# Patient Record
Sex: FEMALE | Race: WHITE | Employment: OTHER | URBAN - METROPOLITAN AREA
[De-identification: names, ages, dates, MRNs, and addresses within clinical notes are randomized per-mention and may not be internally consistent; named-entity substitution may affect disease eponyms.]

---

## 2017-12-18 ENCOUNTER — OFFICE VISIT (OUTPATIENT)
Dept: CARDIOLOGY CLINIC | Age: 82
End: 2017-12-18

## 2017-12-18 ENCOUNTER — CLINICAL SUPPORT (OUTPATIENT)
Dept: CARDIOLOGY CLINIC | Age: 82
End: 2017-12-18

## 2017-12-18 VITALS
BODY MASS INDEX: 21.73 KG/M2 | OXYGEN SATURATION: 94 % | WEIGHT: 107.8 LBS | DIASTOLIC BLOOD PRESSURE: 62 MMHG | HEIGHT: 59 IN | SYSTOLIC BLOOD PRESSURE: 160 MMHG | RESPIRATION RATE: 18 BRPM | HEART RATE: 79 BPM

## 2017-12-18 DIAGNOSIS — I25.10 CORONARY ARTERY DISEASE INVOLVING NATIVE CORONARY ARTERY OF NATIVE HEART WITHOUT ANGINA PECTORIS: Primary | ICD-10-CM

## 2017-12-18 DIAGNOSIS — R06.02 SHORTNESS OF BREATH: ICD-10-CM

## 2017-12-18 DIAGNOSIS — R09.89 CAROTID BRUIT, UNSPECIFIED LATERALITY: Primary | ICD-10-CM

## 2017-12-18 DIAGNOSIS — R00.2 HEART PALPITATIONS: Primary | ICD-10-CM

## 2017-12-18 DIAGNOSIS — H53.9 VISUAL DISTURBANCE: ICD-10-CM
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
## 2017-12-18 NOTE — PROGRESS NOTES
1. Have you been to the ER, urgent care clinic since your last visit? Hospitalized since your last visit? No.    2. Have you seen or consulted any other health care providers outside of the [address] [place] [address] since your last visit? Include any pap smears or colon screening. Yes her PCP.       Chief Complaint   Patient presents with    Annual Exam     soboe- PT STOPPED TAKING HER METOPROLOL DUE TO DOUBLE VISION

## 2017-12-20 NOTE — PROGRESS NOTES
Spoke to patient using 2 identifiers. Per Ewa Grubbs NP, pt was made aware that we are waiting for holter report to make the decision as to when to follow up. Pt verbalized understanding.

## 2017-12-20 NOTE — PROGRESS NOTES
Spoke to patient using 2 identifiers.   Per Christo Adhikari, NP, pt was made aware that carotid duplex is normal.

## 2017-12-20 NOTE — PROCEDURES
[facility]  *** FINAL REPORT ***    Name: Luisa Coker  MRN: [id number]      Outpatient  : 1928  HIS Order #: [id number]  [address] [facility] Visit #: [id number]  Date: 18 Dec 2017    TYPE OF TEST: Cerebrovascular Duplex    REASON FOR TEST    Right Carotid:-             Proximal               Mid                 Distal  cm/s  Systolic  Diastolic  Systolic  Diastolic  Systolic  Diastolic  CCA:     95.2       6.3       47.8       9.1       46.8      10.3  Bulb:  ECA:    163.0      21.3  ICA:    121.5      37.6        0.0                121.2      25.6  ICA/CCA:  2.6       3.7    ICA Stenosis: <50%    Right Vertebral:-  Finding: Antegrade  Sys:       25.7  Luisa:        9.1    Right Subclavian: Normal    Left Carotid:-            Proximal                Mid                 Distal  cm/s  Systolic  Diastolic  Systolic  Diastolic  Systolic  Diastolic  CCA:     02.2      11.1       87.8      17.3       76.8      13.2  Bulb:  ECA:    107.5      15.9  ICA:     64.9      20.1                            99.9      26.9  ICA/CCA:  0.8       1.5    ICA Stenosis: <50%    Left Vertebral:-  Finding: Antegrade  Sys:       37.4  Luisa:       11.3    Left Subclavian: Normal    INTERPRETATION/FINDINGS  1. Bilateral <50% stenosis of the internal carotid arteries. 2. No significant stenosis in the external carotid arteries  bilaterally. 3. Antegrade flow in both vertebral arteries. 4. Normal flow in both subclavian arteries. Plaque Morphology:  1. Homogeneous plaque in the bulb and right ICA. 2. Homogeneous plaque in the bulb and left ICA. ADDITIONAL COMMENTS    I have personally reviewed the data relevant to the interpretation of  this  study. TECHNOLOGIST: BUTCH Ellsworth  Signed:    PHYSICIAN: Myra Juarez.  Alanna Friend MD  Signed: 2017 08:40 AM

## 2017-12-22 ENCOUNTER — TELEPHONE (OUTPATIENT)
Dept: OTHER | Age: 82
End: 2017-12-22

## 2017-12-22 NOTE — TELEPHONE ENCOUNTER
Please call pt, she has some questions regarding monitor results. She will be leaving home in the next hour. She has requested that we call her daughter in law who she will be with Hilary Rollins) she is listed on [employer].  Her cell number is [phone]      Thanks

## 2017-12-22 NOTE — TELEPHONE ENCOUNTER
Spoke with Beau Teague on [address] [place] [address] Verified patient with two identifiers.  Informed her of abnormal heart monitor and need to discuss further with Dr Emily Castro.

## 2017-12-26 ENCOUNTER — OFFICE VISIT (OUTPATIENT)
Dept: CARDIOLOGY CLINIC | Age: 82
End: 2017-12-26

## 2017-12-26 VITALS
WEIGHT: 104.5 LBS | BODY MASS INDEX: 21.07 KG/M2 | RESPIRATION RATE: 18 BRPM | SYSTOLIC BLOOD PRESSURE: 154 MMHG | HEIGHT: 59 IN | DIASTOLIC BLOOD PRESSURE: 70 MMHG | OXYGEN SATURATION: 93 % | HEART RATE: 48 BPM

## 2017-12-26 DIAGNOSIS — R42 DIZZINESS: ICD-10-CM

## 2017-12-26 DIAGNOSIS — I49.5 SSS (SICK SINUS SYNDROME) (HCC): ICD-10-CM

## 2017-12-26 DIAGNOSIS — R00.2 HEART PALPITATIONS: Primary | ICD-10-CM

## 2017-12-26 RX ORDER — LATANOPROST 50 UG/ML
1 SOLUTION/ DROPS OPHTHALMIC
Refills: 0 | COMMUNITY
Start: 2017-11-24

## 2017-12-26 NOTE — MR AVS SNAPSHOT
Visit Information Date & Time Provider Department Dept. Phone Encounter #  
 12/26/2017 10:15 AM Sharron Bob Lisandro, [address] [facility] [phone] [id number] Your Appointments 1/25/2018  3:15 PM  
PACEMAKER with PACEMAKER, [facility] [address]) Appt Note: f/u new dcpm,jaa  
 [phone] [facility] [address].  
[phone] [address] Upcoming Health Maintenance Date Due DTaP/Tdap/Td series (1 - Tdap) 1/29/1949 ZOSTER VACCINE AGE 60> 11/29/1987 GLAUCOMA SCREENING Q2Y 1/29/1993 OSTEOPOROSIS SCREENING (DEXA) 1/29/1993 MEDICARE YEARLY EXAM 1/29/1993 Pneumococcal 65+ Low/Medium Risk (2 of 2 - PCV13) 11/11/2015 Influenza Age 5 to Adult 8/1/2017 Allergies as of 12/26/2017  Review Complete On: 12/26/2017 By: Angie Villatoro LPN No Known Allergies Current Immunizations  Never Reviewed Name Date Influenza Vaccine 10/1/2014 Pneumococcal Polysaccharide (PPSV-23) 11/11/2014  8:53 AM  
  
 Not reviewed this visit You Were Diagnosed With   
  
 Codes Comments Heart palpitations    -  Primary ICD-10-CM: R00.2 ICD-9-CM: 785.1 SSS (sick sinus syndrome) (HCC)     ICD-10-CM: I49.5 ICD-9-CM: 427.81 Dizziness     ICD-10-CM: J33 ICD-9-CM: 780.4 Vitals BP Pulse Resp Height(growth percentile) Weight(growth percentile) SpO2  
 154/70 (BP 1 Location: Left arm, BP Patient Position: Sitting) (!) 48 18 4' 11\" (1.499 m) 104 lb 8 oz (47.4 kg) 93% BMI OB Status Smoking Status 21.11 kg/m2 Postmenopausal Former Smoker Vitals History BMI and BSA Data Body Mass Index Body Surface Area  
 21.11 kg/m 2 1.4 m 2 Your Updated Medication List  
  
   
This list is accurate as of: 12/26/17 11:11 AM.  Always use your most recent med list.  
  
  
  
  
 aspirin 325 mg tablet Commonly known as:  ASPIRIN  
 Take 325 mg by mouth daily. CALCIUM 600 WITH VITAMIN D3 600 mg(1,500mg) -400 unit Cap Generic drug:  Calcium-Cholecalciferol (D3) Take 1 Cap by mouth two (2) times a day. FLOVENT  mcg/actuation inhaler Generic drug:  fluticasone Take 2 puffs by inhalation every twelve (12) hours. FOSAMAX 70 mg tablet Generic drug:  alendronate Take 70 mg by mouth every Sunday. HYDROcodone-acetaminophen 5-325 mg per tablet Commonly known as:  Aleshia Ehsan Take 1 tablet by mouth every four (4) hours as needed for Pain.  
  
 latanoprost 0.005 % ophthalmic solution Commonly known as:  Verta Piety Administer 1 Drop to both eyes nightly. LEVOTHROID 75 mcg tablet Generic drug:  levothyroxine Take 75 mcg by mouth Daily (before breakfast). metoprolol succinate 25 mg XL tablet Commonly known as:  TOPROL-XL Take 0.5 Tabs by mouth every evening.  
  
 multivitamin tablet Commonly known as:  ONE A DAY Take 1 Tab by mouth daily. QVAR 80 mcg/actuation CEED Tech Generic drug:  beclomethasone 1 Puff as needed. simvastatin 40 mg tablet Commonly known as:  ZOCOR Take 40 mg by mouth nightly. We Performed the Following AMB POC EKG ROUTINE W/ 12 LEADS, INTER & REP [49340 CPT(R)] CBC WITH AUTOMATED DIFF [75172 CPT(R)] METABOLIC PANEL, COMPREHENSIVE [75953 CPT(R)] PROTHROMBIN TIME + INR [14851 CPT(R)] To-Do List   
 12/29/2017 Imaging:  XR CHEST PA LAT Introducing [facility]! Rosalia Souza introduces [employer] patient portal. Now you can access parts of your medical record, email your doctor's office, and request medication refills online. 1. In your internet browser, go to [email or web address] 2. Click on the First Time User? Click Here link in the Sign In box. You will see the New Member Sign Up page. 3. Enter your [employer] Access Code exactly as it appears below.  You will not need to use this code after youve completed the sign-up process. If you do not sign up before the expiration date, you must request a new code. · [employer] Access Code: [email or web address] Expires: 3/18/2018 12:51 PM 
 
4. Enter the last four digits of your Social Security Number (xxxx) and Date of Birth (mm/dd/yyyy) as indicated and click Submit. You will be taken to the next sign-up page. 5. Create a Mobile[employer]iter ID. This will be your [employer] login ID and cannot be changed, so think of one that is secure and easy to remember. 6. Create a [employer] password. You can change your password at any time. 7. Enter your Password Reset Question and Answer. This can be used at a later time if you forget your password. 8. Enter your e-mail address. You will receive e-mail notification when new information is available in [address] 9. Click Sign Up. You can now view and download portions of your medical record. 10. Click the Download Summary menu link to download a portable copy of your medical information. If you have questions, please visit the Frequently Asked Questions section of the [employer] website. Remember, Mobile[employer]iter is NOT to be used for urgent needs. For medical emergencies, dial 911. Now available from your iPhone and Android! Please provide this summary of care documentation to your next provider. Your primary care clinician is listed as Marie Christensen. If you have any questions after today's visit, please call [phone].

## 2017-12-26 NOTE — PROGRESS NOTES
1. Have you been to the ER, urgent care clinic since your last visit? Hospitalized since your last visit? No    2. Have you seen or consulted any other health care providers outside of the [address] [place] [address] since your last visit? Include any pap smears or colon screening. No    Chief Complaint   Patient presents with    Palpitations     See  results. Ref by Dr. Bre Hess. C/O rare SOB.

## 2017-12-26 NOTE — PROGRESS NOTES
Subjective:      Michael Roberson is a 80 y.o. female is here for EP consult. She has sob and dizziness. Her monitor was c/w sick sinus syndrome. Patient Active Problem List    Diagnosis Date Noted    Heart palpitations 12/18/2017    Shortness of breath 09/23/2016    Pneumonia 11/13/2014    Left humeral fracture 11/13/2014    Acute respiratory failure with hypoxia ([address] 11/13/2014    Sepsis ([facility] [address] 11/10/2014    CAD (coronary artery disease) 01/01/1996      Karel Dominguez MD  Past Medical History:   Diagnosis Date    CAD (coronary artery disease) 1996    MI     Cancer ([facility] [address]     breast, lung    Hypertension     Ill-defined condition     elevated cholesterol      Past Surgical History:   Procedure Laterality Date    BREAST SURGERY PROCEDURE UNLISTED      right mastectomy     No Known Allergies   No family history on file. negative for cardiac disease  Social History     Social History    Marital status:      Spouse name: N/A    Number of children: N/A    Years of education: N/A     Social History Main Topics    Smoking status: Former Smoker     Packs/day: 0.25     Years: 10.00     Quit date: 12/26/1989    Smokeless tobacco: Never Used    Alcohol use No    Drug use: No    Sexual activity: Not Asked     Other Topics Concern    None     Social History Narrative     Current Outpatient Prescriptions   Medication Sig    QVAR 80 mcg/actuation aero 1 Puff as needed.  latanoprost (XALATAN) 0.005 % ophthalmic solution Administer 1 Drop to both eyes nightly.  multivitamin (ONE A DAY) tablet Take 1 Tab by mouth daily.  simvastatin (ZOCOR) 40 mg tablet Take 40 mg by mouth nightly.  Calcium-Cholecalciferol, D3, (CALCIUM 600 WITH VITAMIN D3) 600 mg(1,500mg) -400 unit cap Take 1 Cap by mouth two (2) times a day.  aspirin (ASPIRIN) 325 mg tablet Take 325 mg by mouth daily.  levothyroxine (LEVOTHROID) 75 mcg tablet Take 75 mcg by mouth Daily (before breakfast).     metoprolol succinate (TOPROL-XL) 25 mg XL tablet Take 0.5 Tabs by mouth every evening. (Patient taking differently: Take 12.5 mg by mouth every evening. Indications: not taken in 2 months)    alendronate (FOSAMAX) 70 mg tablet Take 70 mg by mouth every Sunday.  hydrocodone-acetaminophen (NORCO) 5-325 mg per tablet Take 1 tablet by mouth every four (4) hours as needed for Pain.  fluticasone (FLOVENT HFA) 110 mcg/actuation inhaler Take 2 puffs by inhalation every twelve (12) hours. No current facility-administered medications for this visit. Vitals:    12/26/17 1016   BP: 154/70   Pulse: (!) 48   Resp: 18   SpO2: 93%   Weight: 104 lb 8 oz (47.4 kg)   Height: 4' 11\" (1.499 m)       I have reviewed the nurses notes, vitals, problem list, allergy list, medical history, family, social history and medications. Review of Symptoms:    General: Pt denies excessive weight gain or loss. Pt is able to conduct ADL's  HEENT: Denies blurred vision, headaches, epistaxis and difficulty swallowing. Respiratory: + shortness of breath, HAND, denies wheezing or stridor. Cardiovascular: Denies precordial pain, palpitations, edema or PND  Gastrointestinal: Denies poor appetite, indigestion, abdominal pain or blood in stool  Urinary: Denies dysuria, pyuria  Musculoskeletal: Denies pain or swelling from muscles or joints  Neurologic: Denies tremor, paresthesias, or sensory motor disturbance  Skin: Denies rash, itching or texture change. Psych: Denies depression      Physical Exam:      General: Well developed, in no acute distress. HEENT: Eyes - PERRL, no jvd  Heart:  Normal S1/S2 negative S3 or S4. Regular, no murmur, gallop or rub.   Respiratory: Clear bilaterally x 4, no wheezing or rales  Abdomen:   Soft, non-tender, bowel sounds are active.   Extremities:  No edema, normal cap refill, no cyanosis. Musculoskeletal: No clubbing  Neuro: A&Ox3, speech clear, gait stable. Skin: Skin color is normal. No rashes or lesions.  Non diaphoretic  Vascular: 2+ pulses symmetric in all extremities    Cardiographics    Ekg: nsr    Monitor - junctional rhythm c/w sss    Results for orders placed or performed during the hospital encounter of 09/01/16   EKG, 12 LEAD, INITIAL   Result Value Ref Range    Ventricular Rate 80 BPM    Atrial Rate 80 BPM    P-R Interval 112 ms    QRS Duration 94 ms    Q-T Interval 402 ms    QTC Calculation (Bezet) 463 ms    Calculated P Axis 69 degrees    Calculated R Axis -96 degrees    Calculated T Axis 16 degrees    Diagnosis       Sinus rhythm with occasional premature ventricular complexes  Left atrial enlargement  Right superior axis deviation  When compared with ECG of 10-NOV-2014 13:19,  premature ventricular complexes are now present  Confirmed by Alexandrea Dao ([id number]) on 9/2/2016 1:41:31 PM           Lab Results   Component Value Date/Time    WBC 8.8 09/01/2016 06:00 PM    HGB 15.0 09/01/2016 06:00 PM    HCT 44.8 09/01/2016 06:00 PM    PLATELET 322 92/53/1278 06:00 PM    MCV 89.8 09/01/2016 06:00 PM      Lab Results   Component Value Date/Time    Sodium 141 09/01/2016 06:00 PM    Potassium 4.0 09/01/2016 06:00 PM    Chloride 107 09/01/2016 06:00 PM    CO2 27 09/01/2016 06:00 PM    Anion gap 7 09/01/2016 06:00 PM    Glucose 87 09/01/2016 06:00 PM    BUN 23 09/01/2016 06:00 PM    Creatinine 1.04 09/01/2016 06:00 PM    BUN/Creatinine ratio 22 09/01/2016 06:00 PM    GFR est AA >60 09/01/2016 06:00 PM    GFR est non-AA 50 09/01/2016 06:00 PM    Calcium 8.7 09/01/2016 06:00 PM    Bilirubin, total 0.3 09/01/2016 06:00 PM    AST (SGOT) 19 09/01/2016 06:00 PM    Alk. phosphatase 66 09/01/2016 06:00 PM    Protein, total 6.9 09/01/2016 06:00 PM    Albumin 3.6 09/01/2016 06:00 PM    Globulin 3.3 09/01/2016 06:00 PM    A-G Ratio 1.1 09/01/2016 06:00 PM    ALT (SGPT) 24 09/01/2016 06:00 PM         Assessment:     Assessment:        ICD-10-CM ICD-9-CM    1.  Heart palpitations R00.2 785.1 AMB POC EKG ROUTINE W/ 12 LEADS, INTER & REP      QVAR 80 mcg/actuation aero      latanoprost (XALATAN) 0.005 % ophthalmic solution      CBC WITH AUTOMATED DIFF      PROTHROMBIN TIME + INR      METABOLIC PANEL, COMPREHENSIVE      XR CHEST PA LAT   2. SSS (sick sinus syndrome) ([facility]) I49.5 427.81 AMB POC EKG ROUTINE W/ 12 LEADS, INTER & REP      QVAR 80 mcg/actuation aero      latanoprost (XALATAN) 0.005 % ophthalmic solution      CBC WITH AUTOMATED DIFF      PROTHROMBIN TIME + INR      METABOLIC PANEL, COMPREHENSIVE      XR CHEST PA LAT   3. Dizziness R42 780.4 AMB POC EKG ROUTINE W/ 12 LEADS, INTER & REP      QVAR 80 mcg/actuation aero      latanoprost (XALATAN) 0.005 % ophthalmic solution      CBC WITH AUTOMATED DIFF      PROTHROMBIN TIME + INR      METABOLIC PANEL, COMPREHENSIVE      XR CHEST PA LAT     Orders Placed This Encounter    XR CHEST PA LAT     Standing Status:   Future     Standing Expiration Date:   2019     Order Specific Question:   Reason for Exam     Answer:   pre op     Order Specific Question:   Is Patient Allergic to Contrast Dye? Answer:   Unknown    CBC WITH AUTOMATED DIFF    PROTHROMBIN TIME + INR    METABOLIC PANEL, COMPREHENSIVE    AMB POC EKG ROUTINE W/ 12 LEADS, INTER & REP     Order Specific Question:   Reason for Exam:     Answer:   routine    QVAR 80 mcg/actuation aero     Si Puff as needed. Refill:  0    latanoprost (XALATAN) 0.005 % ophthalmic solution     Sig: Administer 1 Drop to both eyes nightly. Refill:  0        Plan:   Ms Sherie Diaz is a pleasant lady with sick sinus syndrome/sob and dizziness. She is a candidate for a pacemaker. I discussed the risks/benefits/alternatives of the procedure with the patient. Risks include (but are not limited to) bleeding, heart block, infection, cva/mi/tamponade/death. The patient understands and agrees to proceed. Thank you for this interesting consultation. Continue medical management for htn.     Thank you for allowing me to participate in [address] Janice Coronado 's care.     Jeremiah Lawson MD, Nadeen Mcdonnell

## 2018-01-02 ENCOUNTER — HOSPITAL ENCOUNTER (OUTPATIENT)
Dept: LAB | Age: 83
Discharge: HOME | End: 2018-01-02

## 2018-01-02 ENCOUNTER — HOSPITAL ENCOUNTER (OUTPATIENT)
Dept: GENERAL RADIOLOGY | Age: 83
Discharge: HOME | End: 2018-01-02
Payer: MEDICARE

## 2018-01-02 DIAGNOSIS — R00.2 HEART PALPITATIONS: ICD-10-CM

## 2018-01-02 DIAGNOSIS — I49.5 SSS (SICK SINUS SYNDROME) (HCC): ICD-10-CM

## 2018-01-02 DIAGNOSIS — R42 DIZZINESS: ICD-10-CM

## 2018-01-02 PROCEDURE — 71046 X-RAY EXAM CHEST 2 VIEWS: CPT

## 2018-01-03 LAB
ALBUMIN SERPL-MCNC: 4.1 G/DL (ref 3.5–4.7)
ALBUMIN/GLOB SERPL: 1.6 {RATIO} (ref 1.2–2.2)
ALP SERPL-CCNC: 60 IU/L (ref 39–117)
ALT SERPL-CCNC: 16 IU/L (ref 0–32)
AST SERPL-CCNC: 21 IU/L (ref 0–40)
BASOPHILS # BLD AUTO: 0.1 X10E3/UL (ref 0–0.2)
BASOPHILS NFR BLD AUTO: 2 %
BILIRUB SERPL-MCNC: 0.5 MG/DL (ref 0–1.2)
BUN SERPL-MCNC: 15 MG/DL (ref 8–27)
BUN/CREAT SERPL: 14 (ref 12–28)
CALCIUM SERPL-MCNC: 9.4 MG/DL (ref 8.7–10.3)
CHLORIDE SERPL-SCNC: 104 MMOL/L (ref 96–106)
CO2 SERPL-SCNC: 29 MMOL/L (ref 18–29)
CREAT SERPL-MCNC: 1.04 MG/DL (ref 0.57–1)
EOSINOPHIL # BLD AUTO: 0.2 X10E3/UL (ref 0–0.4)
EOSINOPHIL NFR BLD AUTO: 2 %
ERYTHROCYTE [DISTWIDTH] IN BLOOD BY AUTOMATED COUNT: 14.5 % (ref 12.3–15.4)
GLOBULIN SER CALC-MCNC: 2.6 G/DL (ref 1.5–4.5)
GLUCOSE SERPL-MCNC: 84 MG/DL (ref 65–99)
HCT VFR BLD AUTO: 45.9 % (ref 34–46.6)
HGB BLD-MCNC: 15.4 G/DL (ref 11.1–15.9)
IMM GRANULOCYTES # BLD: 0 X10E3/UL (ref 0–0.1)
IMM GRANULOCYTES NFR BLD: 0 %
INR PPP: 1.1 (ref 0.8–1.2)
INTERPRETATION: NORMAL
LYMPHOCYTES # BLD AUTO: 1.5 X10E3/UL (ref 0.7–3.1)
LYMPHOCYTES NFR BLD AUTO: 18 %
MCH RBC QN AUTO: 31.2 PG (ref 26.6–33)
MCHC RBC AUTO-ENTMCNC: 33.6 G/DL (ref 31.5–35.7)
MCV RBC AUTO: 93 FL (ref 79–97)
MONOCYTES # BLD AUTO: 0.7 X10E3/UL (ref 0.1–0.9)
MONOCYTES NFR BLD AUTO: 8 %
NEUTROPHILS # BLD AUTO: 6.1 X10E3/UL (ref 1.4–7)
NEUTROPHILS NFR BLD AUTO: 70 %
PLATELET # BLD AUTO: 254 X10E3/UL (ref 150–379)
POTASSIUM SERPL-SCNC: 4.2 MMOL/L (ref 3.5–5.2)
PROT SERPL-MCNC: 6.7 G/DL (ref 6–8.5)
PROTHROMBIN TIME: 11.2 SEC (ref 9.1–12)
RBC # BLD AUTO: 4.93 X10E6/UL (ref 3.77–5.28)
SODIUM SERPL-SCNC: 146 MMOL/L (ref 134–144)
WBC # BLD AUTO: 8.6 X10E3/UL (ref 3.4–10.8)

## 2018-01-09 ENCOUNTER — HOSPITAL ENCOUNTER (OUTPATIENT)
Dept: NON INVASIVE DIAGNOSTICS | Age: 83
Discharge: HOME | End: 2018-01-09
Attending: INTERNAL MEDICINE | Admitting: INTERNAL MEDICINE
Payer: MEDICARE

## 2018-01-09 ENCOUNTER — APPOINTMENT (OUTPATIENT)
Dept: GENERAL RADIOLOGY | Age: 83
End: 2018-01-09
Attending: INTERNAL MEDICINE
Payer: MEDICARE

## 2018-01-09 VITALS
RESPIRATION RATE: 19 BRPM | OXYGEN SATURATION: 93 % | SYSTOLIC BLOOD PRESSURE: 170 MMHG | BODY MASS INDEX: 19.63 KG/M2 | HEART RATE: 72 BPM | TEMPERATURE: 97.9 F | WEIGHT: 100 LBS | HEIGHT: 60 IN | DIASTOLIC BLOOD PRESSURE: 87 MMHG

## 2018-01-09 PROBLEM — I49.5 SSS (SICK SINUS SYNDROME) (HCC): Status: ACTIVE | Noted: 2018-01-09

## 2018-01-09 PROCEDURE — C1898 LEAD, PMKR, OTHER THAN TRANS: HCPCS

## 2018-01-09 PROCEDURE — 33208 INSRT HEART PM ATRIAL & VENT: CPT

## 2018-01-09 PROCEDURE — 74011250636 HC RX REV CODE- 250/636

## 2018-01-09 PROCEDURE — 71045 X-RAY EXAM CHEST 1 VIEW: CPT

## 2018-01-09 PROCEDURE — 77030018836 HC SOL IRR NACL ICUM -A

## 2018-01-09 PROCEDURE — A4565 SLINGS: HCPCS

## 2018-01-09 PROCEDURE — 77030011640 HC PAD GRND REM COVD -A

## 2018-01-09 PROCEDURE — 77030031139 HC SUT VCRL2 J&J -A

## 2018-01-09 PROCEDURE — C1894 INTRO/SHEATH, NON-LASER: HCPCS

## 2018-01-09 PROCEDURE — 74011250636 HC RX REV CODE- 250/636: Performed by: INTERNAL MEDICINE

## 2018-01-09 PROCEDURE — C1892 INTRO/SHEATH,FIXED,PEEL-AWAY: HCPCS

## 2018-01-09 PROCEDURE — 74011000250 HC RX REV CODE- 250

## 2018-01-09 PROCEDURE — 74011000258 HC RX REV CODE- 258: Performed by: INTERNAL MEDICINE

## 2018-01-09 PROCEDURE — 77030002996 HC SUT SLK J&J -A

## 2018-01-09 PROCEDURE — C1785 PMKR, DUAL, RATE-RESP: HCPCS

## 2018-01-09 PROCEDURE — 77030018729 HC ELECTRD DEFIB PAD CARD -B

## 2018-01-09 RX ORDER — NALOXONE HYDROCHLORIDE 0.4 MG/ML
0.4 INJECTION, SOLUTION INTRAMUSCULAR; INTRAVENOUS; SUBCUTANEOUS AS NEEDED
Status: DISCONTINUED | OUTPATIENT
Start: 2018-01-09 | End: 2018-01-09 | Stop reason: HOSPADM

## 2018-01-09 RX ORDER — FENTANYL CITRATE 50 UG/ML
INJECTION, SOLUTION INTRAMUSCULAR; INTRAVENOUS
Status: COMPLETED
Start: 2018-01-09 | End: 2018-01-09

## 2018-01-09 RX ORDER — HEPARIN SODIUM 200 [USP'U]/100ML
INJECTION, SOLUTION INTRAVENOUS
Status: COMPLETED
Start: 2018-01-09 | End: 2018-01-09

## 2018-01-09 RX ORDER — HEPARIN SODIUM 200 [USP'U]/100ML
500 INJECTION, SOLUTION INTRAVENOUS ONCE
Status: COMPLETED | OUTPATIENT
Start: 2018-01-09 | End: 2018-01-09

## 2018-01-09 RX ORDER — BACITRACIN 50000 [IU]/1
INJECTION, POWDER, FOR SOLUTION INTRAMUSCULAR
Status: COMPLETED
Start: 2018-01-09 | End: 2018-01-09

## 2018-01-09 RX ORDER — SODIUM CHLORIDE 0.9 % (FLUSH) 0.9 %
5-10 SYRINGE (ML) INJECTION AS NEEDED
Status: DISCONTINUED | OUTPATIENT
Start: 2018-01-09 | End: 2018-01-09 | Stop reason: HOSPADM

## 2018-01-09 RX ORDER — SODIUM CHLORIDE 0.9 % (FLUSH) 0.9 %
5-10 SYRINGE (ML) INJECTION EVERY 8 HOURS
Status: DISCONTINUED | OUTPATIENT
Start: 2018-01-09 | End: 2018-01-09 | Stop reason: HOSPADM

## 2018-01-09 RX ORDER — MIDAZOLAM HYDROCHLORIDE 1 MG/ML
INJECTION, SOLUTION INTRAMUSCULAR; INTRAVENOUS
Status: COMPLETED
Start: 2018-01-09 | End: 2018-01-09

## 2018-01-09 RX ORDER — FENTANYL CITRATE 50 UG/ML
12.5-5 INJECTION, SOLUTION INTRAMUSCULAR; INTRAVENOUS
Status: DISCONTINUED | OUTPATIENT
Start: 2018-01-09 | End: 2018-01-09 | Stop reason: HOSPADM

## 2018-01-09 RX ORDER — BACITRACIN 50000 [IU]/1
50000 INJECTION, POWDER, FOR SOLUTION INTRAMUSCULAR ONCE
Status: COMPLETED | OUTPATIENT
Start: 2018-01-09 | End: 2018-01-09

## 2018-01-09 RX ORDER — LIDOCAINE HYDROCHLORIDE 10 MG/ML
INJECTION INFILTRATION; PERINEURAL
Status: COMPLETED
Start: 2018-01-09 | End: 2018-01-09

## 2018-01-09 RX ORDER — LIDOCAINE HYDROCHLORIDE 10 MG/ML
1-40 INJECTION INFILTRATION; PERINEURAL
Status: DISCONTINUED | OUTPATIENT
Start: 2018-01-09 | End: 2018-01-09 | Stop reason: HOSPADM

## 2018-01-09 RX ORDER — MIDAZOLAM HYDROCHLORIDE 1 MG/ML
1-5 INJECTION, SOLUTION INTRAMUSCULAR; INTRAVENOUS
Status: DISCONTINUED | OUTPATIENT
Start: 2018-01-09 | End: 2018-01-09 | Stop reason: HOSPADM

## 2018-01-09 RX ORDER — CEFAZOLIN SODIUM 1 G/3ML
INJECTION, POWDER, FOR SOLUTION INTRAMUSCULAR; INTRAVENOUS
Status: DISCONTINUED
Start: 2018-01-09 | End: 2018-01-09 | Stop reason: HOSPADM

## 2018-01-09 RX ADMIN — FENTANYL CITRATE 25 MCG: 50 INJECTION, SOLUTION INTRAMUSCULAR; INTRAVENOUS at 08:10

## 2018-01-09 RX ADMIN — BACITRACIN 50000 UNITS: 5000 INJECTION, POWDER, FOR SOLUTION INTRAMUSCULAR at 08:51

## 2018-01-09 RX ADMIN — CEFAZOLIN SODIUM 1 G: 1 INJECTION, POWDER, FOR SOLUTION INTRAMUSCULAR; INTRAVENOUS at 08:28

## 2018-01-09 RX ADMIN — BACITRACIN 50000 UNITS: 50000 INJECTION, POWDER, FOR SOLUTION INTRAMUSCULAR at 08:51

## 2018-01-09 RX ADMIN — Medication 1000 UNITS: at 08:33

## 2018-01-09 RX ADMIN — FENTANYL CITRATE 25 MCG: 50 INJECTION, SOLUTION INTRAMUSCULAR; INTRAVENOUS at 08:25

## 2018-01-09 RX ADMIN — MIDAZOLAM HYDROCHLORIDE 1 MG: 1 INJECTION INTRAMUSCULAR; INTRAVENOUS at 08:05

## 2018-01-09 RX ADMIN — MIDAZOLAM HYDROCHLORIDE 1 MG: 1 INJECTION, SOLUTION INTRAMUSCULAR; INTRAVENOUS at 08:05

## 2018-01-09 RX ADMIN — MIDAZOLAM HYDROCHLORIDE 1 MG: 1 INJECTION, SOLUTION INTRAMUSCULAR; INTRAVENOUS at 08:30

## 2018-01-09 RX ADMIN — LIDOCAINE HYDROCHLORIDE 30 ML: 10 INJECTION, SOLUTION INFILTRATION; PERINEURAL at 08:37

## 2018-01-09 RX ADMIN — HEPARIN SODIUM 1000 UNITS: 200 INJECTION, SOLUTION INTRAVENOUS at 08:33

## 2018-01-09 RX ADMIN — LIDOCAINE HYDROCHLORIDE 30 ML: 10 INJECTION INFILTRATION; PERINEURAL at 08:37

## 2018-01-09 NOTE — PROCEDURES
[address] [address]  [phone]    Indications and Pre-Procedure Diagnosis:  Vijay Child is a 80 y.o. female with sick sinus syndrome is referred for dual chamber pacemaker. Post Procedure Diagnosis:    Sick sinus syndrome    Pacemaker Implant Procedure and Findings:  Informed consent was obtained and the patient was premedicated with cefazolin. The procedure was performed under local anesthesia. Continuous pulse oximetry and cuff pressure were monitored. During the procedure, the patient received Versed and Fentanyl for sedation per nursing personnel. The left deltopectoral area was prepped and draped in the usual sterile fashion and was liberally infiltrated with 1% lidocaine. An incision was made over the left subpectoral area and a generator pocket was manually dissected. Access was achieved in the left axillary vein under fluoroscopic guidance and using the seldinger technique. Through the left axillary vein, pacing leads were positioned in appropriate regions in the right heart chambers where satisfactory pacing and sensing parameters were measured. Stability of the leads was assessed with deep breathing and there was no diaphragmatic pacing at 10V output. The leads were anchored using the sleeves and a pulse generator pocket fashioned using blunt dissection. The leads were then connected to the pulse generator. The pulse generator pocket was then liberally infiltrated with bacitracin solution, and the device implanted with a single silk fixation suture in the header to prevent migration. The wound was closed in layers using continuous 2-0 Vicryl and 4-0 Vicryl ending with a sub-cuticular closure. A bio-occlusive dressing were applied to the skin. Fluoroscopy and total procedure times were 1 and 30 minutes respectively. Estimated blood loss <10 ml. Sharp count: correct. Specimen(s) collected: none. The following procedure related complication occurred: none.  The following problems were encountered: none. Findings: successful pacemaker placement. Device Data Measurements:  Lead Sensing (mV) Threshold (V)Pulse Width (ms) Impedance (Ohms)  RA 3.7  3.7  0.5   903  RV 11.6  1.5  0.5   907      Final Programmed Parameters  Bradycardia pacing rate  60 bpm  Pacing Mode    DDDR  Pacing Output    3.5 V@ 0.5 ms    Supplies Summary available in the chart  [employer]    Thank you for allowing me to participate in this patients care.     Annalise Thomas MD, Clark Ness

## 2018-01-09 NOTE — PROGRESS NOTES
Ambulate with pt in thomas; gait steady. Dressing to left anterior chest wall dry and intact. Pt denies c/o. DC instructions reviewed with pt and family. All verbalize understanding. SL dc'd without difficulty. Pt dc'd to home with family via car.

## 2018-01-09 NOTE — IP AVS SNAPSHOT
[address] [facility] 
[phone] Patient: Stephany Reynolds MRN: [id number] HTD:0/15/[birth year]75 About your hospitalization You were admitted on:  January 9, 2018 You last received care in the:  [facility] CARDIAC CATH LAB You were discharged on:  January 9, 2018 Why you were hospitalized Your primary diagnosis was:  Not on File Your diagnoses also included:  Sss (Sick Sinus Syndrome) ([facility]) Follow-up Information Follow up With Details Comments Contact Info Mray Guzman MD   [address] [facility] [address] 
[phone] Your Scheduled Appointments Thursday January 25, 2018  3:15 PM EST  
PACEMAKER with PACEMAKER, [facility] [address]) [address] [facility]  
[phone] Discharge Orders None A check kathia indicates which time of day the medication should be taken. My Medications CONTINUE taking these medications Instructions Each Dose to Equal  
 Morning Noon Evening Bedtime  
 aspirin 325 mg tablet Commonly known as:  ASPIRIN Your last dose was: Your next dose is: Take 325 mg by mouth daily. 325 mg CALCIUM 600 WITH VITAMIN D3 600 mg(1,500mg) -400 unit Cap Generic drug:  Calcium-Cholecalciferol (D3) Your last dose was: Your next dose is: Take 1 Cap by mouth two (2) times a day. 1 Cap FLOVENT  mcg/actuation inhaler Generic drug:  fluticasone Your last dose was: Your next dose is: Take 2 puffs by inhalation every twelve (12) hours. 2 Puff FOSAMAX 70 mg tablet Generic drug:  alendronate Your last dose was: Your next dose is: Take 70 mg by mouth every Sunday.   
 70 mg  
    
   
   
   
  
 HYDROcodone-acetaminophen 5-325 mg per tablet Commonly known as:  [address] Jiang Your last dose was: Your next dose is: Take 1 tablet by mouth every four (4) hours as needed for Pain. 1 Tab  
    
   
   
   
  
 latanoprost 0.005 % ophthalmic solution Commonly known as:  Jaylin Sane Your last dose was: Your next dose is:    
   
   
 Administer 1 Drop to both eyes nightly. 1 Drop LEVOTHROID 75 mcg tablet Generic drug:  levothyroxine Your last dose was: Your next dose is: Take 75 mcg by mouth Daily (before breakfast). 75 mcg  
    
   
   
   
  
 metoprolol succinate 25 mg XL tablet Commonly known as:  TOPROL-XL Your last dose was: Your next dose is: Take 0.5 Tabs by mouth every evening. 12.5 mg  
    
   
   
   
  
 multivitamin tablet Commonly known as:  ONE A DAY Your last dose was: Your next dose is: Take 1 Tab by mouth daily. 1 Tab QVAR 80 mcg/actuation [employer] Generic drug:  beclomethasone Your last dose was: Your next dose is:    
   
   
 1 Puff as needed. 1 Puff  
    
   
   
   
  
 simvastatin 40 mg tablet Commonly known as:  ZOCOR Your last dose was: Your next dose is: Take 40 mg by mouth nightly. 40 mg Discharge Instructions None Introducing [address]! Micheal Carrasquillo introduces [employer] patient portal. Now you can access parts of your medical record, email your doctor's office, and request medication refills online. 1. In your internet browser, go to [email or web address] 2. Click on the First Time User? Click Here link in the Sign In box. You will see the New Member Sign Up page. 3. Enter your [employer] Access Code exactly as it appears below.  You will not need to use this code after youve completed the sign-up process. If you do not sign up before the expiration date, you must request a new code. · [employer] Access Code: [email or web address] Expires: 3/18/2018 12:51 PM 
 
4. Enter the last four digits of your Social Security Number (xxxx) and Date of Birth (mm/dd/yyyy) as indicated and click Submit. You will be taken to the next sign-up page. 5. Create a [employer] ID. This will be your [employer] login ID and cannot be changed, so think of one that is secure and easy to remember. 6. Create a [employer] password. You can change your password at any time. 7. Enter your Password Reset Question and Answer. This can be used at a later time if you forget your password. 8. Enter your e-mail address. You will receive e-mail notification when new information is available in [address] 9. Click Sign Up. You can now view and download portions of your medical record. 10. Click the Download Summary menu link to download a portable copy of your medical information. If you have questions, please visit the Frequently Asked Questions section of the [employer] website. Remember, [employer] is NOT to be used for urgent needs. For medical emergencies, dial 911. Now available from your iPhone and Android! Providers Seen During Your Hospitalization Provider Specialty Primary office phone Mari Herman MD Cardiology [phone] Your Primary Care Physician (PCP) Primary Care Physician Office Phone Office Fax 50 [facility] 61 [phone] You are allergic to the following No active allergies Recent Documentation Height Weight Breastfeeding? BMI OB Status Smoking Status 1.524 m 45.4 kg No 19.53 kg/m2 Postmenopausal Former Smoker Emergency Contacts Name Discharge Info Relation Home Work Mobile [address] CAREGIVER [3] Child [2] [phone] Patient Belongings The following personal items are in your possession at time of discharge: 
  Dental Appliances: Lowers, Uppers  Visual Aid: Glasses      Home Medications: None   Jewelry: None  Clothing: At bedside    Other Valuables: None  Personal Items Sent to Safe: none Please provide this summary of care documentation to your next provider. Signatures-by signing, you are acknowledging that this After Visit Summary has been reviewed with you and you have received a copy. Patient Signature:  ____________________________________________________________ Date:  ____________________________________________________________  
  
Cyndi Audelia Provider Signature:  ____________________________________________________________ Date:  ____________________________________________________________

## 2018-01-09 NOTE — PROGRESS NOTES
Cardiac Cath Lab Recovery Arrival Note:      Lupe Ruiz arrived to Cardiac Cath Lab, Recovery Area. Staff introduced to patient. Patient identifiers verified with NAME and DATE OF BIRTH. Procedure verified with patient. Consent forms reviewed and signed by patient or authorized representative and verified. Allergies verified. Patient and family oriented to department. Patient and family informed of procedure and plan of care. Questions answered with review. Patient prepped for procedure, per orders from physician, prior to arrival.    Patient on cardiac monitor, non-invasive blood pressure, SPO2 monitor. On room air. Patient is A&Ox 3. Patient reports no c/o. Patient in stretcher, in low position, with side rails up, call bell within reach, patient instructed to call if assistance as needed. Patient prep in: [address], [place] [address]. Patient family has pager # no c/o  Family in: [address] [facility] waiting area.    Prep by: Caroline Minor RN and [address] [facility], NR

## 2018-01-09 NOTE — INTERVAL H&P NOTE
H&P Update:  Emmie Dalton was seen and examined. History and physical has been reviewed. The patient has been examined.  There have been no significant clinical changes since the completion of the originally dated History and Physical.    Signed By: Jorje Ware MD     January 9, 2018 9:01 AM

## 2018-01-09 NOTE — H&P (VIEW-ONLY)
Subjective:      Manuel Us is a 80 y.o. female is here for EP consult. She has sob and dizziness. Her monitor was c/w sick sinus syndrome. Patient Active Problem List    Diagnosis Date Noted    Heart palpitations 12/18/2017    Shortness of breath 09/23/2016    Pneumonia 11/13/2014    Left humeral fracture 11/13/2014    Acute respiratory failure with hypoxia ([address] 11/13/2014    Sepsis ([address] 11/10/2014    CAD (coronary artery disease) 01/01/1996      Margarita Cardoza MD  Past Medical History:   Diagnosis Date    CAD (coronary artery disease) 1996    MI     Cancer ([facility]     breast, lung    Hypertension     Ill-defined condition     elevated cholesterol      Past Surgical History:   Procedure Laterality Date    BREAST SURGERY PROCEDURE UNLISTED      right mastectomy     No Known Allergies   No family history on file. negative for cardiac disease  Social History     Social History    Marital status:      Spouse name: N/A    Number of children: N/A    Years of education: N/A     Social History Main Topics    Smoking status: Former Smoker     Packs/day: 0.25     Years: 10.00     Quit date: 12/26/1989    Smokeless tobacco: Never Used    Alcohol use No    Drug use: No    Sexual activity: Not Asked     Other Topics Concern    None     Social History Narrative     Current Outpatient Prescriptions   Medication Sig    QVAR 80 mcg/actuation aero 1 Puff as needed.  latanoprost (XALATAN) 0.005 % ophthalmic solution Administer 1 Drop to both eyes nightly.  multivitamin (ONE A DAY) tablet Take 1 Tab by mouth daily.  simvastatin (ZOCOR) 40 mg tablet Take 40 mg by mouth nightly.  Calcium-Cholecalciferol, D3, (CALCIUM 600 WITH VITAMIN D3) 600 mg(1,500mg) -400 unit cap Take 1 Cap by mouth two (2) times a day.  aspirin (ASPIRIN) 325 mg tablet Take 325 mg by mouth daily.  levothyroxine (LEVOTHROID) 75 mcg tablet Take 75 mcg by mouth Daily (before breakfast).     metoprolol succinate (TOPROL-XL) 25 mg XL tablet Take 0.5 Tabs by mouth every evening. (Patient taking differently: Take 12.5 mg by mouth every evening. Indications: not taken in 2 months)    alendronate (FOSAMAX) 70 mg tablet Take 70 mg by mouth every Sunday.  hydrocodone-acetaminophen (NORCO) 5-325 mg per tablet Take 1 tablet by mouth every four (4) hours as needed for Pain.  fluticasone (FLOVENT HFA) 110 mcg/actuation inhaler Take 2 puffs by inhalation every twelve (12) hours. No current facility-administered medications for this visit. Vitals:    12/26/17 1016   BP: 154/70   Pulse: (!) 48   Resp: 18   SpO2: 93%   Weight: 104 lb 8 oz (47.4 kg)   Height: 4' 11\" (1.499 m)       I have reviewed the nurses notes, vitals, problem list, allergy list, medical history, family, social history and medications. Review of Symptoms:    General: Pt denies excessive weight gain or loss. Pt is able to conduct ADL's  HEENT: Denies blurred vision, headaches, epistaxis and difficulty swallowing. Respiratory: + shortness of breath, HAND, denies wheezing or stridor. Cardiovascular: Denies precordial pain, palpitations, edema or PND  Gastrointestinal: Denies poor appetite, indigestion, abdominal pain or blood in stool  Urinary: Denies dysuria, pyuria  Musculoskeletal: Denies pain or swelling from muscles or joints  Neurologic: Denies tremor, paresthesias, or sensory motor disturbance  Skin: Denies rash, itching or texture change. Psych: Denies depression      Physical Exam:      General: Well developed, in no acute distress. HEENT: Eyes - PERRL, no jvd  Heart:  Normal S1/S2 negative S3 or S4. Regular, no murmur, gallop or rub.   Respiratory: Clear bilaterally x 4, no wheezing or rales  Abdomen:   Soft, non-tender, bowel sounds are active.   Extremities:  No edema, normal cap refill, no cyanosis. Musculoskeletal: No clubbing  Neuro: A&Ox3, speech clear, gait stable. Skin: Skin color is normal. No rashes or lesions.  Non diaphoretic  Vascular: 2+ pulses symmetric in all extremities    Cardiographics    Ekg: nsr    Monitor - junctional rhythm c/w sss    Results for orders placed or performed during the hospital encounter of 09/01/16   EKG, 12 LEAD, INITIAL   Result Value Ref Range    Ventricular Rate 80 BPM    Atrial Rate 80 BPM    P-R Interval 112 ms    QRS Duration 94 ms    Q-T Interval 402 ms    QTC Calculation (Bezet) 463 ms    Calculated P Axis 69 degrees    Calculated R Axis -96 degrees    Calculated T Axis 16 degrees    Diagnosis       Sinus rhythm with occasional premature ventricular complexes  Left atrial enlargement  Right superior axis deviation  When compared with ECG of 10-NOV-2014 13:19,  premature ventricular complexes are now present  Confirmed by Annabelle Blount ([id number]) on 9/2/2016 1:41:31 PM           Lab Results   Component Value Date/Time    WBC 8.8 09/01/2016 06:00 PM    HGB 15.0 09/01/2016 06:00 PM    HCT 44.8 09/01/2016 06:00 PM    PLATELET 292 93/85/4648 06:00 PM    MCV 89.8 09/01/2016 06:00 PM      Lab Results   Component Value Date/Time    Sodium 141 09/01/2016 06:00 PM    Potassium 4.0 09/01/2016 06:00 PM    Chloride 107 09/01/2016 06:00 PM    CO2 27 09/01/2016 06:00 PM    Anion gap 7 09/01/2016 06:00 PM    Glucose 87 09/01/2016 06:00 PM    BUN 23 09/01/2016 06:00 PM    Creatinine 1.04 09/01/2016 06:00 PM    BUN/Creatinine ratio 22 09/01/2016 06:00 PM    GFR est AA >60 09/01/2016 06:00 PM    GFR est non-AA 50 09/01/2016 06:00 PM    Calcium 8.7 09/01/2016 06:00 PM    Bilirubin, total 0.3 09/01/2016 06:00 PM    AST (SGOT) 19 09/01/2016 06:00 PM    Alk. phosphatase 66 09/01/2016 06:00 PM    Protein, total 6.9 09/01/2016 06:00 PM    Albumin 3.6 09/01/2016 06:00 PM    Globulin 3.3 09/01/2016 06:00 PM    A-G Ratio 1.1 09/01/2016 06:00 PM    ALT (SGPT) 24 09/01/2016 06:00 PM         Assessment:     Assessment:        ICD-10-CM ICD-9-CM    1.  Heart palpitations R00.2 785.1 AMB POC EKG ROUTINE W/ 12 LEADS, INTER & REP      QVAR 80 mcg/actuation aero      latanoprost (XALATAN) 0.005 % ophthalmic solution      CBC WITH AUTOMATED DIFF      PROTHROMBIN TIME + INR      METABOLIC PANEL, COMPREHENSIVE      XR CHEST PA LAT   2. SSS (sick sinus syndrome) ([facility]) I49.5 427.81 AMB POC EKG ROUTINE W/ 12 LEADS, INTER & REP      QVAR 80 mcg/actuation aero      latanoprost (XALATAN) 0.005 % ophthalmic solution      CBC WITH AUTOMATED DIFF      PROTHROMBIN TIME + INR      METABOLIC PANEL, COMPREHENSIVE      XR CHEST PA LAT   3. Dizziness R42 780.4 AMB POC EKG ROUTINE W/ 12 LEADS, INTER & REP      QVAR 80 mcg/actuation aero      latanoprost (XALATAN) 0.005 % ophthalmic solution      CBC WITH AUTOMATED DIFF      PROTHROMBIN TIME + INR      METABOLIC PANEL, COMPREHENSIVE      XR CHEST PA LAT     Orders Placed This Encounter    XR CHEST PA LAT     Standing Status:   Future     Standing Expiration Date:   2019     Order Specific Question:   Reason for Exam     Answer:   pre op     Order Specific Question:   Is Patient Allergic to Contrast Dye? Answer:   Unknown    CBC WITH AUTOMATED DIFF    PROTHROMBIN TIME + INR    METABOLIC PANEL, COMPREHENSIVE    AMB POC EKG ROUTINE W/ 12 LEADS, INTER & REP     Order Specific Question:   Reason for Exam:     Answer:   routine    QVAR 80 mcg/actuation aero     Si Puff as needed. Refill:  0    latanoprost (XALATAN) 0.005 % ophthalmic solution     Sig: Administer 1 Drop to both eyes nightly. Refill:  0        Plan:   Ms Pretty Travis is a pleasant lady with sick sinus syndrome/sob and dizziness. She is a candidate for a pacemaker. I discussed the risks/benefits/alternatives of the procedure with the patient. Risks include (but are not limited to) bleeding, heart block, infection, cva/mi/tamponade/death. The patient understands and agrees to proceed. Thank you for this interesting consultation. Continue medical management for htn.     Thank you for allowing me to participate in [address] [employer] Julio Bonilla 's rea.     Beena Diaz MD, Jose Bell

## 2018-01-09 NOTE — PROGRESS NOTES
TRANSFER - IN REPORT:    Verbal report received from KELLY Wells on Guillermina We-07-A 1498  being received from EP for routine progression of care. Report consisted of patients Situation, Background, Assessment and Recommendations(SBAR). Information from the following report(s) Procedure Summary and MAR was reviewed with the receiving clinician. Opportunity for questions and clarification was provided. Assessment completed upon patients arrival to [address] [place] [address] and care assumed. Cardiac Cath Lab Recovery Arrival Note:    Guillermina Leos-07-A 1498 arrived to [facility] recovery area. Patient procedure= PPI. Patient on cardiac monitor, non-invasive blood pressure, SPO2 monitor. On O2 @ 2 lpm via nc. Patient status doing well without problems. Patient is A&Ox 3. Patient reports no c/o. PROCEDURE SITE CHECK:    Procedure site:without any bleeding and no hematoma, No pain/discomfort reported at procedure site. No change in patient status. Continue to monitor patient and status.

## 2018-01-25 ENCOUNTER — CLINICAL SUPPORT (OUTPATIENT)
Dept: CARDIOLOGY CLINIC | Age: 83
End: 2018-01-25

## 2018-01-25 DIAGNOSIS — I49.5 SSS (SICK SINUS SYNDROME) (HCC): Primary | ICD-10-CM

## 2018-01-25 DIAGNOSIS — Z95.0 PACEMAKER: Primary | ICD-10-CM

## 2018-01-25 DIAGNOSIS — I49.5 SSS (SICK SINUS SYNDROME) (HCC): ICD-10-CM

## 2018-01-25 DIAGNOSIS — Z51.89 VISIT FOR WOUND CHECK: ICD-10-CM

## 2018-01-25 DIAGNOSIS — R00.1 BRADYCARDIA: ICD-10-CM

## 2018-01-25 DIAGNOSIS — Z95.0 PACEMAKER: ICD-10-CM

## 2018-01-29 NOTE — PROGRESS NOTES
Livia [place]  1/29/1928  Shaggy Vela MD          Subjective:    Patient is here for 2 week site check and device interrogation after pacemaker implantation. The patient denies chest pain or shortness of breath. Patient Active Problem List    Diagnosis Date Noted    SSS (sick sinus syndrome) ([facility] 01/09/2018    Heart palpitations 12/18/2017    Shortness of breath 09/23/2016    Pneumonia 11/13/2014    Left humeral fracture 11/13/2014    Acute respiratory failure with hypoxia ([facility] 11/13/2014    Sepsis ([facility] 11/10/2014    CAD (coronary artery disease) 01/01/1996      Past Medical History:   Diagnosis Date    CAD (coronary artery disease) 1996    MI     Cancer ([facility]     breast, lung    Hypertension     Ill-defined condition     elevated cholesterol      Past Surgical History:   Procedure Laterality Date    BREAST SURGERY PROCEDURE UNLISTED      right mastectomy     No Known Allergies   No family history on file. Current Outpatient Prescriptions   Medication Sig    QVAR 80 mcg/actuation aero 1 Puff as needed.  latanoprost (XALATAN) 0.005 % ophthalmic solution Administer 1 Drop to both eyes nightly.  multivitamin (ONE A DAY) tablet Take 1 Tab by mouth daily.  metoprolol succinate (TOPROL-XL) 25 mg XL tablet Take 0.5 Tabs by mouth every evening. (Patient taking differently: Take 12.5 mg by mouth every evening. Indications: not taken in 2 months)    simvastatin (ZOCOR) 40 mg tablet Take 40 mg by mouth nightly.  alendronate (FOSAMAX) 70 mg tablet Take 70 mg by mouth every Sunday.  Calcium-Cholecalciferol, D3, (CALCIUM 600 WITH VITAMIN D3) 600 mg(1,500mg) -400 unit cap Take 1 Cap by mouth two (2) times a day.  hydrocodone-acetaminophen (NORCO) 5-325 mg per tablet Take 1 tablet by mouth every four (4) hours as needed for Pain.  aspirin (ASPIRIN) 325 mg tablet Take 325 mg by mouth daily.     fluticasone (FLOVENT HFA) 110 mcg/actuation inhaler Take 2 puffs by inhalation every twelve (12) hours.  levothyroxine (LEVOTHROID) 75 mcg tablet Take 75 mcg by mouth Daily (before breakfast). No current facility-administered medications for this visit. Review of Systems:    General: Pt denies excessive weight gain or loss. Pt is able to conduct ADL's  Respiratory: Denies shortness of breath, HAND, wheezing or stridor. Cardiovascular: Denies precordial pain, palpitations, edema or PND        Physical ExamPhysical Exam:      General: Well developed, in no acute distress. .  Chest: left subclavian pacer site approximated well, steri-strips intact  Neuro: A&Ox3, speech clear, gait stable. Assessment:   Assessment:     ICD-10-CM ICD-9-CM    1. SSS (sick sinus syndrome) ([facility]) I49.5 427.81    2. Pacemaker Z95.0 V45.01    3. Visit for wound check Z51.89 V58.89         Plan:     Patient feels well following pacemaker implantation. Left subclavian pacemaker site approximated well, no discharge. Steri-strips are intact. No erythema or heat. Device interrogation shows normal functioning (53.2%AP, 0.2% RVP, no episodes). Follow up as planned.      Micha Fontana NP

## 2018-02-06 ENCOUNTER — APPOINTMENT (OUTPATIENT)
Dept: GENERAL RADIOLOGY | Age: 83
DRG: 536 | End: 2018-02-06
Attending: EMERGENCY MEDICINE
Payer: MEDICARE

## 2018-02-06 ENCOUNTER — HOSPITAL ENCOUNTER (INPATIENT)
Age: 83
LOS: 3 days | Discharge: NURSING, REHAB OR LONG-TERM CARE | DRG: 536 | End: 2018-02-09
Attending: EMERGENCY MEDICINE | Admitting: INTERNAL MEDICINE
Payer: MEDICARE

## 2018-02-06 DIAGNOSIS — S32.501A CLOSED NONDISPLACED FRACTURE OF RIGHT PUBIS, INITIAL ENCOUNTER (HCC): Primary | ICD-10-CM

## 2018-02-06 PROBLEM — S32.509A PUBIC BONE FRACTURE (HCC): Status: ACTIVE | Noted: 2018-02-06

## 2018-02-06 LAB
ALBUMIN SERPL-MCNC: 3.9 G/DL (ref 3.5–5)
ALBUMIN/GLOB SERPL: 1.2 {RATIO} (ref 1.1–2.2)
ALP SERPL-CCNC: 66 U/L (ref 45–117)
ALT SERPL-CCNC: 27 U/L (ref 12–78)
ANION GAP SERPL CALC-SCNC: 6 MMOL/L (ref 5–15)
APTT PPP: 30.9 SEC (ref 22.1–32.5)
AST SERPL-CCNC: 27 U/L (ref 15–37)
BASOPHILS # BLD: 0.1 K/UL (ref 0–0.1)
BASOPHILS NFR BLD: 1 % (ref 0–1)
BILIRUB SERPL-MCNC: 0.6 MG/DL (ref 0.2–1)
BUN SERPL-MCNC: 17 MG/DL (ref 6–20)
BUN/CREAT SERPL: 15 (ref 12–20)
CALCIUM SERPL-MCNC: 8.6 MG/DL (ref 8.5–10.1)
CHLORIDE SERPL-SCNC: 105 MMOL/L (ref 97–108)
CK SERPL-CCNC: 60 U/L (ref 26–192)
CO2 SERPL-SCNC: 30 MMOL/L (ref 21–32)
CREAT SERPL-MCNC: 1.11 MG/DL (ref 0.55–1.02)
DIFFERENTIAL METHOD BLD: ABNORMAL
EOSINOPHIL # BLD: 0.2 K/UL (ref 0–0.4)
EOSINOPHIL NFR BLD: 2 % (ref 0–7)
ERYTHROCYTE [DISTWIDTH] IN BLOOD BY AUTOMATED COUNT: 13.5 % (ref 11.5–14.5)
GLOBULIN SER CALC-MCNC: 3.3 G/DL (ref 2–4)
GLUCOSE SERPL-MCNC: 109 MG/DL (ref 65–100)
HCT VFR BLD AUTO: 46.1 % (ref 35–47)
HGB BLD-MCNC: 16 G/DL (ref 11.5–16)
IMM GRANULOCYTES # BLD: 0.2 K/UL (ref 0–0.04)
IMM GRANULOCYTES NFR BLD AUTO: 2 % (ref 0–0.5)
INR PPP: 1.1 (ref 0.9–1.1)
LYMPHOCYTES # BLD: 1.6 K/UL (ref 0.8–3.5)
LYMPHOCYTES NFR BLD: 15 % (ref 12–49)
MCH RBC QN AUTO: 32.1 PG (ref 26–34)
MCHC RBC AUTO-ENTMCNC: 34.7 G/DL (ref 30–36.5)
MCV RBC AUTO: 92.4 FL (ref 80–99)
MONOCYTES # BLD: 0.7 K/UL (ref 0–1)
MONOCYTES NFR BLD: 7 % (ref 5–13)
NEUTS SEG # BLD: 8 K/UL (ref 1.8–8)
NEUTS SEG NFR BLD: 74 % (ref 32–75)
NRBC # BLD: 0 K/UL (ref 0–0.01)
NRBC BLD-RTO: 0 PER 100 WBC
PLATELET # BLD AUTO: 195 K/UL (ref 150–400)
PMV BLD AUTO: 11 FL (ref 8.9–12.9)
POTASSIUM SERPL-SCNC: 3.8 MMOL/L (ref 3.5–5.1)
PROT SERPL-MCNC: 7.2 G/DL (ref 6.4–8.2)
PROTHROMBIN TIME: 11.4 SEC (ref 9–11.1)
RBC # BLD AUTO: 4.99 M/UL (ref 3.8–5.2)
SODIUM SERPL-SCNC: 141 MMOL/L (ref 136–145)
THERAPEUTIC RANGE,PTTT: NORMAL SECS (ref 58–77)
TROPONIN I SERPL-MCNC: <0.04 NG/ML
WBC # BLD AUTO: 10.7 K/UL (ref 3.6–11)

## 2018-02-06 PROCEDURE — 84484 ASSAY OF TROPONIN QUANT: CPT | Performed by: EMERGENCY MEDICINE

## 2018-02-06 PROCEDURE — 96374 THER/PROPH/DIAG INJ IV PUSH: CPT

## 2018-02-06 PROCEDURE — 80053 COMPREHEN METABOLIC PANEL: CPT | Performed by: EMERGENCY MEDICINE

## 2018-02-06 PROCEDURE — 65270000029 HC RM PRIVATE

## 2018-02-06 PROCEDURE — 93005 ELECTROCARDIOGRAM TRACING: CPT

## 2018-02-06 PROCEDURE — 74011250637 HC RX REV CODE- 250/637: Performed by: EMERGENCY MEDICINE

## 2018-02-06 PROCEDURE — 71101 X-RAY EXAM UNILAT RIBS/CHEST: CPT

## 2018-02-06 PROCEDURE — 99285 EMERGENCY DEPT VISIT HI MDM: CPT

## 2018-02-06 PROCEDURE — 82550 ASSAY OF CK (CPK): CPT | Performed by: EMERGENCY MEDICINE

## 2018-02-06 PROCEDURE — G8979 MOBILITY GOAL STATUS: HCPCS

## 2018-02-06 PROCEDURE — 85025 COMPLETE CBC W/AUTO DIFF WBC: CPT | Performed by: EMERGENCY MEDICINE

## 2018-02-06 PROCEDURE — 97161 PT EVAL LOW COMPLEX 20 MIN: CPT

## 2018-02-06 PROCEDURE — 97530 THERAPEUTIC ACTIVITIES: CPT

## 2018-02-06 PROCEDURE — 85610 PROTHROMBIN TIME: CPT | Performed by: EMERGENCY MEDICINE

## 2018-02-06 PROCEDURE — 73502 X-RAY EXAM HIP UNI 2-3 VIEWS: CPT

## 2018-02-06 PROCEDURE — 74011250637 HC RX REV CODE- 250/637: Performed by: INTERNAL MEDICINE

## 2018-02-06 PROCEDURE — 94761 N-INVAS EAR/PLS OXIMETRY MLT: CPT

## 2018-02-06 PROCEDURE — 77030038269 HC DRN EXT URIN PURWCK BARD -A

## 2018-02-06 PROCEDURE — G8978 MOBILITY CURRENT STATUS: HCPCS

## 2018-02-06 PROCEDURE — 74011250636 HC RX REV CODE- 250/636: Performed by: INTERNAL MEDICINE

## 2018-02-06 PROCEDURE — 85730 THROMBOPLASTIN TIME PARTIAL: CPT | Performed by: EMERGENCY MEDICINE

## 2018-02-06 PROCEDURE — 36415 COLL VENOUS BLD VENIPUNCTURE: CPT | Performed by: EMERGENCY MEDICINE

## 2018-02-06 PROCEDURE — 74011250636 HC RX REV CODE- 250/636: Performed by: EMERGENCY MEDICINE

## 2018-02-06 RX ORDER — FERROUS SULFATE, DRIED 160(50) MG
1 TABLET, EXTENDED RELEASE ORAL DAILY
Status: DISCONTINUED | OUTPATIENT
Start: 2018-02-07 | End: 2018-02-09 | Stop reason: HOSPADM

## 2018-02-06 RX ORDER — LATANOPROST 50 UG/ML
1 SOLUTION/ DROPS OPHTHALMIC
Status: DISCONTINUED | OUTPATIENT
Start: 2018-02-06 | End: 2018-02-09 | Stop reason: HOSPADM

## 2018-02-06 RX ORDER — OXYCODONE AND ACETAMINOPHEN 5; 325 MG/1; MG/1
1 TABLET ORAL
Status: DISCONTINUED | OUTPATIENT
Start: 2018-02-06 | End: 2018-02-09 | Stop reason: HOSPADM

## 2018-02-06 RX ORDER — FACIAL-BODY WIPES
10 EACH TOPICAL DAILY PRN
Status: DISCONTINUED | OUTPATIENT
Start: 2018-02-06 | End: 2018-02-09 | Stop reason: HOSPADM

## 2018-02-06 RX ORDER — ATORVASTATIN CALCIUM 40 MG/1
40 TABLET, FILM COATED ORAL
Status: DISCONTINUED | OUTPATIENT
Start: 2018-02-06 | End: 2018-02-09 | Stop reason: HOSPADM

## 2018-02-06 RX ORDER — HYDRALAZINE HYDROCHLORIDE 20 MG/ML
10 INJECTION INTRAMUSCULAR; INTRAVENOUS
Status: DISCONTINUED | OUTPATIENT
Start: 2018-02-06 | End: 2018-02-09 | Stop reason: HOSPADM

## 2018-02-06 RX ORDER — POLYETHYLENE GLYCOL 3350 17 G/17G
17 POWDER, FOR SOLUTION ORAL DAILY
Status: DISCONTINUED | OUTPATIENT
Start: 2018-02-07 | End: 2018-02-09 | Stop reason: HOSPADM

## 2018-02-06 RX ORDER — ENOXAPARIN SODIUM 100 MG/ML
40 INJECTION SUBCUTANEOUS EVERY 24 HOURS
Status: DISCONTINUED | OUTPATIENT
Start: 2018-02-06 | End: 2018-02-06 | Stop reason: SDUPTHER

## 2018-02-06 RX ORDER — SODIUM CHLORIDE 0.9 % (FLUSH) 0.9 %
5-10 SYRINGE (ML) INJECTION EVERY 8 HOURS
Status: DISCONTINUED | OUTPATIENT
Start: 2018-02-06 | End: 2018-02-09 | Stop reason: HOSPADM

## 2018-02-06 RX ORDER — ACETAMINOPHEN 500 MG
500 TABLET ORAL
COMMUNITY
End: 2018-02-09

## 2018-02-06 RX ORDER — LANOLIN ALCOHOL/MO/W.PET/CERES
1 CREAM (GRAM) TOPICAL DAILY
COMMUNITY

## 2018-02-06 RX ORDER — SODIUM CHLORIDE 0.9 % (FLUSH) 0.9 %
5 SYRINGE (ML) INJECTION EVERY 8 HOURS
Status: DISCONTINUED | OUTPATIENT
Start: 2018-02-06 | End: 2018-02-08

## 2018-02-06 RX ORDER — ENOXAPARIN SODIUM 100 MG/ML
25 INJECTION SUBCUTANEOUS EVERY 24 HOURS
Status: DISCONTINUED | OUTPATIENT
Start: 2018-02-06 | End: 2018-02-09 | Stop reason: HOSPADM

## 2018-02-06 RX ORDER — FENTANYL CITRATE 50 UG/ML
50 INJECTION, SOLUTION INTRAMUSCULAR; INTRAVENOUS
Status: COMPLETED | OUTPATIENT
Start: 2018-02-06 | End: 2018-02-06

## 2018-02-06 RX ORDER — LEVOTHYROXINE SODIUM 75 UG/1
75 TABLET ORAL
Status: DISCONTINUED | OUTPATIENT
Start: 2018-02-07 | End: 2018-02-09 | Stop reason: HOSPADM

## 2018-02-06 RX ORDER — SODIUM CHLORIDE 0.9 % (FLUSH) 0.9 %
5-10 SYRINGE (ML) INJECTION AS NEEDED
Status: DISCONTINUED | OUTPATIENT
Start: 2018-02-06 | End: 2018-02-09 | Stop reason: HOSPADM

## 2018-02-06 RX ORDER — SODIUM CHLORIDE 9 MG/ML
50 INJECTION, SOLUTION INTRAVENOUS CONTINUOUS
Status: DISPENSED | OUTPATIENT
Start: 2018-02-06 | End: 2018-02-07

## 2018-02-06 RX ORDER — ASPIRIN 325 MG
325 TABLET ORAL
Status: DISCONTINUED | OUTPATIENT
Start: 2018-02-06 | End: 2018-02-09 | Stop reason: HOSPADM

## 2018-02-06 RX ORDER — OXYCODONE AND ACETAMINOPHEN 5; 325 MG/1; MG/1
1 TABLET ORAL
Status: COMPLETED | OUTPATIENT
Start: 2018-02-06 | End: 2018-02-06

## 2018-02-06 RX ORDER — ONDANSETRON 4 MG/1
4 TABLET, ORALLY DISINTEGRATING ORAL
Status: DISCONTINUED | OUTPATIENT
Start: 2018-02-06 | End: 2018-02-09 | Stop reason: HOSPADM

## 2018-02-06 RX ORDER — ACETAMINOPHEN 325 MG/1
650 TABLET ORAL
Status: DISCONTINUED | OUTPATIENT
Start: 2018-02-06 | End: 2018-02-09 | Stop reason: HOSPADM

## 2018-02-06 RX ADMIN — ATORVASTATIN CALCIUM 40 MG: 40 TABLET, FILM COATED ORAL at 21:34

## 2018-02-06 RX ADMIN — SODIUM CHLORIDE 75 ML/HR: 900 INJECTION, SOLUTION INTRAVENOUS at 18:30

## 2018-02-06 RX ADMIN — FENTANYL CITRATE 50 MCG: 50 INJECTION, SOLUTION INTRAMUSCULAR; INTRAVENOUS at 16:54

## 2018-02-06 RX ADMIN — HYDRALAZINE HYDROCHLORIDE 10 MG: 20 INJECTION INTRAMUSCULAR; INTRAVENOUS at 21:38

## 2018-02-06 RX ADMIN — Medication 5 ML: at 16:54

## 2018-02-06 RX ADMIN — OXYCODONE HYDROCHLORIDE AND ACETAMINOPHEN 1 TABLET: 5; 325 TABLET ORAL at 12:51

## 2018-02-06 RX ADMIN — ASPIRIN 325 MG ORAL TABLET 325 MG: 325 PILL ORAL at 21:34

## 2018-02-06 RX ADMIN — ENOXAPARIN SODIUM 25 MG: 60 INJECTION SUBCUTANEOUS at 19:06

## 2018-02-06 NOTE — ED NOTES
Received per EMS. Patient states she was walking to her son-in-law's car and lost her footing. Landed on her right hip on the sidewalk and was unable to get back up.

## 2018-02-06 NOTE — PROGRESS NOTES
Pharmacy - Enoxaparin (Lovenox®) Monitoring/Dosing      Indication: DVT ppx     Current Dose: Enoxaparin 40 subcutaneously every 24 hours    Creatinine Clearance (mL/min): 24    Current Weight: 45.4    Labs:  Recent Labs      02/06/18   1312   CREA  1.11*   HGB  16.0   PLT  195   INR  1.1     Wt Readings from Last 1 Encounters:   02/06/18 45.4 kg (100 lb 1.4 oz)     Ht Readings from Last 1 Encounters:   02/06/18 152.4 cm (60\")       Impression/Plan:   Enoxaparin dose adjusted to 0.5 mg/kg for wr <60 kg. Total dose is 25 mg daily. Thanks,  Kavita Lyons PHARMD      [email or web address]

## 2018-02-06 NOTE — IP AVS SNAPSHOT
[address] [facility] 
[phone] Patient: Maximino Guzman MRN: [id number] About your hospitalization You were admitted on:  February 6, 2018 You last received care in the:  [facility] You were discharged on:  February 9, 2018 Why you were hospitalized Your primary diagnosis was:  Not on File Your diagnoses also included:  Pubic Bone Fracture (Hcc), Uti (Urinary Tract Infection) Follow-up Information Follow up With Details Comments Contact Info [id number] [address] [facility]   [address] [place] [address] 
[phone] Nora Menon MD On 3/15/2018 For follow up appointment at 10:45AM  [address] [facility] [address] [facility] [address] 
[phone] Discharge Orders None A check kathia indicates which time of day the medication should be taken. My Medications START taking these medications Instructions Each Dose to Equal  
 Morning Noon Evening Bedtime  
 cefUROXime 500 mg tablet Commonly known as:  CEFTIN Your last dose was: Your next dose is: Take 1 Tab by mouth every twelve (12) hours for 5 days. 500 mg  
    
   
   
   
  
 oxyCODONE-acetaminophen 2.5-325 mg per tablet Commonly known as:  PERCOCET Your last dose was: Your next dose is: Take 1 Tab by mouth every six (6) hours as needed for Pain. Max Daily Amount: 4 Tabs. 1 Tab  
    
   
   
   
  
 polyethylene glycol 17 gram packet Commonly known as:  Rigoberto Samak Your last dose was: Your next dose is: Take 1 Packet by mouth daily. 17 g CHANGE how you take these medications Instructions Each Dose to Equal  
 Morning Noon Evening Bedtime  
 acetaminophen 325 mg tablet Commonly known as:  TYLENOL What changed:   
- medication strength - how much to take - when to take this 
- reasons to take this Your last dose was: Your next dose is: Take 2 Tabs by mouth every four (4) hours as needed. 650 mg CONTINUE taking these medications Instructions Each Dose to Equal  
 Morning Noon Evening Bedtime  
 aspirin 325 mg tablet Commonly known as:  ASPIRIN Your last dose was: Your next dose is: Take 325 mg by mouth nightly. 325 mg  
    
   
   
   
  
 calcium citrate-vitamin d3 315-200 mg-unit Tab Commonly known as:  CITRACAL+D Your last dose was: Your next dose is: Take 1 Tab by mouth daily. 1 Tab  
    
   
   
   
  
 latanoprost 0.005 % ophthalmic solution Commonly known as:  Roman Koehler Your last dose was: Your next dose is:    
   
   
 Administer 1 Drop to both eyes nightly. 1 Drop LEVOTHROID 75 mcg tablet Generic drug:  levothyroxine Your last dose was: Your next dose is: Take 75 mcg by mouth Daily (before breakfast). 75 mcg  
    
   
   
   
  
 multivitamin tablet Commonly known as:  ONE A DAY Your last dose was: Your next dose is: Take 1 Tab by mouth daily. 1 Tab QVAR 80 mcg/actuation Togethera Generic drug:  beclomethasone Your last dose was: Your next dose is: Take 2 Puffs by inhalation as needed. 2 Puff  
    
   
   
   
  
 simvastatin 40 mg tablet Commonly known as:  ZOCOR Your last dose was: Your next dose is: Take 40 mg by mouth nightly. 40 mg Where to Get Your Medications Information on where to get these meds will be given to you by the nurse or doctor. ! Ask your nurse or doctor about these medications  
  acetaminophen 325 mg tablet  
 cefUROXime 500 mg tablet oxyCODONE-acetaminophen 2.5-325 mg per tablet  
 polyethylene glycol 17 gram packet Discharge Instructions HOSPITALIST DISCHARGE INSTRUCTIONS 
 
NAME: Stephany Reynolds :  1928 MRN:  [id number] Date/Time:  2018 8:02 AM 
 
ADMIT DATE: 2018 DISCHARGE DATE: 2018 Attending Physician: Jorge A Pope MD 
 
DISCHARGE DIAGNOSIS: 
Pubic Fracture, UTI, Hypothyroidism, H/O Lung Cancer, SSS s/p PPM, Glaucoma, Moderate Malnutrition Medications: Per above medication reconciliation. Pain Management: per above medications Recommended diet: Regular Diet Recommended activity: Activity as tolerated Wound care: None Indwelling devices:  None Supplemental Oxygen: 2 LNC,  wean as tolerated Required Lab work: Per SNF routine Glucose management:  None Code status: DNR Outside physician follow up: Follow-up Information Follow up With Details Comments Contact Info [address] [address] 
[phone] Mary Guzman MD   [address] [address] 
[phone] F/U PCP 
F/U Orthopedics Skilled nursing facility/ SNF MD responsible for above on discharge. Information obtained by : 
I understand that if any problems occur once I am at home I am to contact my physician. I understand and acknowledge receipt of the instructions indicated above. Physician's or R.N.'s Signature                                                                  Date/Time Patient or Repres MyChart Announcement We are excited to announce that we are making your provider's discharge notes available to you in [employer]. You will see these notes when they are completed and signed by the physician that discharged you from your recent hospital stay. If you have any questions or concerns about any information you see in [employer], please call the Health Information Department where you were seen or reach out to your Primary Care Provider for more information about your plan of care. Introducing [facility]! [employer] introduces [employer] patient portal. Now you can access parts of your medical record, email your doctor's office, and request medication refills online. 1. In your internet browser, go to [email or web address] 2. Click on the First Time User? Click Here link in the Sign In box. You will see the New Member Sign Up page. 3. Enter your [employer] Access Code exactly as it appears below. You will not need to use this code after youve completed the sign-up process. If you do not sign up before the expiration date, you must request a new code. · [employer] Access Code: [email or web address] Expires: 3/18/2018 12:51 PM 
 
4. Enter the last four digits of your Social Security Number (xxxx) and Date of Birth (mm/dd/yyyy) as indicated and click Submit. You will be taken to the next sign-up page. 5. Create a [employer] ID. This will be your [employer] login ID and cannot be changed, so think of one that is secure and easy to remember. 6. Create a [employer] password. You can change your password at any time. 7. Enter your Password Reset Question and Answer. This can be used at a later time if you forget your password. 8. Enter your e-mail address. You will receive e-mail notification when new information is available in [address] 9. Click Sign Up. You can now view and download portions of your medical record.  
10. Click the Download Summary menu link to download a portable copy of your medical information. If you have questions, please visit the Frequently Asked Questions section of the [employer] website. Remember, Tap.Me is NOT to be used for urgent needs. For medical emergencies, dial 911. Now available from your iPhone and Android! Providers Seen During Your Hospitalization Provider Specialty Primary office phone Jaye Jackson DO Emergency Medicine [phone] Cirilo Ernst MD Hospitalist [phone] Your Primary Care Physician (PCP) Primary Care Physician Office Phone Office Fax [address] [address] [phone] You are allergic to the following No active allergies Recent Documentation Height Weight Breastfeeding? BMI OB Status Smoking Status 1.524 m 45.4 kg No 19.55 kg/m2 Postmenopausal Former Smoker Emergency Contacts Name Discharge Info Relation Home Work Mobile Sharonda Arenas 33 CAREGIVER [3] Child [2] [phone] Patient Belongings The following personal items are in your possession at time of discharge: 
  Dental Appliances: Lowers, Uppers, With patient  Visual Aid: Glasses, With patient      Home Medications: None   Jewelry: Ring, Watch, With patient  Clothing: Footwear, Jacket/Coat, Undergarments, Shirt, Pants, Sweater Please provide this summary of care documentation to your next provider. Signatures-by signing, you are acknowledging that this After Visit Summary has been reviewed with you and you have received a copy. Patient Signature:  ____________________________________________________________ Date:  ____________________________________________________________  
  
Paula Duvall Provider Signature:  ____________________________________________________________ Date:  ____________________________________________________________

## 2018-02-06 NOTE — ED NOTES
Patient given fentanyl slow IVP and immediately after completion she stopped breathing briefly. Sats dropped to 82%. Patient placed on oxygen and Dr. Dolly Kong notified.

## 2018-02-06 NOTE — PROGRESS NOTES
Pharmacy Clarification of Prior to Admission Medication Regimen     The patient was interviewed regarding clarification of the prior to admission medication regimen. Patient's family present in room and obtained permission from patient to discuss drug regimen with visitor(s) present. Patient was questioned regarding use of any other inhalers, topical products, over the counter medications, herbal medications, vitamin products or ophthalmic/nasal/otic medication use. Information Obtained From: Patient, Patient's Family, Outpatient Pharmacy, and Rx Query    Pertinent Pharmacy Findings:   QVAR 80 mcg/actuation aero: Per Hesham Zapata at patient's outpatient pharmacy, [phone], patient instructed to take 1 puff BID, but patient takes 2 puffs PRN.  multivitamin (ONE A DAY) tablet: Patient stated that recently, she has not been taking this agent regularly due to 'stuff going on with my pacemaker.'      PTA medication list was corrected to the following:     Prior to Admission Medications   Prescriptions Last Dose Informant Patient Reported? Taking? QVAR 80 mcg/actuation aero 2/5/2018 at Unknown time Self Yes Yes   Sig: Take 2 Puffs by inhalation as needed. acetaminophen (TYLENOL EXTRA STRENGTH) 500 mg tablet 1/6/2018 at Unknown time Self Yes Yes   Sig: Take 500 mg by mouth every six (6) hours as needed for Pain. aspirin (ASPIRIN) 325 mg tablet 2/5/2018 at Unknown time Self Yes Yes   Sig: Take 325 mg by mouth nightly. calcium citrate-vitamin d3 (CITRACAL+D) 315-200 mg-unit tab 2/5/2018 at Unknown time Child Yes Yes   Sig: Take 1 Tab by mouth daily. latanoprost (XALATAN) 0.005 % ophthalmic solution 2/5/2018 at Unknown time Self Yes Yes   Sig: Administer 1 Drop to both eyes nightly. levothyroxine (LEVOTHROID) 75 mcg tablet 2/6/2018 at Unknown time Self Yes Yes   Sig: Take 75 mcg by mouth Daily (before breakfast).    multivitamin (ONE A DAY) tablet 1/23/2018 at Unknown time Self Yes Yes   Sig: Take 1 Tab by mouth daily. simvastatin (ZOCOR) 40 mg tablet 2/5/2018 at Unknown time Self Yes Yes   Sig: Take 40 mg by mouth nightly.       Facility-Administered Medications: None          Thank you,  Gloria Batres CPhT  Medication History Pharmacy Technician

## 2018-02-06 NOTE — PROGRESS NOTES
Problem: Mobility Impaired (Adult and Pediatric)  Goal: *Acute Goals and Plan of Care (Insert Text)  Physical Therapy Goals  Initiated 2/6/2018  1. Patient will move from supine to sit and sit to supine , scoot up and down and roll side to side in bed with minimal assistance/contact guard assist within 7 day(s). 2.  Patient will transfer from bed to chair and chair to bed with minimal assistance/contact guard assist using the least restrictive device within 7 day(s). 3.  Patient will perform sit to stand with minimal assistance/contact guard assist within 7 day(s). 4.  Patient will ambulate with minimal assistance/contact guard assist for 50 feet with the least restrictive device within 7 day(s). physical Therapy Emergency Department EVALUATION with CMS G codes  recommended admission  Patient: Kiana Gee (6 y.o. female)  Date: 2/6/2018  Primary Diagnosis: There are no admission diagnoses documented for this encounter. Precautions: WBAT RLE NOTE: pt is recent PPM placement 1/9/18    ASSESSMENT :  Based on the objective data described below, the patient presents with significant RLE/groin/hip pain s/p fall limiting her ROM and activity tolerance resulting in decreased functional mobility and inability to tolerate weight on RLE and inability to amb. Asked by Dr. Laurel Herring to see pt for assess of mobility. Pt lives at home in [facility] with no steps and has been fully independent with amb and ADLs w/o device. She owns a RW. She showers standing up in walk in shower. Her family is supportive. As noted above, pt is s/p permanent pacemaker placement on 1/9/18. Pt and family state that she is still restricted for AROM and lifting with the L arm but she was to be able to raise arm above shoulder on 2/9/18. Currently, pt reports 5/10 pain at rest in R groin/hip and 10/10 with activity and movement. She also c/o aching pain in R anterior upper ribcage area.  She shows functional strength LLE and R ankle but is unable to tolerate testing on R knee/hip. Pt requires mod A and use of elevation of HOB to near 90 degrees to being movement to sitting. She requires mod A to complete movement to EOB. Her balance in sitting is fair to good. She is able to come to stand with weight mainly on LLE using a slide from EOB to stand off of higher ED stretcher. Pt maintains standing with light support on walker with min A. She is unable to weight RLE for amb or take sufficient weight on UEs for assist due to recent PPM and pain in R ribcage. Pt returned to bed with call bell in place and family present. At this time, pt would be unable to return to her prior living situation safely. Her mobility is further complicated by her recent pacemaker placement and R ribcage pain. She would benefit from PT in the acute setting and will likely need SNF rehab prior to return home to allow for safe transition. Pt is very motivated and very independent-minded and shows good potential to improve with rehab. Rounded all with Dr. Dolly Kong. Admission for this patient is recommended with continued acute therapy. PLAN :  Frequency/Duration: Pending admission, the patient will be followed by physical therapy 5 times a week to address goals.       If admitted, Recommendations and Planned Interventions:  [x]           Bed Mobility Training             []    Neuromuscular Re-Education  [x]           Transfer Training                   []    Orthotic/Prosthetic Training  [x]           Gait Training                         []    Modalities  [x]           Therapeutic Exercises           []    Edema Management/Control  [x]           Therapeutic Activities            [x]    Patient and Family Training/Education  []           Other (comment):    Discharge Recommendations:     []   Home with family  [x]   Skilled nursing facility for rehab  []   Admission to hospital with rehab likely needed  []   Inpatient rehab referral  []   Outpatient physical therapy referral  []   Other:    Further Equipment Recommendations for Discharge:   []   Rolling walker with 5\" wheels  []   Crutches   []   Cane   []   Wheelchair   [x]   Other: TBD    COMMUNICATION/EDUCATION:   Communication/Collaboration:  [x]   Fall prevention education was provided and the patient/caregiver indicated understanding. [x]   Patient/family have participated as able and agree with findings and recommendations. []   Patient is unable to participate in plan of care at this time. Findings and recommendations were discussed with: MD/DO physician and Registered Nurse       SUBJECTIVE:   Patient stated It hurts in my ribs too.     OBJECTIVE DATA SUMMARY:     Past Medical History:   Diagnosis Date    CAD (coronary artery disease) 1996    MI     Cancer ([facility] [address]     breast, lung    Hypertension     Ill-defined condition     elevated cholesterol     Past Surgical History:   Procedure Laterality Date    BREAST SURGERY PROCEDURE UNLISTED      right mastectomy     Prior Level of Function/Home Situation: Pt lives at home in [facility] with no steps and has been fully independent with amb and ADLs w/o device. She owns a RW. She showers standing up in walk in shower. Her family is supportive. As noted above, pt is s/p permanent pacemaker placement on 1/9/18. Pt and family state that she is still restricted for AROM and lifting with the L arm but she was to be able to raise arm above shoulder on 2/9/18.      Home Situation  Home Environment: Private residence  # Steps to Enter: 0  One/Two Story Residence: One story  Living Alone: Yes  Support Systems: Child(tor), Family member(s)  Patient Expects to be Discharged to[de-identified] Private residence  Current DME Used/Available at Home: Walker, rolling  Critical Behavior:  Neurologic State: Alert  Orientation Level: Oriented to person, Oriented to place, Oriented to situation, Oriented to time  Cognition: Follows commands       Strength:    Strength: Generally decreased, functional (limits to LUE due to recent PPM placement)                      Tone & Sensation:                  Sensation: Intact                 Range Of Motion:  AROM:  (RLE with pain on movement limited at hip/knee)                           Functional Mobility:  Bed Mobility:     Supine to Sit: Moderate assistance  Sit to Supine: Maximum assistance     Transfers:  Sit to Stand: Moderate assistance (slides from higher ED stretcher)  Stand to Sit: Minimum assistance                       Balance:   Sitting: Impaired  Sitting - Static: Fair (occasional)  Sitting - Dynamic:  (Not fully tested due to pain)  Standing:  (stands with RW with min A but no weight on RLE)  Ambulation/Gait Training:              Gait Description (WDL):  (unable to take weight on RLE to amb)                                          Special Tests:  Barthel Index:    Bathin  Bladder: 10  Bowels: 10  Groomin  Dressin  Feedin  Mobility: 0  Stairs: 0  Toilet Use: 0  Transfer (Bed to Chair and Back): 0  Total: 25       Barthel and G-code impairment scale:  Percentage of impairment CH  0% CI  1-19% CJ  20-39% CK  40-59% CL  60-79% CM  80-99% CN  100%   Barthel Score 0-100 100 99-80 79-60 59-40 20-39 1-19   0   Barthel Score 0-20 20 17-19 13-16 9-12 5-8 1-4 0      The Barthel ADL Index: Guidelines  1. The index should be used as a record of what a patient does, not as a record of what a patient could do. 2. The main aim is to establish degree of independence from any help, physical or verbal, however minor and for whatever reason. 3. The need for supervision renders the patient not independent. 4. A patient's performance should be established using the best available evidence. Asking the patient, friends/relatives and nurses are the usual sources, but direct observation and common sense are also important. However direct testing is not needed.   5. Usually the patient's performance over the preceding 24-48 hours is important, but occasionally longer periods will be relevant. 6. Middle categories imply that the patient supplies over 50 per cent of the effort. 7. Use of aids to be independent is allowed. Lopez Gr., Barthel, D.W. (1217). Functional evaluation: the Barthel Index. [address] [place] [employer] (14)2. DENA Kendall, Carmenza Corbin., Viancamichelle Haley., Elena Estes, [address] [place] [address] (1999). Measuring the change indisability after inpatient rehabilitation; comparison of the responsiveness of the Barthel Index and Functional [place] Measure. Journal of Neurology, Neurosurgery, and Psychiatry, 66(4), 448-819. David Smith, N.[place].A, LILIAN Chandra, & Everardo Harrison MObduliaA. (2004.) Assessment of post-stroke quality of life in cost-effectiveness studies: The usefulness of the Barthel Index and the EuroQoL-5D. Quality of Life Research, 13, 427-03        In compliance with CMSs Claims Based Outcome Reporting, the following G-code set was chosen for this patient based on their primary functional limitation being treated: The outcome measure chosen to determine the severity of the functional limitation was the barthel with a score of 25/100 which was correlated with the impairment scale. ? Mobility - Walking and Moving Around:     - CURRENT STATUS: CL - 60%-79% impaired, limited or restricted    - GOAL STATUS: CI - 1%-19% impaired, limited or restricted    - D/C STATUS:  ---------------To be determined---------------     Pain:  Pain Scale 1: Numeric (0 - 10)  Pain Intensity 1: 5 at rest; 10 with movement  Pain Location 1: Hip  Activity Tolerance:   Limited as noted by pain   Please refer to the flowsheet for vital signs taken during this treatment.   After treatment:   [x]   Patient left in no apparent distress sitting up in chair  [x]   Patient left in no apparent distress in bed  [x]   Call bell left within reach  [x]   Nursing notified  []   Caregiver present  []   Bed alarm activated        Thank you for this referral.  Dhaval Luther Rashawn Tracey, PT   Time Calculation: 31 mins

## 2018-02-06 NOTE — ED PROVIDER NOTES
EMERGENCY DEPARTMENT HISTORY AND PHYSICAL EXAM      Date: 2/6/2018  Patient Name: Stephany Reynolds    History of Presenting Illness     Chief Complaint   Patient presents with   Marce Hiss Fall     Walking to her son-in-law's car and fell on the sidewalk. Landed on her right hip. History Provided By: Patient    HPI: Stephany Reynolds, 80 y.o. female with PMHx significant for CAD, HTN, presents via EMS to the ED with cc of sudden onset, constant, moderate right hip pain that began today s/p mechanical fall. Pt lost her footing while getting into her son-in-law's car and fell onto the sidewalk on her right side. She was unable to bear weight after the fall. Pt specifically denies preceding chest pain or SOB. PCP: Mary Guzman MD    Social Hx: former Tobacco, - EtOH, - Illicit Drugs    There are no other complaints, changes, or physical findings at this time. Current Facility-Administered Medications   Medication Dose Route Frequency Provider Last Rate Last Dose    SALINE PERIPHERAL FLUSH Q8H soln 5 mL  5 mL InterCATHeter Q8H Joseph Riddle, DO   5 mL at 02/06/18 1654     Current Outpatient Prescriptions   Medication Sig Dispense Refill    acetaminophen (TYLENOL EXTRA STRENGTH) 500 mg tablet Take 500 mg by mouth every six (6) hours as needed for Pain.  calcium citrate-vitamin d3 (CITRACAL+D) 315-200 mg-unit tab Take 1 Tab by mouth daily.  QVAR 80 mcg/actuation aero Take 2 Puffs by inhalation as needed. 0    latanoprost (XALATAN) 0.005 % ophthalmic solution Administer 1 Drop to both eyes nightly. 0    multivitamin (ONE A DAY) tablet Take 1 Tab by mouth daily.  simvastatin (ZOCOR) 40 mg tablet Take 40 mg by mouth nightly.  aspirin (ASPIRIN) 325 mg tablet Take 325 mg by mouth nightly.  levothyroxine (LEVOTHROID) 75 mcg tablet Take 75 mcg by mouth Daily (before breakfast).        Past History     Past Medical History:  Past Medical History:   Diagnosis Date    CAD (coronary artery disease) 1996    MI     Cancer ([facility] [address]     breast, lung    Hypertension     Ill-defined condition     elevated cholesterol     Past Surgical History:  Past Surgical History:   Procedure Laterality Date    BREAST SURGERY PROCEDURE UNLISTED      right mastectomy     Family History:  History reviewed. No pertinent family history. Social History:  Social History   Substance Use Topics    Smoking status: Former Smoker     Packs/day: 0.25     Years: 10.00     Quit date: 12/26/1989    Smokeless tobacco: Never Used    Alcohol use No     Allergies:  No Known Allergies    Review of Systems   Review of Systems   Constitutional: Negative for fatigue and fever. Eyes: Negative. Respiratory: Negative for shortness of breath and wheezing. Cardiovascular: Negative for chest pain and leg swelling. Gastrointestinal: Negative for blood in stool, constipation, diarrhea, nausea and vomiting. Endocrine: Negative. Genitourinary: Negative for difficulty urinating and dysuria. Musculoskeletal: Positive for arthralgias (R hip). Skin: Negative for rash. Allergic/Immunologic: Negative. Neurological: Negative for weakness and numbness. Hematological: Negative. Psychiatric/Behavioral: Negative. Physical Exam   Physical Exam   Constitutional: She is oriented to person, place, and time. She appears well-developed and well-nourished. No distress. HENT:   Head: Normocephalic and atraumatic. Mouth/Throat: Oropharynx is clear and moist.   Eyes: Conjunctivae and EOM are normal.   Neck: Neck supple. No JVD present. No tracheal deviation present. Cardiovascular: Normal rate, regular rhythm and intact distal pulses. Exam reveals no gallop and no friction rub. No murmur heard. Pulses:       Dorsalis pedis pulses are 2+ on the right side, and 2+ on the left side. Pulmonary/Chest: Effort normal and breath sounds normal. No stridor. No respiratory distress. She has no wheezes. Abdominal: Soft.  Bowel sounds are normal. She exhibits no distension and no mass. There is no tenderness. There is no guarding. Musculoskeletal: She exhibits tenderness. She exhibits no edema. Tender to palpation to right lateral hip   No peripheral edema    Neurological: She is alert and oriented to person, place, and time. She has normal strength. No focal deficits  Sensation intact to bilateral lower extremities    Skin: Skin is warm and dry. Abrasion (R elbow) noted. No rash noted. Psychiatric: She has a normal mood and affect. Her behavior is normal. Judgment and thought content normal.   Nursing note and vitals reviewed. Diagnostic Study Results     Labs -     Recent Results (from the past 12 hour(s))   CBC WITH AUTOMATED DIFF    Collection Time: 02/06/18  1:12 PM   Result Value Ref Range    WBC 10.7 3.6 - 11.0 K/uL    RBC 4.99 3.80 - 5.20 M/uL    HGB 16.0 11.5 - 16.0 g/dL    HCT 46.1 35.0 - 47.0 %    MCV 92.4 80.0 - 99.0 FL    MCH 32.1 26.0 - 34.0 PG    MCHC 34.7 30.0 - 36.5 g/dL    RDW 13.5 11.5 - 14.5 %    PLATELET 024 484 - 857 K/uL    MPV 11.0 8.9 - 12.9 FL    NRBC 0.0 0  WBC    ABSOLUTE NRBC 0.00 0.00 - 0.01 K/uL    NEUTROPHILS 74 32 - 75 %    LYMPHOCYTES 15 12 - 49 %    MONOCYTES 7 5 - 13 %    EOSINOPHILS 2 0 - 7 %    BASOPHILS 1 0 - 1 %    IMMATURE GRANULOCYTES 2 (H) 0.0 - 0.5 %    ABS. NEUTROPHILS 8.0 1.8 - 8.0 K/UL    ABS. LYMPHOCYTES 1.6 0.8 - 3.5 K/UL    ABS. MONOCYTES 0.7 0.0 - 1.0 K/UL    ABS. EOSINOPHILS 0.2 0.0 - 0.4 K/UL    ABS. BASOPHILS 0.1 0.0 - 0.1 K/UL    ABS. IMM.  GRANS. 0.2 (H) 0.00 - 0.04 K/UL    DF AUTOMATED     METABOLIC PANEL, COMPREHENSIVE    Collection Time: 02/06/18  1:12 PM   Result Value Ref Range    Sodium 141 136 - 145 mmol/L    Potassium 3.8 3.5 - 5.1 mmol/L    Chloride 105 97 - 108 mmol/L    CO2 30 21 - 32 mmol/L    Anion gap 6 5 - 15 mmol/L    Glucose 109 (H) 65 - 100 mg/dL    BUN 17 6 - 20 MG/DL    Creatinine 1.11 (H) 0.55 - 1.02 MG/DL    BUN/Creatinine ratio 15 12 - 20 GFR est AA 56 (L) >60 ml/min/1.73m2    GFR est non-AA 46 (L) >60 ml/min/1.73m2    Calcium 8.6 8.5 - 10.1 MG/DL    Bilirubin, total 0.6 0.2 - 1.0 MG/DL    ALT (SGPT) 27 12 - 78 U/L    AST (SGOT) 27 15 - 37 U/L    Alk. phosphatase 66 45 - 117 U/L    Protein, total 7.2 6.4 - 8.2 g/dL    Albumin 3.9 3.5 - 5.0 g/dL    Globulin 3.3 2.0 - 4.0 g/dL    A-G Ratio 1.2 1.1 - 2.2     TROPONIN I    Collection Time: 02/06/18  1:12 PM   Result Value Ref Range    Troponin-I, Qt. <0.04 <0.05 ng/mL   CK W/ REFLX CKMB    Collection Time: 02/06/18  1:12 PM   Result Value Ref Range    CK 60 26 - 192 U/L   PROTHROMBIN TIME + INR    Collection Time: 02/06/18  1:12 PM   Result Value Ref Range    INR 1.1 0.9 - 1.1      Prothrombin time 11.4 (H) 9.0 - 11.1 sec   PTT    Collection Time: 02/06/18  1:12 PM   Result Value Ref Range    aPTT 30.9 22.1 - 32.5 sec    aPTT, therapeutic range     58.0 - 77.0 SECS   EKG, 12 LEAD, INITIAL    Collection Time: 02/06/18  1:20 PM   Result Value Ref Range    Ventricular Rate 81 BPM    Atrial Rate 81 BPM    P-R Interval 112 ms    QRS Duration 80 ms    Q-T Interval 380 ms    QTC Calculation (Bezet) 441 ms    Calculated P Axis 43 degrees    Calculated R Axis 123 degrees    Calculated T Axis 6 degrees    Diagnosis       Sinus rhythm with frequent premature ventricular complexes  Left atrial enlargement  Right axis deviation  Nonspecific ST and T wave abnormality  Abnormal ECG  When compared with ECG of 01-SEP-2016 16:39,  Questionable change in QRS axis  Borderline criteria for Inferior infarct are no longer present  Nonspecific T wave abnormality now evident in Lateral leads       Radiologic Studies -     EXAM:  XR HIP RT W OR WO PELV 2-3 VWS     INDICATION:   Trauma. Right hip pain from fall.     COMPARISON: None.     FINDINGS: An AP view of the pelvis and a true lateral view of the right hip  demonstrate fracture of the right pubis with slight displacement.  Femur appears  intact and there is no femoral head dislocation. Soft tissues show vascular  calcifications.     IMPRESSION  IMPRESSION:  Right pubic fracture. INDICATION:  fall, right chest pain     Three views of the right ribs and a frontal CXR show no apparent rib fracture or  other rib lesion. The lungs show scarring without airspace disease. There is  no pneumothorax. Heart size is slightly large. There is no pulmonary edema. Pacemaker is present.     IMPRESSION  IMPRESSION: No Right Rib Fracture; No Acute Cardiopulmonary Disease.     Medical Decision Making   I am the first provider for this patient. I reviewed the vital signs, available nursing notes, past medical history, past surgical history, family history and social history. Vital Signs-Reviewed the patient's vital signs. Patient Vitals for the past 12 hrs:   Temp Pulse Resp BP SpO2   02/06/18 1700 - 91 17 156/61 (!) 89 %   02/06/18 1645 - 93 24 183/54 90 %   02/06/18 1315 - 88 21 188/69 94 %   02/06/18 1300 - 92 17 (!) 169/104 95 %   02/06/18 1256 - - - (!) 197/112 94 %   02/06/18 1253 97.7 °F (36.5 °C) 91 18 - 96 %     Records Reviewed: Nursing Notes and Old Medical Records    EKG interpretation: (Preliminary) 13:20   Rhythm: sinus rhythm with frequency premature ventricular complexes. Rate (approx.): 81; Axis: normal; OR interval: normal; QRS interval: normal ; ST/T wave: no ST changes. Written by Asha Heard ED Scribe, as dictated by Santy Knox DO    Provider Notes (Medical Decision Making):   DDx: strain, sprain, fracture, dislocation     ED Course:   Initial assessment performed. The patients presenting problems have been discussed, and they are in agreement with the care plan formulated and outlined with them. I have encouraged them to ask questions as they arise throughout their visit. 4:10 PM  PT evaluated the pt. She was able to assist the pt to stand, but pt is unable to ambulate. Will admit.     CONSULT NOTE:  4:49 PM  Santy Knox DO spoke with  Alvin Sim,  Specialty: Hospitalist   Discussed pt's hx, disposition, and available diagnostic and imaging results. Reviewed care plans. Consultant recommends admitting the pt. Disposition:  Admit Note:  4:49 PM  Patient is being admitted to the hospital by Dr. Mario Patricio. The results of their tests and reasons for their admission have been discussed with them and/or available family. They convey agreement and understanding for the need to be admitted and for their admission diagnosis. Consultation has been made with the inpatient physician specialist for hospitalization. PLAN: Admit to Hospital      Diagnosis     Clinical Impression:   1. Closed nondisplaced fracture of right pubis, initial encounter [facility])        Attestations:    Attestation: This note is prepared by Philip Iglesias, acting as scribe for Valeri Silva, DO: The scribe's documentation has been prepared under my direction and personally reviewed by me in its entirety. I confirm that the note above accurately reflects all work, treatment, procedures, and medical decision making performed by me.

## 2018-02-07 ENCOUNTER — APPOINTMENT (OUTPATIENT)
Dept: GENERAL RADIOLOGY | Age: 83
DRG: 536 | End: 2018-02-07
Attending: NURSE PRACTITIONER
Payer: MEDICARE

## 2018-02-07 LAB
ABO + RH BLD: NORMAL
ANION GAP SERPL CALC-SCNC: 6 MMOL/L (ref 5–15)
ATRIAL RATE: 81 BPM
BASOPHILS # BLD: 0.1 K/UL (ref 0–0.1)
BASOPHILS NFR BLD: 1 % (ref 0–1)
BLOOD GROUP ANTIBODIES SERPL: NORMAL
BUN SERPL-MCNC: 14 MG/DL (ref 6–20)
BUN/CREAT SERPL: 15 (ref 12–20)
CALCIUM SERPL-MCNC: 8.3 MG/DL (ref 8.5–10.1)
CALCULATED P AXIS, ECG09: 43 DEGREES
CALCULATED R AXIS, ECG10: 123 DEGREES
CALCULATED T AXIS, ECG11: 6 DEGREES
CHLORIDE SERPL-SCNC: 107 MMOL/L (ref 97–108)
CO2 SERPL-SCNC: 28 MMOL/L (ref 21–32)
CREAT SERPL-MCNC: 0.93 MG/DL (ref 0.55–1.02)
DIAGNOSIS, 93000: NORMAL
DIFFERENTIAL METHOD BLD: ABNORMAL
EOSINOPHIL # BLD: 0.2 K/UL (ref 0–0.4)
EOSINOPHIL NFR BLD: 2 % (ref 0–7)
ERYTHROCYTE [DISTWIDTH] IN BLOOD BY AUTOMATED COUNT: 13.6 % (ref 11.5–14.5)
GLUCOSE SERPL-MCNC: 94 MG/DL (ref 65–100)
HCT VFR BLD AUTO: 42.1 % (ref 35–47)
HGB BLD-MCNC: 14.3 G/DL (ref 11.5–16)
IMM GRANULOCYTES # BLD: 0.1 K/UL (ref 0–0.04)
IMM GRANULOCYTES NFR BLD AUTO: 1 % (ref 0–0.5)
LYMPHOCYTES # BLD: 1.4 K/UL (ref 0.8–3.5)
LYMPHOCYTES NFR BLD: 14 % (ref 12–49)
MCH RBC QN AUTO: 31.4 PG (ref 26–34)
MCHC RBC AUTO-ENTMCNC: 34 G/DL (ref 30–36.5)
MCV RBC AUTO: 92.3 FL (ref 80–99)
MONOCYTES # BLD: 0.8 K/UL (ref 0–1)
MONOCYTES NFR BLD: 7 % (ref 5–13)
NEUTS SEG # BLD: 7.8 K/UL (ref 1.8–8)
NEUTS SEG NFR BLD: 75 % (ref 32–75)
NRBC # BLD: 0 K/UL (ref 0–0.01)
NRBC BLD-RTO: 0 PER 100 WBC
P-R INTERVAL, ECG05: 112 MS
PLATELET # BLD AUTO: 163 K/UL (ref 150–400)
PMV BLD AUTO: 11.1 FL (ref 8.9–12.9)
POTASSIUM SERPL-SCNC: 3.8 MMOL/L (ref 3.5–5.1)
Q-T INTERVAL, ECG07: 380 MS
QRS DURATION, ECG06: 80 MS
QTC CALCULATION (BEZET), ECG08: 441 MS
RBC # BLD AUTO: 4.56 M/UL (ref 3.8–5.2)
SODIUM SERPL-SCNC: 141 MMOL/L (ref 136–145)
SPECIMEN EXP DATE BLD: NORMAL
VENTRICULAR RATE, ECG03: 81 BPM
WBC # BLD AUTO: 10.3 K/UL (ref 3.6–11)

## 2018-02-07 PROCEDURE — 74011000250 HC RX REV CODE- 250: Performed by: INTERNAL MEDICINE

## 2018-02-07 PROCEDURE — 97530 THERAPEUTIC ACTIVITIES: CPT

## 2018-02-07 PROCEDURE — 97535 SELF CARE MNGMENT TRAINING: CPT

## 2018-02-07 PROCEDURE — 72190 X-RAY EXAM OF PELVIS: CPT

## 2018-02-07 PROCEDURE — 80048 BASIC METABOLIC PNL TOTAL CA: CPT | Performed by: INTERNAL MEDICINE

## 2018-02-07 PROCEDURE — 74011250637 HC RX REV CODE- 250/637: Performed by: INTERNAL MEDICINE

## 2018-02-07 PROCEDURE — 65270000029 HC RM PRIVATE

## 2018-02-07 PROCEDURE — 85025 COMPLETE CBC W/AUTO DIFF WBC: CPT | Performed by: INTERNAL MEDICINE

## 2018-02-07 PROCEDURE — 97165 OT EVAL LOW COMPLEX 30 MIN: CPT

## 2018-02-07 PROCEDURE — 77030038269 HC DRN EXT URIN PURWCK BARD -A

## 2018-02-07 PROCEDURE — 77010033678 HC OXYGEN DAILY

## 2018-02-07 PROCEDURE — 74011250636 HC RX REV CODE- 250/636: Performed by: INTERNAL MEDICINE

## 2018-02-07 PROCEDURE — G8987 SELF CARE CURRENT STATUS: HCPCS

## 2018-02-07 PROCEDURE — 86900 BLOOD TYPING SEROLOGIC ABO: CPT | Performed by: EMERGENCY MEDICINE

## 2018-02-07 PROCEDURE — 36415 COLL VENOUS BLD VENIPUNCTURE: CPT | Performed by: INTERNAL MEDICINE

## 2018-02-07 RX ORDER — LABETALOL HYDROCHLORIDE 5 MG/ML
10 INJECTION, SOLUTION INTRAVENOUS
Status: DISCONTINUED | OUTPATIENT
Start: 2018-02-07 | End: 2018-02-09 | Stop reason: HOSPADM

## 2018-02-07 RX ADMIN — OXYCODONE HYDROCHLORIDE AND ACETAMINOPHEN 1 TABLET: 5; 325 TABLET ORAL at 18:45

## 2018-02-07 RX ADMIN — Medication 5 ML: at 21:04

## 2018-02-07 RX ADMIN — Medication 10 ML: at 21:04

## 2018-02-07 RX ADMIN — CALCIUM CARBONATE 500 MG (1,250 MG)-VITAMIN D3 200 UNIT TABLET 1 TABLET: at 10:10

## 2018-02-07 RX ADMIN — POLYETHYLENE GLYCOL 3350 17 G: 17 POWDER, FOR SOLUTION ORAL at 10:10

## 2018-02-07 RX ADMIN — OXYCODONE HYDROCHLORIDE AND ACETAMINOPHEN 1 TABLET: 5; 325 TABLET ORAL at 12:14

## 2018-02-07 RX ADMIN — ATORVASTATIN CALCIUM 40 MG: 40 TABLET, FILM COATED ORAL at 21:04

## 2018-02-07 RX ADMIN — ASPIRIN 325 MG ORAL TABLET 325 MG: 325 PILL ORAL at 21:04

## 2018-02-07 RX ADMIN — Medication 10 ML: at 07:23

## 2018-02-07 RX ADMIN — LEVOTHYROXINE SODIUM 75 MCG: 75 TABLET ORAL at 07:22

## 2018-02-07 RX ADMIN — LATANOPROST 1 DROP: 50 SOLUTION OPHTHALMIC at 23:16

## 2018-02-07 RX ADMIN — Medication 5 ML: at 14:58

## 2018-02-07 RX ADMIN — ENOXAPARIN SODIUM 25 MG: 60 INJECTION SUBCUTANEOUS at 18:45

## 2018-02-07 NOTE — PROGRESS NOTES
Problem: Self Care Deficits Care Plan (Adult)  Goal: *Acute Goals and Plan of Care (Insert Text)  Occupational Therapy Goals  Initiated 2/7/2018  1. Patient will perform grooming sitting on EOb with supervision/set-up within 7 day(s). 2.  Patient will perform bathing UB sitting on EOb with moderate assistance  within 7 day(s). 3.  Patient will perform lower body dressing with maximal assistance with AE within 7 day(s). 4.  Patient will perform toilet transfers with moderate assistance  within 7 day(s). 5.  Patient will perform all aspects of toileting with moderate assistance  within 7 day(s). 6.  Patient will participate in upper extremity therapeutic exercise/activities with minimal assistance/contact guard assist for 5 minutes within 7 day(s). 7.  Patient will utilize energy conservation techniques during functional activities with verbal cues within 7 day(s). Occupational Therapy EVALUATION  Patient: Livia Jurado (54 y.o. female)  Date: 2/7/2018  Primary Diagnosis: Pubic bone fracture ([address]        Precautions:        ASSESSMENT :  Based on the objective data described below, the patient presents with generalized weakness, decreased endurance, strength, functional mobility, and ADLs. Pt was living at home alone and was independent in all areas per pt and son. She was cleared to be seen for therapy and was supine in bed and max assist of 2 for bed mobility, max assist for scooting to EOb, min to mod assist of 2 for standing. She was able to stand but complaining of hip pain on left and voided in the floor. Pt was min to mod assist of 2 for transfers, and extra time and needed most assist for verbal cues and moving the RW for pt. Pt was able to stand and be cleaned and nursing placed purwick prior to pt sitting down. Pt was left sitting up in chair with a snack and call bell with in reach and nursing notified.   Recommend that pt have have further therapy at discharge at rehab at SNf.    Patient will benefit from skilled intervention to address the above impairments. Patients rehabilitation potential is considered to be Fair  Factors which may influence rehabilitation potential include:   []             None noted  []             Mental ability/status  []             Medical condition  [x]             Home/family situation and support systems  []             Safety awareness  []             Pain tolerance/management  []             Other:      PLAN :  Recommendations and Planned Interventions:  [x]               Self Care Training                  []        Therapeutic Activities  [x]               Functional Mobility Training    []        Cognitive Retraining  [x]               Therapeutic Exercises           [x]        Endurance Activities  []               Balance Training                   []        Neuromuscular Re-Education  []               Visual/Perceptual Training     [x]   Home Safety Training  [x]               Patient Education                 [x]        Family Training/Education  []               Other (comment):    Frequency/Duration: Patient will be followed by occupational therapy 4 times a week to address goals. Discharge Recommendations: Jose Day  Further Equipment Recommendations for Discharge: tbd     SUBJECTIVE:   Patient stated I cant move my feet. It hurts too bad.     OBJECTIVE DATA SUMMARY:   HISTORY:   Past Medical History:   Diagnosis Date    CAD (coronary artery disease) 1996    MI     Cancer ([facility] [address]     breast, lung    Hypertension     Ill-defined condition     elevated cholesterol     Past Surgical History:   Procedure Laterality Date    BREAST SURGERY PROCEDURE UNLISTED      right mastectomy       Prior Level of Function/Environment/Context: pt lives alone and was independent in all areas per pt and son.     Expanded or extensive additional review of patient history:     Home Situation  Home Environment: Private residence  # Steps to Enter: 0  One/Two Story Residence: One story  Living Alone: Yes  Support Systems: Child(tor), Family member(s)  Patient Expects to be Discharged to[de-identified] Skilled nursing facility  Current DME Used/Available at Home: evelyn Roy [employer], straight  [x]  Right hand dominant   []  Left hand dominant    EXAMINATION OF PERFORMANCE DEFICITS:  Cognitive/Behavioral Status:  Neurologic State: Alert  Orientation Level: Appropriate for age;Oriented X4  Cognition: Appropriate decision making; Follows commands  Perception: Appears intact  Perseveration: No perseveration noted  Safety/Judgement: Awareness of environment; Fall prevention    Skin: in good health for her age    Edema: none noted    Hearing: Auditory  Auditory Impairment: None    Vision/Perceptual:                    intact                 Range of Motion:    AROM: Generally decreased, functional (PACER percautions)  PROM: Generally decreased, functional                      Strength:    Strength: Generally decreased, functional                Coordination:  Coordination: Within functional limits  Fine Motor Skills-Upper: Left Intact; Right Intact    Gross Motor Skills-Upper: Left Intact; Right Intact    Tone & Sensation:    Tone: Normal  Sensation: Intact                      Balance:  Sitting: Impaired  Sitting - Static: Good (unsupported); Fair (occasional)  Sitting - Dynamic: Fair (occasional)  Standing: Impaired; With support  Standing - Static: Good;Fair;Constant support  Standing - Dynamic : Fair    Functional Mobility and Transfers for ADLs:  Bed Mobility:  Rolling: Maximum assistance;Assist x2  Supine to Sit: Maximum assistance;Assist x2  Sit to Supine: Maximum assistance;Assist x2  Scooting: Maximum assistance;Assist x1    Transfers:  Sit to Stand:  Moderate assistance;Minimum assistance;Assist x2  Stand to Sit: Minimum assistance;Assist x2  Bed to Chair: Minimum assistance;Assist x2    ADL Assessment:  Feeding: Independent    Oral Facial Hygiene/Grooming: Setup    Bathing: Maximum assistance; Moderate assistance    Upper Body Dressing: Setup    Lower Body Dressing: Maximum assistance    Toileting: Total assistance        Cognitive Retraining  Safety/Judgement: Awareness of environment; Fall prevention         Functional Measure:  Barthel Index:    Bathin  Bladder: 5  Bowels: 10  Groomin  Dressin  Feedin  Mobility: 0  Stairs: 0  Toilet Use: 0  Transfer (Bed to Chair and Back): 0  Total: 25       Barthel and G-code impairment scale:  Percentage of impairment CH  0% CI  1-19% CJ  20-39% CK  40-59% CL  60-79% CM  80-99% CN  100%   Barthel Score 0-100 100 99-80 79-60 59-40 20-39 1-19   0   Barthel Score 0-20 20 17-19 13-16 9-12 5-8 1-4 0      The Barthel ADL Index: Guidelines  1. The index should be used as a record of what a patient does, not as a record of what a patient could do. 2. The main aim is to establish degree of independence from any help, physical or verbal, however minor and for whatever reason. 3. The need for supervision renders the patient not independent. 4. A patient's performance should be established using the best available evidence. Asking the patient, friends/relatives and nurses are the usual sources, but direct observation and common sense are also important. However direct testing is not needed. 5. Usually the patient's performance over the preceding 24-48 hours is important, but occasionally longer periods will be relevant. 6. Middle categories imply that the patient supplies over 50 per cent of the effort. 7. Use of aids to be independent is allowed. Tammy Alvarez., Barthel, DObduliaW. (6705). Functional evaluation: the Barthel Index. [facility] (14)2. DENA Bingham, Bigg Sanders., Jenise Delatorre.Terry, [address] Measuring the change indisability after inpatient rehabilitation; comparison of the responsiveness of the Barthel Index and Functional [place] Measure.  Journal of Neurology, Neurosurgery, and Psychiatry, 66(4), 415-920. ASH Denton, LILIAN Chandra, & Melba Gallardo M.A. (2004.) Assessment of post-stroke quality of life in cost-effectiveness studies: The usefulness of the Barthel Index and the EuroQoL-5D. Quality of Life Research, 13, 502-97         G codes: In compliance with CMSs Claims Based Outcome Reporting, the following G-code set was chosen for this patient based on their primary functional limitation being treated: The outcome measure chosen to determine the severity of the functional limitation was the Barthel with a score of 25/100 which was correlated with the impairment scale. ? Self Care:     - CURRENT STATUS: CL - 60%-79% impaired, limited or restricted    - GOAL STATUS: CK - 40%-59% impaired, limited or restricted    - D/C STATUS:  ---------------To be determined---------------     Occupational Therapy Evaluation Charge Determination   History Examination Decision-Making   MEDIUM Complexity : Expanded review of history including physical, cognitive and psychosocial  history  MEDIUM Complexity : 3-5 performance deficits relating to physical, cognitive , or psychosocial skils that result in activity limitations and / or participation restrictions MEDIUM Complexity : Patient may present with comorbidities that affect occupational performnce. Miniml to moderate modification of tasks or assistance (eg, physical or verbal ) with assesment(s) is necessary to enable patient to complete evaluation       Based on the above components, the patient evaluation is determined to be of the following complexity level: MEDIUM  Pain:  Pain Scale 1: Numeric (0 - 10)  Pain Intensity 1: 5  Pain Location 1: Hip  Pain Orientation 1: Right  Pain Description 1: Aching  Pain Intervention(s) 1: Medication (see MAR)  Activity Tolerance:   vss  Please refer to the flowsheet for vital signs taken during this treatment.   After treatment:   [x] Patient left in no apparent distress sitting up in chair  [] Patient left in no apparent distress in bed  [x] Call bell left within reach  [x] Nursing notified  [] Caregiver present  [] Bed alarm activated    COMMUNICATION/EDUCATION:   The patients plan of care was discussed with: Physical Therapist and Registered Nurse. [x] Home safety education was provided and the patient/caregiver indicated understanding. [x] Patient/family have participated as able in goal setting and plan of care. [x] Patient/family agree to work toward stated goals and plan of care. [] Patient understands intent and goals of therapy, but is neutral about his/her participation. [] Patient is unable to participate in goal setting and plan of care. This patients plan of care is appropriate for delegation to hospitals.     Thank you for this referral.  Meir Murillo OT  Time Calculation: 39 mins

## 2018-02-07 NOTE — PROGRESS NOTES
Problem: Mobility Impaired (Adult and Pediatric)  Goal: *Acute Goals and Plan of Care (Insert Text)  Physical Therapy Goals  Initiated 2/6/2018  1. Patient will move from supine to sit and sit to supine , scoot up and down and roll side to side in bed with minimal assistance/contact guard assist within 7 day(s). 2.  Patient will transfer from bed to chair and chair to bed with minimal assistance/contact guard assist using the least restrictive device within 7 day(s). 3.  Patient will perform sit to stand with minimal assistance/contact guard assist within 7 day(s). 4.  Patient will ambulate with minimal assistance/contact guard assist for 50 feet with the least restrictive device within 7 day(s). physical Therapy TREATMENT  Patient: Neto Sol (5 y.o. female)  Date: 2/7/2018  Diagnosis: Pubic bone fracture ([facility] [address] <principal problem not specified>       Precautions:    Chart, physical therapy assessment, plan of care and goals were reviewed. ASSESSMENT:  Pt received supine in bed and agreeable to PT intervention. Pt cleared by nursing for mobility. Pt reported 0/10 pain at rest. Performed bed mobility with max A x 1-2 and transfers with min-mod A x 2. Needs constant encouragement and education to progress mobility as pt with c/o 8/10 pain. Pt able to stand x 2 minutes with RW support while being assisted in getting cleaned up. Demonstrated difficulty with taking steps secondary to pain, therefore shuffled feet from bed>chair. On 2.5 L/min O2 on arrival with SaO2 96%, however 91% on RA during activity and left pt on 1 L/min O2 for support. Pt was left sitting in bedside chair with all needs met, RN aware, and family present. Recommend pt discharge to SNF rehab to improve functional mobility and independence prior to returning home.     Progression toward goals:  []    Improving appropriately and progressing toward goals  [x]    Improving slowly and progressing toward goals  []    Not making progress toward goals and plan of care will be adjusted     PLAN:  Patient continues to benefit from skilled intervention to address the above impairments. Continue treatment per established plan of care. Discharge Recommendations:  Jose Day  Further Equipment Recommendations for Discharge:  TBD by rehab     SUBJECTIVE:   Patient stated Oh, it hurts.     OBJECTIVE DATA SUMMARY:   Critical Behavior:  Neurologic State: Alert  Orientation Level: Appropriate for age, Oriented X4  Cognition: Appropriate decision making, Follows commands  Safety/Judgement: Awareness of environment, Fall prevention  Functional Mobility Training:  Bed Mobility:  Rolling: Maximum assistance;Assist x2  Supine to Sit: Maximum assistance;Assist x2  Sit to Supine: Maximum assistance;Assist x2  Scooting: Maximum assistance;Assist x1        Transfers:  Sit to Stand: Moderate assistance;Minimum assistance;Assist x2  Stand to Sit: Minimum assistance;Assist x2        Bed to Chair: Minimum assistance;Assist x2                    Balance:  Sitting: Impaired  Sitting - Static: Good (unsupported); Fair (occasional)  Sitting - Dynamic: Fair (occasional)  Standing: Impaired; With support  Standing - Static: Good;Fair;Constant support  Standing - Dynamic : Fair  Ambulation/Gait Training:          Pain:  Pain Scale 1: Numeric (0 - 10)  Pain Intensity 1: 5  Pain Location 1: Hip  Pain Orientation 1: Right  Pain Description 1: Aching  Pain Intervention(s) 1: Medication (see MAR)  Activity Tolerance:   Fair - 8/10 pain during activity; SaO2 91% on RA during activity  Please refer to the flowsheet for vital signs taken during this treatment.   After treatment:   [x]    Patient left in no apparent distress sitting up in chair  []    Patient left in no apparent distress in bed  [x]    Call bell left within reach  [x]    Nursing notified  [x]    Caregiver present  []    Bed alarm activated    COMMUNICATION/COLLABORATION:   The patients plan of care was discussed with: Physical Therapist, Occupational Therapist and Registered Nurse    Fern Lopez, PT, DPT   Time Calculation: 34 mins

## 2018-02-07 NOTE — PROGRESS NOTES
Hospitalist Progress Note    NAME: Emmie Dalton   :  1928   MRN:  [id number]       Assessment / Plan:  R pubic fracture from mechanical fall: c/w pain management, PT/OT, waiting for Ortho evaluation, will need SNF on D/C. Hypothyroidism c/w  synthroid  Hx of Lung CA s/p resection, chemo and XRT, completed in , not on O2  CAD/hx of MI  SSS s/p pacemaker placement on 18  -cont' asa and statin, no need for interrogation, this was not syncope  Glaucoma con't home eyedrop  Moderate Malnutrition: counseled BMI 19.55  Surrogate Decision Maker: son  Baseline: lives alone in   Code status: DNR  Prophylaxis: Lovenox  Recommended Disposition: SNF/LTC     Subjective:     Chief Complaint / Reason for Physician Visit  \"I fell and now can not walk\". Discussed with RN events overnight. Review of Systems:  Symptom Y/N Comments  Symptom Y/N Comments   Fever/Chills    Chest Pain     Poor Appetite    Edema     Cough    Abdominal Pain     Sputum    Joint Pain     SOB/HAND    Pruritis/Rash     Nausea/vomit    Tolerating PT/OT     Diarrhea    Tolerating Diet y    Constipation    Other y Joint pain     Could NOT obtain due to:      Objective:     VITALS:   Last 24hrs VS reviewed since prior progress note.  Most recent are:  Patient Vitals for the past 24 hrs:   Temp Pulse Resp BP SpO2   18 1010 98.3 °F (36.8 °C) 100 18 134/71 94 %   18 0612 98 °F (36.7 °C) 100 18 149/85 98 %   18 0200 98.2 °F (36.8 °C) 85 18 153/69 96 %   18 2220 98.6 °F (37 °C) 95 16 151/67 95 %   18 2130 98.8 °F (37.1 °C) 82 18 200/76 93 %   18 1957 98.8 °F (37.1 °C) 90 18 171/84 96 %   18 1755 - 95 22 173/62 93 %   18 1730 - 96 21 187/74 94 %   18 1700 - 91 17 156/61 (!) 89 %   18 1645 - 93 24 183/54 90 %   18 1315 - 88 21 188/69 94 %   18 1300 - 92 17 (!) 169/104 95 %   18 1256 - - - (!) 197/112 94 %   18 1253 97.7 °F (36.5 °C) 91 18 - 96 % Intake/Output Summary (Last 24 hours) at 02/07/18 1032  Last data filed at 02/07/18 0512   Gross per 24 hour   Intake              560 ml   Output              450 ml   Net              110 ml        PHYSICAL EXAM:  General: WD, WN. Alert, cooperative, no acute distress    EENT:  EOMI. Anicteric sclerae. MMM  Resp:  Coarse BS  CV:  Regular  rhythm,  No edema  GI:  Soft, Non distended, Non tender.  +Bowel sounds  Neurologic:  Alert and oriented X 2, normal speech,   Psych:   Fair  insight. Not anxious nor agitated  Skin:  No rashes. No jaundice    Reviewed most current lab test results and cultures  YES  Reviewed most current radiology test results   YES  Review and summation of old records today    NO  Reviewed patient's current orders and MAR    YES  PMH/SH reviewed - no change compared to H&P  ________________________________________________________________________  Care Plan discussed with:    Comments   Patient y    Family  y sons   RN y    Care Manager     Consultant                        Multidiciplinary team rounds were held today with , nursing, pharmacist and clinical coordinator. Patient's plan of care was discussed; medications were reviewed and discharge planning was addressed. ________________________________________________________________________  Total NON critical care TIME:  35   Minutes    Total CRITICAL CARE TIME Spent:   Minutes non procedure based      Comments   >50% of visit spent in counseling and coordination of care y    ________________________________________________________________________  Lopez William MD     Procedures: see electronic medical records for all procedures/Xrays and details which were not copied into this note but were reviewed prior to creation of Plan. LABS:  I reviewed today's most current labs and imaging studies.   Pertinent labs include:  Recent Labs      02/07/18   0257  02/06/18   1312   WBC  10.3  10.7   HGB  14.3  16.0   HCT 42.1  46.1   PLT  163  195     Recent Labs      02/07/18   0257  02/06/18   1312   NA  141  141   K  3.8  3.8   CL  107  105   CO2  28  30   GLU  94  109*   BUN  14  17   CREA  0.93  1.11*   CA  8.3*  8.6   ALB   --   3.9   TBILI   --   0.6   SGOT   --   27   ALT   --   27   INR   --   1.1       Signed: Red Arriola MD

## 2018-02-07 NOTE — ED NOTES
PureWick placed at 1800 for urination as patient does not feel she can use a bedpan at this time. TRANSFER - OUT REPORT:    Verbal report given to Samuel(name) on Tod Johnson  being transferred to Ortho(unit) for routine progression of care       Report consisted of patients Situation, Background, Assessment and   Recommendations(SBAR). Information from the following report(s) SBAR, Kardex, ED Summary, Intake/Output, MAR, Recent Results and Cardiac Rhythm NSR with PVC's was reviewed with the receiving nurse. Lines:   Peripheral IV 02/06/18 Left Antecubital (Active)   Site Assessment Clean, dry, & intact 2/6/2018  1:14 PM   Phlebitis Assessment 0 2/6/2018  1:14 PM   Infiltration Assessment 0 2/6/2018  1:14 PM   Dressing Status Clean, dry, & intact 2/6/2018  1:14 PM   Dressing Type Tape;Transparent 2/6/2018  1:14 PM        Opportunity for questions and clarification was provided.       Patient transported with:   [facility]

## 2018-02-07 NOTE — PROGRESS NOTES
Primary Nurse Sierra Shine, KELLY and Ada Valdovinos RN performed a dual skin assessment on this patient No impairment noted  Gerardo score is 15

## 2018-02-07 NOTE — PROGRESS NOTES
IP consult completed for disposition. Pt is a 81 yo  female admitted with a pubic bone fracture. Spoke with pt's son and, prior to admission, pt lived alone and supportive family provided transportation. Pt has no previous SNF experiences. PT recommending SNF at d/c. Discussed possible d/c disposition of SNF. Son does not have a current preference for SNF. SW left SNF left at bedside for family to look over and pick 2-3 preferences. Care Management Interventions  PCP Verified by CM: Yes  Mode of Transport at Discharge: Other (see comment) (Pt does not drive but has supportive family who provide transportation )  Transition of Care Consult (CM Consult): Discharge Planning  Physical Therapy Consult: Yes  Occupational Therapy Consult: Yes  Current Support Network: Own Home  Confirm Follow Up Transport: Family  Plan discussed with Pt/Family/Caregiver: Yes  Discharge Location  Discharge Placement:  (SNF)    CM will continue to follow and assist with discharge planning.      George Tim, MSW  [address], MSW

## 2018-02-07 NOTE — CONSULTS
ORTHOPAEDIC CONSULT NOTE    Subjective:     Date of Consultation:  February 7, 2018      Melre Olson is a 80 y.o. female who is being seen for R sided pain s/p GLF yesterday while trying to get in the car per son, pt states she fell on the floor. Work up reveled a R sided pubic rami fx with mild displacement. Pt c/o R pelvic pain with movement and R arm/ rib pain. Pt lives alone. Patient Active Problem List    Diagnosis Date Noted    Pubic bone fracture ([facility] [address] 02/06/2018    SSS (sick sinus syndrome) ([facility] [address] 01/09/2018    Heart palpitations 12/18/2017    Shortness of breath 09/23/2016    Pneumonia 11/13/2014    Left humeral fracture 11/13/2014    Acute respiratory failure with hypoxia ([facility] [address] 11/13/2014    Sepsis ([facility] 11/10/2014    CAD (coronary artery disease) 01/01/1996     History reviewed. No pertinent family history. Social History   Substance Use Topics    Smoking status: Former Smoker     Packs/day: 0.25     Years: 10.00     Quit date: 12/26/1989    Smokeless tobacco: Never Used    Alcohol use No     Past Medical History:   Diagnosis Date    CAD (coronary artery disease) 1996    MI     Cancer ([facility]     breast, lung    Hypertension     Ill-defined condition     elevated cholesterol      Past Surgical History:   Procedure Laterality Date    BREAST SURGERY PROCEDURE UNLISTED      right mastectomy      Prior to Admission medications    Medication Sig Start Date End Date Taking? Authorizing Provider   acetaminophen (TYLENOL EXTRA STRENGTH) 500 mg tablet Take 500 mg by mouth every six (6) hours as needed for Pain. Yes Historical Provider   calcium citrate-vitamin d3 (CITRACAL+D) 315-200 mg-unit tab Take 1 Tab by mouth daily. Yes Historical Provider   QVAR 80 mcg/actuation aero Take 2 Puffs by inhalation as needed. 12/6/17  Yes Historical Provider   latanoprost (XALATAN) 0.005 % ophthalmic solution Administer 1 Drop to both eyes nightly.  11/24/17  Yes Historical Provider   multivitamin (ONE A DAY) tablet Take 1 Tab by mouth daily. Yes Historical Provider   simvastatin (ZOCOR) 40 mg tablet Take 40 mg by mouth nightly. Yes Historical Provider   aspirin (ASPIRIN) 325 mg tablet Take 325 mg by mouth nightly. Yes Janak Aguirre MD   levothyroxine (LEVOTHROID) 75 mcg tablet Take 75 mcg by mouth Daily (before breakfast). Yes Janak Other, MD     Current Facility-Administered Medications   Medication Dose Route Frequency    labetalol (NORMODYNE;TRANDATE) injection 10 mg  10 mg IntraVENous Q6H PRN    SALINE PERIPHERAL FLUSH Q8H soln 5 mL  5 mL InterCATHeter Q8H    sodium chloride (NS) flush 5-10 mL  5-10 mL IntraVENous Q8H    sodium chloride (NS) flush 5-10 mL  5-10 mL IntraVENous PRN    0.9% sodium chloride infusion  50 mL/hr IntraVENous CONTINUOUS    acetaminophen (TYLENOL) tablet 650 mg  650 mg Oral Q4H PRN    ondansetron (ZOFRAN ODT) tablet 4 mg  4 mg Oral Q4H PRN    bisacodyl (DULCOLAX) suppository 10 mg  10 mg Rectal DAILY PRN    aspirin (ASPIRIN) tablet 325 mg  325 mg Oral QHS    calcium-vitamin D (OS-ZAMZAM) 500 mg-200 unit tablet  1 Tab Oral DAILY    latanoprost (XALATAN) 0.005 % ophthalmic solution 1 Drop  1 Drop Both Eyes QHS    levothyroxine (SYNTHROID) tablet 75 mcg  75 mcg Oral ACB    atorvastatin (LIPITOR) tablet 40 mg  40 mg Oral QHS    hydrALAZINE (APRESOLINE) 20 mg/mL injection 10 mg  10 mg IntraVENous Q6H PRN    oxyCODONE-acetaminophen (PERCOCET) 5-325 mg per tablet 1 Tab  1 Tab Oral Q6H PRN    enoxaparin (LOVENOX) injection 25 mg +++partial dose+++  25 mg SubCUTAneous Q24H    polyethylene glycol (MIRALAX) packet 17 g  17 g Oral DAILY      No Known Allergies     Review of Systems:  A comprehensive review of systems was negative except for that written in the HPI.     Mental Status: mild dementia    Objective:     Patient Vitals for the past 8 hrs:   BP Temp Pulse Resp SpO2   02/07/18 1010 134/71 98.3 °F (36.8 °C) 100 18 94 %   02/07/18 0612 149/85 98 °F (36.7 °C) 100 18 98 %     Temp (24hrs), Av.3 °F (36.8 °C), Min:97.7 °F (36.5 °C), Max:98.8 °F (37.1 °C)      Gen: Well-developed,  in no acute distress   Musc: RLE/LLE - + ROM, mild TTP of pelvis, NVI     RUE/LUE - + ROM, NTTP, NVI   Skin: No skin breakdown noted. Skin warm, pink, dry  Neuro: Cranial nerves are grossly intact, no focal motor weakness, follows commands appropriately   Psych: Good insight, oriented to person, place and time, alert    Imaging Review: INDICATION:   Trauma. Right hip pain from fall.     COMPARISON: None.     FINDINGS: An AP view of the pelvis and a true lateral view of the right hip  demonstrate fracture of the right pubis with slight displacement. Femur appears  intact and there is no femoral head dislocation. Soft tissues show vascular  calcifications.     IMPRESSION  IMPRESSION:  Right pubic fracture.          Labs:   Recent Results (from the past 24 hour(s))   CBC WITH AUTOMATED DIFF    Collection Time: 18  1:12 PM   Result Value Ref Range    WBC 10.7 3.6 - 11.0 K/uL    RBC 4.99 3.80 - 5.20 M/uL    HGB 16.0 11.5 - 16.0 g/dL    HCT 46.1 35.0 - 47.0 %    MCV 92.4 80.0 - 99.0 FL    MCH 32.1 26.0 - 34.0 PG    MCHC 34.7 30.0 - 36.5 g/dL    RDW 13.5 11.5 - 14.5 %    PLATELET 659 578 - 592 K/uL    MPV 11.0 8.9 - 12.9 FL    NRBC 0.0 0  WBC    ABSOLUTE NRBC 0.00 0.00 - 0.01 K/uL    NEUTROPHILS 74 32 - 75 %    LYMPHOCYTES 15 12 - 49 %    MONOCYTES 7 5 - 13 %    EOSINOPHILS 2 0 - 7 %    BASOPHILS 1 0 - 1 %    IMMATURE GRANULOCYTES 2 (H) 0.0 - 0.5 %    ABS. NEUTROPHILS 8.0 1.8 - 8.0 K/UL    ABS. LYMPHOCYTES 1.6 0.8 - 3.5 K/UL    ABS. MONOCYTES 0.7 0.0 - 1.0 K/UL    ABS. EOSINOPHILS 0.2 0.0 - 0.4 K/UL    ABS. BASOPHILS 0.1 0.0 - 0.1 K/UL    ABS. IMM.  GRANS. 0.2 (H) 0.00 - 0.04 K/UL    DF AUTOMATED     METABOLIC PANEL, COMPREHENSIVE    Collection Time: 18  1:12 PM   Result Value Ref Range    Sodium 141 136 - 145 mmol/L    Potassium 3.8 3.5 - 5.1 mmol/L    Chloride 105 97 - 108 mmol/L    CO2 30 21 - 32 mmol/L    Anion gap 6 5 - 15 mmol/L    Glucose 109 (H) 65 - 100 mg/dL    BUN 17 6 - 20 MG/DL    Creatinine 1.11 (H) 0.55 - 1.02 MG/DL    BUN/Creatinine ratio 15 12 - 20      GFR est AA 56 (L) >60 ml/min/1.73m2    GFR est non-AA 46 (L) >60 ml/min/1.73m2    Calcium 8.6 8.5 - 10.1 MG/DL    Bilirubin, total 0.6 0.2 - 1.0 MG/DL    ALT (SGPT) 27 12 - 78 U/L    AST (SGOT) 27 15 - 37 U/L    Alk.  phosphatase 66 45 - 117 U/L    Protein, total 7.2 6.4 - 8.2 g/dL    Albumin 3.9 3.5 - 5.0 g/dL    Globulin 3.3 2.0 - 4.0 g/dL    A-G Ratio 1.2 1.1 - 2.2     TROPONIN I    Collection Time: 02/06/18  1:12 PM   Result Value Ref Range    Troponin-I, Qt. <0.04 <0.05 ng/mL   CK W/ REFLX CKMB    Collection Time: 02/06/18  1:12 PM   Result Value Ref Range    CK 60 26 - 192 U/L   PROTHROMBIN TIME + INR    Collection Time: 02/06/18  1:12 PM   Result Value Ref Range    INR 1.1 0.9 - 1.1      Prothrombin time 11.4 (H) 9.0 - 11.1 sec   PTT    Collection Time: 02/06/18  1:12 PM   Result Value Ref Range    aPTT 30.9 22.1 - 32.5 sec    aPTT, therapeutic range     58.0 - 77.0 SECS   EKG, 12 LEAD, INITIAL    Collection Time: 02/06/18  1:20 PM   Result Value Ref Range    Ventricular Rate 81 BPM    Atrial Rate 81 BPM    P-R Interval 112 ms    QRS Duration 80 ms    Q-T Interval 380 ms    QTC Calculation (Bezet) 441 ms    Calculated P Axis 43 degrees    Calculated R Axis 123 degrees    Calculated T Axis 6 degrees    Diagnosis       Sinus rhythm with frequent premature ventricular complexes  Left atrial enlargement  Right axis deviation  Nonspecific ST and T wave abnormality  Abnormal ECG  When compared with ECG of 01-SEP-2016 16:39,  Questionable change in QRS axis  Borderline criteria for Inferior infarct are no longer present  Nonspecific T wave abnormality now evident in Lateral leads     TYPE & SCREEN    Collection Time: 02/07/18  2:57 AM   Result Value Ref Range    Crossmatch Expiration 02/10/2018     ABO/Rh(D) B POSITIVE     Antibody screen NEG    METABOLIC PANEL, BASIC    Collection Time: 02/07/18  2:57 AM   Result Value Ref Range    Sodium 141 136 - 145 mmol/L    Potassium 3.8 3.5 - 5.1 mmol/L    Chloride 107 97 - 108 mmol/L    CO2 28 21 - 32 mmol/L    Anion gap 6 5 - 15 mmol/L    Glucose 94 65 - 100 mg/dL    BUN 14 6 - 20 MG/DL    Creatinine 0.93 0.55 - 1.02 MG/DL    BUN/Creatinine ratio 15 12 - 20      GFR est AA >60 >60 ml/min/1.73m2    GFR est non-AA 57 (L) >60 ml/min/1.73m2    Calcium 8.3 (L) 8.5 - 10.1 MG/DL   CBC WITH AUTOMATED DIFF    Collection Time: 02/07/18  2:57 AM   Result Value Ref Range    WBC 10.3 3.6 - 11.0 K/uL    RBC 4.56 3.80 - 5.20 M/uL    HGB 14.3 11.5 - 16.0 g/dL    HCT 42.1 35.0 - 47.0 %    MCV 92.3 80.0 - 99.0 FL    MCH 31.4 26.0 - 34.0 PG    MCHC 34.0 30.0 - 36.5 g/dL    RDW 13.6 11.5 - 14.5 %    PLATELET 072 225 - 889 K/uL    MPV 11.1 8.9 - 12.9 FL    NRBC 0.0 0  WBC    ABSOLUTE NRBC 0.00 0.00 - 0.01 K/uL    NEUTROPHILS 75 32 - 75 %    LYMPHOCYTES 14 12 - 49 %    MONOCYTES 7 5 - 13 %    EOSINOPHILS 2 0 - 7 %    BASOPHILS 1 0 - 1 %    IMMATURE GRANULOCYTES 1 (H) 0.0 - 0.5 %    ABS. NEUTROPHILS 7.8 1.8 - 8.0 K/UL    ABS. LYMPHOCYTES 1.4 0.8 - 3.5 K/UL    ABS. MONOCYTES 0.8 0.0 - 1.0 K/UL    ABS. EOSINOPHILS 0.2 0.0 - 0.4 K/UL    ABS. BASOPHILS 0.1 0.0 - 0.1 K/UL    ABS. IMM.  GRANS. 0.1 (H) 0.00 - 0.04 K/UL    DF AUTOMATED           Impression:     Patient Active Problem List    Diagnosis Date Noted    Pubic bone fracture ([facility] [address] 02/06/2018    SSS (sick sinus syndrome) ([facility] [address] 01/09/2018    Heart palpitations 12/18/2017    Shortness of breath 09/23/2016    Pneumonia 11/13/2014    Left humeral fracture 11/13/2014    Acute respiratory failure with hypoxia ([facility] [address] 11/13/2014    Sepsis ([facility] [address] 11/10/2014    CAD (coronary artery disease) 01/01/1996     Active Problems:    Pubic bone fracture ([facility] [address] (2/6/2018)        Plan:   -  Pt is stable orthopaedically, Non-Operative management at this time  -  R sided pubic rami fx - additional XR views ordered per Dr. Nikki Seals request   -  Pain management as ordered   -  Will follow up after additional XR reviewed       Dr. Nikki Seals aware and agrees with plan as above.         Mariusz Silva NP  Orthopedic Nurse Practitioner   [place]

## 2018-02-07 NOTE — H&P
Hospitalist Admission Note    NAME: Joseline tSacy   :  1928   MRN:  [id number]     Date/Time:  2018 7:02 PM    Patient PCP: Shashi Donato MD  ________________________________________________________________________    My assessment of this patient's clinical condition and my plan of care is as follows. Assessment / Plan:  R pubic fracture from mechanical fall  -xr hip showed R pubic fracture. Pt is unable to bear wt. Lives alone.  -pain control with norco, dvt prophylaxis  -consult ortho  -PT/OT consulted, likely needs rehab on discharge    Hypothyroidism  -resume synthroid    Hx of Lung CA  -s/p resection, chemo and XRT, completed in   -not on O2    CAD/hx of MI  SSS s/p pacemaker placement on 18  -cont' asa and statin  -do not think pacemaker needs to be interrogated as this was a mechanical fall    Glaucoma  -con't home eyedrop    Code Status: DNR (discussed with pt and her son at bedside)  Surrogate Decision Maker: son  DVT Prophylaxis: lovenox  GI Prophylaxis: not indicated  Baseline: lives alone in         Subjective:   CHIEF COMPLAINT: fall    HISTORY OF PRESENT ILLNESS:     Fabiana Ramos is a 80 y.o.  female  With pmhx significant for CAD, hx of MI, SSS s/p recent pacemaker placemnt in 2018, present to ED after she suffered a mechanical fall. Per pt and her son at bedside, pt was preparing to go to lunch with her son today, and prior to getting into the back seat, she heard a noise and turned her around, however her foot was caught with the other foot which lead her to fall onto the sidewalk on her R side. Pt was unable to bear wt due to pain. In the ER, pt's pain was controlled with percocet. Vitals: T 97.7, P91, /112, SpO2 96% on RA  Labs: trop neg, INR 1.1, cr 1.1, wbc 10, hgb 16. XR hip R pubic fracture  XR ribs showed no rib fracture    We were asked to admit for work up and evaluation of the above problems.      Past Medical History:   Diagnosis Date    CAD (coronary artery disease) 1996    MI     Cancer ([facility] [address]     breast, lung    Hypertension     Ill-defined condition     elevated cholesterol        Past Surgical History:   Procedure Laterality Date    BREAST SURGERY PROCEDURE UNLISTED      right mastectomy       Social History   Substance Use Topics    Smoking status: Former Smoker     Packs/day: 0.25     Years: 10.00     Quit date: 12/26/1989    Smokeless tobacco: Never Used    Alcohol use No        History reviewed. No pertinent family history. No Known Allergies     Prior to Admission medications    Medication Sig Start Date End Date Taking? Authorizing Provider   acetaminophen (TYLENOL EXTRA STRENGTH) 500 mg tablet Take 500 mg by mouth every six (6) hours as needed for Pain. Yes Historical Provider   calcium citrate-vitamin d3 (CITRACAL+D) 315-200 mg-unit tab Take 1 Tab by mouth daily. Yes Historical Provider   QVAR 80 mcg/actuation aero Take 2 Puffs by inhalation as needed. 12/6/17  Yes Historical Provider   latanoprost (XALATAN) 0.005 % ophthalmic solution Administer 1 Drop to both eyes nightly. 11/24/17  Yes Historical Provider   multivitamin (ONE A DAY) tablet Take 1 Tab by mouth daily. Yes Historical Provider   simvastatin (ZOCOR) 40 mg tablet Take 40 mg by mouth nightly. Yes Historical Provider   aspirin (ASPIRIN) 325 mg tablet Take 325 mg by mouth nightly. Yes Janak Aguirre MD   levothyroxine (LEVOTHROID) 75 mcg tablet Take 75 mcg by mouth Daily (before breakfast). Yes Janak Aguirre MD       REVIEW OF SYSTEMS:     I am not able to complete the review of systems because:    The patient is intubated and sedated    The patient has altered mental status due to his acute medical problems    The patient has baseline aphasia from prior stroke(s)    The patient has baseline dementia and is not reliable historian    The patient is in acute medical distress and unable to provide information           Total of 12 systems reviewed as follows:       POSITIVE= BOLD text  Negative = text not BOLD  General:  fever, chills, sweats, generalized weakness, weight loss/gain,      loss of appetite   Eyes:    blurred vision, eye pain, loss of vision, double vision  ENT:    rhinorrhea, pharyngitis   Respiratory:   cough, sputum production, SOB, HAND, wheezing, pleuritic pain   Cardiology:   chest pain, palpitations, orthopnea, PND, edema, syncope   Gastrointestinal:  abdominal pain , N/V, diarrhea, dysphagia, constipation, bleeding   Genitourinary:  frequency, urgency, dysuria, hematuria, incontinence   Muskuloskeletal :  arthralgia, myalgia, R hip pain  Hematology:  easy bruising, nose or gum bleeding, lymphadenopathy   Dermatological: rash, ulceration, pruritis, color change / jaundice  Endocrine:   hot flashes or polydipsia   Neurological:  Fall, headache, dizziness, confusion, focal weakness, paresthesia,     Speech difficulties, memory loss, gait difficulty  Psychological: Feelings of anxiety, depression, agitation    Objective:   VITALS:    Visit Vitals    /62    Pulse 95    Temp 97.7 °F (36.5 °C)    Resp 22    Ht 5' (1.524 m)    Wt 45.4 kg (100 lb 1.4 oz)    SpO2 93%    BMI 19.55 kg/m2       PHYSICAL EXAM:    General:    Alert, cooperative, no distress, appears stated age. HEENT: Atraumatic, anicteric sclerae, pink conjunctivae     No oral ulcers, mucosa moist, throat clear, dentition fair  Neck:  Supple, symmetrical,  thyroid: non tender  Lungs:   Clear to auscultation bilaterally. No Wheezing or Rhonchi. No rales. Chest wall:  No tenderness  No Accessory muscle use. Heart:   Regular  rhythm,  No  murmur   No edema  Abdomen:   Soft, non-tender. Not distended. Bowel sounds normal  Extremities: No cyanosis. No clubbing,      Skin turgor normal, Capillary refill normal, Radial dial pulse 2+  Skin:     Not pale. Not Jaundiced  No rashes   Psych:   Not depressed. Not anxious or agitated.   Neurologic: EOMs intact. No facial asymmetry. No aphasia or slurred speech. Symmetrical strength, Sensation grossly intact. Alert and oriented X 4.     _______________________________________________________________________  Care Plan discussed with:    Comments   Patient x    Family  x Son    RN x    Care Manager                    Consultant:      _______________________________________________________________________  Expected  Disposition:   Home with Family    HH/PT/OT/RN    SNF/LTC x   BRODY    ________________________________________________________________________  TOTAL TIME:  72 Minutes    Critical Care Provided     Minutes non procedure based      Comments    x Reviewed previous records   >50% of visit spent in counseling and coordination of care x Discussion with patient and/or family and questions answered       ________________________________________________________________________  Signed: Luli Le MD    Procedures: see electronic medical records for all procedures/Xrays and details which were not copied into this note but were reviewed prior to creation of Plan. LAB DATA REVIEWED:    Recent Results (from the past 24 hour(s))   CBC WITH AUTOMATED DIFF    Collection Time: 02/06/18  1:12 PM   Result Value Ref Range    WBC 10.7 3.6 - 11.0 K/uL    RBC 4.99 3.80 - 5.20 M/uL    HGB 16.0 11.5 - 16.0 g/dL    HCT 46.1 35.0 - 47.0 %    MCV 92.4 80.0 - 99.0 FL    MCH 32.1 26.0 - 34.0 PG    MCHC 34.7 30.0 - 36.5 g/dL    RDW 13.5 11.5 - 14.5 %    PLATELET 244 739 - 867 K/uL    MPV 11.0 8.9 - 12.9 FL    NRBC 0.0 0  WBC    ABSOLUTE NRBC 0.00 0.00 - 0.01 K/uL    NEUTROPHILS 74 32 - 75 %    LYMPHOCYTES 15 12 - 49 %    MONOCYTES 7 5 - 13 %    EOSINOPHILS 2 0 - 7 %    BASOPHILS 1 0 - 1 %    IMMATURE GRANULOCYTES 2 (H) 0.0 - 0.5 %    ABS. NEUTROPHILS 8.0 1.8 - 8.0 K/UL    ABS. LYMPHOCYTES 1.6 0.8 - 3.5 K/UL    ABS. MONOCYTES 0.7 0.0 - 1.0 K/UL    ABS. EOSINOPHILS 0.2 0.0 - 0.4 K/UL    ABS. BASOPHILS 0.1 0.0 - 0.1 K/UL    ABS. IMM. [place]. 0.2 (H) 0.00 - 0.04 K/UL    DF AUTOMATED     METABOLIC PANEL, COMPREHENSIVE    Collection Time: 02/06/18  1:12 PM   Result Value Ref Range    Sodium 141 136 - 145 mmol/L    Potassium 3.8 3.5 - 5.1 mmol/L    Chloride 105 97 - 108 mmol/L    CO2 30 21 - 32 mmol/L    Anion gap 6 5 - 15 mmol/L    Glucose 109 (H) 65 - 100 mg/dL    BUN 17 6 - 20 MG/DL    Creatinine 1.11 (H) 0.55 - 1.02 MG/DL    BUN/Creatinine ratio 15 12 - 20      GFR est AA 56 (L) >60 ml/min/1.73m2    GFR est non-AA 46 (L) >60 ml/min/1.73m2    Calcium 8.6 8.5 - 10.1 MG/DL    Bilirubin, total 0.6 0.2 - 1.0 MG/DL    ALT (SGPT) 27 12 - 78 U/L    AST (SGOT) 27 15 - 37 U/L    Alk.  phosphatase 66 45 - 117 U/L    Protein, total 7.2 6.4 - 8.2 g/dL    Albumin 3.9 3.5 - 5.0 g/dL    Globulin 3.3 2.0 - 4.0 g/dL    A-G Ratio 1.2 1.1 - 2.2     TROPONIN I    Collection Time: 02/06/18  1:12 PM   Result Value Ref Range    Troponin-I, Qt. <0.04 <0.05 ng/mL   CK W/ REFLX CKMB    Collection Time: 02/06/18  1:12 PM   Result Value Ref Range    CK 60 26 - 192 U/L   PROTHROMBIN TIME + INR    Collection Time: 02/06/18  1:12 PM   Result Value Ref Range    INR 1.1 0.9 - 1.1      Prothrombin time 11.4 (H) 9.0 - 11.1 sec   PTT    Collection Time: 02/06/18  1:12 PM   Result Value Ref Range    aPTT 30.9 22.1 - 32.5 sec    aPTT, therapeutic range     58.0 - 77.0 SECS   EKG, 12 LEAD, INITIAL    Collection Time: 02/06/18  1:20 PM   Result Value Ref Range    Ventricular Rate 81 BPM    Atrial Rate 81 BPM    P-R Interval 112 ms    QRS Duration 80 ms    Q-T Interval 380 ms    QTC Calculation (Bezet) 441 ms    Calculated P Axis 43 degrees    Calculated R Axis 123 degrees    Calculated T Axis 6 degrees    Diagnosis       Sinus rhythm with frequent premature ventricular complexes  Left atrial enlargement  Right axis deviation  Nonspecific ST and T wave abnormality  Abnormal ECG  When compared with ECG of 01-SEP-2016 16:39,  Questionable change in QRS axis  Borderline criteria for Inferior infarct are no longer present  Nonspecific T wave abnormality now evident in Lateral leads

## 2018-02-07 NOTE — CDMP QUERY
Dr. Ravi Dior :    Patient is noted to have a BMI of 19.55. Please clarify if this patient is:     =>Underweight  =>Cachexia  =>Failure to Thrive  =>Other explanation of clinical findings  =>Unable to determine (no explanation for clinical findings)    Presentation: 89 WF admitted with R pubic bone fracture from mechanical fall, 100 lbs = BMI 19.55    Please clarify and document your clinical opinion in the progress notes and discharge summary, including the definitive and or presumptive diagnosis, (suspected or probable), related to the above clinical findings. Please include clinical findings supporting your diagnosis. Thank [email or web address]  [phone]

## 2018-02-07 NOTE — PROGRESS NOTES
Spiritual Care Partner Volunteer visited patient in [facility] on 2/7/18. Documented by:  Trey Hurst M.Div.    Paging Service [phone])

## 2018-02-07 NOTE — PROGRESS NOTES
Bedside and Verbal shift change report given to KELLY Baker (oncoming nurse) by Guanakito Ruby (offgoing nurse). Report included the following information SBAR, Kardex, OR Summary, Procedure Summary, Intake/Output, MAR, Recent Results and Med Rec Status.

## 2018-02-07 NOTE — PROGRESS NOTES
Interdisciplinary Rounds:    Patient's course of treatment, hospital stay and discharge planning were discussed by the interdisciplinary team which includes, nursing, nurse manager, nurse practitioner, care management, rehab therapy representative, rehab liason, and pharmacy. Daily Pt goes, Geometric LOS, Discharge plan and barriers discussed. Working with therapy, 2 max assist, plan on SNF upon discharge.

## 2018-02-08 LAB
ALBUMIN SERPL-MCNC: 3.1 G/DL (ref 3.5–5)
ALBUMIN/GLOB SERPL: 1 {RATIO} (ref 1.1–2.2)
ALP SERPL-CCNC: 53 U/L (ref 45–117)
ALT SERPL-CCNC: 16 U/L (ref 12–78)
ANION GAP SERPL CALC-SCNC: 8 MMOL/L (ref 5–15)
AST SERPL-CCNC: 22 U/L (ref 15–37)
BASOPHILS # BLD: 0.1 K/UL (ref 0–0.1)
BASOPHILS NFR BLD: 1 % (ref 0–1)
BILIRUB SERPL-MCNC: 1.1 MG/DL (ref 0.2–1)
BUN SERPL-MCNC: 15 MG/DL (ref 6–20)
BUN/CREAT SERPL: 15 (ref 12–20)
CALCIUM SERPL-MCNC: 8.5 MG/DL (ref 8.5–10.1)
CHLORIDE SERPL-SCNC: 106 MMOL/L (ref 97–108)
CK SERPL-CCNC: 101 U/L (ref 26–192)
CO2 SERPL-SCNC: 24 MMOL/L (ref 21–32)
CREAT SERPL-MCNC: 1.01 MG/DL (ref 0.55–1.02)
DIFFERENTIAL METHOD BLD: ABNORMAL
EOSINOPHIL # BLD: 0.2 K/UL (ref 0–0.4)
EOSINOPHIL NFR BLD: 1 % (ref 0–7)
ERYTHROCYTE [DISTWIDTH] IN BLOOD BY AUTOMATED COUNT: 13.9 % (ref 11.5–14.5)
GLOBULIN SER CALC-MCNC: 3.1 G/DL (ref 2–4)
GLUCOSE SERPL-MCNC: 106 MG/DL (ref 65–100)
HCT VFR BLD AUTO: 41.2 % (ref 35–47)
HGB BLD-MCNC: 13.9 G/DL (ref 11.5–16)
IMM GRANULOCYTES # BLD: 0.1 K/UL (ref 0–0.04)
IMM GRANULOCYTES NFR BLD AUTO: 1 % (ref 0–0.5)
LYMPHOCYTES # BLD: 0.9 K/UL (ref 0.8–3.5)
LYMPHOCYTES NFR BLD: 6 % (ref 12–49)
MAGNESIUM SERPL-MCNC: 2 MG/DL (ref 1.6–2.4)
MCH RBC QN AUTO: 31.8 PG (ref 26–34)
MCHC RBC AUTO-ENTMCNC: 33.7 G/DL (ref 30–36.5)
MCV RBC AUTO: 94.3 FL (ref 80–99)
MONOCYTES # BLD: 1.2 K/UL (ref 0–1)
MONOCYTES NFR BLD: 9 % (ref 5–13)
NEUTS SEG # BLD: 11.5 K/UL (ref 1.8–8)
NEUTS SEG NFR BLD: 83 % (ref 32–75)
NRBC # BLD: 0 K/UL (ref 0–0.01)
NRBC BLD-RTO: 0 PER 100 WBC
PLATELET # BLD AUTO: 134 K/UL (ref 150–400)
PMV BLD AUTO: 11.6 FL (ref 8.9–12.9)
POTASSIUM SERPL-SCNC: 4.1 MMOL/L (ref 3.5–5.1)
PROT SERPL-MCNC: 6.2 G/DL (ref 6.4–8.2)
RBC # BLD AUTO: 4.37 M/UL (ref 3.8–5.2)
SODIUM SERPL-SCNC: 138 MMOL/L (ref 136–145)
WBC # BLD AUTO: 13.9 K/UL (ref 3.6–11)

## 2018-02-08 PROCEDURE — 82550 ASSAY OF CK (CPK): CPT | Performed by: INTERNAL MEDICINE

## 2018-02-08 PROCEDURE — 85025 COMPLETE CBC W/AUTO DIFF WBC: CPT | Performed by: INTERNAL MEDICINE

## 2018-02-08 PROCEDURE — 74011250637 HC RX REV CODE- 250/637: Performed by: INTERNAL MEDICINE

## 2018-02-08 PROCEDURE — 77030011943

## 2018-02-08 PROCEDURE — 77030038269 HC DRN EXT URIN PURWCK BARD -A

## 2018-02-08 PROCEDURE — 36415 COLL VENOUS BLD VENIPUNCTURE: CPT | Performed by: INTERNAL MEDICINE

## 2018-02-08 PROCEDURE — 51798 US URINE CAPACITY MEASURE: CPT

## 2018-02-08 PROCEDURE — 74011250636 HC RX REV CODE- 250/636: Performed by: INTERNAL MEDICINE

## 2018-02-08 PROCEDURE — 80053 COMPREHEN METABOLIC PANEL: CPT | Performed by: INTERNAL MEDICINE

## 2018-02-08 PROCEDURE — 65270000029 HC RM PRIVATE

## 2018-02-08 PROCEDURE — 83735 ASSAY OF MAGNESIUM: CPT | Performed by: INTERNAL MEDICINE

## 2018-02-08 RX ADMIN — POLYETHYLENE GLYCOL 3350 17 G: 17 POWDER, FOR SOLUTION ORAL at 09:27

## 2018-02-08 RX ADMIN — ASPIRIN 325 MG ORAL TABLET 325 MG: 325 PILL ORAL at 23:06

## 2018-02-08 RX ADMIN — Medication 5 ML: at 13:03

## 2018-02-08 RX ADMIN — ENOXAPARIN SODIUM 25 MG: 60 INJECTION SUBCUTANEOUS at 17:59

## 2018-02-08 RX ADMIN — ATORVASTATIN CALCIUM 40 MG: 40 TABLET, FILM COATED ORAL at 23:07

## 2018-02-08 RX ADMIN — LEVOTHYROXINE SODIUM 75 MCG: 75 TABLET ORAL at 06:58

## 2018-02-08 RX ADMIN — Medication 10 ML: at 13:02

## 2018-02-08 RX ADMIN — OXYCODONE HYDROCHLORIDE AND ACETAMINOPHEN 1 TABLET: 5; 325 TABLET ORAL at 09:26

## 2018-02-08 RX ADMIN — Medication 10 ML: at 23:08

## 2018-02-08 RX ADMIN — CALCIUM CARBONATE 500 MG (1,250 MG)-VITAMIN D3 200 UNIT TABLET 1 TABLET: at 09:26

## 2018-02-08 RX ADMIN — Medication 5 ML: at 06:56

## 2018-02-08 RX ADMIN — Medication 10 ML: at 06:56

## 2018-02-08 RX ADMIN — LATANOPROST 1 DROP: 50 SOLUTION OPHTHALMIC at 23:14

## 2018-02-08 RX ADMIN — OXYCODONE HYDROCHLORIDE AND ACETAMINOPHEN 1 TABLET: 5; 325 TABLET ORAL at 19:10

## 2018-02-08 NOTE — CONSULTS
CC: right groin pain    HPI: 80 y.o. female with history of GLF on the sidewalk. She was trying to get into the car to go get a meal with her family when she lost her balance and fell on her \"bottom\". She did not hit her head or lose consciousness. She reports pain only in the right groin. No UE pain or LLE pain. She lives in a town home independently. She uses no assistive device at baseline. ROS:  Positive per HPI, otherwise negative. PMHX:  Past Medical History:   Diagnosis Date    CAD (coronary artery disease) 1996    MI     Cancer ([facility] [address]     breast, lung    Hypertension     Ill-defined condition     elevated cholesterol       PSHX  Past Surgical History:   Procedure Laterality Date    BREAST SURGERY PROCEDURE UNLISTED      right mastectomy       ALLERGIES:  No Known Allergies    MEDS  No current facility-administered medications on file prior to encounter. Current Outpatient Prescriptions on File Prior to Encounter   Medication Sig Dispense Refill    QVAR 80 mcg/actuation aero Take 2 Puffs by inhalation as needed. 0    latanoprost (XALATAN) 0.005 % ophthalmic solution Administer 1 Drop to both eyes nightly. 0    multivitamin (ONE A DAY) tablet Take 1 Tab by mouth daily.  simvastatin (ZOCOR) 40 mg tablet Take 40 mg by mouth nightly.  aspirin (ASPIRIN) 325 mg tablet Take 325 mg by mouth nightly.  levothyroxine (LEVOTHROID) 75 mcg tablet Take 75 mcg by mouth Daily (before breakfast). SOC HX  Social History     Social History    Marital status:      Spouse name: N/A    Number of children: N/A    Years of education: N/A     Occupational History    Not on file.      Social History Main Topics    Smoking status: Former Smoker     Packs/day: 0.25     Years: 10.00     Quit date: 12/26/1989    Smokeless tobacco: Never Used    Alcohol use No    Drug use: No    Sexual activity: Not on file     Other Topics Concern    Not on file     Social History Narrative PE:  Visit Vitals    /63    Pulse (!) 101    Temp 98 °F (36.7 °C)    Resp 18    Ht 5' (1.524 m)    Wt 45.4 kg (100 lb 1.4 oz)    SpO2 93%    Breastfeeding No    BMI 19.55 kg/m2       A&Ox3  Normocephalic, atraumatic  Trachea midline  No audible wheezes  NRRR  Abdomen soft/NT    TTP at right groin  - compartments soft, compressible  - Motor: +EHL/FHL/TA/GSC/PL/PB  - SILT DP/MP/LP/MP/SURAL/SAPHENOUS  - Vascular: + DP, CR < 2 sec      IMAGING:   Post mobilization Pelvis plain films - R superior and I pubic rami fractures, minimally displaced.  No definite sacral fractures    A/P: Right pelvic ring fractures, specifically superior and inferior pubic rami  - Pursue closed treatment of pelvic ring fractures  - WBAT  - PT/OT  - Dispo planning - Likely SNF; follow up in my clinic in 2 weeks

## 2018-02-08 NOTE — PROGRESS NOTES
Spoke with family and pt about SNF choices. Family given a list of SNFs and encouraged to pick top 2-3 choices. Explained referral process and family agreed to consider the options and call SW as soon as possible with placement preference. SW will continue to follow and assist with d/c planning. 4:10PM  Pt and family chose [facility] for [facility]. FOC notice given, signed by son, and placed on bedside chart. Referral sent and accepted. [facility] transport set up for 9:00AM tomorrow. Family and nursing notified. No further discharge needs at this time.      Sarahy [place]      Cheryln Snellen, MSW

## 2018-02-08 NOTE — INTERDISCIPLINARY ROUNDS
Bedside interdisciplinary rounds were held today to discuss patient plan of care and outcomes. The following members were present: Physician, Nurse, Clinical Care Leader, Pharmacy, Physical Therapy, and Case Management. Plan:  Discharge to SNF likely tomorrow.

## 2018-02-08 NOTE — PROGRESS NOTES
2/8/2018    Urine with Reflex culture sent at approximately 0540 this am post straight cath from patient. Lab system did not have current order for UA with reflex UC; printed temporary labels and sent down with printout of order. 2/8/2018  At approximately 1171 received notification from  that sample for UC could not be used due to label not printing off in correct alignment and missing patient name. Was told that the UA could be used and would be processed.

## 2018-02-08 NOTE — PROGRESS NOTES
Bedside and Verbal shift change report given to Cheyenne RN (oncoming nurse) by KELLY Collier (offgoing nurse). Report included the following information SBAR, Kardex, Intake/Output, MAR and Recent Results.

## 2018-02-08 NOTE — PROGRESS NOTES
Patient was bladder scanned only had 325 mL of urine in bladder. Patient has not voided since being straight cathed this morning at 0540. Spoke with Dr. Elvis Gardner in regards to how much urine in bladder before he would like us to straight cath again and gave a verbal order to straight cath if bladder scan showed greater than 400mL of urine in bladder. Should patient need to be bladder scanned will pass along in report to also get a urine culture and reflex for patient.

## 2018-02-08 NOTE — PROGRESS NOTES
Hospitalist Progress Note    NAME: Joseline Stacy   :  1928   MRN:  [id number]       Assessment / Plan:  R pubic fracture from mechanical fall: c/w pain management, PT/OT,  Ortho evaluation appreciated, non operative treatment recommended, will need SNF on D/C. Hypothyroidism c/w  synthroid  Hx of Lung CA s/p resection, chemo and XRT, completed in , not on O2  CAD/hx of MI  SSS s/p pacemaker placement on 18  -cont' asa and statin, no need for interrogation, this was not syncope  Glaucoma con't home eyedrop  Moderate Malnutrition: counseled BMI 19.55  Surrogate Decision Maker: son  Baseline: lives alone in   Code status: DNR  Prophylaxis: Lovenox  Recommended Disposition: SNF/LTC     D/c plan for tomorrow to SNF     Subjective:     Chief Complaint / Reason for Physician Visit  \"I feel OK\". Discussed with RN events overnight. Review of Systems:  Symptom Y/N Comments  Symptom Y/N Comments   Fever/Chills    Chest Pain     Poor Appetite    Edema     Cough    Abdominal Pain     Sputum    Joint Pain     SOB/HAND    Pruritis/Rash     Nausea/vomit    Tolerating PT/OT     Diarrhea    Tolerating Diet y    Constipation    Other y Joint pain     Could NOT obtain due to:      Objective:     VITALS:   Last 24hrs VS reviewed since prior progress note. Most recent are:  Patient Vitals for the past 24 hrs:   Temp Pulse Resp BP SpO2   18 1040 98.1 °F (36.7 °C) 74 18 123/63 90 %   18 0705 - - - - 93 %   18 0533 98 °F (36.7 °C) (!) 101 18 147/63 93 %   18 0300 98.3 °F (36.8 °C) 79 16 (!) 157/38 93 %   18 2205 98 °F (36.7 °C) (!) 106 16 (!) 153/37 97 %   18 1830 99.4 °F (37.4 °C) 84 18 157/82 99 %       Intake/Output Summary (Last 24 hours) at 18 1433  Last data filed at 18 0530   Gross per 24 hour   Intake               60 ml   Output              900 ml   Net             -840 ml        PHYSICAL EXAM:  General: WD, WN.  Alert, cooperative, no acute distress    EENT:  EOMI. Anicteric sclerae. MMM  Resp:  Coarse BS  CV:  Regular  rhythm,  No edema  GI:  Soft, Non distended, Non tender.  +Bowel sounds  Neurologic:  Alert and oriented X 2, normal speech,   Psych:   Fair  insight. Not anxious nor agitated  Skin:  No rashes. No jaundice    Reviewed most current lab test results and cultures  YES  Reviewed most current radiology test results   YES  Review and summation of old records today    NO  Reviewed patient's current orders and MAR    YES  PMH/SH reviewed - no change compared to H&P  ________________________________________________________________________  Care Plan discussed with:    Comments   Patient y    Family  y sons   RN y    Care Manager     Consultant                        Multidiciplinary team rounds were held today with , nursing, pharmacist and clinical coordinator. Patient's plan of care was discussed; medications were reviewed and discharge planning was addressed. ________________________________________________________________________  Total NON critical care TIME:  25   Minutes    Total CRITICAL CARE TIME Spent:   Minutes non procedure based      Comments   >50% of visit spent in counseling and coordination of care y    ________________________________________________________________________  Jimmy Gomez MD     Procedures: see electronic medical records for all procedures/Xrays and details which were not copied into this note but were reviewed prior to creation of Plan. LABS:  I reviewed today's most current labs and imaging studies.   Pertinent labs include:  Recent Labs      02/08/18   0456  02/07/18   0257  02/06/18   1312   WBC  13.9*  10.3  10.7   HGB  13.9  14.3  16.0   HCT  41.2  42.1  46.1   PLT  134*  163  195     Recent Labs      02/08/18   0456  02/07/18   0257  02/06/18   1312   NA  138  141  141   K  4.1  3.8  3.8   CL  106  107  105   CO2  24  28  30   GLU  106*  94  109*   BUN  15  14  17   CREA  1.01 0. 93  1.11*   CA  8.5  8.3*  8.6   MG  2.0   --    --    ALB  3.1*   --   3.9   TBILI  1.1*   --   0.6   SGOT  22   --   27   ALT  16   --   27   INR   --    --   1.1       Signed: Jodei Darnell MD

## 2018-02-08 NOTE — PROGRESS NOTES
2/8/2018  05:05  AM    Patient unable to void; reported bladder scan to MD on call, Order received to straight cath one time.

## 2018-02-08 NOTE — PROGRESS NOTES
Was informed by KELLY Mazariegos that a urine sample was sent to the lab around 5AM this morning 2/8/2018. The lab contacted her and said that the reflex could not be used because the temporary label that was sent did not have the patient's name on it, however, it did have her information on it. The lab said that they could do the urinalysis. Contacted the lab at [facility] because there was no urinalysis in [facility] and was told that there was no urine at the lab at [facility] to do the urinalysis. Will attempt to collect another sample to send to the lab.

## 2018-02-09 VITALS
OXYGEN SATURATION: 97 % | SYSTOLIC BLOOD PRESSURE: 131 MMHG | BODY MASS INDEX: 19.65 KG/M2 | HEIGHT: 60 IN | RESPIRATION RATE: 16 BRPM | DIASTOLIC BLOOD PRESSURE: 64 MMHG | WEIGHT: 100.09 LBS | TEMPERATURE: 97.4 F | HEART RATE: 87 BPM

## 2018-02-09 PROBLEM — N39.0 UTI (URINARY TRACT INFECTION): Status: ACTIVE | Noted: 2018-02-09

## 2018-02-09 LAB
ALBUMIN SERPL-MCNC: 2.9 G/DL (ref 3.5–5)
ALBUMIN/GLOB SERPL: 0.8 {RATIO} (ref 1.1–2.2)
ALP SERPL-CCNC: 52 U/L (ref 45–117)
ALT SERPL-CCNC: 15 U/L (ref 12–78)
ANION GAP SERPL CALC-SCNC: 8 MMOL/L (ref 5–15)
AST SERPL-CCNC: 18 U/L (ref 15–37)
BASOPHILS # BLD: 0.1 K/UL (ref 0–0.1)
BASOPHILS NFR BLD: 0 % (ref 0–1)
BILIRUB SERPL-MCNC: 1.3 MG/DL (ref 0.2–1)
BUN SERPL-MCNC: 22 MG/DL (ref 6–20)
BUN/CREAT SERPL: 19 (ref 12–20)
CALCIUM SERPL-MCNC: 8.8 MG/DL (ref 8.5–10.1)
CHLORIDE SERPL-SCNC: 105 MMOL/L (ref 97–108)
CO2 SERPL-SCNC: 25 MMOL/L (ref 21–32)
CREAT SERPL-MCNC: 1.15 MG/DL (ref 0.55–1.02)
DIFFERENTIAL METHOD BLD: ABNORMAL
EOSINOPHIL # BLD: 0 K/UL (ref 0–0.4)
EOSINOPHIL NFR BLD: 0 % (ref 0–7)
ERYTHROCYTE [DISTWIDTH] IN BLOOD BY AUTOMATED COUNT: 14 % (ref 11.5–14.5)
GLOBULIN SER CALC-MCNC: 3.6 G/DL (ref 2–4)
GLUCOSE SERPL-MCNC: 108 MG/DL (ref 65–100)
HCT VFR BLD AUTO: 40.4 % (ref 35–47)
HGB BLD-MCNC: 13.7 G/DL (ref 11.5–16)
IMM GRANULOCYTES # BLD: 0.1 K/UL (ref 0–0.04)
IMM GRANULOCYTES NFR BLD AUTO: 1 % (ref 0–0.5)
LYMPHOCYTES # BLD: 1.1 K/UL (ref 0.8–3.5)
LYMPHOCYTES NFR BLD: 7 % (ref 12–49)
MAGNESIUM SERPL-MCNC: 2.2 MG/DL (ref 1.6–2.4)
MCH RBC QN AUTO: 31.6 PG (ref 26–34)
MCHC RBC AUTO-ENTMCNC: 33.9 G/DL (ref 30–36.5)
MCV RBC AUTO: 93.3 FL (ref 80–99)
MONOCYTES # BLD: 1.4 K/UL (ref 0–1)
MONOCYTES NFR BLD: 9 % (ref 5–13)
NEUTS SEG # BLD: 13.2 K/UL (ref 1.8–8)
NEUTS SEG NFR BLD: 83 % (ref 32–75)
NRBC # BLD: 0 K/UL (ref 0–0.01)
NRBC BLD-RTO: 0 PER 100 WBC
PLATELET # BLD AUTO: 118 K/UL (ref 150–400)
PMV BLD AUTO: 11.7 FL (ref 8.9–12.9)
POTASSIUM SERPL-SCNC: 3.8 MMOL/L (ref 3.5–5.1)
PROT SERPL-MCNC: 6.5 G/DL (ref 6.4–8.2)
RBC # BLD AUTO: 4.33 M/UL (ref 3.8–5.2)
SODIUM SERPL-SCNC: 138 MMOL/L (ref 136–145)
WBC # BLD AUTO: 15.9 K/UL (ref 3.6–11)

## 2018-02-09 PROCEDURE — 74011250637 HC RX REV CODE- 250/637: Performed by: INTERNAL MEDICINE

## 2018-02-09 PROCEDURE — 85025 COMPLETE CBC W/AUTO DIFF WBC: CPT | Performed by: INTERNAL MEDICINE

## 2018-02-09 PROCEDURE — 77030011943

## 2018-02-09 PROCEDURE — 36415 COLL VENOUS BLD VENIPUNCTURE: CPT | Performed by: INTERNAL MEDICINE

## 2018-02-09 PROCEDURE — 83735 ASSAY OF MAGNESIUM: CPT | Performed by: INTERNAL MEDICINE

## 2018-02-09 PROCEDURE — 80053 COMPREHEN METABOLIC PANEL: CPT | Performed by: INTERNAL MEDICINE

## 2018-02-09 PROCEDURE — 51798 US URINE CAPACITY MEASURE: CPT

## 2018-02-09 RX ORDER — CEFUROXIME AXETIL 500 MG/1
500 TABLET ORAL EVERY 12 HOURS
Qty: 10 TAB | Refills: 0 | Status: SHIPPED | OUTPATIENT
Start: 2018-02-09 | End: 2018-02-14

## 2018-02-09 RX ORDER — ACETAMINOPHEN 325 MG/1
650 TABLET ORAL
Qty: 40 TAB | Refills: 0 | Status: SHIPPED | OUTPATIENT
Start: 2018-02-09

## 2018-02-09 RX ORDER — CEFUROXIME AXETIL 250 MG/1
500 TABLET ORAL EVERY 12 HOURS
Status: DISCONTINUED | OUTPATIENT
Start: 2018-02-09 | End: 2018-02-09 | Stop reason: HOSPADM

## 2018-02-09 RX ORDER — OXYCODONE AND ACETAMINOPHEN 2.5; 325 MG/1; MG/1
1 TABLET ORAL
Qty: 30 TAB | Refills: 0 | Status: SHIPPED | OUTPATIENT
Start: 2018-02-09 | End: 2019-05-23

## 2018-02-09 RX ORDER — POLYETHYLENE GLYCOL 3350 17 G/17G
17 POWDER, FOR SOLUTION ORAL DAILY
Qty: 30 EACH | Refills: 0 | Status: SHIPPED | OUTPATIENT
Start: 2018-02-09 | End: 2021-07-28

## 2018-02-09 RX ADMIN — LEVOTHYROXINE SODIUM 75 MCG: 75 TABLET ORAL at 07:03

## 2018-02-09 RX ADMIN — CEFUROXIME AXETIL 500 MG: 250 TABLET ORAL at 09:47

## 2018-02-09 RX ADMIN — POLYETHYLENE GLYCOL 3350 17 G: 17 POWDER, FOR SOLUTION ORAL at 09:47

## 2018-02-09 RX ADMIN — CALCIUM CARBONATE 500 MG (1,250 MG)-VITAMIN D3 200 UNIT TABLET 1 TABLET: at 09:47

## 2018-02-09 RX ADMIN — Medication 10 ML: at 06:14

## 2018-02-09 NOTE — ROUTINE PROCESS
Pt to transport to [facility] via [facility] at [address] and Hilary Cervantes at [facility] of pt d/c. PCS and call report note placed on bedside chart. Second IM letter given. No further d/c needs at this time. Care Management Interventions  PCP Verified by CM:  Yes  Mode of Transport at Discharge: [address]  [address] Time of Discharge: 0900  Transition of Care Consult (CM Consult): SNF  Partner SNF: Yes  Physical Therapy Consult: Yes  Occupational Therapy Consult: Yes  Current Support Network: Own Home  Confirm Follow Up Transport: Other (see comment) ([facility])  Plan discussed with Pt/Family/Caregiver: Yes  Discharge Location  Discharge Placement:  (SNF)    Amirah Alcantara, MSW      Paula Ghosh MSW

## 2018-02-09 NOTE — DISCHARGE INSTRUCTIONS
HOSPITALIST DISCHARGE INSTRUCTIONS    NAME: Meenu Singh   :  1928   MRN:  [id number]     Date/Time:  2018 8:02 AM    ADMIT DATE: 2018   DISCHARGE DATE: 2018     Attending Physician: Zehra Bowens MD    DISCHARGE DIAGNOSIS:  Pubic Fracture, UTI, Hypothyroidism, H/O Lung Cancer, SSS s/p PPM, Glaucoma, Moderate Malnutrition      Medications: Per above medication reconciliation. Pain Management: per above medications    Recommended diet: Regular Diet    Recommended activity: Activity as tolerated    Wound care: None    Indwelling devices:  None    Supplemental Oxygen: 2 LNC,  wean as tolerated    Required Lab work: Per SNF routine    Glucose management:  None    Code status: DNR        Outside physician follow up: Follow-up Information     Follow up With Details Comments Contact [facility]    [address] [address]  [address] [place] [address] [address] [facility]  [phone]          F/U PCP  F/U Orthopedics       Skilled nursing facility/ SNF MD responsible for above on discharge. Information obtained by :  I understand that if any problems occur once I am at home I am to contact my physician. I understand and acknowledge receipt of the instructions indicated above.                                                                                                                                            Physician's or R.N.'s Signature                                                                  Date/Time                                                                                                                                              Patient or Repres

## 2018-02-09 NOTE — DISCHARGE SUMMARY
Hospitalist Discharge Summary     Patient ID:  Merle Olson  [id number]  66 y.o.  1/29/1928    PCP on record: Juan Luis Reinoso MD    Admit date: 2/6/2018  Discharge date and time: 2/9/2018      DISCHARGE DIAGNOSIS:    Pubic Fracture, UTI, Hypothyroidism, H/O Lung Cancer, SSS s/p PPM, Glaucoma, Moderate Malnutrition      CONSULTATIONS:  IP CONSULT TO ORTHOPEDIC SURGERY    Excerpted HPI from H&P of Joselyn Overton MD:  Hortencia Magaña is a 80 y.o.  female  With pmhx significant for CAD, hx of MI, SSS s/p recent pacemaker placemnt in 1/2018, present to ED after she suffered a mechanical fall. Per pt and her son at bedside, pt was preparing to go to lunch with her son today, and prior to getting into the back seat, she heard a noise and turned her around, however her foot was caught with the other foot which lead her to fall onto the sidewalk on her R side. Pt was unable to bear wt due to pain. In the ER, pt's pain was controlled with percocet. Vitals: T 97.7, P91, /112, SpO2 96% on RA  Labs: trop neg, INR 1.1, cr 1.1, wbc 10, hgb 16. XR hip R pubic fracture  XR ribs showed no rib fracture  We were asked to admit for work up and evaluation of the above problems. ______________________________________________________________________  DISCHARGE SUMMARY/HOSPITAL COURSE:  for full details see H&P, daily progress notes, labs, consult notes. R pubic fracture from mechanical fall: c/w pain management, PT/OT,  Ortho evaluation appreciated, non operative treatment recommended, will need SNF on D/C.   Hypothyroidism c/w  synthroid  Hx of Lung CA s/p resection, chemo and XRT, completed in 2000, c/w O2 2LNC, wean down as tolerated  CAD/hx of MI  UTI?: unfortunately U/A never sent, despite advising staff, will treat empirically for UTI with Ceftin for 5 days  SSS s/p pacemaker placement on 1/9/18  -cont' asa and statin, no need for interrogation, this was not syncope  Glaucoma con't home eyedrop  Moderate Malnutrition: counseled BMI 19.55  Surrogate Decision Maker: son  Baseline: lives alone   Code status: DNR  Prophylaxis: Lovenox  Recommended Disposition: SNF/LTC      D/c to SNF today and F/U with PCP and Orthopedics  _______________________________________________________________________  Patient seen and examined by me on discharge day. Pertinent Findings:  Gen:    Not in distress  Chest: Coarse BS  CVS:   Regular rhythm. No edema  Abd:  Soft, not distended, not tender  Neuro:  Alert, GCS 15  _______________________________________________________________________  DISCHARGE MEDICATIONS:   Current Discharge Medication List      START taking these medications    Details   cefUROXime (CEFTIN) 500 mg tablet Take 1 Tab by mouth every twelve (12) hours for 5 days. Qty: 10 Tab, Refills: 0      oxyCODONE-acetaminophen (PERCOCET) 2.5-325 mg per tablet Take 1 Tab by mouth every six (6) hours as needed for Pain. Max Daily Amount: 4 Tabs. Qty: 30 Tab, Refills: 0    Associated Diagnoses: Closed nondisplaced fracture of right pubis, initial encounter ([facility] [address]      polyethylene glycol (MIRALAX) 17 gram packet Take 1 Packet by mouth daily. Qty: 30 Each, Refills: 0         CONTINUE these medications which have CHANGED    Details   acetaminophen (TYLENOL) 325 mg tablet Take 2 Tabs by mouth every four (4) hours as needed. Qty: 40 Tab, Refills: 0         CONTINUE these medications which have NOT CHANGED    Details   calcium citrate-vitamin d3 (CITRACAL+D) 315-200 mg-unit tab Take 1 Tab by mouth daily. QVAR 80 mcg/actuation aero Take 2 Puffs by inhalation as needed. Refills: 0    Associated Diagnoses: Heart palpitations; SSS (sick sinus syndrome) ([address] Dizziness      latanoprost (XALATAN) 0.005 % ophthalmic solution Administer 1 Drop to both eyes nightly. Refills: 0    Associated Diagnoses: Heart palpitations; SSS (sick sinus syndrome) ([address]  Dizziness      multivitamin (ONE A DAY) tablet Take 1 Tab by mouth daily. Associated Diagnoses: Precordial pain; Shortness of breath; Essential hypertension, hypertension with unspecified goal      simvastatin (ZOCOR) 40 mg tablet Take 40 mg by mouth nightly. aspirin (ASPIRIN) 325 mg tablet Take 325 mg by mouth nightly. levothyroxine (LEVOTHROID) 75 mcg tablet Take 75 mcg by mouth Daily (before breakfast). My Recommended Diet, Activity, Wound Care, and follow-up labs are listed in the patient's Discharge Insturctions which I have personally completed and reviewed.     ______________________________________________________________________    Risk of deterioration: Moderate    Condition at Discharge:  Stable  ______________________________________________________________________    Disposition  SNF/LTC  ______________________________________________________________________    Care Plan discussed with:   Patient, Family, RN, Care Manager, Consultant    ______________________________________________________________________    Code Status: DNR/DNI  ______________________________________________________________________      Follow up with:   PCP : Phyllistine League, MD  Follow-up Information     Follow up With Details Comments Contact Info    [address] [address] [facility]   [address] [place] [address]  [address] [place] [facility] [place]   [address]  [address] [facility]  [place] [address] [address]  [phone]          F/U PCP  F/U Orthopedics      Total time in minutes spent coordinating this discharge (includes going over instructions, follow-up, prescriptions, and preparing report for sign off to her PCP) :  35 minutes    Signed:  Noel Mandel MD

## 2018-04-24 ENCOUNTER — TELEPHONE (OUTPATIENT)
Dept: CARDIOLOGY CLINIC | Age: 83
End: 2018-04-24

## 2018-04-24 NOTE — TELEPHONE ENCOUNTER
Spoke with patient's son in law AMAURY (verified Jaida)  Verified patient with 2 patient identifiers    AMAURY called to verify patient medications

## 2018-05-01 ENCOUNTER — CLINICAL SUPPORT (OUTPATIENT)
Dept: CARDIOLOGY CLINIC | Age: 83
End: 2018-05-01

## 2018-05-01 ENCOUNTER — OFFICE VISIT (OUTPATIENT)
Dept: CARDIOLOGY CLINIC | Age: 83
End: 2018-05-01

## 2018-05-01 VITALS
RESPIRATION RATE: 16 BRPM | WEIGHT: 103.2 LBS | OXYGEN SATURATION: 96 % | BODY MASS INDEX: 19.48 KG/M2 | SYSTOLIC BLOOD PRESSURE: 130 MMHG | HEART RATE: 70 BPM | DIASTOLIC BLOOD PRESSURE: 70 MMHG | HEIGHT: 61 IN

## 2018-05-01 DIAGNOSIS — I49.5 SSS (SICK SINUS SYNDROME) (HCC): ICD-10-CM

## 2018-05-01 DIAGNOSIS — I10 ESSENTIAL HYPERTENSION: ICD-10-CM

## 2018-05-01 DIAGNOSIS — R00.2 HEART PALPITATIONS: Primary | ICD-10-CM

## 2018-05-01 DIAGNOSIS — Z95.0 PACEMAKER: Primary | ICD-10-CM

## 2018-05-01 DIAGNOSIS — I25.10 CORONARY ARTERY DISEASE INVOLVING NATIVE CORONARY ARTERY OF NATIVE HEART WITHOUT ANGINA PECTORIS: ICD-10-CM

## 2018-05-01 RX ORDER — FLUTICASONE FUROATE 100 UG/1
POWDER RESPIRATORY (INHALATION)
Refills: 4 | COMMUNITY
Start: 2018-03-21 | End: 2021-01-20

## 2018-05-01 RX ORDER — AMLODIPINE BESYLATE 5 MG/1
TABLET ORAL
Refills: 4 | COMMUNITY
Start: 2018-03-15

## 2018-05-01 NOTE — PROGRESS NOTES
Subjective:      Kristin Baker is a 80 y.o. female is here for EP follow up. The patient denies chest pain/ shortness of breath, orthopnea, PND, LE edema, palpitations, syncope, presyncope or fatigue. Feels well overall. Patient Active Problem List    Diagnosis Date Noted    UTI (urinary tract infection) 02/09/2018    Pubic bone fracture ([facility] [address] 02/06/2018    SSS (sick sinus syndrome) ([facility] [address] 01/09/2018    Heart palpitations 12/18/2017    Shortness of breath 09/23/2016    Pneumonia 11/13/2014    Left humeral fracture 11/13/2014    Acute respiratory failure with hypoxia ([facility] [address] 11/13/2014    Sepsis ([address] 11/10/2014    CAD (coronary artery disease) 01/01/1996      Margaux Morrison MD  Past Medical History:   Diagnosis Date    CAD (coronary artery disease) 1996    MI     Cancer ([facility] [address]     breast, lung    Hypertension     Ill-defined condition     elevated cholesterol      Past Surgical History:   Procedure Laterality Date    BREAST SURGERY PROCEDURE UNLISTED      right mastectomy     No Known Allergies   No family history on file. negative for cardiac disease  Social History     Social History    Marital status:      Spouse name: N/A    Number of children: N/A    Years of education: N/A     Social History Main Topics    Smoking status: Former Smoker     Packs/day: 0.25     Years: 10.00     Quit date: 12/26/1989    Smokeless tobacco: Never Used    Alcohol use No    Drug use: No    Sexual activity: Not Asked     Other Topics Concern    None     Social History Narrative     Current Outpatient Prescriptions   Medication Sig    amLODIPine (NORVASC) 5 mg tablet TAKE 1 TABLET BY MOUTH EVERY DAY    ARNUITY ELLIPTA 100 mcg/actuation dsdv inhaler USE 1 PUFF ONCE DAILY    acetaminophen (TYLENOL) 325 mg tablet Take 2 Tabs by mouth every four (4) hours as needed.  calcium citrate-vitamin d3 (CITRACAL+D) 315-200 mg-unit tab Take 1 Tab by mouth daily.     latanoprost (XALATAN) 0.005 % ophthalmic solution Administer 1 Drop to both eyes nightly.  multivitamin (ONE A DAY) tablet Take 1 Tab by mouth daily.  simvastatin (ZOCOR) 40 mg tablet Take 40 mg by mouth nightly.  aspirin (ASPIRIN) 325 mg tablet Take 325 mg by mouth nightly.  levothyroxine (LEVOTHROID) 75 mcg tablet Take 75 mcg by mouth Daily (before breakfast).  oxyCODONE-acetaminophen (PERCOCET) 2.5-325 mg per tablet Take 1 Tab by mouth every six (6) hours as needed for Pain. Max Daily Amount: 4 Tabs. (Patient not taking: Reported on 5/1/2018)    polyethylene glycol (MIRALAX) 17 gram packet Take 1 Packet by mouth daily. (Patient not taking: Reported on 5/1/2018)     No current facility-administered medications for this visit. Vitals:    05/01/18 1518   BP: 130/70   Pulse: 70   Resp: 16   SpO2: 96%   Weight: 103 lb 3.2 oz (46.8 kg)   Height: 5' 1\" (1.549 m)       I have reviewed the nurses notes, vitals, problem list, allergy list, medical history, family, social history and medications. Review of Symptoms:    General: Pt denies excessive weight gain or loss. Pt is able to conduct ADL's  HEENT: Denies blurred vision, headaches, epistaxis and difficulty swallowing. Respiratory: Denies shortness of breath, HAND, wheezing or stridor. Cardiovascular: Denies precordial pain, palpitations, edema or PND  Gastrointestinal: Denies poor appetite, indigestion, abdominal pain or blood in stool  Urinary: Denies dysuria, pyuria  Musculoskeletal: Denies pain or swelling from muscles or joints  Neurologic: Denies tremor, paresthesias, or sensory motor disturbance  Skin: Denies rash, itching or texture change. Psych: Denies depression      Physical Exam:      General: Well developed, in no acute distress. HEENT: Eyes - PERRL, no jvd  Heart:  Normal S1/S2 negative S3 or S4.  Regular, no murmur, gallop or rub.   Respiratory: Clear bilaterally x 4, no wheezing or rales  Extremities:  No edema, normal cap refill, no cyanosis. Musculoskeletal: No clubbing  Neuro: A&Ox3, speech clear, gait stable. Skin: Skin color is normal. No rashes or lesions. Non diaphoretic  Vascular: 2+ pulses symmetric in all extremities    Cardiographics    Ekg: Sinus  Rhythm  - occasional ectopic ventricular beat     -Right axis -consider right ventricular hypertrophy.    -Left atrial enlargement. Results for orders placed or performed during the hospital encounter of 02/06/18   EKG, 12 LEAD, INITIAL   Result Value Ref Range    Ventricular Rate 81 BPM    Atrial Rate 81 BPM    P-R Interval 112 ms    QRS Duration 80 ms    Q-T Interval 380 ms    QTC Calculation (Bezet) 441 ms    Calculated P Axis 43 degrees    Calculated R Axis 123 degrees    Calculated T Axis 6 degrees    Diagnosis       Sinus rhythm with frequent premature ventricular complexes  Left atrial enlargement  Nonspecific ST and T wave abnormality  When compared with ECG of 01-SEP-2016 16:39,  No significant change  Confirmed by Maria Kline ([id number]) on 2/7/2018 6:25:51 PM           Lab Results   Component Value Date/Time    WBC 15.9 (H) 02/09/2018 04:22 AM    HGB 13.7 02/09/2018 04:22 AM    HCT 40.4 02/09/2018 04:22 AM    PLATELET 998 (L) 62/00/1601 04:22 AM    MCV 93.3 02/09/2018 04:22 AM      Lab Results   Component Value Date/Time    Sodium 138 02/09/2018 04:22 AM    Potassium 3.8 02/09/2018 04:22 AM    Chloride 105 02/09/2018 04:22 AM    CO2 25 02/09/2018 04:22 AM    Anion gap 8 02/09/2018 04:22 AM    Glucose 108 (H) 02/09/2018 04:22 AM    BUN 22 (H) 02/09/2018 04:22 AM    Creatinine 1.15 (H) 02/09/2018 04:22 AM    BUN/Creatinine ratio 19 02/09/2018 04:22 AM    GFR est AA 54 (L) 02/09/2018 04:22 AM    GFR est non-AA 44 (L) 02/09/2018 04:22 AM    Calcium 8.8 02/09/2018 04:22 AM    Bilirubin, total 1.3 (H) 02/09/2018 04:22 AM    AST (SGOT) 18 02/09/2018 04:22 AM    Alk.  phosphatase 52 02/09/2018 04:22 AM    Protein, total 6.5 02/09/2018 04:22 AM    Albumin 2.9 (L) 02/09/2018 04:22 AM    Globulin 3.6 02/09/2018 04:22 AM    A-G Ratio 0.8 (L) 02/09/2018 04:22 AM    ALT (SGPT) 15 02/09/2018 04:22 AM      No results found for: TSH, TSH2, TSH3, TSHP, TSHEXT, TSHEXT     Assessment:            ICD-10-CM ICD-9-CM    1. Heart palpitations R00.2 785.1 AMB POC EKG ROUTINE W/ 12 LEADS, INTER & REP   2. SSS (sick sinus syndrome) (HCC) I49.5 427.81    3. Coronary artery disease involving native coronary artery of native heart without angina pectoris I25.10 414.01      Orders Placed This Encounter    AMB POC EKG ROUTINE W/ 12 LEADS, INTER & REP     Order Specific Question:   Reason for Exam:     Answer:   routine    amLODIPine (NORVASC) 5 mg tablet     Sig: TAKE 1 TABLET BY MOUTH EVERY DAY     Refill:  4    ARNUITY ELLIPTA 100 mcg/actuation dsdv inhaler     Sig: USE 1 PUFF ONCE DAILY     Refill:  4        Plan:      Ms Jean Vidal is a 80year old female s/p dual chamber PM inserted 1/18. She is 30% A paced for sick sinus. Cont med rx for htn and hyperlipidemia. We will see her back in the office in 1 year, device clinic as planned. Continue medical management for sick sinus syndrome, ypertension, hyperlipidemia. Thank you for allowing me to participate in Jamaica Griffith 's care.       ALVARO Pritchard MD, [facility], [facility]

## 2018-05-01 NOTE — PROGRESS NOTES
1. Have you been to the ER, urgent care clinic since your last visit? Hospitalized since your last visit? Yes When: 2/2018 [facility]    2. Have you seen or consulted any other health care providers outside of the [address] [place] [address] since your last visit? Include any pap smears or colon screening. No    Patient has no cardiac complaints.

## 2018-08-06 ENCOUNTER — CLINICAL SUPPORT (OUTPATIENT)
Dept: CARDIOLOGY CLINIC | Age: 83
End: 2018-08-06

## 2018-08-06 DIAGNOSIS — I49.5 SSS (SICK SINUS SYNDROME) (HCC): ICD-10-CM

## 2018-08-06 DIAGNOSIS — R00.2 HEART PALPITATIONS: ICD-10-CM

## 2018-08-06 DIAGNOSIS — Z95.0 PACEMAKER: Primary | ICD-10-CM

## 2018-08-06 DIAGNOSIS — R00.1 BRADYCARDIA: ICD-10-CM

## 2018-08-10 NOTE — PROGRESS NOTES
See device report - MDT DC PM remote send, next remote send in 6 months, in office check in 3 months.

## 2018-11-06 ENCOUNTER — CLINICAL SUPPORT (OUTPATIENT)
Dept: CARDIOLOGY CLINIC | Age: 83
End: 2018-11-06

## 2018-11-06 DIAGNOSIS — I49.5 SSS (SICK SINUS SYNDROME) (HCC): ICD-10-CM

## 2018-11-06 DIAGNOSIS — Z95.0 CARDIAC PACEMAKER IN SITU: Primary | ICD-10-CM

## 2019-02-11 ENCOUNTER — CLINICAL SUPPORT (OUTPATIENT)
Dept: CARDIOLOGY CLINIC | Age: 84
End: 2019-02-11

## 2019-02-11 DIAGNOSIS — Z95.0 CARDIAC PACEMAKER IN SITU: Primary | ICD-10-CM

## 2019-02-11 DIAGNOSIS — I49.5 SSS (SICK SINUS SYNDROME) (HCC): ICD-10-CM

## 2019-05-23 ENCOUNTER — CLINICAL SUPPORT (OUTPATIENT)
Dept: CARDIOLOGY CLINIC | Age: 84
End: 2019-05-23

## 2019-05-23 ENCOUNTER — OFFICE VISIT (OUTPATIENT)
Dept: CARDIOLOGY CLINIC | Age: 84
End: 2019-05-23

## 2019-05-23 VITALS
HEIGHT: 61 IN | RESPIRATION RATE: 18 BRPM | HEART RATE: 60 BPM | SYSTOLIC BLOOD PRESSURE: 148 MMHG | BODY MASS INDEX: 21.22 KG/M2 | OXYGEN SATURATION: 95 % | DIASTOLIC BLOOD PRESSURE: 70 MMHG | WEIGHT: 112.4 LBS

## 2019-05-23 DIAGNOSIS — Z45.018 PACEMAKER REPROGRAMMING/CHECK: ICD-10-CM

## 2019-05-23 DIAGNOSIS — I10 ESSENTIAL HYPERTENSION: ICD-10-CM

## 2019-05-23 DIAGNOSIS — I49.5 SSS (SICK SINUS SYNDROME) (HCC): Primary | ICD-10-CM

## 2019-05-23 DIAGNOSIS — Z95.0 CARDIAC PACEMAKER IN SITU: Primary | ICD-10-CM

## 2019-05-23 DIAGNOSIS — Z95.0 CARDIAC PACEMAKER IN SITU: ICD-10-CM

## 2019-05-23 DIAGNOSIS — R00.1 BRADYCARDIA: ICD-10-CM

## 2019-05-23 DIAGNOSIS — I49.5 SSS (SICK SINUS SYNDROME) (HCC): ICD-10-CM

## 2019-05-23 NOTE — PROGRESS NOTES
Subjective:      Byron Valdez is a 80 y.o. female is here for annual EP follow up. The patient denies chest pain/ shortness of breath, orthopnea, PND, LE edema, palpitations, syncope, presyncope or fatigue. Patient Active Problem List    Diagnosis Date Noted    UTI (urinary tract infection) 2018    Pubic bone fracture ([facility] [address] 2018    SSS (sick sinus syndrome) ([facility] [address] 2018    Heart palpitations 2017    Shortness of breath 2016    Pneumonia 2014    Left humeral fracture 2014    Acute respiratory failure with hypoxia ([facility] [address] 2014    Sepsis ([facility] [address] 11/10/2014    CAD (coronary artery disease) 1996      Hermes Espinoza MD  Past Medical History:   Diagnosis Date    CAD (coronary artery disease)     MI     Cancer ([facility] [address]     breast, lung    Hypertension     Ill-defined condition     elevated cholesterol      Past Surgical History:   Procedure Laterality Date    BREAST SURGERY PROCEDURE UNLISTED      right mastectomy     No Known Allergies   No family history on file. negative for cardiac disease  Social History     Socioeconomic History    Marital status:      Spouse name: Not on file    Number of children: Not on file    Years of education: Not on file    Highest education level: Not on file   Tobacco Use    Smoking status: Former Smoker     Packs/day: 0.25     Years: 10.00     Pack years: 2.50     Last attempt to quit: 1989     Years since quittin.4    Smokeless tobacco: Never Used   Substance and Sexual Activity    Alcohol use: No    Drug use: No     Current Outpatient Medications   Medication Sig    amLODIPine (NORVASC) 5 mg tablet TAKE 1 TABLET BY MOUTH EVERY DAY    ARNUITY ELLIPTA 100 mcg/actuation dsdv inhaler USE 1 PUFF ONCE DAILY    acetaminophen (TYLENOL) 325 mg tablet Take 2 Tabs by mouth every four (4) hours as needed.  polyethylene glycol (MIRALAX) 17 gram packet Take 1 Packet by mouth daily.  calcium citrate-vitamin d3 (CITRACAL+D) 315-200 mg-unit tab Take 1 Tab by mouth daily.  latanoprost (XALATAN) 0.005 % ophthalmic solution Administer 1 Drop to both eyes nightly.  multivitamin (ONE A DAY) tablet Take 1 Tab by mouth daily.  simvastatin (ZOCOR) 40 mg tablet Take 40 mg by mouth nightly.  aspirin (ASPIRIN) 325 mg tablet Take 325 mg by mouth nightly.  levothyroxine (LEVOTHROID) 75 mcg tablet Take 75 mcg by mouth Daily (before breakfast). No current facility-administered medications for this visit. Vitals:    05/23/19 1317   BP: 148/70   Pulse: 60   Resp: 18   SpO2: 95%   Weight: 112 lb 6.4 oz (51 kg)   Height: 5' 1\" (1.549 m)       I have reviewed the nurses notes, vitals, problem list, allergy list, medical history, family, social history and medications. Review of Symptoms:    General: Pt denies excessive weight gain or loss. Pt is able to conduct ADL's  HEENT: Denies blurred vision, headaches, epistaxis and difficulty swallowing. Respiratory: Denies shortness of breath, HAND, wheezing or stridor. Cardiovascular: Denies precordial pain, palpitations, edema or PND  Gastrointestinal: Denies poor appetite, indigestion, abdominal pain or blood in stool  Urinary: Denies dysuria, pyuria  Musculoskeletal: Denies pain or swelling from muscles or joints  Neurologic: Denies tremor, paresthesias, or sensory motor disturbance  Skin: Denies rash, itching or texture change. Psych: Denies depression      Physical Exam:      General: Well developed, in no acute distress. HEENT: Eyes - PERRL, no jvd  Heart:  Normal S1/S2 negative S3 or S4. Regular, no murmur, gallop or rub. Respiratory: Clear bilaterally x 4, no wheezing or rales  Extremities:  No edema, normal cap refill, no cyanosis. Musculoskeletal: No clubbing  Neuro: A&Ox3, speech clear, gait stable. Skin: Skin color is normal. No rashes or lesions.  Non diaphoretic  Vascular: 2+ pulses symmetric in all extremities    Cardiographics    Ekg: A paced. Results for orders placed or performed during the hospital encounter of 02/06/18   EKG, 12 LEAD, INITIAL   Result Value Ref Range    Ventricular Rate 81 BPM    Atrial Rate 81 BPM    P-R Interval 112 ms    QRS Duration 80 ms    Q-T Interval 380 ms    QTC Calculation (Bezet) 441 ms    Calculated P Axis 43 degrees    Calculated R Axis 123 degrees    Calculated T Axis 6 degrees    Diagnosis       Sinus rhythm with frequent premature ventricular complexes  Left atrial enlargement  Nonspecific ST and T wave abnormality  When compared with ECG of 01-SEP-2016 16:39,  No significant change  Confirmed by Hosea Gonzalez ([id number]) on 2/7/2018 6:25:51 PM           Lab Results   Component Value Date/Time    WBC 15.9 (H) 02/09/2018 04:22 AM    HGB 13.7 02/09/2018 04:22 AM    HCT 40.4 02/09/2018 04:22 AM    PLATELET 379 (L) 84/55/6781 04:22 AM    MCV 93.3 02/09/2018 04:22 AM      Lab Results   Component Value Date/Time    Sodium 138 02/09/2018 04:22 AM    Potassium 3.8 02/09/2018 04:22 AM    Chloride 105 02/09/2018 04:22 AM    CO2 25 02/09/2018 04:22 AM    Anion gap 8 02/09/2018 04:22 AM    Glucose 108 (H) 02/09/2018 04:22 AM    BUN 22 (H) 02/09/2018 04:22 AM    Creatinine 1.15 (H) 02/09/2018 04:22 AM    BUN/Creatinine ratio 19 02/09/2018 04:22 AM    GFR est AA 54 (L) 02/09/2018 04:22 AM    GFR est non-AA 44 (L) 02/09/2018 04:22 AM    Calcium 8.8 02/09/2018 04:22 AM    Bilirubin, total 1.3 (H) 02/09/2018 04:22 AM    AST (SGOT) 18 02/09/2018 04:22 AM    Alk. phosphatase 52 02/09/2018 04:22 AM    Protein, total 6.5 02/09/2018 04:22 AM    Albumin 2.9 (L) 02/09/2018 04:22 AM    Globulin 3.6 02/09/2018 04:22 AM    A-G Ratio 0.8 (L) 02/09/2018 04:22 AM    ALT (SGPT) 15 02/09/2018 04:22 AM      No results found for: TSH, TSH2, TSH3, TSHP, TSHEXT, TSHEXT     Assessment:           ICD-10-CM ICD-9-CM    1. SSS (sick sinus syndrome) (HCC) I49.5 427.81    2. Bradycardia R00.1 427.89    3. Essential hypertension I10 401.9    4. Cardiac pacemaker in situ Z95.0 V45.01    5. Pacemaker reprogramming/check Z45.018 V53.31 AMB POC EKG ROUTINE W/ 12 LEADS, INTER & REP     Orders Placed This Encounter    AMB POC EKG ROUTINE W/ 12 LEADS, INTER & REP     Order Specific Question:   Reason for Exam:     Answer:   routine        Plan:     Ms Rosalia Solis is here for follow up s/p dual chamber PM 1/9/19. She us 48% AP and <0.1% RVP with no events. She is A paced on EKG, normotensive. No cardiac complaints. Will continue to follow device remotely and see her back in 1 year. Continue medical management for HTN. Thank you for allowing me to participate in Hardik Chen 's care.       Jovon Lewis NP

## 2019-05-23 NOTE — PROGRESS NOTES
1. Have you been to the ER, urgent care clinic since your last visit? Hospitalized since your last visit? No    2. Have you seen or consulted any other health care providers outside of the [facility] [place] since your last visit? Include any pap smears or colon screening. No    Chief Complaint   Patient presents with    Pacemaker Check     Yearly appt. Denied cardiac symptoms.

## 2019-06-10 NOTE — ROUTINE PROCESS
Detail Level: Zone PCP ALYSE appt scheduled with Dr. Nikky Hernandez on 3/15/18 at 10:45AM. Appt added to AVS. Anton Hauser CM Specialist

## 2019-11-07 NOTE — PERIOP NOTES
[facility]  Ambulatory Surgery Unit  Pre-operative Instructions    Procedure Date  Monday, November 11, 2019            Tentative Arrival Time 1921      1. On the day of your procedure, please report to the Ambulatory Surgery Unit Registration Desk and sign in at your designated time. The Ambulatory Surgery Unit is located in [facility] on the [facility] side of the [facility] across from the [address] [place] [address]. Please have all of your health insurance cards and a photo ID. 2. You must have someone with you to drive you home as directed by your surgeon. 3. You may have a light breakfast and take normal morning medications. 4. We recommend you do not drink any alcoholic beverages for 24 hours before and after your procedure. 5. Contact your surgeons office for instructions on the following medications: non-steroidal anti-inflammatory drugs (i.e. Advil, Aleve), vitamins, and supplements. (Some surgeons will want you to stop these medications prior to surgery and others may allow you to take them)   **If you are currently taking Plavix, Coumadin, Aspirin and/or other blood-thinning agents, contact your surgeon for instructions. ** Your surgeon will partner with the physician prescribing these medications to determine if it is safe to stop or if you need to continue taking. Please do not stop taking these medications without instructions from your surgeon. 6. In an effort to help prevent surgical site infection, we ask that you shower with an anti-bacterial soap (i.e. Dial or Safeguard) on the morning of your procedure. Do not apply any lotions, powders, or deodorants after showering. 7. Wear comfortable clothes. Wear glasses instead of contacts. Do not bring any jewelry or money (other than copays or fees as instructed). Do not wear make-up, particularly mascara, the morning of your procedure. Wear your hair loose or down, no ponytails, buns, mayte pins or clips.  All body piercings must be removed. 8. You should understand that if you do not follow these instructions your procedure may be cancelled. If your physical condition changes (i.e. fever, cold or flu) please contact your surgeon as soon as possible. 9. It is important that you be on time. If a situation occurs where you may be late, or if you have any questions or problems, please call [phone].    10. Your procedure time may be subject to change. You will receive a phone call the day prior to confirm your arrival time. I understand a pre-operative phone call will be made to verify my procedure time. In the event that I am not available, I give permission for a message to be left on my answering service and/or with another person?       yes    Preop instructions reviewed  Pt verbalized understanding.      ___________________      ___________________      ___________________  (Signature of Patient)          (Witness)                   (Date and Time)

## 2019-11-11 ENCOUNTER — HOSPITAL ENCOUNTER (OUTPATIENT)
Age: 84
Setting detail: OUTPATIENT SURGERY
Discharge: HOME | End: 2019-11-11
Attending: OPHTHALMOLOGY | Admitting: OPHTHALMOLOGY
Payer: MEDICARE

## 2019-11-11 VITALS
DIASTOLIC BLOOD PRESSURE: 72 MMHG | WEIGHT: 112.43 LBS | SYSTOLIC BLOOD PRESSURE: 101 MMHG | HEART RATE: 69 BPM | HEIGHT: 61 IN | TEMPERATURE: 97.7 F | RESPIRATION RATE: 17 BRPM | BODY MASS INDEX: 21.23 KG/M2 | OXYGEN SATURATION: 98 %

## 2019-11-11 PROCEDURE — 74011000250 HC RX REV CODE- 250

## 2019-11-11 PROCEDURE — 74011000250 HC RX REV CODE- 250: Performed by: OPHTHALMOLOGY

## 2019-11-11 PROCEDURE — 76030000017 HC AMB SURG FIRST 0.5 HR INTENSV-TIER 1: Performed by: OPHTHALMOLOGY

## 2019-11-11 RX ORDER — TROPICAMIDE 10 MG/ML
SOLUTION/ DROPS OPHTHALMIC
Status: COMPLETED
Start: 2019-11-11 | End: 2019-11-11

## 2019-11-11 RX ORDER — TETRACAINE HYDROCHLORIDE 5 MG/ML
1 SOLUTION OPHTHALMIC ONCE
Status: COMPLETED | OUTPATIENT
Start: 2019-11-11 | End: 2019-11-11

## 2019-11-11 RX ORDER — TROPICAMIDE 10 MG/ML
1 SOLUTION/ DROPS OPHTHALMIC
Status: DISCONTINUED | OUTPATIENT
Start: 2019-11-11 | End: 2019-11-11 | Stop reason: HOSPADM

## 2019-11-11 RX ORDER — TETRACAINE HYDROCHLORIDE 5 MG/ML
SOLUTION OPHTHALMIC
Status: COMPLETED
Start: 2019-11-11 | End: 2019-11-11

## 2019-11-11 RX ORDER — BRIMONIDINE TARTRATE 2 MG/ML
1 SOLUTION/ DROPS OPHTHALMIC
Status: DISCONTINUED | OUTPATIENT
Start: 2019-11-11 | End: 2019-11-11 | Stop reason: HOSPADM

## 2019-11-11 RX ADMIN — TROPICAMIDE 1 DROP: 10 SOLUTION/ DROPS OPHTHALMIC at 12:03

## 2019-11-11 RX ADMIN — TROPICAMIDE 1 DROP: 10 SOLUTION/ DROPS OPHTHALMIC at 12:06

## 2019-11-11 RX ADMIN — TETRACAINE HYDROCHLORIDE 1 DROP: 5 SOLUTION OPHTHALMIC at 12:44

## 2019-11-11 RX ADMIN — BRIMONIDINE TARTRATE 1 DROP: 2 SOLUTION OPHTHALMIC at 12:53

## 2019-11-11 RX ADMIN — BRIMONIDINE TARTRATE 1 DROP: 2 SOLUTION OPHTHALMIC at 12:06

## 2019-11-11 RX ADMIN — BRIMONIDINE TARTRATE 1 DROP: 2 SOLUTION OPHTHALMIC at 12:03

## 2019-11-11 NOTE — DISCHARGE INSTRUCTIONS
Patient to resume all home medications and activities. Post-op appt card to be given to patient by Dr. Erica Loving. No prescriptions. Wear sunglasses until pupil returns to normal size. Please call Dr. Maverick Gomez office with any questions at [phone].

## 2019-11-11 NOTE — BRIEF OP NOTE
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   Anesthesia: Topical   Estimated Blood Loss: none  Specimens: * No specimens in log *   Findings: secondary membrane left eye  Complications: none  Implants: * No implants in log *

## 2019-11-11 NOTE — H&P
Surgery History and Physcial    Subjective:      Julia Duarte is a 80 y.o. female with secondary membrane left eye for Yag laser capsulotomy left eye. Patient Active Problem List    Diagnosis Date Noted    UTI (urinary tract infection) 2018    [facility] bone fracture ([facility] 2018    SSS (sick sinus syndrome) ([facility] 2018    Heart palpitations 2017    Shortness of breath 2016    Pneumonia 2014    Left humeral fracture 2014    Acute respiratory failure with hypoxia ([address] 2014    Sepsis ([facility] 11/10/2014    CAD (coronary artery disease) 1996     Past Medical History:   Diagnosis Date    CAD (coronary artery disease)     MI     Cancer ([facility]     breast, lung    Elevated cholesterol     Glaucoma     Hypertension     Hypothyroid     Sick sinus syndrome ([facility]       Past Surgical History:   Procedure Laterality Date    BREAST SURGERY PROCEDURE UNLISTED      right mastectomy    HX CATARACT REMOVAL Bilateral     HX PACEMAKER PLACEMENT Left 2018      Social History     Tobacco Use    Smoking status: Former Smoker     Packs/day: 0.25     Years: 10.00     Pack years: 2.50     Last attempt to quit: 1989     Years since quittin.8    Smokeless tobacco: Never Used   Substance Use Topics    Alcohol use: No      History reviewed. No pertinent family history. Prior to Admission medications    Medication Sig Start Date End Date Taking? Authorizing Provider   amLODIPine (NORVASC) 5 mg tablet TAKE 1 TABLET BY MOUTH EVERY DAY 3/15/18  Yes Provider, Historical   ARNUITY ELLIPTA 100 mcg/actuation dsdv inhaler USE 1 PUFF ONCE DAILY 3/21/18  Yes Provider, Historical   acetaminophen (TYLENOL) 325 mg tablet Take 2 Tabs by mouth every four (4) hours as needed. 18  Yes Claudell Gallo, MD   calcium citrate-vitamin d3 (CITRACAL+D) 315-200 mg-unit tab Take 1 Tab by mouth daily.    Yes Provider, Historical   latanoprost (XALATAN) 0.005 % ophthalmic solution Administer 1 Drop to both eyes nightly. 11/24/17  Yes Provider, Historical   multivitamin (ONE A DAY) tablet Take 1 Tab by mouth daily. Yes Provider, Historical   simvastatin (ZOCOR) 40 mg tablet Take 40 mg by mouth nightly. Yes Provider, Historical   aspirin (ASPIRIN) 325 mg tablet Take 325 mg by mouth nightly. Yes Other, MD Janak   levothyroxine (LEVOTHROID) 75 mcg tablet Take 75 mcg by mouth Daily (before breakfast). Yes Other, MD Janak   polyethylene glycol (MIRALAX) 17 gram packet Take 1 Packet by mouth daily. 2/9/18   Judy Ritter MD     No Known Allergies      Review of Systems   All other systems reviewed and are negative. Objective:     Visit Vitals  Ht 5' 0.98\" (1.549 m)   Wt 51 kg (112 lb 7 oz)   BMI 21.26 kg/m²       Physical Exam   Constitutional: She is oriented to person, place, and time. She appears well-developed and well-nourished. HENT:   Head: Normocephalic and atraumatic. Cardiovascular: Normal heart sounds. Pulmonary/Chest: Breath sounds normal. No respiratory distress. Abdominal: Bowel sounds are normal.   Musculoskeletal: Normal range of motion. Neurological: She is alert and oriented to person, place, and time. Psychiatric: She has a normal mood and affect. Imaging:      Lab Review:  No results found for this or any previous visit (from the past 24 hour(s)). 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 Plan:     Secondary membrane left eye for Yag laser capsulotomy left eye.

## 2019-11-11 NOTE — OP NOTES
Date of Procedure: 11/11/19  Preoperative Diagnosis: Secondary membrane left eye (Y37.049)  Postoperative Diagnosis: Secondary membrane left eye (M17.233)  Procedure: Yag laser capsulotomy left eye  Surgeon:  ISABELLE Macias MD  Assistants: None  Anesthesia: Topical tetracaine  Estimated Blood Loss: None  Findings: Secondary membrane left eye  Complications: None  Specimens: none  Prosthetic Devices: None    The patient's left eye was dilated with mydriacyl 1% and was given brimonidine 0.2% preoperatively. Tetracaine was used topically for anesthesia. The patient was seated at the YAG laser, and the laser was used to make a central opening in the capsule with a total of 60 impulses at 2.3 mJ power. Brimonidine 0.2% was placed postoperatively. The patient tolerated the procedure well and is to follow-up in one week.

## 2019-12-02 ENCOUNTER — OFFICE VISIT (OUTPATIENT)
Dept: CARDIOLOGY CLINIC | Age: 84
End: 2019-12-02

## 2019-12-02 DIAGNOSIS — I49.5 SSS (SICK SINUS SYNDROME) (HCC): ICD-10-CM

## 2019-12-02 DIAGNOSIS — Z95.0 CARDIAC PACEMAKER IN SITU: Primary | ICD-10-CM

## 2020-03-09 ENCOUNTER — OFFICE VISIT (OUTPATIENT)
Dept: CARDIOLOGY CLINIC | Age: 85
End: 2020-03-09

## 2020-03-09 DIAGNOSIS — Z95.0 CARDIAC PACEMAKER IN SITU: Primary | ICD-10-CM

## 2020-03-09 DIAGNOSIS — R00.1 BRADYCARDIA: ICD-10-CM

## 2020-07-02 ENCOUNTER — OFFICE VISIT (OUTPATIENT)
Dept: CARDIOLOGY CLINIC | Age: 85
End: 2020-07-02

## 2020-07-02 ENCOUNTER — CLINICAL SUPPORT (OUTPATIENT)
Dept: CARDIOLOGY CLINIC | Age: 85
End: 2020-07-02

## 2020-07-02 VITALS
OXYGEN SATURATION: 93 % | WEIGHT: 115.6 LBS | HEART RATE: 70 BPM | RESPIRATION RATE: 18 BRPM | BODY MASS INDEX: 23.31 KG/M2 | DIASTOLIC BLOOD PRESSURE: 78 MMHG | HEIGHT: 59 IN | SYSTOLIC BLOOD PRESSURE: 140 MMHG

## 2020-07-02 DIAGNOSIS — R00.1 BRADYCARDIA: ICD-10-CM

## 2020-07-02 DIAGNOSIS — Z45.018 PACEMAKER REPROGRAMMING/CHECK: Primary | ICD-10-CM

## 2020-07-02 DIAGNOSIS — Z95.0 CARDIAC PACEMAKER IN SITU: Primary | ICD-10-CM

## 2020-07-02 DIAGNOSIS — I10 ESSENTIAL HYPERTENSION: ICD-10-CM

## 2020-07-02 DIAGNOSIS — I49.5 SSS (SICK SINUS SYNDROME) (HCC): ICD-10-CM

## 2020-07-02 DIAGNOSIS — Z95.0 CARDIAC PACEMAKER IN SITU: ICD-10-CM

## 2020-07-02 NOTE — PROGRESS NOTES
1. Have you been to the ER, urgent care clinic since your last visit? Hospitalized since your last visit? 11-11-19  Left eye surgery. 2. Have you seen or consulted any other health care providers outside of the [address] [place] [address] since your last visit? Include any pap smears or colon screening.  No

## 2020-07-02 NOTE — PROGRESS NOTES
Subjective:      Hasmukh Gr is a 80 y.o. female is here for EP follow up. The patient denies chest pain/ shortness of breath, orthopnea, PND, LE edema, palpitations, syncope, presyncope or fatigue. She is without cardiac complaints. Patient Active Problem List    Diagnosis Date Noted    UTI (urinary tract infection) 2018    [facility]ic bone fracture [facility] 2018    SSS (sick sinus syndrome) ([facility] 2018    Heart palpitations 2017    Shortness of breath 2016    Pneumonia 2014    Left humeral fracture 2014    Acute respiratory failure with hypoxia ([address] 2014    Sepsis ([address] 11/10/2014    CAD (coronary artery disease) 1996      Joyce Rios MD  Past Medical History:   Diagnosis Date    CAD (coronary artery disease)     MI     Cancer ([facility]     breast, lung    Elevated cholesterol     Glaucoma     Hypertension     Hypothyroid     Sick sinus syndrome ([facility]       Past Surgical History:   Procedure Laterality Date    BREAST SURGERY PROCEDURE UNLISTED      right mastectomy    HX CATARACT REMOVAL Bilateral     HX PACEMAKER PLACEMENT Left 2018     No Known Allergies   No family history on file. negative for cardiac disease  Social History     Socioeconomic History    Marital status:      Spouse name: Not on file    Number of children: Not on file    Years of education: Not on file    Highest education level: Not on file   Tobacco Use    Smoking status: Former Smoker     Packs/day: 0.25     Years: 10.00     Pack years: 2.50     Last attempt to quit: 1989     Years since quittin.5    Smokeless tobacco: Never Used   Substance and Sexual Activity    Alcohol use: No    Drug use: No     Current Outpatient Medications   Medication Sig    beclomethasone (QVAR) 80 mcg/actuation aero Take 1 Puff by inhalation two (2) times a day.     amLODIPine (NORVASC) 5 mg tablet TAKE 1 TABLET BY MOUTH EVERY DAY    acetaminophen (TYLENOL) 325 mg tablet Take 2 Tabs by mouth every four (4) hours as needed.  polyethylene glycol (MIRALAX) 17 gram packet Take 1 Packet by mouth daily.  calcium citrate-vitamin d3 (CITRACAL+D) 315-200 mg-unit tab Take 1 Tab by mouth daily.  latanoprost (XALATAN) 0.005 % ophthalmic solution Administer 1 Drop to both eyes nightly.  multivitamin (ONE A DAY) tablet Take 1 Tab by mouth daily.  simvastatin (ZOCOR) 40 mg tablet Take 40 mg by mouth nightly.  aspirin (ASPIRIN) 325 mg tablet Take 325 mg by mouth nightly.  ARNUITY ELLIPTA 100 mcg/actuation dsdv inhaler USE 1 PUFF ONCE DAILY    levothyroxine (LEVOTHROID) 75 mcg tablet Take 75 mcg by mouth Daily (before breakfast). No current facility-administered medications for this visit. Vitals:    07/02/20 1051   BP: 140/78   Pulse: 70   Resp: 18   SpO2: 93%   Weight: 115 lb 9.6 oz (52.4 kg)   Height: 4' 11\" (1.499 m)       I have reviewed the nurses notes, vitals, problem list, allergy list, medical history, family, social history and medications. Review of Symptoms:    General: Pt denies excessive weight gain or loss. Pt is able to conduct ADL's  HEENT: Denies blurred vision, headaches, epistaxis and difficulty swallowing. Respiratory: Denies shortness of breath, HAND, wheezing or stridor. Cardiovascular: Denies precordial pain, palpitations, edema or PND  Gastrointestinal: Denies poor appetite, indigestion, abdominal pain or blood in stool  Urinary: Denies dysuria, pyuria  Musculoskeletal: Denies pain or swelling from muscles or joints  Neurologic: Denies tremor, paresthesias, or sensory motor disturbance  Skin: Denies rash, itching or texture change. Psych: Denies depression      Physical Exam:      General: Well developed, in no acute distress. HEENT: Eyes - PERRL, no jvd  Heart:  Normal S1/S2 negative S3 or S4. Regular, no murmur, gallop or rub.    Respiratory: Clear bilaterally x 4, no wheezing or rales  Extremities:  No edema, normal cap refill, no cyanosis. Musculoskeletal: No clubbing  Neuro: A&Ox3, speech clear, gait stable. Skin: Skin color is normal. No rashes or lesions. Non diaphoretic  Vascular: 2+ pulses symmetric in all extremities    Cardiographics    Ekg: a paced    Results for orders placed or performed during the hospital encounter of 02/06/18   EKG, 12 LEAD, INITIAL   Result Value Ref Range    Ventricular Rate 81 BPM    Atrial Rate 81 BPM    P-R Interval 112 ms    QRS Duration 80 ms    Q-T Interval 380 ms    QTC Calculation (Bezet) 441 ms    Calculated P Axis 43 degrees    Calculated R Axis 123 degrees    Calculated T Axis 6 degrees    Diagnosis       Sinus rhythm with frequent premature ventricular complexes  Left atrial enlargement  Nonspecific ST and T wave abnormality  When compared with ECG of 01-SEP-2016 16:39,  No significant change  Confirmed by Mian Salazar ([id number]) on 2/7/2018 6:25:51 PM           Lab Results   Component Value Date/Time    WBC 15.9 (H) 02/09/2018 04:22 AM    HGB 13.7 02/09/2018 04:22 AM    HCT 40.4 02/09/2018 04:22 AM    PLATELET 664 (L) 54/89/1092 04:22 AM    MCV 93.3 02/09/2018 04:22 AM      Lab Results   Component Value Date/Time    Sodium 138 02/09/2018 04:22 AM    Potassium 3.8 02/09/2018 04:22 AM    Chloride 105 02/09/2018 04:22 AM    CO2 25 02/09/2018 04:22 AM    Anion gap 8 02/09/2018 04:22 AM    Glucose 108 (H) 02/09/2018 04:22 AM    BUN 22 (H) 02/09/2018 04:22 AM    Creatinine 1.15 (H) 02/09/2018 04:22 AM    BUN/Creatinine ratio 19 02/09/2018 04:22 AM    GFR est AA 54 (L) 02/09/2018 04:22 AM    GFR est non-AA 44 (L) 02/09/2018 04:22 AM    Calcium 8.8 02/09/2018 04:22 AM    Bilirubin, total 1.3 (H) 02/09/2018 04:22 AM    Alk.  phosphatase 52 02/09/2018 04:22 AM    Protein, total 6.5 02/09/2018 04:22 AM    Albumin 2.9 (L) 02/09/2018 04:22 AM    Globulin 3.6 02/09/2018 04:22 AM    A-G Ratio 0.8 (L) 02/09/2018 04:22 AM    ALT (SGPT) 15 02/09/2018 04:22 AM No results found for: TSH, TSH2, TSH3, TSHP, TSHEXT, TSHEXT     Assessment:           ICD-10-CM ICD-9-CM    1. Pacemaker reprogramming/check Z45.018 V53.31 AMB POC EKG ROUTINE W/ 12 LEADS, INTER & REP   2. Bradycardia R00.1 427.89    3. SSS (sick sinus syndrome) (HCC) I49.5 427.81    4. Essential hypertension I10 401.9    5. Cardiac pacemaker in situ Z95.0 V45.01      Orders Placed This Encounter    AMB POC EKG ROUTINE W/ 12 LEADS, INTER & REP     Order Specific Question:   Reason for Exam:     Answer:   routine    beclomethasone (QVAR) 80 mcg/actuation aero     Sig: Take 1 Puff by inhalation two (2) times a day. Plan:     Ms Milana Jackson is here for follow up s/p dual chamber PM 1/9/19. She is 55% AP and <0.1% RVP with one NSVT of 1 second - asymptomatic. She is A paced on EKG, normotensive. No cardiac complaints. Will continue to follow device remotely and see her back in 1 year.      Continue medical management for HTN. Thank you for allowing me to participate in Mahogany Mendez 's care.       Ronnell Landau, NP

## 2020-10-05 ENCOUNTER — OFFICE VISIT (OUTPATIENT)
Dept: CARDIOLOGY CLINIC | Age: 85
End: 2020-10-05
Payer: MEDICARE

## 2020-10-05 DIAGNOSIS — I49.5 SSS (SICK SINUS SYNDROME) (HCC): ICD-10-CM

## 2020-10-05 DIAGNOSIS — Z95.0 CARDIAC PACEMAKER IN SITU: Primary | ICD-10-CM

## 2020-10-05 PROCEDURE — 93294 REM INTERROG EVL PM/LDLS PM: CPT | Performed by: INTERNAL MEDICINE

## 2020-10-05 PROCEDURE — 93296 REM INTERROG EVL PM/IDS: CPT | Performed by: INTERNAL MEDICINE

## 2021-01-11 ENCOUNTER — OFFICE VISIT (OUTPATIENT)
Dept: CARDIOLOGY CLINIC | Age: 86
End: 2021-01-11
Payer: MEDICARE

## 2021-01-11 DIAGNOSIS — I49.5 SSS (SICK SINUS SYNDROME) (HCC): ICD-10-CM

## 2021-01-11 DIAGNOSIS — Z95.0 CARDIAC PACEMAKER IN SITU: Primary | ICD-10-CM

## 2021-01-11 PROCEDURE — 93294 REM INTERROG EVL PM/LDLS PM: CPT | Performed by: INTERNAL MEDICINE

## 2021-01-11 PROCEDURE — 93296 REM INTERROG EVL PM/IDS: CPT | Performed by: INTERNAL MEDICINE

## 2021-01-11 NOTE — PERIOP NOTES
PATIENT'S SON CALLED AND MADE AWARE OF COVID-19 TESTING NEEDED TO BE DONE WITHIN 96 HOURS OF SURGERY. COVID-19 TESTING APPOINTMENT MADE FOR PATIENT. INSTRUCTED ON SELF QUARANTINE BETWEEN TESTING AND ARRIVAL TIME DAY OF SURGERY. ALSO INFORMED TO NOT USE ANY NASAL SPRAYS OR NASAL OINTMENTS PRIOR TO NASAL SWAB.

## 2021-01-14 ENCOUNTER — HOSPITAL ENCOUNTER (OUTPATIENT)
Dept: PREADMISSION TESTING | Age: 86
Discharge: HOME | End: 2021-01-14
Payer: MEDICARE

## 2021-01-14 ENCOUNTER — TRANSCRIBE ORDER (OUTPATIENT)
Dept: REGISTRATION | Age: 86
End: 2021-01-14

## 2021-01-14 VITALS
TEMPERATURE: 98.1 F | HEART RATE: 70 BPM | SYSTOLIC BLOOD PRESSURE: 165 MMHG | WEIGHT: 112.43 LBS | DIASTOLIC BLOOD PRESSURE: 74 MMHG | BODY MASS INDEX: 24.26 KG/M2 | HEIGHT: 57 IN | OXYGEN SATURATION: 93 %

## 2021-01-14 DIAGNOSIS — Z01.812 PRE-PROCEDURE LAB EXAM: ICD-10-CM

## 2021-01-14 DIAGNOSIS — Z01.812 PRE-PROCEDURE LAB EXAM: Primary | ICD-10-CM

## 2021-01-14 LAB
ANION GAP SERPL CALC-SCNC: 2 MMOL/L (ref 5–15)
BASOPHILS # BLD: 0.1 K/UL (ref 0–0.1)
BASOPHILS NFR BLD: 2 % (ref 0–1)
BUN SERPL-MCNC: 26 MG/DL (ref 6–20)
BUN/CREAT SERPL: 22 (ref 12–20)
CALCIUM SERPL-MCNC: 9.8 MG/DL (ref 8.5–10.1)
CHLORIDE SERPL-SCNC: 108 MMOL/L (ref 97–108)
CO2 SERPL-SCNC: 32 MMOL/L (ref 21–32)
CREAT SERPL-MCNC: 1.2 MG/DL (ref 0.55–1.02)
DIFFERENTIAL METHOD BLD: ABNORMAL
EOSINOPHIL # BLD: 0.3 K/UL (ref 0–0.4)
EOSINOPHIL NFR BLD: 3 % (ref 0–7)
ERYTHROCYTE [DISTWIDTH] IN BLOOD BY AUTOMATED COUNT: 14 % (ref 11.5–14.5)
GLUCOSE SERPL-MCNC: 90 MG/DL (ref 65–100)
HCT VFR BLD AUTO: 44.4 % (ref 35–47)
HGB BLD-MCNC: 14.7 G/DL (ref 11.5–16)
IMM GRANULOCYTES # BLD AUTO: 0 K/UL (ref 0–0.04)
IMM GRANULOCYTES NFR BLD AUTO: 0 % (ref 0–0.5)
LYMPHOCYTES # BLD: 1.5 K/UL (ref 0.8–3.5)
LYMPHOCYTES NFR BLD: 19 % (ref 12–49)
MCH RBC QN AUTO: 30.6 PG (ref 26–34)
MCHC RBC AUTO-ENTMCNC: 33.1 G/DL (ref 30–36.5)
MCV RBC AUTO: 92.3 FL (ref 80–99)
MONOCYTES # BLD: 0.8 K/UL (ref 0–1)
MONOCYTES NFR BLD: 9 % (ref 5–13)
NEUTS SEG # BLD: 5.4 K/UL (ref 1.8–8)
NEUTS SEG NFR BLD: 67 % (ref 32–75)
NRBC # BLD: 0 K/UL (ref 0–0.01)
NRBC BLD-RTO: 0 PER 100 WBC
PLATELET # BLD AUTO: 243 K/UL (ref 150–400)
PMV BLD AUTO: 11.9 FL (ref 8.9–12.9)
POTASSIUM SERPL-SCNC: 4.6 MMOL/L (ref 3.5–5.1)
RBC # BLD AUTO: 4.81 M/UL (ref 3.8–5.2)
SODIUM SERPL-SCNC: 142 MMOL/L (ref 136–145)
WBC # BLD AUTO: 8.1 K/UL (ref 3.6–11)

## 2021-01-14 PROCEDURE — 36415 COLL VENOUS BLD VENIPUNCTURE: CPT

## 2021-01-14 PROCEDURE — U0003 INFECTIOUS AGENT DETECTION BY NUCLEIC ACID (DNA OR RNA); SEVERE ACUTE RESPIRATORY SYNDROME CORONAVIRUS 2 (SARS-COV-2) (CORONAVIRUS DISEASE [COVID-19]), AMPLIFIED PROBE TECHNIQUE, MAKING USE OF HIGH THROUGHPUT TECHNOLOGIES AS DESCRIBED BY CMS-2020-01-R: HCPCS

## 2021-01-14 PROCEDURE — 85025 COMPLETE CBC W/AUTO DIFF WBC: CPT

## 2021-01-14 PROCEDURE — 80048 BASIC METABOLIC PNL TOTAL CA: CPT

## 2021-01-14 PROCEDURE — 93005 ELECTROCARDIOGRAM TRACING: CPT

## 2021-01-15 ENCOUNTER — TELEPHONE (OUTPATIENT)
Dept: CARDIOLOGY CLINIC | Age: 86
End: 2021-01-15

## 2021-01-15 LAB
ATRIAL RATE: 67 BPM
CALCULATED P AXIS, ECG09: 68 DEGREES
CALCULATED R AXIS, ECG10: -13 DEGREES
CALCULATED T AXIS, ECG11: 30 DEGREES
DIAGNOSIS, 93000: NORMAL
P-R INTERVAL, ECG05: 152 MS
Q-T INTERVAL, ECG07: 410 MS
QRS DURATION, ECG06: 84 MS
QTC CALCULATION (BEZET), ECG08: 433 MS
SARS-COV-2, COV2NT: NOT DETECTED
VENTRICULAR RATE, ECG03: 67 BPM

## 2021-01-15 NOTE — TELEPHONE ENCOUNTER
Spoke with patients daughter in [place], Jamie , on [place]. Advised per Rex May   I suspect she is on this for her heart hx not related to her device. Ok to hold 3 days prior and restart. She has normally fnx dual chamber ppm with no new arrhthymias. Jamie verified understanding.

## 2021-01-15 NOTE — TELEPHONE ENCOUNTER
Patient having rectal prolasp procedure on January 18,2021 and they want to know is it ok for patient to stop asipirin and when to stop taking it. [phone] Marlon Mad) daughter saul.     Thanks  Raya Willard

## 2021-01-18 ENCOUNTER — ANESTHESIA (OUTPATIENT)
Dept: SURGERY | Age: 86
End: 2021-01-18
Payer: MEDICARE

## 2021-01-18 ENCOUNTER — HOSPITAL ENCOUNTER (OUTPATIENT)
Age: 86
Setting detail: OBSERVATION
Discharge: HOME | End: 2021-01-20
Attending: COLON & RECTAL SURGERY | Admitting: COLON & RECTAL SURGERY
Payer: MEDICARE

## 2021-01-18 ENCOUNTER — ANESTHESIA EVENT (OUTPATIENT)
Dept: SURGERY | Age: 86
End: 2021-01-18
Payer: MEDICARE

## 2021-01-18 DIAGNOSIS — K62.3 RECTAL PROLAPSE: Primary | ICD-10-CM

## 2021-01-18 PROCEDURE — 77030013079 HC BLNKT BAIR HGGR 3M -A: Performed by: ANESTHESIOLOGY

## 2021-01-18 PROCEDURE — 76060000035 HC ANESTHESIA 2 TO 2.5 HR: Performed by: COLON & RECTAL SURGERY

## 2021-01-18 PROCEDURE — 76210000006 HC OR PH I REC 0.5 TO 1 HR: Performed by: COLON & RECTAL SURGERY

## 2021-01-18 PROCEDURE — 77030026438 HC STYL ET INTUB CARD -A: Performed by: ANESTHESIOLOGY

## 2021-01-18 PROCEDURE — 74011250636 HC RX REV CODE- 250/636: Performed by: NURSE ANESTHETIST, CERTIFIED REGISTERED

## 2021-01-18 PROCEDURE — 2709999900 HC NON-CHARGEABLE SUPPLY: Performed by: COLON & RECTAL SURGERY

## 2021-01-18 PROCEDURE — P9045 ALBUMIN (HUMAN), 5%, 250 ML: HCPCS | Performed by: NURSE ANESTHETIST, CERTIFIED REGISTERED

## 2021-01-18 PROCEDURE — 88307 TISSUE EXAM BY PATHOLOGIST: CPT

## 2021-01-18 PROCEDURE — 74011000250 HC RX REV CODE- 250: Performed by: COLON & RECTAL SURGERY

## 2021-01-18 PROCEDURE — 74011000250 HC RX REV CODE- 250: Performed by: NURSE ANESTHETIST, CERTIFIED REGISTERED

## 2021-01-18 PROCEDURE — 94640 AIRWAY INHALATION TREATMENT: CPT

## 2021-01-18 PROCEDURE — 77030029684 HC NEB SM VOL KT MONA -A

## 2021-01-18 PROCEDURE — 94760 N-INVAS EAR/PLS OXIMETRY 1: CPT

## 2021-01-18 PROCEDURE — 77030031139 HC SUT VCRL2 J&J -A: Performed by: COLON & RECTAL SURGERY

## 2021-01-18 PROCEDURE — 77030008684 HC TU ET CUF COVD -B: Performed by: ANESTHESIOLOGY

## 2021-01-18 PROCEDURE — 74011250636 HC RX REV CODE- 250/636: Performed by: ANESTHESIOLOGY

## 2021-01-18 PROCEDURE — 74011250636 HC RX REV CODE- 250/636: Performed by: COLON & RECTAL SURGERY

## 2021-01-18 PROCEDURE — 77030034626 HC LIGASURE SM JAW SEAL OPN SURG COVD -E: Performed by: COLON & RECTAL SURGERY

## 2021-01-18 PROCEDURE — 76010000171 HC OR TIME 2 TO 2.5 HR INTENSV-TIER 1: Performed by: COLON & RECTAL SURGERY

## 2021-01-18 PROCEDURE — 77030014007 HC SPNG HEMSTAT J&J -B: Performed by: COLON & RECTAL SURGERY

## 2021-01-18 PROCEDURE — 77030021052 HC RNG RETRCTR STAY COOP -A: Performed by: COLON & RECTAL SURGERY

## 2021-01-18 PROCEDURE — 77010033678 HC OXYGEN DAILY

## 2021-01-18 PROCEDURE — 99218 HC RM OBSERVATION: CPT

## 2021-01-18 PROCEDURE — 74011000272 HC RX REV CODE- 272: Performed by: COLON & RECTAL SURGERY

## 2021-01-18 PROCEDURE — 74011250637 HC RX REV CODE- 250/637: Performed by: COLON & RECTAL SURGERY

## 2021-01-18 PROCEDURE — 77030005513 HC CATH URETH FOL11 MDII -B: Performed by: COLON & RECTAL SURGERY

## 2021-01-18 PROCEDURE — 77030010011 HC RNG RETRCTR STAY COOP -B: Performed by: COLON & RECTAL SURGERY

## 2021-01-18 PROCEDURE — 74011250637 HC RX REV CODE- 250/637: Performed by: ANESTHESIOLOGY

## 2021-01-18 RX ORDER — NALOXONE HYDROCHLORIDE 0.4 MG/ML
0.4 INJECTION, SOLUTION INTRAMUSCULAR; INTRAVENOUS; SUBCUTANEOUS AS NEEDED
Status: DISCONTINUED | OUTPATIENT
Start: 2021-01-18 | End: 2021-01-20 | Stop reason: HOSPADM

## 2021-01-18 RX ORDER — SODIUM CHLORIDE, SODIUM LACTATE, POTASSIUM CHLORIDE, CALCIUM CHLORIDE 600; 310; 30; 20 MG/100ML; MG/100ML; MG/100ML; MG/100ML
100 INJECTION, SOLUTION INTRAVENOUS CONTINUOUS
Status: DISCONTINUED | OUTPATIENT
Start: 2021-01-18 | End: 2021-01-18 | Stop reason: HOSPADM

## 2021-01-18 RX ORDER — SODIUM CHLORIDE 9 MG/ML
25 INJECTION, SOLUTION INTRAVENOUS CONTINUOUS
Status: DISCONTINUED | OUTPATIENT
Start: 2021-01-18 | End: 2021-01-18 | Stop reason: HOSPADM

## 2021-01-18 RX ORDER — SODIUM CHLORIDE, SODIUM LACTATE, POTASSIUM CHLORIDE, CALCIUM CHLORIDE 600; 310; 30; 20 MG/100ML; MG/100ML; MG/100ML; MG/100ML
1000 INJECTION, SOLUTION INTRAVENOUS CONTINUOUS
Status: DISCONTINUED | OUTPATIENT
Start: 2021-01-18 | End: 2021-01-18 | Stop reason: HOSPADM

## 2021-01-18 RX ORDER — LIDOCAINE HYDROCHLORIDE 20 MG/ML
INJECTION, SOLUTION EPIDURAL; INFILTRATION; INTRACAUDAL; PERINEURAL AS NEEDED
Status: DISCONTINUED | OUTPATIENT
Start: 2021-01-18 | End: 2021-01-18 | Stop reason: HOSPADM

## 2021-01-18 RX ORDER — AMLODIPINE BESYLATE 5 MG/1
5 TABLET ORAL DAILY
Status: DISCONTINUED | OUTPATIENT
Start: 2021-01-19 | End: 2021-01-20 | Stop reason: HOSPADM

## 2021-01-18 RX ORDER — FENTANYL CITRATE 50 UG/ML
INJECTION, SOLUTION INTRAMUSCULAR; INTRAVENOUS AS NEEDED
Status: DISCONTINUED | OUTPATIENT
Start: 2021-01-18 | End: 2021-01-18 | Stop reason: HOSPADM

## 2021-01-18 RX ORDER — LATANOPROST 50 UG/ML
1 SOLUTION/ DROPS OPHTHALMIC
Status: DISCONTINUED | OUTPATIENT
Start: 2021-01-18 | End: 2021-01-20 | Stop reason: HOSPADM

## 2021-01-18 RX ORDER — FLUTICASONE PROPIONATE 110 UG/1
1 AEROSOL, METERED RESPIRATORY (INHALATION)
Status: DISCONTINUED | OUTPATIENT
Start: 2021-01-18 | End: 2021-01-18 | Stop reason: CLARIF

## 2021-01-18 RX ORDER — FENTANYL CITRATE 50 UG/ML
50 INJECTION, SOLUTION INTRAMUSCULAR; INTRAVENOUS AS NEEDED
Status: DISCONTINUED | OUTPATIENT
Start: 2021-01-18 | End: 2021-01-18 | Stop reason: HOSPADM

## 2021-01-18 RX ORDER — SODIUM CHLORIDE, SODIUM LACTATE, POTASSIUM CHLORIDE, CALCIUM CHLORIDE 600; 310; 30; 20 MG/100ML; MG/100ML; MG/100ML; MG/100ML
75 INJECTION, SOLUTION INTRAVENOUS CONTINUOUS
Status: DISPENSED | OUTPATIENT
Start: 2021-01-18 | End: 2021-01-19

## 2021-01-18 RX ORDER — ROPIVACAINE HYDROCHLORIDE 5 MG/ML
150 INJECTION, SOLUTION EPIDURAL; INFILTRATION; PERINEURAL AS NEEDED
Status: DISCONTINUED | OUTPATIENT
Start: 2021-01-18 | End: 2021-01-18 | Stop reason: HOSPADM

## 2021-01-18 RX ORDER — BUPIVACAINE HYDROCHLORIDE AND EPINEPHRINE 5; 5 MG/ML; UG/ML
INJECTION, SOLUTION EPIDURAL; INTRACAUDAL; PERINEURAL AS NEEDED
Status: DISCONTINUED | OUTPATIENT
Start: 2021-01-18 | End: 2021-01-18 | Stop reason: HOSPADM

## 2021-01-18 RX ORDER — FENTANYL CITRATE 50 UG/ML
25 INJECTION, SOLUTION INTRAMUSCULAR; INTRAVENOUS
Status: DISCONTINUED | OUTPATIENT
Start: 2021-01-18 | End: 2021-01-18 | Stop reason: HOSPADM

## 2021-01-18 RX ORDER — LEVOTHYROXINE SODIUM 75 UG/1
75 TABLET ORAL
Status: DISCONTINUED | OUTPATIENT
Start: 2021-01-19 | End: 2021-01-20 | Stop reason: HOSPADM

## 2021-01-18 RX ORDER — HYDROMORPHONE HYDROCHLORIDE 1 MG/ML
0.2 INJECTION, SOLUTION INTRAMUSCULAR; INTRAVENOUS; SUBCUTANEOUS
Status: DISCONTINUED | OUTPATIENT
Start: 2021-01-18 | End: 2021-01-18 | Stop reason: HOSPADM

## 2021-01-18 RX ORDER — DIPHENHYDRAMINE HYDROCHLORIDE 50 MG/ML
12.5 INJECTION, SOLUTION INTRAMUSCULAR; INTRAVENOUS AS NEEDED
Status: DISCONTINUED | OUTPATIENT
Start: 2021-01-18 | End: 2021-01-18 | Stop reason: HOSPADM

## 2021-01-18 RX ORDER — NEOSTIGMINE METHYLSULFATE 1 MG/ML
INJECTION INTRAVENOUS AS NEEDED
Status: DISCONTINUED | OUTPATIENT
Start: 2021-01-18 | End: 2021-01-18 | Stop reason: HOSPADM

## 2021-01-18 RX ORDER — ENOXAPARIN SODIUM 100 MG/ML
30 INJECTION SUBCUTANEOUS EVERY 24 HOURS
Status: DISCONTINUED | OUTPATIENT
Start: 2021-01-19 | End: 2021-01-20 | Stop reason: HOSPADM

## 2021-01-18 RX ORDER — PROPOFOL 10 MG/ML
INJECTION, EMULSION INTRAVENOUS AS NEEDED
Status: DISCONTINUED | OUTPATIENT
Start: 2021-01-18 | End: 2021-01-18 | Stop reason: HOSPADM

## 2021-01-18 RX ORDER — ONDANSETRON 2 MG/ML
4 INJECTION INTRAMUSCULAR; INTRAVENOUS AS NEEDED
Status: DISCONTINUED | OUTPATIENT
Start: 2021-01-18 | End: 2021-01-18 | Stop reason: HOSPADM

## 2021-01-18 RX ORDER — DEXAMETHASONE SODIUM PHOSPHATE 4 MG/ML
INJECTION, SOLUTION INTRA-ARTICULAR; INTRALESIONAL; INTRAMUSCULAR; INTRAVENOUS; SOFT TISSUE AS NEEDED
Status: DISCONTINUED | OUTPATIENT
Start: 2021-01-18 | End: 2021-01-18 | Stop reason: HOSPADM

## 2021-01-18 RX ORDER — ONDANSETRON 4 MG/1
4 TABLET, ORALLY DISINTEGRATING ORAL
Status: DISCONTINUED | OUTPATIENT
Start: 2021-01-18 | End: 2021-01-20 | Stop reason: HOSPADM

## 2021-01-18 RX ORDER — ROCURONIUM BROMIDE 10 MG/ML
INJECTION, SOLUTION INTRAVENOUS AS NEEDED
Status: DISCONTINUED | OUTPATIENT
Start: 2021-01-18 | End: 2021-01-18 | Stop reason: HOSPADM

## 2021-01-18 RX ORDER — ALBUMIN HUMAN 50 G/1000ML
SOLUTION INTRAVENOUS AS NEEDED
Status: DISCONTINUED | OUTPATIENT
Start: 2021-01-18 | End: 2021-01-18 | Stop reason: HOSPADM

## 2021-01-18 RX ORDER — EPHEDRINE SULFATE/0.9% NACL/PF 50 MG/5 ML
5 SYRINGE (ML) INTRAVENOUS AS NEEDED
Status: DISCONTINUED | OUTPATIENT
Start: 2021-01-18 | End: 2021-01-18 | Stop reason: HOSPADM

## 2021-01-18 RX ORDER — LABETALOL HYDROCHLORIDE 5 MG/ML
INJECTION, SOLUTION INTRAVENOUS AS NEEDED
Status: DISCONTINUED | OUTPATIENT
Start: 2021-01-18 | End: 2021-01-18 | Stop reason: HOSPADM

## 2021-01-18 RX ORDER — ONDANSETRON 2 MG/ML
INJECTION INTRAMUSCULAR; INTRAVENOUS AS NEEDED
Status: DISCONTINUED | OUTPATIENT
Start: 2021-01-18 | End: 2021-01-18 | Stop reason: HOSPADM

## 2021-01-18 RX ORDER — OXYCODONE HYDROCHLORIDE 5 MG/1
5 TABLET ORAL
Status: DISCONTINUED | OUTPATIENT
Start: 2021-01-18 | End: 2021-01-20 | Stop reason: HOSPADM

## 2021-01-18 RX ORDER — MIDAZOLAM HYDROCHLORIDE 1 MG/ML
1 INJECTION, SOLUTION INTRAMUSCULAR; INTRAVENOUS AS NEEDED
Status: DISCONTINUED | OUTPATIENT
Start: 2021-01-18 | End: 2021-01-18 | Stop reason: HOSPADM

## 2021-01-18 RX ORDER — GLYCOPYRROLATE 0.2 MG/ML
INJECTION INTRAMUSCULAR; INTRAVENOUS AS NEEDED
Status: DISCONTINUED | OUTPATIENT
Start: 2021-01-18 | End: 2021-01-18 | Stop reason: HOSPADM

## 2021-01-18 RX ORDER — BUDESONIDE 0.5 MG/2ML
500 INHALANT ORAL
Status: DISCONTINUED | OUTPATIENT
Start: 2021-01-18 | End: 2021-01-20 | Stop reason: HOSPADM

## 2021-01-18 RX ORDER — MIDAZOLAM HYDROCHLORIDE 1 MG/ML
0.5 INJECTION, SOLUTION INTRAMUSCULAR; INTRAVENOUS
Status: DISCONTINUED | OUTPATIENT
Start: 2021-01-18 | End: 2021-01-18 | Stop reason: HOSPADM

## 2021-01-18 RX ORDER — MORPHINE SULFATE 10 MG/ML
2 INJECTION, SOLUTION INTRAMUSCULAR; INTRAVENOUS
Status: DISCONTINUED | OUTPATIENT
Start: 2021-01-18 | End: 2021-01-18 | Stop reason: HOSPADM

## 2021-01-18 RX ORDER — ACETAMINOPHEN 500 MG
1000 TABLET ORAL EVERY 6 HOURS
Status: DISCONTINUED | OUTPATIENT
Start: 2021-01-18 | End: 2021-01-20 | Stop reason: HOSPADM

## 2021-01-18 RX ORDER — OXYCODONE HYDROCHLORIDE 5 MG/1
5 TABLET ORAL AS NEEDED
Status: DISCONTINUED | OUTPATIENT
Start: 2021-01-18 | End: 2021-01-18 | Stop reason: HOSPADM

## 2021-01-18 RX ORDER — SUCCINYLCHOLINE CHLORIDE 20 MG/ML
INJECTION INTRAMUSCULAR; INTRAVENOUS AS NEEDED
Status: DISCONTINUED | OUTPATIENT
Start: 2021-01-18 | End: 2021-01-18 | Stop reason: HOSPADM

## 2021-01-18 RX ORDER — KETOROLAC TROMETHAMINE 30 MG/ML
15 INJECTION, SOLUTION INTRAMUSCULAR; INTRAVENOUS EVERY 6 HOURS
Status: DISCONTINUED | OUTPATIENT
Start: 2021-01-18 | End: 2021-01-19

## 2021-01-18 RX ORDER — LIDOCAINE HYDROCHLORIDE 10 MG/ML
0.1 INJECTION, SOLUTION EPIDURAL; INFILTRATION; INTRACAUDAL; PERINEURAL AS NEEDED
Status: DISCONTINUED | OUTPATIENT
Start: 2021-01-18 | End: 2021-01-18 | Stop reason: HOSPADM

## 2021-01-18 RX ORDER — ACETAMINOPHEN 325 MG/1
650 TABLET ORAL ONCE
Status: COMPLETED | OUTPATIENT
Start: 2021-01-18 | End: 2021-01-18

## 2021-01-18 RX ORDER — PHENYLEPHRINE HCL IN 0.9% NACL 0.4MG/10ML
SYRINGE (ML) INTRAVENOUS AS NEEDED
Status: DISCONTINUED | OUTPATIENT
Start: 2021-01-18 | End: 2021-01-18 | Stop reason: HOSPADM

## 2021-01-18 RX ADMIN — ROCURONIUM BROMIDE 10 MG: 10 SOLUTION INTRAVENOUS at 13:03

## 2021-01-18 RX ADMIN — SODIUM CHLORIDE, POTASSIUM CHLORIDE, SODIUM LACTATE AND CALCIUM CHLORIDE 1000 ML: 600; 310; 30; 20 INJECTION, SOLUTION INTRAVENOUS at 11:21

## 2021-01-18 RX ADMIN — PROPOFOL 40 MG: 10 INJECTION, EMULSION INTRAVENOUS at 12:28

## 2021-01-18 RX ADMIN — BUDESONIDE 500 MCG: 0.5 INHALANT RESPIRATORY (INHALATION) at 21:43

## 2021-01-18 RX ADMIN — ACETAMINOPHEN 650 MG: 325 TABLET ORAL at 11:15

## 2021-01-18 RX ADMIN — LABETALOL HYDROCHLORIDE 10 MG: 5 INJECTION INTRAVENOUS at 13:52

## 2021-01-18 RX ADMIN — KETOROLAC TROMETHAMINE 15 MG: 30 INJECTION, SOLUTION INTRAMUSCULAR at 17:50

## 2021-01-18 RX ADMIN — ROCURONIUM BROMIDE 5 MG: 10 SOLUTION INTRAVENOUS at 11:59

## 2021-01-18 RX ADMIN — DEXAMETHASONE SODIUM PHOSPHATE 4 MG: 4 INJECTION, SOLUTION INTRAMUSCULAR; INTRAVENOUS at 12:38

## 2021-01-18 RX ADMIN — ONDANSETRON HYDROCHLORIDE 4 MG: 2 INJECTION, SOLUTION INTRAMUSCULAR; INTRAVENOUS at 13:41

## 2021-01-18 RX ADMIN — GLYCOPYRROLATE 0.4 MG: 0.2 INJECTION, SOLUTION INTRAMUSCULAR; INTRAVENOUS at 13:45

## 2021-01-18 RX ADMIN — ROCURONIUM BROMIDE 25 MG: 10 SOLUTION INTRAVENOUS at 12:05

## 2021-01-18 RX ADMIN — WATER 2 G: 1 INJECTION INTRAMUSCULAR; INTRAVENOUS; SUBCUTANEOUS at 12:26

## 2021-01-18 RX ADMIN — PROPOFOL 30 MG: 10 INJECTION, EMULSION INTRAVENOUS at 12:04

## 2021-01-18 RX ADMIN — FENTANYL CITRATE 25 MCG: 50 INJECTION, SOLUTION INTRAMUSCULAR; INTRAVENOUS at 12:05

## 2021-01-18 RX ADMIN — ACETAMINOPHEN 1000 MG: 500 TABLET ORAL at 17:50

## 2021-01-18 RX ADMIN — LATANOPROST 1 DROP: 50 SOLUTION OPHTHALMIC at 22:06

## 2021-01-18 RX ADMIN — SUCCINYLCHOLINE CHLORIDE 120 MG: 20 INJECTION, SOLUTION INTRAMUSCULAR; INTRAVENOUS at 12:00

## 2021-01-18 RX ADMIN — Medication 80 MCG: at 11:59

## 2021-01-18 RX ADMIN — NEOSTIGMINE METHYLSULFATE 3 MG: 1 INJECTION, SOLUTION INTRAVENOUS at 13:45

## 2021-01-18 RX ADMIN — ACETAMINOPHEN 1000 MG: 500 TABLET ORAL at 22:06

## 2021-01-18 RX ADMIN — PROPOFOL 70 MG: 10 INJECTION, EMULSION INTRAVENOUS at 11:59

## 2021-01-18 RX ADMIN — ALBUMIN (HUMAN) 250 ML: 12.5 INJECTION, SOLUTION INTRAVENOUS at 12:10

## 2021-01-18 RX ADMIN — KETOROLAC TROMETHAMINE 15 MG: 30 INJECTION, SOLUTION INTRAMUSCULAR at 23:53

## 2021-01-18 RX ADMIN — ROCURONIUM BROMIDE 10 MG: 10 SOLUTION INTRAVENOUS at 12:34

## 2021-01-18 RX ADMIN — ENOXAPARIN SODIUM 30 MG: 30 INJECTION SUBCUTANEOUS at 23:52

## 2021-01-18 RX ADMIN — LIDOCAINE HYDROCHLORIDE 60 MG: 20 INJECTION, SOLUTION EPIDURAL; INFILTRATION; INTRACAUDAL; PERINEURAL at 11:59

## 2021-01-18 RX ADMIN — PHENYLEPHRINE HYDROCHLORIDE 40 MCG/MIN: 10 INJECTION INTRAVENOUS at 13:21

## 2021-01-18 RX ADMIN — FENTANYL CITRATE 25 MCG: 50 INJECTION, SOLUTION INTRAMUSCULAR; INTRAVENOUS at 12:34

## 2021-01-18 RX ADMIN — FENTANYL CITRATE 25 MCG: 50 INJECTION, SOLUTION INTRAMUSCULAR; INTRAVENOUS at 13:03

## 2021-01-18 RX ADMIN — Medication 80 MCG: at 12:14

## 2021-01-18 NOTE — H&P
Colon and Rectal Surgery History and Physical    Subjective:      Mauricio Calzada is a 80 y.o. female who has rectal prolapse. Patient Active Problem List    Diagnosis Date Noted    UTI (urinary tract infection) 2018    Pubic bone fracture ([address] 2018    SSS (sick sinus syndrome) ([address] 2018    Heart palpitations 2017    Shortness of breath 2016    Pneumonia 2014    Left humeral fracture 2014    Acute respiratory failure with hypoxia ([address] 2014    Sepsis ([address] 11/10/2014    CAD (coronary artery disease) 1996     Past Medical History:   Diagnosis Date    Arm fracture, left     Arm fracture, right     Bell's palsy ,     H/O BELL'S PALSY X2    CAD (coronary artery disease)     MI , STENT    Cancer ([address]     BREAST RIGHT - [place] RADIATION    Cancer ([address]     LUNG    Elevated cholesterol     Glaucoma     H/O: pneumonia     History of pelvic fracture     Hypertension     Hypothyroid     Sick sinus syndrome ([address]       Past Surgical History:   Procedure Laterality Date    HX CATARACT REMOVAL Bilateral     HX OTHER SURGICAL  2013    EXC LESIONS FACE, LIP    HX PACEMAKER  2018    HX PACEMAKER PLACEMENT Left 2018    OR BREAST SURGERY PROCEDURE UNLISTED  1998    right mastectomy    OR CHEST SURGERY PROCEDURE UNLISTED Left 1998    EXC LUNG MASS      Social History     Tobacco Use    Smoking status: Former Smoker     Packs/day: 0.25     Years: 10.00     Pack years: 2.50     Quit date: 1989     Years since quittin.0    Smokeless tobacco: Never Used   Substance Use Topics    Alcohol use: No      Family History   Family history unknown: Yes      Prior to Admission medications    Medication Sig Start Date End Date Taking? Authorizing Provider   inulin (FIBER GUMMIES PO) Take 2 Tabs by mouth daily.    Yes Provider, Historical   amLODIPine (NORVASC) 5 mg tablet TAKE 1 TABLET BY MOUTH EVERY DAY 3/15/18  Yes Provider, Historical   latanoprost (XALATAN) 0.005 % ophthalmic solution Administer 1 Drop to both eyes nightly. 11/24/17  Yes Provider, Historical   simvastatin (ZOCOR) 40 mg tablet Take 40 mg by mouth nightly. Yes Provider, Historical   levothyroxine (LEVOTHROID) 75 mcg tablet Take 75 mcg by mouth Daily (before breakfast). Yes Other, MD Janak   beclomethasone (QVAR) 80 mcg/actuation aero Take 1 Puff by inhalation two (2) times a day. Provider, Historical   ARNUITY ELLIPTA 100 mcg/actuation dsdv inhaler USE 1 PUFF ONCE DAILY 3/21/18   Provider, Historical   acetaminophen (TYLENOL) 325 mg tablet Take 2 Tabs by mouth every four (4) hours as needed. 2/9/18   Venita Crockett MD   polyethylene glycol (MIRALAX) 17 gram packet Take 1 Packet by mouth daily. 2/9/18   Venita Crockett MD   calcium citrate-vitamin d3 (CITRACAL+D) 315-200 mg-unit tab Take 1 Tab by mouth daily. Provider, Historical   multivitamin (ONE A DAY) tablet Take 1 Tab by mouth daily. Provider, Historical   aspirin (ASPIRIN) 325 mg tablet Take 325 mg by mouth nightly. Other, MD Janak     No Known Allergies     Review of Systems:    A comprehensive review of systems was negative except for that written in the History of Present Illness. Objective:     Visit Vitals  BP (!) 157/69 (BP 1 Location: Left arm, BP Patient Position: At rest)   Pulse 69   Temp 98.6 °F (37 °C)   Resp 14   Wt 51 kg (112 lb 7 oz)   SpO2 95%   BMI 24.33 kg/m²        Physical Exam:   General:  Alert, cooperative, no distress, appears stated age. Head:  Normocephalic, without obvious abnormality, atraumatic. Eyes:  Conjunctivae/corneas clear. PERRL, EOMs intact. Ears:  Normal external ear canals both ears. Nose: Nares normal. Septum midline. No drainage. Throat: Lips, mucosa, and tongue normal. Teeth and gums normal.   Neck: Supple, symmetrical, trachea midline, no adenopathy   Back:   Symmetric, no curvature.  ROM normal.   Lungs:   Clear   Chest wall:  No tenderness or deformity.   Heart:  Regular rate and rhythm       Abdomen:   Soft, non-tender. Bowel sounds normal. No masses,  No organomegaly.   Genitalia:  deferred       Extremities: Extremities normal, atraumatic, no cyanosis or edema.       Skin: Skin color, texture, turgor normal. No rashes or lesions.       Neurologic: CNII-XII intact. Normal strength, sensation and reflexes throughout.         Imaging:  images and reports reviewed    Lab Review:  No results found for this or any previous visit (from the past 24 hour(s)).    Labs and radiology: images and reports reviewed      Assessment:     Rectal prolapse    Plan:     1. I recommend proceeding with perineal proctosigmoidectomy. Treatment alternatives were discussed.  2. Discussed aspects of surgical intervention, methods, risks (including by not limited to infection, bleeding, hematoma, and perforation of the intestines or solid organs), and the risks of general anesthetic. The patient understands the risks; any and all questions were answered to the patient's satisfaction.    Signed By: Craig Yu MD     January 18, 2021

## 2021-01-18 NOTE — PROGRESS NOTES
Lovenox dose adjustment  Indication: DVT Prophylaxis  No results for input(s): HGB, PLT, INR, CREA, HGBEXT, PLTEXT, INREXT in the last 72 hours. Current Weight: 51 kg  Est. CrCl = 21 ml/min  Current Dose: 40 mg subcutaneously every 24 hours.   Plan: Change to 30mg q24h

## 2021-01-18 NOTE — ROUTINE PROCESS
Patient: Monica Wolfe MRN: [id number]  SSN: [id number] YOB: 1928  Age: 80 y.o. Sex: female Patient is status post Procedure(s): PERINEAL PROCTO SIGMOIDECTOMY. Surgeon(s) and Role: Thu Becker MD - Primary Local/Dose/Irrigation:  See [facility] Peripheral IV 01/18/21 Anterior; Left Forearm (Active) Site Assessment Clean, dry, & intact 01/18/21 1114 Phlebitis Assessment 0 01/18/21 1114 Infiltration Assessment 0 01/18/21 1114 Dressing Status Clean, dry, & intact; New 01/18/21 1114 Dressing Type Tape;Transparent 01/18/21 1114 Hub Color/Line Status Pink; Infusing 01/18/21 1114 Airway - Endotracheal Tube 01/18/21 Oral (Active) Dressing/Packing:  Incision 01/18/21 Perineum-Dressing/Treatment: (4 X 4, medipore tape) (01/18/21 1341) Splint/Cast:  ] Other:  N/A

## 2021-01-18 NOTE — OP NOTES
[address] [facility]  OPERATIVE REPORT    Name:  Sheyla Pandya  MR#:  [id number]  :  1928  ACCOUNT #:  [de-identified]  DATE OF SERVICE:  2021      PREOPERATIVE DIAGNOSIS:  History of rectal prolapse. POSTOPERATIVE DIAGNOSIS:  History of rectal prolapse. PROCEDURE PERFORMED:  Perineal proctosigmoidectomy with levator plasty. SURGEON:  Yannick Cruz MD    ASSISTANT:  SA    ANESTHESIA:  General plus 30 mL of 0.25% Marcaine with epinephrine. COMPLICATIONS:  None. SPECIMENS REMOVED:  Fragment sent to Pathology. IMPLANTS:  none    ESTIMATED BLOOD LOSS:  10 mL. DISPOSITION:  The patient tolerated the procedure well and was transferred to the recovery room in stable condition. INDICATIONS:  This is a patient with a history of rectal prolapse. She was having a significant amount of pain. Risks, benefits, and alternatives to perineal proctosigmoidectomy were discussed with her and her daughter. PROCEDURE:  She was taken to the operating room and placed in the normal supine position. She was then intubated and flipped into prone jackknife position. The buttocks were taped apart and the perineum was prepped and draped in the usual fashion. Time-out was performed. A [place] retractor was placed. The rectum was prolapsed. I was able to kathia out about 2 cm proximal to the dentate line. I then began to suction. I was able to dissect into the hernia sac taking care to avoid vagina. Once I was able to dissect into the peritoneal cavity, I was able to take the mesentery around the rectum. There was no significant amount of redundancy in the sigmoid colon. Once I had the rectum dilated and dissected out, I transected across the prolapsed rectum. I then created an end-to-end anastomosis with 2-0 Vicryl in interrupted fashion. I placed 4-quadrant stitches and then closed 4 quadrants. I performed a levator plasty with 2-0 Vicryl suture.   The vagina was not injured during the closure. A Gelfoam pack was applied. The patient tolerated the procedure well and was transferred to the recovery room in stable condition.         Heron Corbett MD      [id number]  D:  01/18/2021 14:00  T:  01/18/2021 16:55  JOB #:  [id number]

## 2021-01-18 NOTE — ANESTHESIA POSTPROCEDURE EVALUATION
Post-Anesthesia Evaluation and Assessment    Patient: Maren Perez MRN: [id number]  SSN: [id number]    YOB: 1928  Age: 80 y.o. Sex: female      I have evaluated the patient and they are stable and ready for discharge from the PACU. Cardiovascular Function/Vital Signs  Visit Vitals  /79   Pulse 65   Temp 36.3 °C (97.4 °F)   Resp 12   Wt 51 kg (112 lb 7 oz)   SpO2 99%   BMI 24.33 kg/m²       Patient is status post General anesthesia for Procedure(s):  PERINEAL PROCTO SIGMOIDECTOMY. Nausea/Vomiting: None    Postoperative hydration reviewed and adequate. Pain:  Pain Scale 1: Numeric (0 - 10) (01/18/21 1430)  Pain Intensity 1: 0 (01/18/21 1430)   Managed    Neurological Status:   Neuro (WDL): Exceptions to WDL (01/18/21 1430)  Neuro  Neurologic State: Confused; Alert (01/18/21 1430)  Orientation Level: Oriented to person;Oriented to place; Disoriented to situation (01/18/21 1430)   At baseline    Mental Status, Level of Consciousness: Alert and  oriented to person, place, and time    Pulmonary Status:   O2 Device: Room air (01/18/21 1430)   Adequate oxygenation and airway patent    Complications related to anesthesia: None    Post-anesthesia assessment completed. No concerns    Signed By: Misael Canas MD     January 18, 2021              Procedure(s):  PERINEAL PROCTO SIGMOIDECTOMY. general    <BSHSIANPOST>    INITIAL Post-op Vital signs:   Vitals Value Taken Time   /70 01/18/21 1445   Temp 36.3 °C (97.4 °F) 01/18/21 1430   Pulse 68 01/18/21 1445   Resp 18 01/18/21 1445   SpO2 98 % 01/18/21 1445   Vitals shown include unvalidated device data.

## 2021-01-18 NOTE — BRIEF OP NOTE
Brief Postoperative Note    Patient: Alexis Abdi  YOB: 1928  MRN: [id number]    Date of Procedure: 1/18/2021     Pre-Op Diagnosis: RECTAL PROLAPSE    Post-Op Diagnosis: Same as preoperative diagnosis.       Procedure(s):  PERINEAL PROCTO SIGMOIDECTOMY    Surgeon(s):  Xander Guillory MD    Surgical Assistant: Surg Asst-1: Gurvinder MCINTOSH    Anesthesia: General     Estimated Blood Loss (mL): Minimal    Complications: None    Specimens:   ID Type Source Tests Collected by Time Destination   1 : Rectum Fresh Rectal  Xander Guillory MD 1/18/2021 1315 Pathology        Implants: * No implants in log *    Drains: * No LDAs found *    Findings: rectal prolapse    Electronically Signed by Dale Hammonds MD on 1/18/2021 at 1:44 PM

## 2021-01-19 LAB
ANION GAP SERPL CALC-SCNC: 12 MMOL/L (ref 5–15)
BUN SERPL-MCNC: 20 MG/DL (ref 6–20)
BUN/CREAT SERPL: 15 (ref 12–20)
CALCIUM SERPL-MCNC: 8.8 MG/DL (ref 8.5–10.1)
CHLORIDE SERPL-SCNC: 105 MMOL/L (ref 97–108)
CO2 SERPL-SCNC: 23 MMOL/L (ref 21–32)
CREAT SERPL-MCNC: 1.35 MG/DL (ref 0.55–1.02)
ERYTHROCYTE [DISTWIDTH] IN BLOOD BY AUTOMATED COUNT: 14 % (ref 11.5–14.5)
GLUCOSE SERPL-MCNC: 144 MG/DL (ref 65–100)
HCT VFR BLD AUTO: 41.2 % (ref 35–47)
HGB BLD-MCNC: 14.1 G/DL (ref 11.5–16)
MAGNESIUM SERPL-MCNC: 2.1 MG/DL (ref 1.6–2.4)
MCH RBC QN AUTO: 30.5 PG (ref 26–34)
MCHC RBC AUTO-ENTMCNC: 34.2 G/DL (ref 30–36.5)
MCV RBC AUTO: 89.2 FL (ref 80–99)
NRBC # BLD: 0 K/UL (ref 0–0.01)
NRBC BLD-RTO: 0 PER 100 WBC
PHOSPHATE SERPL-MCNC: 3 MG/DL (ref 2.6–4.7)
PLATELET # BLD AUTO: 209 K/UL (ref 150–400)
PMV BLD AUTO: 12.1 FL (ref 8.9–12.9)
POTASSIUM SERPL-SCNC: 3.8 MMOL/L (ref 3.5–5.1)
RBC # BLD AUTO: 4.62 M/UL (ref 3.8–5.2)
SODIUM SERPL-SCNC: 140 MMOL/L (ref 136–145)
WBC # BLD AUTO: 18.1 K/UL (ref 3.6–11)

## 2021-01-19 PROCEDURE — 77010033678 HC OXYGEN DAILY

## 2021-01-19 PROCEDURE — 97535 SELF CARE MNGMENT TRAINING: CPT

## 2021-01-19 PROCEDURE — 85027 COMPLETE CBC AUTOMATED: CPT

## 2021-01-19 PROCEDURE — 74011000250 HC RX REV CODE- 250: Performed by: COLON & RECTAL SURGERY

## 2021-01-19 PROCEDURE — 80048 BASIC METABOLIC PNL TOTAL CA: CPT

## 2021-01-19 PROCEDURE — 94640 AIRWAY INHALATION TREATMENT: CPT

## 2021-01-19 PROCEDURE — 84100 ASSAY OF PHOSPHORUS: CPT

## 2021-01-19 PROCEDURE — 74011250636 HC RX REV CODE- 250/636: Performed by: COLON & RECTAL SURGERY

## 2021-01-19 PROCEDURE — 97165 OT EVAL LOW COMPLEX 30 MIN: CPT

## 2021-01-19 PROCEDURE — 94760 N-INVAS EAR/PLS OXIMETRY 1: CPT

## 2021-01-19 PROCEDURE — 97161 PT EVAL LOW COMPLEX 20 MIN: CPT

## 2021-01-19 PROCEDURE — 99218 HC RM OBSERVATION: CPT

## 2021-01-19 PROCEDURE — 97116 GAIT TRAINING THERAPY: CPT

## 2021-01-19 PROCEDURE — 74011250637 HC RX REV CODE- 250/637: Performed by: COLON & RECTAL SURGERY

## 2021-01-19 PROCEDURE — 36415 COLL VENOUS BLD VENIPUNCTURE: CPT

## 2021-01-19 PROCEDURE — 83735 ASSAY OF MAGNESIUM: CPT

## 2021-01-19 RX ADMIN — ACETAMINOPHEN 1000 MG: 500 TABLET ORAL at 22:00

## 2021-01-19 RX ADMIN — ACETAMINOPHEN 1000 MG: 500 TABLET ORAL at 09:19

## 2021-01-19 RX ADMIN — ACETAMINOPHEN 1000 MG: 500 TABLET ORAL at 15:51

## 2021-01-19 RX ADMIN — SODIUM CHLORIDE, POTASSIUM CHLORIDE, SODIUM LACTATE AND CALCIUM CHLORIDE 75 ML/HR: 600; 310; 30; 20 INJECTION, SOLUTION INTRAVENOUS at 06:38

## 2021-01-19 RX ADMIN — LEVOTHYROXINE SODIUM 75 MCG: 0.07 TABLET ORAL at 06:38

## 2021-01-19 RX ADMIN — BUDESONIDE 500 MCG: 0.5 INHALANT RESPIRATORY (INHALATION) at 07:10

## 2021-01-19 RX ADMIN — LATANOPROST 1 DROP: 50 SOLUTION OPHTHALMIC at 22:00

## 2021-01-19 RX ADMIN — AMLODIPINE BESYLATE 5 MG: 5 TABLET ORAL at 09:18

## 2021-01-19 RX ADMIN — KETOROLAC TROMETHAMINE 15 MG: 30 INJECTION, SOLUTION INTRAMUSCULAR at 06:38

## 2021-01-19 NOTE — ADVANCED PRACTICE NURSE
Surgery NP Progress Note    s/p PERINEAL PROCTO SIGMOIDECTOMY on 2021   Plan d/w with Dr. Tierra Sky      Assessment:   Active Problems:    Rectal prolapse (2021)        Expected post-op progress. Plan/Recommendations/Medical Decision Making:     - Mobilize with nursing and OOB to chair for meals  - Will have PT/OT see patient for any home needs   - Continue low fiber diet  - Pain management- Continue current pain control methods. (scheduled tylenol. Will dc toradol as creatinine is elevated. Has required no prn oxcodeon)  - VTE Prophylaxis: Lovenox, TEDs and SCD    Discharge Planning    Plan for patient to discharge to Home- No Needs (will have PT/OT see patient to assess for any possible home needs)    Dtr-in-law, Daisy Mcgee is at work but can pick patient up when she is discharged. Patient's daughters will take turns staying with her    Anticipated discharge date today or tomorrow    Incision/Wound Care Needs:  Routine post-op, patient will self manage as instructed    Discharge plan discussed with:  Patient and Family/Caregiver    Subjective:     Patient has no new complaints. Pain control adequate. Patient reports PO intake adequate. No nausea/vomiting. Negative flatus. Negative stool output. Voiding status: adequate per Intake/output    Objective:     Blood pressure (!) 155/64, pulse 71, temperature 97.8 °F (36.6 °C), resp. rate 18, weight 54.5 kg (120 lb 1.6 oz), SpO2 91 %.     Temp (24hrs), Av.8 °F (36.6 °C), Min:97.3 °F (36.3 °C), Max:98.6 °F (37 °C)      Recent Results (from the past 48 hour(s))   CBC W/O DIFF    Collection Time: 21  2:04 AM   Result Value Ref Range    WBC 18.1 (H) 3.6 - 11.0 K/uL    RBC 4.62 3.80 - 5.20 M/uL    HGB 14.1 11.5 - 16.0 g/dL    HCT 41.2 35.0 - 47.0 %    MCV 89.2 80.0 - 99.0 FL    MCH 30.5 26.0 - 34.0 PG    MCHC 34.2 30.0 - 36.5 g/dL    RDW 14.0 11.5 - 14.5 %    PLATELET 893 903 - 942 K/uL    MPV 12.1 8.9 - 12.9 FL    NRBC 0.0 0  WBC    ABSOLUTE NRBC 0.00 0.00 - 0.27 K/uL   METABOLIC PANEL, BASIC    Collection Time: 01/19/21  2:04 AM   Result Value Ref Range    Sodium 140 136 - 145 mmol/L    Potassium 3.8 3.5 - 5.1 mmol/L    Chloride 105 97 - 108 mmol/L    CO2 23 21 - 32 mmol/L    Anion gap 12 5 - 15 mmol/L    Glucose 144 (H) 65 - 100 mg/dL    BUN 20 6 - 20 MG/DL    Creatinine 1.35 (H) 0.55 - 1.02 MG/DL    BUN/Creatinine ratio 15 12 - 20      GFR est AA 44 (L) >60 ml/min/1.73m2    GFR est non-AA 37 (L) >60 ml/min/1.73m2    Calcium 8.8 8.5 - 10.1 MG/DL   MAGNESIUM    Collection Time: 01/19/21  2:04 AM   Result Value Ref Range    Magnesium 2.1 1.6 - 2.4 mg/dL   PHOSPHORUS    Collection Time: 01/19/21  2:04 AM   Result Value Ref Range    Phosphorus 3.0 2.6 - 4.7 MG/DL       Pt resting in chair. NAD   Lungs:  CTA  Abdomen:  Soft, non-distended  Perineal dressing dry. SCDs for mechanical DVT proph while in bed    Body mass index is 25.99 kg/m². Reference: BMI greater than 30 is classified as obesity and greater than 40 is classified as morbid obesity.      Last 3 Recorded Weights in this Encounter    01/18/21 1054 01/19/21 3348   Weight: 51 kg (112 lb 7 oz) 54.5 kg (120 lb 1.6 oz)         Teddy Rm NP     01/19/21

## 2021-01-19 NOTE — PROGRESS NOTES
ALYSE PLAN:    RUR -Observation    Patient was admitted from home and will be discharged home in care of her family    Patient's daughter in-law Abilio Burdick will provide transportation home. Patient may need home PT. Awaiting evaluation    Follow up with PCP/Specialist    Observation notice provided in writing to patient and/or caregiver as well as verbal explanation of the policy. Patients who are in outpatient status also receive the Observation notice. Patient lives alone without any assistive devices. She does not drive and her family assists with her IADLs needs. Her ACP is in the chart  CM spoke with patient's daughter in -law Abilio Burdick who said patient's 2 daughters will take turns staying with her following discharge. Care Management Interventions  PCP Verified by CM: Yes  Palliative Care Criteria Met (RRAT>21 & CHF Dx)?: No  Mode of Transport at Discharge:  Other (see comment)(Private car)  Transition of Care Consult (CM Consult): Discharge Planning  Health Maintenance Reviewed: Yes  Physical Therapy Consult: Yes  Occupational Therapy Consult: Yes  Speech Therapy Consult: No  Current Support Network: Lives Alone  Confirm Follow Up Transport: Family  Discharge Location  Discharge Placement: Home with family assistance

## 2021-01-19 NOTE — ADVANCED PRACTICE NURSE
Patient has been up in the chair most of the day. Cleared by PT and OT. Has not passed any flatus or had BM but tolerating low fiber diet. No nausea, vomiting or bloating. Voiding without any problems. Patient is confused at times. Rounded with Dr. Alvarado Serra. Will keep overnight. Patient has not had BM/flatus but is not distended. Son in room with patient. Slight confusion should clear once she is back in her own environment   Has bed alarm in place.        Had gelfoam in rectum which will come out with her BM

## 2021-01-19 NOTE — PROGRESS NOTES
Bedside shift change report given to [employer], RN (oncoming nurse) by Rosa Nagel (offgoing nurse). Report included the following information SBAR and Kardex.

## 2021-01-19 NOTE — PROGRESS NOTES
Patient voided after surgery, very forgetful of questions asked during the same conversation. Bed alarm placed for safety. Bedside shift change report given to Mirza Lopez (oncoming nurse) by Enzo Light RN (offgoing nurse). Report included the following information SBAR, Kardex, Procedure Summary and Intake/Output.

## 2021-01-19 NOTE — PROGRESS NOTES
PHYSICAL THERAPY EVALUATION/DISCHARGE  Patient: David Gonsales (32 y.o. female)  Date: 1/19/2021  Primary Diagnosis: Rectal prolapse [K62.3]  Procedure(s) (LRB):  PERINEAL PROCTO SIGMOIDECTOMY (N/A) 1 Day Post-Op   Precautions:          ASSESSMENT  Based on the objective data described below, the patient presents with functional levels of AROM and strength with steady gait and dynamic standing balance during functional activity consistent with patient's baseline after admission for rectal prolapse. Patient alert and nearly Ox 4 but needed prompts for full date and otherwise able to provide detailed recent and family history. Patient exhibited ease of movement with transfers and during gait with smooth penny with and without the RW. With RW she did require occasional cues for positioning within frame but she is used to her rollator. VSS throughout and patient talking animatedly without signs of distress or fatigue. At baseline, patient lives alone in a one level [facility] with no steps to enter. She does not use any DME in the home but uses a rollator for outings, specifically when she walks to the corner gas station several times a week. She reports one of her daughters will stay with her at d/c and declines to have HHPT at her home, stating she feels good and at her baseline and ready to walk to the gas station tomorrow. Will clear her for d/c mobility-wise. Functional Outcome Measure: The patient scored 85 on the Barthel outcome measure which is indicative of minimal reliance on others for self care tasks or mobility. Other factors to consider for discharge: supportive family     Further skilled acute physical therapy is not indicated at this time. PLAN :  Recommendation for discharge: (in order for the patient to meet his/her long term goals)  No skilled physical therapy/ follow up rehabilitation needs identified at this time.     This discharge recommendation:  Has been made in collaboration with the attending provider and/or case management    IF patient discharges home will need the following DME: patient owns DME required for discharge       SUBJECTIVE:   Patient stated I'm fine, I feel just like I always do, better now that they've fixed my tail.     OBJECTIVE DATA SUMMARY:   HISTORY:    Past Medical History:   Diagnosis Date    Arm fracture, left 2013    Arm fracture, right 1999    Bell's palsy 2005, 2011    H/O BELL'S PALSY X2    CAD (coronary artery disease) 1996    MI , STENT    Cancer ([facility] [address] 2008    BREAST RIGHT - BALLON RADIATION    Cancer ([facility] [address]     LUNG    Elevated cholesterol     Glaucoma     H/O: pneumonia 2013    History of pelvic fracture 2018    Hypertension     Hypothyroid     Sick sinus syndrome ([facility] [address]      Past Surgical History:   Procedure Laterality Date    HX CATARACT REMOVAL Bilateral 2001    HX OTHER SURGICAL  2013 [address] LIP    HX PACEMAKER  2018    HX PACEMAKER PLACEMENT Left 01/2018    [place] BREAST SURGERY PROCEDURE UNLISTED  1998    right mastectomy    [place] CHEST SURGERY PROCEDURE UNLISTED Left 1998    EXC LUNG MASS       Prior level of function: indep without DME or mod indep in community with rollator, takes care of ADLs indep  Personal factors and/or comorbidities impacting plan of care:     Home Situation  Home Environment: ([facility])  # Steps to Enter: 0  One/Two Story Residence: One story  Living Alone: Yes  Support Systems: Family member(s)  Patient Expects to be Discharged to[de-identified] Private residence  Current DME Used/Available at Home: Samira Lilly, rollator  Tub or Shower Type: Shower    EXAMINATION/PRESENTATION/DECISION MAKING:   Critical Behavior:  Neurologic State: Alert  Orientation Level: Oriented to person, Oriented to place, Oriented to situation  Cognition: Follows commands       Range Of Motion:  AROM: Generally decreased, functional           PROM: Generally decreased, functional           Strength:    Strength: Generally decreased, functional                    Tone & Sensation:   Tone: Normal              Sensation: Intact               Coordination:  Coordination: Within functional limits  Vision:   Corrective Lenses: Glasses  Functional Mobility:  Bed Mobility:  Rolling: Supervision(inferred)  Supine to Sit: Supervision(inferred, up in chair)        Transfers:  Sit to Stand: Supervision  Stand to Sit: Supervision        Bed to Chair: Supervision              Balance:   Sitting: Intact  Standing: Intact; With support  Ambulation/Gait Training:  Distance (ft): 200 Feet (ft)  Assistive Device: Gait belt;Walker, rolling(75' with no AD)  Ambulation - Level of Assistance: Supervision                                                 Functional Measure:  Barthel Index:    Bathin  Bladder: 10  Bowels: 10  Groomin  Dressing: 10  Feeding: 10  Mobility: 10  Stairs: 5  Toilet Use: 10  Transfer (Bed to Chair and Back): 15  Total: 85/100       The Barthel ADL Index: Guidelines  1. The index should be used as a record of what a patient does, not as a record of what a patient could do. 2. The main aim is to establish degree of independence from any help, physical or verbal, however minor and for whatever reason. 3. The need for supervision renders the patient not independent. 4. A patient's performance should be established using the best available evidence. Asking the patient, friends/relatives and nurses are the usual sources, but direct observation and common sense are also important. However direct testing is not needed. 5. Usually the patient's performance over the preceding 24-48 hours is important, but occasionally longer periods will be relevant. 6. Middle categories imply that the patient supplies over 50 per cent of the effort. 7. Use of aids to be independent is allowed. Gentry Clarkston., Barthel, D.W. (1199). Functional evaluation: the Barthel Index. [address] [facility] (14)2.   Merged with [facility] keya DENA Moreno, [facility], Heath Berry. (1999). Measuring the change indisability after inpatient rehabilitation; comparison of the responsiveness of the Barthel Index and Functional [place] Measure. Journal of Neurology, Neurosurgery, and Psychiatry, 66(4), 026-253. ASH Hernandez, LILIAN Chandra, & Sarai Toscano M.A. (2004.) Assessment of post-stroke quality of life in cost-effectiveness studies: The usefulness of the Barthel Index and the EuroQoL-5D. Quality of Life Research, 15, 367-23            Physical Therapy Evaluation Charge Determination   History Examination Presentation Decision-Making   HIGH Complexity :3+ comorbidities / personal factors will impact the outcome/ POC  LOW Complexity : 1-2 Standardized tests and measures addressing body structure, function, activity limitation and / or participation in recreation  LOW Complexity : Stable, uncomplicated  Other outcome measures Barthel 85  LOW       Based on the above components, the patient evaluation is determined to be of the following complexity level: LOW     Pain Rating:  None reported    Activity Tolerance:   Good      After treatment patient left in no apparent distress:   Sitting in chair, Call bell within reach and Bed / chair alarm activated    COMMUNICATION/EDUCATION:   The patients plan of care was discussed with: Registered nurse. Fall prevention education was provided and the patient/caregiver indicated understanding.     Thank you for this referral.  Michelle Yanez, PT   Time Calculation: 25 mins

## 2021-01-19 NOTE — PROGRESS NOTES
OCCUPATIONAL THERAPY EVALUATION/DISCHARGE  Patient: Carlos Ness (2 y.o. female)  Date: 1/19/2021  Primary Diagnosis: Rectal prolapse [K62.3]  Procedure(s) (LRB):  PERINEAL PROCTO SIGMOIDECTOMY (N/A) 1 Day Post-Op   Precautions: Fall, chair alarm       ASSESSMENT  Based on the objective data described below, the patient presents with ability to complete ADL tasks with supervision to mod indep s/p Perineal procto sigmoidectomy POD 1. She lives alone in a townhouse and PTA was indep without AD. She was walking to the convenience store in her neighborhood. She demonstrated ability to ambulate to bathroom without AD with supervision, self care transfer with supervision, and LB dressing with supervision. Per chart review, plans for family to stay with her initially. Patient with no acute OT needs at this time. Recommend with nursing, ADLs with supervision/setup, once Egress Test completed then OOB to chair 3x/day and toileting via functional mobility to and from bathroom supervision. Thank you for completing as able in order to maintain patient strength, endurance and independence. Current Level of Function (ADLs/self-care): supervision to mod indep    Functional Outcome Measure: The patient scored 85/100 on the Barthel Index outcome measure which is indicative of mild impairment with ADL. Other factors to consider for discharge: Patient lives at home alone. PLAN :  Recommend with staff: Supervision with toileting, up in chair for meals    Recommendation for discharge: (in order for the patient to meet his/her long term goals)  No skilled occupational therapy/ follow up rehabilitation needs identified at this time. --supervision from family with initial dc home    This discharge recommendation:  Has been made in collaboration with the attending provider and/or case management    IF patient discharges home will need the following DME: patient owns DME required for discharge       SUBJECTIVE: Patient stated I have that buggy from when I broke my pelvis. 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    Cancer ([facility] [address]     LUNG    Elevated cholesterol     Glaucoma     H/O: pneumonia 2013    History of pelvic fracture 2018    Hypertension     Hypothyroid     Sick sinus syndrome ([facility] [address]      Past Surgical History:   Procedure Laterality Date    HX CATARACT REMOVAL Bilateral 2001    HX OTHER SURGICAL  2013    EXC LESIONS FACE, LIP    HX PACEMAKER  2018    HX PACEMAKER PLACEMENT Left 01/2018    [place] BREAST SURGERY PROCEDURE UNLISTED  1998    right mastectomy    [place] CHEST SURGERY PROCEDURE UNLISTED Left 1998    EXC LUNG MASS       Prior Level of Function/Environment/Context: Home alone, indep/mod indep with ADL tasks  Expanded or extensive additional review of patient history:   Home Situation  [facility] Environment: ([facility])  # Steps to Enter: 0  One/Two Story Residence: One story  Living Alone: Yes  Support Systems: Family member(s)  Patient Expects to be Discharged to[de-identified] Private residence  Current DME Used/Available at Home: Niecy Mcardle, rollator  Tub or Shower Type: Shower    Hand dominance: Right    EXAMINATION OF PERFORMANCE DEFICITS:  Cognitive/Behavioral Status:  Neurologic State: Alert  Orientation Level: Oriented to person;Oriented to place;Oriented to situation  Cognition: Follows commands           Vision/Perceptual:                                Corrective Lenses: Glasses    Range of Motion:  AROM: Generally decreased, functional  PROM: Generally decreased, functional                      Strength:  Strength: Generally decreased, functional       Coordination:  Coordination: Within functional limits            Tone & Sensation:  Tone: Normal  Sensation: Intact             Balance:  Sitting: Intact  Standing: Intact; With support    Functional Mobility and Transfers for ADLs:  Bed Mobility:  Rolling: Supervision(inferred)  Supine to Sit: Supervision(inferred, up in chair)    Transfers:  Sit to Stand: Supervision  Stand to Sit: Supervision  Bed to Chair: Supervision  Bathroom Mobility: Supervision/set up    ADL Assessment:  Feeding: Independent  Oral Facial Hygiene/Grooming: Modified Independent  Bathing: Supervision  Upper Body Dressing: Independent  Lower Body Dressing: Supervision  Toileting: Supervision           ADL Intervention and task modifications:  Lower Body Dressing Assistance  Socks: Supervision     Patient instructed and indicated understanding the benefits of maintaining activity tolerance, functional mobility, and independence with self care tasks during acute stay  to ensure safe return home and to baseline. Encouraged patient to increase frequency and duration OOB, be out of bed for all meals, perform daily ADLs (as approved by RN/MD regarding bathing etc), and performing functional mobility to/from bathroom. Provided education on energy conservation techniques in regards to ADL/IADL tasks. Use of example of phone battery in regards to draining of all the energy and having to have extended time to recharge vs using small amounts of energy then less time required to recharge. Patient with good participation in discussion and fair understanding. Provided education with patient on fall prevention for hospital and at home. This includes not getting OOB/chair/toilet without staff assistance, good lighting, good footwear, and recommended AD use. Patient with fair understanding. Activated chair alarm.           Functional Measure:  Barthel Index:    Bathin  Bladder: 10  Bowels: 10  Groomin  Dressing: 10  Feeding: 10  Mobility: 10  Stairs: 5  Toilet Use: 10  Transfer (Bed to Chair and Back): 15  Total: 85/100        The Barthel ADL Index: Guidelines  1. The index should be used as a record of what a patient does, not as a record of what a patient could do. 2. The main aim is to establish degree of independence from any help, physical or verbal, however minor and for whatever reason. 3. The need for supervision renders the patient not independent. 4. A patient's performance should be established using the best available evidence. Asking the patient, friends/relatives and nurses are the usual sources, but direct observation and common sense are also important. However direct testing is not needed. 5. Usually the patient's performance over the preceding 24-48 hours is important, but occasionally longer periods will be relevant. 6. Middle categories imply that the patient supplies over 50 per cent of the effort. 7. Use of aids to be independent is allowed. Quiana Casas., Barthel, D.W. (2506). Functional evaluation: the Barthel Index. [address] [facility] (14)2. Jad Jaimes keya DENA Moreno, Sai Christensen., Nhi Guo., [place], [address] [place] [address] (1999). Measuring the change indisability after inpatient rehabilitation; comparison of the responsiveness of the Barthel Index and Functional [place] Measure. Journal of Neurology, Neurosurgery, and Psychiatry, 66(4), 024-078. Naye Arevalo, N.J.A, LILIAN Chandra, & Kamila Cramer M.A. (2004.) Assessment of post-stroke quality of life in cost-effectiveness studies: The usefulness of the Barthel Index and the EuroQoL-5D.  Quality of Life Research, 15, 126-76         Occupational Therapy Evaluation Charge Determination   History Examination Decision-Making   LOW Complexity : Brief history review  LOW Complexity : 1-3 performance deficits relating to physical, cognitive , or psychosocial skils that result in activity limitations and / or participation restrictions  LOW Complexity : No comorbidities that affect functional and no verbal or physical assistance needed to complete eval tasks       Based on the above components, the patient evaluation is determined to be of the following complexity level: LOW   Pain Rating:  Denies pain    Activity Tolerance:   Fair    After treatment patient left in no apparent distress:    Sitting in chair, Call bell within reach and Bed / chair alarm activated    COMMUNICATION/EDUCATION:   The patients plan of care was discussed with: Physical therapist, Registered nurse and Case management.      Thank you for this referral.  Jackie Ko OT  Time Calculation: 17 mins

## 2021-01-20 ENCOUNTER — APPOINTMENT (OUTPATIENT)
Dept: GENERAL RADIOLOGY | Age: 86
End: 2021-01-20
Attending: FAMILY MEDICINE
Payer: MEDICARE

## 2021-01-20 VITALS
RESPIRATION RATE: 16 BRPM | OXYGEN SATURATION: 92 % | HEART RATE: 74 BPM | WEIGHT: 121.6 LBS | SYSTOLIC BLOOD PRESSURE: 134 MMHG | TEMPERATURE: 98.3 F | BODY MASS INDEX: 26.31 KG/M2 | DIASTOLIC BLOOD PRESSURE: 63 MMHG

## 2021-01-20 PROBLEM — K62.3 RECTAL PROLAPSE: Status: RESOLVED | Noted: 2021-01-18 | Resolved: 2021-01-20

## 2021-01-20 LAB
ALBUMIN SERPL-MCNC: 3.6 G/DL (ref 3.5–5)
ALBUMIN/GLOB SERPL: 1 {RATIO} (ref 1.1–2.2)
ALP SERPL-CCNC: 65 U/L (ref 45–117)
ALT SERPL-CCNC: 25 U/L (ref 12–78)
ANION GAP SERPL CALC-SCNC: 3 MMOL/L (ref 5–15)
APPEARANCE UR: CLEAR
AST SERPL-CCNC: 44 U/L (ref 15–37)
BACTERIA URNS QL MICRO: NEGATIVE /HPF
BILIRUB SERPL-MCNC: 0.4 MG/DL (ref 0.2–1)
BILIRUB UR QL: NEGATIVE
BUN SERPL-MCNC: 27 MG/DL (ref 6–20)
BUN/CREAT SERPL: 24 (ref 12–20)
CALCIUM SERPL-MCNC: 9.1 MG/DL (ref 8.5–10.1)
CHLORIDE SERPL-SCNC: 106 MMOL/L (ref 97–108)
CK MB CFR SERPL CALC: 1.1 % (ref 0–2.5)
CK MB SERPL-MCNC: 5.4 NG/ML (ref 5–25)
CK SERPL-CCNC: 497 U/L (ref 26–192)
CO2 SERPL-SCNC: 31 MMOL/L (ref 21–32)
COLOR UR: ABNORMAL
CREAT SERPL-MCNC: 1.14 MG/DL (ref 0.55–1.02)
EPITH CASTS URNS QL MICRO: ABNORMAL /LPF
ERYTHROCYTE [DISTWIDTH] IN BLOOD BY AUTOMATED COUNT: 14.7 % (ref 11.5–14.5)
GLOBULIN SER CALC-MCNC: 3.5 G/DL (ref 2–4)
GLUCOSE SERPL-MCNC: 91 MG/DL (ref 65–100)
GLUCOSE UR STRIP.AUTO-MCNC: NEGATIVE MG/DL
HCT VFR BLD AUTO: 42.1 % (ref 35–47)
HGB BLD-MCNC: 14.3 G/DL (ref 11.5–16)
HGB UR QL STRIP: ABNORMAL
HYALINE CASTS URNS QL MICRO: ABNORMAL /LPF (ref 0–5)
KETONES UR QL STRIP.AUTO: ABNORMAL MG/DL
LACTATE SERPL-SCNC: 1.5 MMOL/L (ref 0.4–2)
LEUKOCYTE ESTERASE UR QL STRIP.AUTO: NEGATIVE
MCH RBC QN AUTO: 30.6 PG (ref 26–34)
MCHC RBC AUTO-ENTMCNC: 34 G/DL (ref 30–36.5)
MCV RBC AUTO: 90.1 FL (ref 80–99)
NITRITE UR QL STRIP.AUTO: NEGATIVE
NRBC # BLD: 0 K/UL (ref 0–0.01)
NRBC BLD-RTO: 0 PER 100 WBC
PH UR STRIP: 7 [PH] (ref 5–8)
PLATELET # BLD AUTO: 212 K/UL (ref 150–400)
PMV BLD AUTO: 12.1 FL (ref 8.9–12.9)
POTASSIUM SERPL-SCNC: 4 MMOL/L (ref 3.5–5.1)
PROT SERPL-MCNC: 7.1 G/DL (ref 6.4–8.2)
PROT UR STRIP-MCNC: ABNORMAL MG/DL
RBC # BLD AUTO: 4.67 M/UL (ref 3.8–5.2)
RBC #/AREA URNS HPF: ABNORMAL /HPF (ref 0–5)
SODIUM SERPL-SCNC: 140 MMOL/L (ref 136–145)
SP GR UR REFRACTOMETRY: 1.02 (ref 1–1.03)
TROPONIN I SERPL-MCNC: <0.05 NG/ML
TSH SERPL DL<=0.05 MIU/L-ACNC: 0.72 UIU/ML (ref 0.36–3.74)
UR CULT HOLD, URHOLD: NORMAL
UROBILINOGEN UR QL STRIP.AUTO: 0.2 EU/DL (ref 0.2–1)
WBC # BLD AUTO: 14.6 K/UL (ref 3.6–11)
WBC URNS QL MICRO: ABNORMAL /HPF (ref 0–4)

## 2021-01-20 PROCEDURE — 94760 N-INVAS EAR/PLS OXIMETRY 1: CPT

## 2021-01-20 PROCEDURE — 96372 THER/PROPH/DIAG INJ SC/IM: CPT

## 2021-01-20 PROCEDURE — 85027 COMPLETE CBC AUTOMATED: CPT

## 2021-01-20 PROCEDURE — 99218 HC RM OBSERVATION: CPT

## 2021-01-20 PROCEDURE — 74011250636 HC RX REV CODE- 250/636: Performed by: COLON & RECTAL SURGERY

## 2021-01-20 PROCEDURE — 84484 ASSAY OF TROPONIN QUANT: CPT

## 2021-01-20 PROCEDURE — 94640 AIRWAY INHALATION TREATMENT: CPT

## 2021-01-20 PROCEDURE — 82550 ASSAY OF CK (CPK): CPT

## 2021-01-20 PROCEDURE — 74011000250 HC RX REV CODE- 250: Performed by: COLON & RECTAL SURGERY

## 2021-01-20 PROCEDURE — 77010033678 HC OXYGEN DAILY

## 2021-01-20 PROCEDURE — 36415 COLL VENOUS BLD VENIPUNCTURE: CPT

## 2021-01-20 PROCEDURE — 80053 COMPREHEN METABOLIC PANEL: CPT

## 2021-01-20 PROCEDURE — 81001 URINALYSIS AUTO W/SCOPE: CPT

## 2021-01-20 PROCEDURE — 74011250637 HC RX REV CODE- 250/637: Performed by: COLON & RECTAL SURGERY

## 2021-01-20 PROCEDURE — 71046 X-RAY EXAM CHEST 2 VIEWS: CPT

## 2021-01-20 PROCEDURE — 84443 ASSAY THYROID STIM HORMONE: CPT

## 2021-01-20 PROCEDURE — 83605 ASSAY OF LACTIC ACID: CPT

## 2021-01-20 RX ORDER — OXYCODONE HYDROCHLORIDE 5 MG/1
5 TABLET ORAL
Qty: 12 TAB | Refills: 0 | Status: SHIPPED | OUTPATIENT
Start: 2021-01-20 | End: 2021-01-23

## 2021-01-20 RX ADMIN — ACETAMINOPHEN 1000 MG: 500 TABLET ORAL at 11:06

## 2021-01-20 RX ADMIN — ENOXAPARIN SODIUM 30 MG: 30 INJECTION SUBCUTANEOUS at 00:21

## 2021-01-20 RX ADMIN — BUDESONIDE 500 MCG: 0.5 INHALANT RESPIRATORY (INHALATION) at 08:55

## 2021-01-20 RX ADMIN — LEVOTHYROXINE SODIUM 75 MCG: 0.07 TABLET ORAL at 06:44

## 2021-01-20 RX ADMIN — AMLODIPINE BESYLATE 5 MG: 5 TABLET ORAL at 08:39

## 2021-01-20 NOTE — ADVANCED PRACTICE NURSE
Appreciate Dr. Rebeca Crum consults. Probably emphysema. No acute lung process. Labs unremarkable. Urinalysis pending. Sats on room air were better on her ear lobe and on the right hand. Probably some component of PVD adding to lower sats. At this time Dr. Ramon Mejía does not recommend any changes in her home meds (she will use one steroid inhaler) and follow up with Dr. Rekha Nicole. Update communicated to patient's dtr-in-law        17:07  Urinalysis shows no acute infection.  Will discharge

## 2021-01-20 NOTE — ADVANCED PRACTICE NURSE
Surgery NP Progress Note    s/p PERINEAL PROCTO SIGMOIDECTOMY on 1/18/2021   Plan d/w with Dr. Lourdes Pruitt      Assessment:   Active Problems:    Rectal prolapse (1/18/2021)      Passing flatus/abdomen soft- stable from surgical perspective    O2 Sats low at 84% on room air. Congested cough. Spoke with Brendon Alvarado, dtr-in-law about the reason patient is on inhaler(s). She says she is supposed to be using the inhaler twice a day. Prescribed by Dr. Pastora Mayer for SOB with exertion. She has not been using any inhalers on a regular basis. Two inhalers listed on her PTA med list:   arnuity ellipta one puff daily and beclomethasone one puff twice daily. Clarified with Brendon Alvarado and patient was only prescribed one at a time (the insurance didn't pay for one and that was the reason for the switch). Has never been on albuterol per Berndon Alvarado. Did have pneumonia after surgery in the past. Has also had a lung mass removed     0855:  Per Dr. Nish Xavier, will get hospitalist consult     Patient is more confused. Plan/Recommendations/Medical Decision Making:     - Will discuss with Dr. Nish Xavier:   Consider CXR, CBC and CMP. Per Dr. Nish Xavier will get hospitalist consult  - Continue to mobilize with nursing and OOB to chair for meals  -- Continue low fiber diet  - Pain management- Continue current pain control methods. (scheduled tylenol. Has required no prn oxcodeon)  - VTE Prophylaxis: Lovenox, TEDs and SCD    Discharge Planning    Plan for patient to discharge to Home- No Needs     Dtr-in-law, Brendon Alvarado is at work but can pick patient up when she is discharged. Patient's daughters will take turns staying with her    Anticipated discharge date:  Need to follow up on low sats and confusion- hospitalist consult     Incision/Wound Care Needs:  Routine post-op, patient will self manage as instructed    Discharge plan discussed with:  Patient and Family/Caregiver    Subjective:     Patient has no new complaints.   When asked if she feels SOB:  A little but not too bad  Pain control adequate. Patient reports PO intake adequate. No nausea/vomiting. Positive flatus. Negative stool output. Voiding status: adequate per Intake/output    Objective:     Blood pressure (!) 168/56, pulse 87, temperature 98.3 °F (36.8 °C), resp. rate 17, weight 55.2 kg (121 lb 9.6 oz), SpO2 93 %. Temp (24hrs), Av.5 °F (36.9 °C), Min:98.3 °F (36.8 °C), Max:98.8 °F (37.1 °C)      Recent Results (from the past 48 hour(s))   CBC W/O DIFF    Collection Time: 21  2:04 AM   Result Value Ref Range    WBC 18.1 (H) 3.6 - 11.0 K/uL    RBC 4.62 3.80 - 5.20 M/uL    HGB 14.1 11.5 - 16.0 g/dL    HCT 41.2 35.0 - 47.0 %    MCV 89.2 80.0 - 99.0 FL    MCH 30.5 26.0 - 34.0 PG    MCHC 34.2 30.0 - 36.5 g/dL    RDW 14.0 11.5 - 14.5 %    PLATELET 058 038 - 488 K/uL    MPV 12.1 8.9 - 12.9 FL    NRBC 0.0 0  WBC    ABSOLUTE NRBC 0.00 0.00 - 2.47 K/uL   METABOLIC PANEL, BASIC    Collection Time: 21  2:04 AM   Result Value Ref Range    Sodium 140 136 - 145 mmol/L    Potassium 3.8 3.5 - 5.1 mmol/L    Chloride 105 97 - 108 mmol/L    CO2 23 21 - 32 mmol/L    Anion gap 12 5 - 15 mmol/L    Glucose 144 (H) 65 - 100 mg/dL    BUN 20 6 - 20 MG/DL    Creatinine 1.35 (H) 0.55 - 1.02 MG/DL    BUN/Creatinine ratio 15 12 - 20      GFR est AA 44 (L) >60 ml/min/1.73m2    GFR est non-AA 37 (L) >60 ml/min/1.73m2    Calcium 8.8 8.5 - 10.1 MG/DL   MAGNESIUM    Collection Time: 21  2:04 AM   Result Value Ref Range    Magnesium 2.1 1.6 - 2.4 mg/dL   PHOSPHORUS    Collection Time: 21  2:04 AM   Result Value Ref Range    Phosphorus 3.0 2.6 - 4.7 MG/DL     Patient seems more confused this am.  Does not know who Dr. Yahaira Peterson is. Wants to sell the furniture and pictures in the room   Pt resting in chair. Lungs:  CTA but with congested cough. O2 sat 84% on room air.  Sats up to 93% with 2 LPM  Not SOB with transfer from the bed to chair   Abdomen:  Soft, non-distended  Pulse regular. HR 87   Perineal dressing removed. Gelfoam plug was expelled. SCDs for mechanical DVT proph while in bed    Body mass index is 26.31 kg/m². Reference: BMI greater than 30 is classified as obesity and greater than 40 is classified as morbid obesity.      Last 3 Recorded Weights in this Encounter    01/18/21 1054 01/19/21 0432 01/20/21 0646   Weight: 51 kg (112 lb 7 oz) 54.5 kg (120 lb 1.6 oz) 55.2 kg (121 lb 9.6 oz)         Meagan Fitzgerald NP     01/20/21

## 2021-01-20 NOTE — DISCHARGE INSTRUCTIONS
Graciela Morel MD, JANNETTE Mosqueda. Jimi Priest MD, MD Mallika Hough MD Gillermina Obey, MD      Discharge Instructions for Colorectal Surgery Patients       1. Do not lift any objects weighing more than 10 pounds for 4 weeks. 2. Do not do any housework including vacuuming, scrubbing or yardwork for 4 weeks. 3. Do not drive for two weeks or while taking sedating medications. 4. You may walk as desired and go up and down stairs as needed. 5. You may shower. Do not take tub baths, swim or use hot tubs for 4 weeks. 6.. Follow low-fiber diet for 2 weeks. (See handbook for additional details). 7. Drink nutritional supplements 2 times per day until your diet and appetite are back to normal. Diabetic patients should drink one-half bottle 4 times daily. 8. Multiple bowel movements are normal each day. Contact your surgeons office for any concerns. 9.. Take tylenol 1000 mg every 6  Hours on a regular basis for the next 1-2 days and then taper off. 10. Follow up with providers as scheduled. 11. If you experience fever (greater than 100.5), chills, vomiting or redness or drainage at surgical site, please contact your surgeons office. Please see handbook for additional instructions. If you have further questions, please call your surgeons office.

## 2021-01-20 NOTE — PROGRESS NOTES
Patient seen and examined- I feel that she is stable for DC  Her breathing issues are related to moderate-severe COPD- that appears stable and not in acute exacerbation  She has significant changes that are visible on her past Chest CT and her lung exam is consistent with these findings  PRN albuterol inhaler should be all she needs- her resting pulse ox at the bedside when examined was 91-92% on RA. Her labs and UA were unremarkable- I think her confusion should resolve with time and is most consistent with either anesthesia and or mild acute hospital delirium on some mild baseline cognitive impairment- recommend follow up with PCP to consider starting her on dementia type meds- aricept or namenda.

## 2021-01-20 NOTE — PROGRESS NOTES
Bedside and Verbal shift change report given to [address] [place] [address] Street, RN (oncoming nurse) by Michael Covarrubias RN (offgoing nurse). Report included the following information SBAR, Kardex, Intake/Output, MAR and Recent Results.

## 2021-01-20 NOTE — PROGRESS NOTES
0600- Pt had increased confusion tonight 1/19. She had gotten out of bed 5+ times without ringing out. Pt has a bed alarm and this nurse has helped this patient to the bathroom several times and back into bed when she has had confusion. Pt. Gait is steady but this nurse is concerned about safety due to confusion. When asked, patient is only oriented to self she reports being at home and does not know what year it is. Bedside shift change report given to Pierre Grove RN (oncoming nurse) by Pamela Vines (offgoing nurse). Report included the following information SBAR and Kardex.

## 2021-01-21 PROBLEM — S32.509A PUBIC BONE FRACTURE (HCC): Status: RESOLVED | Noted: 2018-02-06 | Resolved: 2021-01-21

## 2021-01-21 PROBLEM — N39.0 UTI (URINARY TRACT INFECTION): Status: RESOLVED | Noted: 2018-02-09 | Resolved: 2021-01-21

## 2021-01-21 NOTE — ADVANCED PRACTICE NURSE
Follow up appointment with Dr. Alonzo Esparza on 2/3 2021 at 10:30. Will dylan Annalisa Ariza and let her know.  Will also let her know about the recommendation from the hospitalist for the albuterol inhaler and possible medication for mild cognitive impairment

## 2021-01-22 NOTE — DISCHARGE SUMMARY
Post- Surgical Discharge Summary    Patient ID:  Maxine Coleman  [id number]  female  80 y.o.  1/29/1928    Admit date: 1/18/2021    Discharge date: 1/20/2021    Admitting Physician: Gila Garcia MD     Consulting Physician(s):   Treatment Team: Utilization Review: Alonso Pierre RN; Consulting Provider: Vicki Gaviria NP; Care Manager: BISHOP Frye    Date of Surgery:   1/18/2021     Preoperative Diagnosis:  RECTAL PROLAPSE    Postoperative Diagnosis:   RECTAL PROLAPSE    Procedure(s):  PERINEAL PROCTO SIGMOIDECTOMY     Anesthesia Type:   General     Surgeon: Leroy Francois MD                            HPI:  Pt is a 80 y.o. female who has a history of RECTAL PROLAPSE who presents at this time for a perineal procto sigmoidectomy following the failure of conservative management. Problem List:     Moderate-severe COPD  Delirium   Possible cognitive impairment  Rectal prolapse- resolved    Hospital Course: The patient underwent surgery. Intra-operative complications: None; patient tolerated the procedure well. Was taken to the PACU in stable condition and then transferred to the surgical floor. Patient was started on a low fiber diet. Did not have any flatus/BM on POD 1 but was not distended and continued to tolerate diet. Patient did have some confusion which family said is not new for here. On POD 2, patient had return of flatus. Dressing was removed off perineal area and the gelfoam packing had been expelled. On POD 2 patient was noted to have O2 sats at 84% on room air. Night shift added O2 via nasal cannula. Patient was more confused at this time. Consulted the hospitalist.  Dr. Nick Gomez evaluated patient. CXR consistent with moderate to severe COPD (past smoker, lung CA with mass excision). Labs and urinalysis unremarkable. sats were higher on left hand at 92-93% on room air.  Dr. Nick Gomez suggested follow up with PCP to consider albuterol and possible medication for cognitive impairment. Perioperative Antibiotics: Cefazolin    Postoperative Pain Management:  Oxycodone (oyxcodone not required)  Pain managed with scheduled tylenol and Ketoralac     Postoperative transfusions:    Number of units banked PRBCs =   none     Post Op complications: None     Perineal area - clean, dry and intact. No significant erythema or swelling. Patient mobilized with nursing and was found to be safe and steady with ambulation. Evaluated by PT/OT and was found to have no therapy needs     Discharged to: Home (daughters to rotate staying with her)    Condition on Discharge: Stable       Discharge instructions:    - Take pain medications as prescribed  - Diet Low Fiber  - Discharge activity:    - Activity as tolerated    - Ambulate several times a day   - No heavy lifting for 4 weeks   - Do not drive for two weeks or while on opioid pain medications  - Wound Care: Keep wound(s) clean and dry. See discharge instruction sheet. Allergies:  No Known Allergies           -DISCHARGE MEDICATION LIST     Discharge Medication List as of 1/20/2021  5:21 PM      START taking these medications    Details   oxyCODONE IR (ROXICODONE) 5 mg immediate release tablet Take 1 Tab by mouth every four (4) hours as needed (postop pain) for up to 3 days. Max Daily Amount: 30 mg., Normal, Disp-12 Tab, R-0         CONTINUE these medications which have NOT CHANGED    Details   inulin (FIBER GUMMIES PO) Take 2 Tabs by mouth daily. , Historical Med      beclomethasone (QVAR) 80 mcg/actuation aero Take 1 Puff by inhalation two (2) times a day., Historical Med      amLODIPine (NORVASC) 5 mg tablet TAKE 1 TABLET BY MOUTH EVERY DAY, Historical Med, R-4      acetaminophen (TYLENOL) 325 mg tablet Take 2 Tabs by mouth every four (4) hours as needed. , Print, Disp-40 Tab, R-0      polyethylene glycol (MIRALAX) 17 gram packet Take 1 Packet by mouth daily. , Print, Disp-30 Each, R-0      calcium citrate-vitamin d3 (CITRACAL+D) 315-200 mg-unit tab Take 1 Tab by mouth daily. , Historical Med      latanoprost (XALATAN) 0.005 % ophthalmic solution Administer 1 Drop to both eyes nightly., Historical Med, R-0      multivitamin (ONE A DAY) tablet Take 1 Tab by mouth daily. , Historical Med      simvastatin (ZOCOR) 40 mg tablet Take 40 mg by mouth nightly., Historical Med      aspirin (ASPIRIN) 325 mg tablet Take 325 mg by mouth nightly., Historical Med      levothyroxine (LEVOTHROID) 75 mcg tablet Take 75 mcg by mouth Daily (before breakfast). , Historical Med         STOP taking these medications       ARNUITY ELLIPTA 100 mcg/actuation dsdv inhaler Comments:   Reason for Stopping:            per medical continuation form      -Follow up in office in 2 weeks      Signed:    1/22/2021  1:39 PM

## 2021-04-19 ENCOUNTER — OFFICE VISIT (OUTPATIENT)
Dept: CARDIOLOGY CLINIC | Age: 86
End: 2021-04-19
Payer: MEDICARE

## 2021-04-19 DIAGNOSIS — I49.5 SSS (SICK SINUS SYNDROME) (HCC): ICD-10-CM

## 2021-04-19 DIAGNOSIS — Z95.0 CARDIAC PACEMAKER IN SITU: Primary | ICD-10-CM

## 2021-04-19 PROCEDURE — 93296 REM INTERROG EVL PM/IDS: CPT | Performed by: INTERNAL MEDICINE

## 2021-04-19 PROCEDURE — 93294 REM INTERROG EVL PM/LDLS PM: CPT | Performed by: INTERNAL MEDICINE

## 2021-07-27 NOTE — PROGRESS NOTES
ELECTROPHYSIOLOGY        Subjective:      Radha Washburn is a 80 y.o. female is here for EP follow up. The patient denies chest pain/ shortness of breath, orthopnea, PND, LE edema, palpitations, syncope, presyncope or fatigue. Reports 2 \"falls\" where she just went backwards. Hit her head both times. Ambulates with a walker. Both occurred when standing. One after walking down a thomas, the other while in her room.      Patient Active Problem List    Diagnosis Date Noted    SSS (sick sinus syndrome) ([facility] 01/09/2018    Heart palpitations 12/18/2017    Shortness of breath 09/23/2016      Karen Perez MD  Past Medical History:   Diagnosis Date    Arm fracture, left 2013    Arm fracture, right 1999    Bell's palsy 2005, 2011    H/O BELL'S PALSY X2    CAD (coronary artery disease) 1996    MI , STENT    Cancer ([facility] 2008    BREAST RIGHT - [place] RADIATION    Cancer ([facility]     LUNG    Chronic obstructive pulmonary disease (HCC)     Elevated cholesterol     Glaucoma     H/O: pneumonia 2013    History of pelvic fracture 2018    Hypertension     Hypothyroid     Pacemaker 01/09/2018    Sick sinus syndrome ([address]     Syncope       Past Surgical History:   Procedure Laterality Date    HX CATARACT REMOVAL Bilateral 2001    HX HEART CATHETERIZATION      HX OTHER SURGICAL  2013    EXC LESIONS FACE, LIP    HX PACEMAKER  2018    HX PACEMAKER PLACEMENT Left 01/2018    PA BREAST SURGERY PROCEDURE UNLISTED  1998    right mastectomy    PA CHEST SURGERY PROCEDURE UNLISTED Left 1998    EXC LUNG MASS     No Known Allergies   Family History   Family history unknown: Yes    negative for cardiac disease  Social History     Socioeconomic History    Marital status:      Spouse name: Not on file    Number of children: Not on file    Years of education: Not on file    Highest education level: Not on file   Tobacco Use    Smoking status: Former Smoker     Packs/day: 0.25     Years: 10.00     Pack years: 2.50     Quit date: 1989     Years since quittin.6    Smokeless tobacco: Never Used   Substance and Sexual Activity    Alcohol use: No    Drug use: No    Sexual activity: Not Currently     Social Determinants of Health     Financial Resource Strain:     Difficulty of Paying Living Expenses:    Food Insecurity:     Worried About Running Out of Food in the Last Year:     Ran Out of Food in the Last Year:    Transportation Needs:     Lack of Transportation (Medical):  Lack of Transportation (Non-Medical):    Physical Activity:     Days of Exercise per Week:     Minutes of Exercise per Session:    Stress:     Feeling of Stress :    Social Connections:     Frequency of Communication with Friends and Family:     Frequency of Social Gatherings with Friends and Family:     Attends [employer] Services:     Active Member of Clubs or Organizations:     Attends Club or Organization Meetings:     Marital Status:      Current Outpatient Medications   Medication Sig    sertraline (ZOLOFT) 50 mg tablet Take 50 mg by mouth daily.  levothyroxine (SYNTHROID) 50 mcg tablet Take 50 mcg by mouth Daily (before breakfast).  simvastatin (ZOCOR) 10 mg tablet Take 10 mg by mouth nightly.  amLODIPine (NORVASC) 5 mg tablet TAKE 1 TABLET BY MOUTH EVERY DAY    acetaminophen (TYLENOL) 325 mg tablet Take 2 Tabs by mouth every four (4) hours as needed.  calcium citrate-vitamin d3 (CITRACAL+D) 315-200 mg-unit tab Take 1 Tab by mouth daily.  latanoprost (XALATAN) 0.005 % ophthalmic solution Administer 1 Drop to both eyes nightly.  multivitamin (ONE A DAY) tablet Take 1 Tab by mouth daily.  aspirin (ASPIRIN) 325 mg tablet Take 325 mg by mouth nightly.  inulin (FIBER GUMMIES PO) Take 2 Tabs by mouth daily. (Patient not taking: Reported on 2021)    beclomethasone (QVAR) 80 mcg/actuation aero Take 1 Puff by inhalation two (2) times a day.  (Patient not taking: Reported on 2021)    polyethylene glycol (MIRALAX) 17 gram packet Take 1 Packet by mouth daily. No current facility-administered medications for this visit. Vitals:    21 0959   BP: 130/60   Pulse: 70   Resp: 20   SpO2: 91%   Weight: 105 lb (47.6 kg)   Height: 4' 11\" (1.499 m)       I have reviewed the nurses notes, vitals, problem list, allergy list, medical history, family, social history and medications. Review of Symptoms:  11 systems reviewed, negative other than as stated in the HPI      Physical Exam:      General: Well developed, in no acute distress. HEENT: Eyes - PERRL  Heart:  Normal S1/S2 negative S3 or S4. Regular, no murmur  Respiratory: Clear bilaterally x 4, no wheezing or rales  Extremities:  No edema, no cyanosis. Musculoskeletal: No clubbing  Neuro: A&Ox3, speech clear  Skin: No visible rashes or lesions. Non diaphoretic.  No visible ulcers  Vascular: 2+ pulses symmetric in all extremities  Psych - judgement intact and orientation is wnl       Cardiographics    Ek21  NSR,  unchanged from 2020    Results for orders placed or performed during the hospital encounter of 21   EKG, 12 LEAD, INITIAL   Result Value Ref Range    Ventricular Rate 67 BPM    Atrial Rate 67 BPM    P-R Interval 152 ms    QRS Duration 84 ms    Q-T Interval 410 ms    QTC Calculation (Bezet) 433 ms    Calculated P Axis 68 degrees    Calculated R Axis -13 degrees    Calculated T Axis 30 degrees    Diagnosis       Normal sinus rhythm  Left atrial enlargement  T wave abnormality, consider lateral ischemia  When compared with ECG of 2018 13:20,  premature ventricular complexes are no longer present  QRS axis shifted left  T wave inversion now evident in Lateral leads  Confirmed by Dayton Pardo M.D., Brunilda Dark ([id number]) on 1/15/2021 8:30:42 AM           Lab Results   Component Value Date/Time    WBC 14.6 (H) 2021 11:50 AM    HGB 14.3 2021 11:50 AM    HCT 42.1 2021 11:50 AM    PLATELET 794  11:50 AM    MCV 90.1 01/20/2021 11:50 AM      Lab Results   Component Value Date/Time    Sodium 140 01/20/2021 11:50 AM    Potassium 4.0 01/20/2021 11:50 AM    Chloride 106 01/20/2021 11:50 AM    CO2 31 01/20/2021 11:50 AM    Anion gap 3 (L) 01/20/2021 11:50 AM    Glucose 91 01/20/2021 11:50 AM    BUN 27 (H) 01/20/2021 11:50 AM    Creatinine 1.14 (H) 01/20/2021 11:50 AM    BUN/Creatinine ratio 24 (H) 01/20/2021 11:50 AM    GFR est AA 54 (L) 01/20/2021 11:50 AM    GFR est non-AA 45 (L) 01/20/2021 11:50 AM    Calcium 9.1 01/20/2021 11:50 AM    Bilirubin, total 0.4 01/20/2021 11:50 AM    Alk. phosphatase 65 01/20/2021 11:50 AM    Protein, total 7.1 01/20/2021 11:50 AM    Albumin 3.6 01/20/2021 11:50 AM    Globulin 3.5 01/20/2021 11:50 AM    A-G Ratio 1.0 (L) 01/20/2021 11:50 AM    ALT (SGPT) 25 01/20/2021 11:50 AM      Lab Results   Component Value Date/Time    TSH 0.72 01/20/2021 11:50 AM           Assessment:           ICD-10-CM ICD-9-CM    1. SSS (sick sinus syndrome) ([facility])  I49.5 427.81 AMB POC EKG ROUTINE W/ 12 LEADS, INTER & REP   2. Essential hypertension  I10 401.9 AMB POC EKG ROUTINE W/ 12 LEADS, INTER & REP   3. Bradycardia  R00.1 427.89 AMB POC EKG ROUTINE W/ 12 LEADS, INTER & REP   4. Cardiac pacemaker in situ  Z95.0 V45.01 AMB POC EKG ROUTINE W/ 12 LEADS, INTER & REP     Orders Placed This Encounter    AMB POC EKG ROUTINE W/ 12 LEADS, INTER & REP     Order Specific Question:   Reason for Exam:     Answer:   routine    sertraline (ZOLOFT) 50 mg tablet     Sig: Take 50 mg by mouth daily.  levothyroxine (SYNTHROID) 50 mcg tablet     Sig: Take 50 mcg by mouth Daily (before breakfast).  simvastatin (ZOCOR) 10 mg tablet     Sig: Take 10 mg by mouth nightly. Plan:     Ms Gwen Almeida is here for follow up s/p dual chamber PM 1/9/19.  She is 44% AP and <0.1% RVP. Longevity is >6  Years. Device with 2 NSVT episodes, longest 12 beats. Asymptomatic She is in NSR per EKG, normotensive.   Will repeat echo with recent NSVT. Falls did not occur during episodes of NSVT. Bps sitting 122/50 and standing 116/50. She is not orthostatic and was asymptomatic. Reports not taking in much po hydration. Strongly encouraged her to improve upon that. Pt verbalizes understanding and is in agreement with the plan. During this visit,  the patient and I had a comprehensive discussion of device management using principles of shared decision making. we reviewed device therapy, including the potential risks and benefits of device management. These risks include death, myocardial infarction, stroke, cardiac perforation, vascular injury, injury, pacing induced cardiomyopathy, inappropriate shocks (defibrillator) and other less severe complications. The patient demonstrated a clear understanding of these issues during out discussion. Our plans, determined together after thorough consideration, are outlined else where in this note. Enrolled in remote monitoring and I will see her back in 1 year. Addressed all patient questions and concerns at this visit. Continue medical management for HTn. Discussed side effects, efficacy and good medication compliance with pt re: [place]. Thank you for allowing me to participate in [facility] 's care. Alicia Cormier NP     Patient was made aware during visit today that all testing completed would be instantaneously available on their MyChart for review. Discussed that these results will be made available to the provider at the same time. They were advised to wait at least 3 business days to allow for provider's interpretation of results with follow-up before calling our office with concerns about their results.

## 2021-07-28 ENCOUNTER — OFFICE VISIT (OUTPATIENT)
Dept: CARDIOLOGY CLINIC | Age: 86
End: 2021-07-28
Payer: MEDICARE

## 2021-07-28 VITALS
RESPIRATION RATE: 20 BRPM | BODY MASS INDEX: 21.17 KG/M2 | OXYGEN SATURATION: 91 % | HEIGHT: 59 IN | DIASTOLIC BLOOD PRESSURE: 60 MMHG | WEIGHT: 105 LBS | HEART RATE: 70 BPM | SYSTOLIC BLOOD PRESSURE: 130 MMHG

## 2021-07-28 DIAGNOSIS — I47.29 NSVT (NONSUSTAINED VENTRICULAR TACHYCARDIA): ICD-10-CM

## 2021-07-28 DIAGNOSIS — R00.1 BRADYCARDIA: ICD-10-CM

## 2021-07-28 DIAGNOSIS — Z95.0 CARDIAC PACEMAKER IN SITU: Primary | ICD-10-CM

## 2021-07-28 DIAGNOSIS — Z95.0 CARDIAC PACEMAKER IN SITU: ICD-10-CM

## 2021-07-28 DIAGNOSIS — I49.5 SSS (SICK SINUS SYNDROME) (HCC): ICD-10-CM

## 2021-07-28 DIAGNOSIS — I49.5 SSS (SICK SINUS SYNDROME) (HCC): Primary | ICD-10-CM

## 2021-07-28 DIAGNOSIS — I10 ESSENTIAL HYPERTENSION: ICD-10-CM

## 2021-07-28 PROCEDURE — 1101F PT FALLS ASSESS-DOCD LE1/YR: CPT | Performed by: NURSE PRACTITIONER

## 2021-07-28 PROCEDURE — G8432 DEP SCR NOT DOC, RNG: HCPCS | Performed by: NURSE PRACTITIONER

## 2021-07-28 PROCEDURE — G0463 HOSPITAL OUTPT CLINIC VISIT: HCPCS | Performed by: NURSE PRACTITIONER

## 2021-07-28 PROCEDURE — G8420 CALC BMI NORM PARAMETERS: HCPCS | Performed by: NURSE PRACTITIONER

## 2021-07-28 PROCEDURE — 99214 OFFICE O/P EST MOD 30 MIN: CPT | Performed by: NURSE PRACTITIONER

## 2021-07-28 PROCEDURE — 93280 PM DEVICE PROGR EVAL DUAL: CPT | Performed by: INTERNAL MEDICINE

## 2021-07-28 PROCEDURE — 1090F PRES/ABSN URINE INCON ASSESS: CPT | Performed by: NURSE PRACTITIONER

## 2021-07-28 PROCEDURE — G8536 NO DOC ELDER MAL SCRN: HCPCS | Performed by: NURSE PRACTITIONER

## 2021-07-28 PROCEDURE — G8427 DOCREV CUR MEDS BY ELIG CLIN: HCPCS | Performed by: NURSE PRACTITIONER

## 2021-07-28 PROCEDURE — 93010 ELECTROCARDIOGRAM REPORT: CPT | Performed by: NURSE PRACTITIONER

## 2021-07-28 PROCEDURE — 93005 ELECTROCARDIOGRAM TRACING: CPT | Performed by: NURSE PRACTITIONER

## 2021-07-28 RX ORDER — SIMVASTATIN 10 MG/1
10 TABLET, FILM COATED ORAL
COMMUNITY
Start: 2021-07-17

## 2021-07-28 RX ORDER — LEVOTHYROXINE SODIUM 50 UG/1
50 TABLET ORAL
COMMUNITY
Start: 2021-07-12

## 2021-07-28 RX ORDER — SERTRALINE HYDROCHLORIDE 50 MG/1
50 TABLET, FILM COATED ORAL DAILY
COMMUNITY
Start: 2021-07-25

## 2021-07-28 NOTE — PROGRESS NOTES
Chief Complaint   Patient presents with    Follow-up    Irregular Heart Beat    Hypertension     1. Have you been to the ER, urgent care clinic since your last visit? Yes  Hospitalized since your last visit? Yes 1/21    2. Have you seen or consulted any other health care providers outside of the [address] [place] [address] since your last visit? Yes Eye Exam  Include any pap smears or colon screening. No   Resides @ [address] [place] [address] complete care there with Medical Physician she had thyroid recently. She doesn't use inhalers only takes breathing treatments when needed four times daily.

## 2021-10-29 ENCOUNTER — HOSPITAL ENCOUNTER (INPATIENT)
Age: 86
LOS: 4 days | Discharge: NURSING, REHAB OR LONG-TERM CARE | DRG: 481 | End: 2021-11-02
Attending: EMERGENCY MEDICINE | Admitting: INTERNAL MEDICINE
Payer: MEDICARE

## 2021-10-29 ENCOUNTER — APPOINTMENT (OUTPATIENT)
Dept: GENERAL RADIOLOGY | Age: 86
DRG: 481 | End: 2021-10-29
Attending: EMERGENCY MEDICINE
Payer: MEDICARE

## 2021-10-29 ENCOUNTER — APPOINTMENT (OUTPATIENT)
Dept: CT IMAGING | Age: 86
DRG: 481 | End: 2021-10-29
Attending: EMERGENCY MEDICINE
Payer: MEDICARE

## 2021-10-29 DIAGNOSIS — S72.142A CLOSED INTERTROCHANTERIC FRACTURE OF HIP, LEFT, INITIAL ENCOUNTER (HCC): Primary | ICD-10-CM

## 2021-10-29 PROBLEM — S72.009A HIP FRACTURE REQUIRING OPERATIVE REPAIR (HCC): Status: ACTIVE | Noted: 2021-10-29

## 2021-10-29 LAB
ANION GAP SERPL CALC-SCNC: 6 MMOL/L (ref 5–15)
BUN SERPL-MCNC: 41 MG/DL (ref 6–20)
BUN/CREAT SERPL: 28 (ref 12–20)
CALCIUM SERPL-MCNC: 10 MG/DL (ref 8.5–10.1)
CHLORIDE SERPL-SCNC: 108 MMOL/L (ref 97–108)
CK SERPL-CCNC: 47 U/L (ref 26–192)
CO2 SERPL-SCNC: 24 MMOL/L (ref 21–32)
COMMENT, HOLDF: NORMAL
CREAT SERPL-MCNC: 1.49 MG/DL (ref 0.55–1.02)
ERYTHROCYTE [DISTWIDTH] IN BLOOD BY AUTOMATED COUNT: 15.2 % (ref 11.5–14.5)
GLUCOSE SERPL-MCNC: 121 MG/DL (ref 65–100)
HCT VFR BLD AUTO: 37.7 % (ref 35–47)
HGB BLD-MCNC: 12.6 G/DL (ref 11.5–16)
MCH RBC QN AUTO: 29.8 PG (ref 26–34)
MCHC RBC AUTO-ENTMCNC: 33.4 G/DL (ref 30–36.5)
MCV RBC AUTO: 89.1 FL (ref 80–99)
NRBC # BLD: 0 K/UL (ref 0–0.01)
NRBC BLD-RTO: 0 PER 100 WBC
PLATELET # BLD AUTO: 273 K/UL (ref 150–400)
PMV BLD AUTO: 10.9 FL (ref 8.9–12.9)
POTASSIUM SERPL-SCNC: 3.6 MMOL/L (ref 3.5–5.1)
RBC # BLD AUTO: 4.23 M/UL (ref 3.8–5.2)
SAMPLES BEING HELD,HOLD: NORMAL
SODIUM SERPL-SCNC: 138 MMOL/L (ref 136–145)
WBC # BLD AUTO: 13.8 K/UL (ref 3.6–11)

## 2021-10-29 PROCEDURE — 85027 COMPLETE CBC AUTOMATED: CPT

## 2021-10-29 PROCEDURE — 65270000029 HC RM PRIVATE

## 2021-10-29 PROCEDURE — 99284 EMERGENCY DEPT VISIT MOD MDM: CPT

## 2021-10-29 PROCEDURE — 80048 BASIC METABOLIC PNL TOTAL CA: CPT

## 2021-10-29 PROCEDURE — 71045 X-RAY EXAM CHEST 1 VIEW: CPT

## 2021-10-29 PROCEDURE — 72125 CT NECK SPINE W/O DYE: CPT

## 2021-10-29 PROCEDURE — 70450 CT HEAD/BRAIN W/O DYE: CPT

## 2021-10-29 PROCEDURE — 72170 X-RAY EXAM OF PELVIS: CPT

## 2021-10-29 PROCEDURE — 93005 ELECTROCARDIOGRAM TRACING: CPT

## 2021-10-29 PROCEDURE — 82550 ASSAY OF CK (CPK): CPT

## 2021-10-29 PROCEDURE — 36415 COLL VENOUS BLD VENIPUNCTURE: CPT

## 2021-10-29 NOTE — ED TRIAGE NOTES
Fall around 1400 today. Left femur fracture confirmed w/ portable x-ray. Notable swelling in left upper leg present.

## 2021-10-30 ENCOUNTER — ANESTHESIA EVENT (OUTPATIENT)
Dept: SURGERY | Age: 86
DRG: 481 | End: 2021-10-30
Payer: MEDICARE

## 2021-10-30 ENCOUNTER — APPOINTMENT (OUTPATIENT)
Dept: GENERAL RADIOLOGY | Age: 86
DRG: 481 | End: 2021-10-30
Attending: ORTHOPAEDIC SURGERY
Payer: MEDICARE

## 2021-10-30 ENCOUNTER — ANESTHESIA (OUTPATIENT)
Dept: SURGERY | Age: 86
DRG: 481 | End: 2021-10-30
Payer: MEDICARE

## 2021-10-30 ENCOUNTER — APPOINTMENT (OUTPATIENT)
Dept: GENERAL RADIOLOGY | Age: 86
DRG: 481 | End: 2021-10-30
Attending: INTERNAL MEDICINE
Payer: MEDICARE

## 2021-10-30 LAB
ALBUMIN SERPL-MCNC: 3.3 G/DL (ref 3.5–5)
ALBUMIN/GLOB SERPL: 0.9 {RATIO} (ref 1.1–2.2)
ALP SERPL-CCNC: 60 U/L (ref 45–117)
ALT SERPL-CCNC: 21 U/L (ref 12–78)
ANION GAP SERPL CALC-SCNC: 8 MMOL/L (ref 5–15)
AST SERPL-CCNC: 16 U/L (ref 15–37)
BASOPHILS # BLD: 0.1 K/UL (ref 0–0.1)
BASOPHILS NFR BLD: 1 % (ref 0–1)
BILIRUB SERPL-MCNC: 0.5 MG/DL (ref 0.2–1)
BUN SERPL-MCNC: 40 MG/DL (ref 6–20)
BUN/CREAT SERPL: 25 (ref 12–20)
CALCIUM SERPL-MCNC: 9.8 MG/DL (ref 8.5–10.1)
CHLORIDE SERPL-SCNC: 107 MMOL/L (ref 97–108)
CO2 SERPL-SCNC: 25 MMOL/L (ref 21–32)
COVID-19 RAPID TEST, COVR: NOT DETECTED
CREAT SERPL-MCNC: 1.58 MG/DL (ref 0.55–1.02)
DIFFERENTIAL METHOD BLD: ABNORMAL
EOSINOPHIL # BLD: 0.1 K/UL (ref 0–0.4)
EOSINOPHIL NFR BLD: 1 % (ref 0–7)
ERYTHROCYTE [DISTWIDTH] IN BLOOD BY AUTOMATED COUNT: 14.9 % (ref 11.5–14.5)
GLOBULIN SER CALC-MCNC: 3.7 G/DL (ref 2–4)
GLUCOSE SERPL-MCNC: 127 MG/DL (ref 65–100)
HCT VFR BLD AUTO: 37.1 % (ref 35–47)
HGB BLD-MCNC: 12 G/DL (ref 11.5–16)
IMM GRANULOCYTES # BLD AUTO: 0.1 K/UL (ref 0–0.04)
IMM GRANULOCYTES NFR BLD AUTO: 1 % (ref 0–0.5)
LYMPHOCYTES # BLD: 0.9 K/UL (ref 0.8–3.5)
LYMPHOCYTES NFR BLD: 6 % (ref 12–49)
MAGNESIUM SERPL-MCNC: 2.1 MG/DL (ref 1.6–2.4)
MCH RBC QN AUTO: 29.5 PG (ref 26–34)
MCHC RBC AUTO-ENTMCNC: 32.3 G/DL (ref 30–36.5)
MCV RBC AUTO: 91.2 FL (ref 80–99)
MONOCYTES # BLD: 1.5 K/UL (ref 0–1)
MONOCYTES NFR BLD: 10 % (ref 5–13)
NEUTS SEG # BLD: 12 K/UL (ref 1.8–8)
NEUTS SEG NFR BLD: 81 % (ref 32–75)
NRBC # BLD: 0 K/UL (ref 0–0.01)
NRBC BLD-RTO: 0 PER 100 WBC
PHOSPHATE SERPL-MCNC: 3.9 MG/DL (ref 2.6–4.7)
PLATELET # BLD AUTO: 275 K/UL (ref 150–400)
PMV BLD AUTO: 11.3 FL (ref 8.9–12.9)
POTASSIUM SERPL-SCNC: 3.7 MMOL/L (ref 3.5–5.1)
PROT SERPL-MCNC: 7 G/DL (ref 6.4–8.2)
RBC # BLD AUTO: 4.07 M/UL (ref 3.8–5.2)
SODIUM SERPL-SCNC: 140 MMOL/L (ref 136–145)
SOURCE, COVRS: NORMAL
TSH SERPL DL<=0.05 MIU/L-ACNC: 8.57 UIU/ML (ref 0.36–3.74)
WBC # BLD AUTO: 14.7 K/UL (ref 3.6–11)

## 2021-10-30 PROCEDURE — 76060000033 HC ANESTHESIA 1 TO 1.5 HR: Performed by: ORTHOPAEDIC SURGERY

## 2021-10-30 PROCEDURE — 85025 COMPLETE CBC W/AUTO DIFF WBC: CPT

## 2021-10-30 PROCEDURE — 76210000016 HC OR PH I REC 1 TO 1.5 HR: Performed by: ORTHOPAEDIC SURGERY

## 2021-10-30 PROCEDURE — 77030013079 HC BLNKT BAIR HGGR 3M -A: Performed by: NURSE ANESTHETIST, CERTIFIED REGISTERED

## 2021-10-30 PROCEDURE — 74011250636 HC RX REV CODE- 250/636: Performed by: INTERNAL MEDICINE

## 2021-10-30 PROCEDURE — 36415 COLL VENOUS BLD VENIPUNCTURE: CPT

## 2021-10-30 PROCEDURE — 77030008846 HC WRE K STRY -C: Performed by: ORTHOPAEDIC SURGERY

## 2021-10-30 PROCEDURE — 74011000250 HC RX REV CODE- 250: Performed by: NURSE ANESTHETIST, CERTIFIED REGISTERED

## 2021-10-30 PROCEDURE — 77030029684 HC NEB SM VOL KT MONA -A

## 2021-10-30 PROCEDURE — 2709999900 HC NON-CHARGEABLE SUPPLY: Performed by: ORTHOPAEDIC SURGERY

## 2021-10-30 PROCEDURE — 0QH706Z INSERTION OF INTRAMEDULLARY INTERNAL FIXATION DEVICE INTO LEFT UPPER FEMUR, OPEN APPROACH: ICD-10-PCS | Performed by: ORTHOPAEDIC SURGERY

## 2021-10-30 PROCEDURE — 80053 COMPREHEN METABOLIC PANEL: CPT

## 2021-10-30 PROCEDURE — C1713 ANCHOR/SCREW BN/BN,TIS/BN: HCPCS | Performed by: ORTHOPAEDIC SURGERY

## 2021-10-30 PROCEDURE — 84100 ASSAY OF PHOSPHORUS: CPT

## 2021-10-30 PROCEDURE — 77030016451 HC BIT DRL AO3 STRY -C: Performed by: ORTHOPAEDIC SURGERY

## 2021-10-30 PROCEDURE — 84443 ASSAY THYROID STIM HORMONE: CPT

## 2021-10-30 PROCEDURE — 74011250637 HC RX REV CODE- 250/637: Performed by: INTERNAL MEDICINE

## 2021-10-30 PROCEDURE — 71045 X-RAY EXAM CHEST 1 VIEW: CPT

## 2021-10-30 PROCEDURE — 65270000029 HC RM PRIVATE

## 2021-10-30 PROCEDURE — 94762 N-INVAS EAR/PLS OXIMTRY CONT: CPT

## 2021-10-30 PROCEDURE — 77010033678 HC OXYGEN DAILY

## 2021-10-30 PROCEDURE — 76010000149 HC OR TIME 1 TO 1.5 HR: Performed by: ORTHOPAEDIC SURGERY

## 2021-10-30 PROCEDURE — 74011000250 HC RX REV CODE- 250: Performed by: INTERNAL MEDICINE

## 2021-10-30 PROCEDURE — 77030012935 HC DRSG AQUACEL BMS -B: Performed by: ORTHOPAEDIC SURGERY

## 2021-10-30 PROCEDURE — 77030026438 HC STYL ET INTUB CARD -A: Performed by: NURSE ANESTHETIST, CERTIFIED REGISTERED

## 2021-10-30 PROCEDURE — 74011000250 HC RX REV CODE- 250: Performed by: ANESTHESIOLOGY

## 2021-10-30 PROCEDURE — 74011250636 HC RX REV CODE- 250/636: Performed by: NURSE ANESTHETIST, CERTIFIED REGISTERED

## 2021-10-30 PROCEDURE — 74011000250 HC RX REV CODE- 250: Performed by: ORTHOPAEDIC SURGERY

## 2021-10-30 PROCEDURE — 77030042556 HC PNCL CAUT -B: Performed by: ORTHOPAEDIC SURGERY

## 2021-10-30 PROCEDURE — 77030002933 HC SUT MCRYL J&J -A: Performed by: ORTHOPAEDIC SURGERY

## 2021-10-30 PROCEDURE — 77030010507 HC ADH SKN DERMBND J&J -B: Performed by: ORTHOPAEDIC SURGERY

## 2021-10-30 PROCEDURE — 77030027138 HC INCENT SPIROMETER -A

## 2021-10-30 PROCEDURE — 77030008684 HC TU ET CUF COVD -B: Performed by: NURSE ANESTHETIST, CERTIFIED REGISTERED

## 2021-10-30 PROCEDURE — 73501 X-RAY EXAM HIP UNI 1 VIEW: CPT

## 2021-10-30 PROCEDURE — 87635 SARS-COV-2 COVID-19 AMP PRB: CPT

## 2021-10-30 PROCEDURE — 77030014405 HC GD ROD RMR SYNT -C: Performed by: ORTHOPAEDIC SURGERY

## 2021-10-30 PROCEDURE — 83735 ASSAY OF MAGNESIUM: CPT

## 2021-10-30 PROCEDURE — 77030003029 HC SUT VCRL J&J -B: Performed by: ORTHOPAEDIC SURGERY

## 2021-10-30 PROCEDURE — 77030040361 HC SLV COMPR DVT MDII -B

## 2021-10-30 PROCEDURE — 77030020788: Performed by: ORTHOPAEDIC SURGERY

## 2021-10-30 PROCEDURE — 74011250637 HC RX REV CODE- 250/637: Performed by: ORTHOPAEDIC SURGERY

## 2021-10-30 PROCEDURE — 74011250636 HC RX REV CODE- 250/636: Performed by: ORTHOPAEDIC SURGERY

## 2021-10-30 PROCEDURE — 94640 AIRWAY INHALATION TREATMENT: CPT

## 2021-10-30 PROCEDURE — 74011250637 HC RX REV CODE- 250/637: Performed by: ANESTHESIOLOGY

## 2021-10-30 PROCEDURE — 77030031139 HC SUT VCRL2 J&J -A: Performed by: ORTHOPAEDIC SURGERY

## 2021-10-30 DEVICE — TROCHANTERIC NAIL KIT, TI
Type: IMPLANTABLE DEVICE | Site: LEG | Status: FUNCTIONAL
Brand: GAMMA

## 2021-10-30 DEVICE — LAG SCREW, TI
Type: IMPLANTABLE DEVICE | Site: LEG | Status: FUNCTIONAL
Brand: GAMMA

## 2021-10-30 DEVICE — LOCKING SCREW, FULLY THREADED: Type: IMPLANTABLE DEVICE | Site: LEG | Status: FUNCTIONAL

## 2021-10-30 RX ORDER — SODIUM CHLORIDE, SODIUM LACTATE, POTASSIUM CHLORIDE, CALCIUM CHLORIDE 600; 310; 30; 20 MG/100ML; MG/100ML; MG/100ML; MG/100ML
INJECTION, SOLUTION INTRAVENOUS
Status: DISCONTINUED | OUTPATIENT
Start: 2021-10-30 | End: 2021-10-30 | Stop reason: HOSPADM

## 2021-10-30 RX ORDER — ATORVASTATIN CALCIUM 10 MG/1
10 TABLET, FILM COATED ORAL
Status: DISCONTINUED | OUTPATIENT
Start: 2021-10-30 | End: 2021-11-02 | Stop reason: HOSPADM

## 2021-10-30 RX ORDER — ONDANSETRON 2 MG/ML
4 INJECTION INTRAMUSCULAR; INTRAVENOUS
Status: DISCONTINUED | OUTPATIENT
Start: 2021-10-30 | End: 2021-11-02 | Stop reason: HOSPADM

## 2021-10-30 RX ORDER — FENTANYL CITRATE 50 UG/ML
50 INJECTION, SOLUTION INTRAMUSCULAR; INTRAVENOUS AS NEEDED
Status: DISCONTINUED | OUTPATIENT
Start: 2021-10-30 | End: 2021-10-31

## 2021-10-30 RX ORDER — LIDOCAINE HYDROCHLORIDE 10 MG/ML
0.1 INJECTION, SOLUTION EPIDURAL; INFILTRATION; INTRACAUDAL; PERINEURAL AS NEEDED
Status: DISCONTINUED | OUTPATIENT
Start: 2021-10-30 | End: 2021-10-31

## 2021-10-30 RX ORDER — POLYETHYLENE GLYCOL 3350 17 G/17G
17 POWDER, FOR SOLUTION ORAL DAILY PRN
Status: DISCONTINUED | OUTPATIENT
Start: 2021-10-30 | End: 2021-11-02 | Stop reason: HOSPADM

## 2021-10-30 RX ORDER — IPRATROPIUM BROMIDE AND ALBUTEROL SULFATE 2.5; .5 MG/3ML; MG/3ML
3 SOLUTION RESPIRATORY (INHALATION)
Status: DISCONTINUED | OUTPATIENT
Start: 2021-10-30 | End: 2021-11-02 | Stop reason: HOSPADM

## 2021-10-30 RX ORDER — ROCURONIUM BROMIDE 10 MG/ML
INJECTION, SOLUTION INTRAVENOUS AS NEEDED
Status: DISCONTINUED | OUTPATIENT
Start: 2021-10-30 | End: 2021-10-30 | Stop reason: HOSPADM

## 2021-10-30 RX ORDER — SODIUM CHLORIDE 0.9 % (FLUSH) 0.9 %
5-40 SYRINGE (ML) INJECTION EVERY 8 HOURS
Status: DISCONTINUED | OUTPATIENT
Start: 2021-10-30 | End: 2021-11-02 | Stop reason: HOSPADM

## 2021-10-30 RX ORDER — SODIUM CHLORIDE 0.9 % (FLUSH) 0.9 %
5-40 SYRINGE (ML) INJECTION EVERY 8 HOURS
Status: DISCONTINUED | OUTPATIENT
Start: 2021-10-30 | End: 2021-10-30 | Stop reason: HOSPADM

## 2021-10-30 RX ORDER — SERTRALINE HYDROCHLORIDE 50 MG/1
50 TABLET, FILM COATED ORAL DAILY
Status: DISCONTINUED | OUTPATIENT
Start: 2021-10-30 | End: 2021-11-02 | Stop reason: HOSPADM

## 2021-10-30 RX ORDER — SODIUM CHLORIDE, SODIUM LACTATE, POTASSIUM CHLORIDE, CALCIUM CHLORIDE 600; 310; 30; 20 MG/100ML; MG/100ML; MG/100ML; MG/100ML
50 INJECTION, SOLUTION INTRAVENOUS CONTINUOUS
Status: DISPENSED | OUTPATIENT
Start: 2021-10-30 | End: 2021-10-31

## 2021-10-30 RX ORDER — FENTANYL CITRATE 50 UG/ML
25 INJECTION, SOLUTION INTRAMUSCULAR; INTRAVENOUS
Status: DISCONTINUED | OUTPATIENT
Start: 2021-10-30 | End: 2021-10-30 | Stop reason: HOSPADM

## 2021-10-30 RX ORDER — PROPOFOL 10 MG/ML
INJECTION, EMULSION INTRAVENOUS AS NEEDED
Status: DISCONTINUED | OUTPATIENT
Start: 2021-10-30 | End: 2021-10-30 | Stop reason: HOSPADM

## 2021-10-30 RX ORDER — LABETALOL HYDROCHLORIDE 5 MG/ML
INJECTION, SOLUTION INTRAVENOUS
Status: DISPENSED
Start: 2021-10-30 | End: 2021-10-30

## 2021-10-30 RX ORDER — AMLODIPINE BESYLATE 5 MG/1
5 TABLET ORAL DAILY
Status: DISCONTINUED | OUTPATIENT
Start: 2021-10-30 | End: 2021-11-02 | Stop reason: HOSPADM

## 2021-10-30 RX ORDER — HYDROXYZINE HYDROCHLORIDE 10 MG/1
10 TABLET, FILM COATED ORAL
Status: DISCONTINUED | OUTPATIENT
Start: 2021-10-30 | End: 2021-11-02 | Stop reason: HOSPADM

## 2021-10-30 RX ORDER — SODIUM CHLORIDE 0.9 % (FLUSH) 0.9 %
5-40 SYRINGE (ML) INJECTION AS NEEDED
Status: DISCONTINUED | OUTPATIENT
Start: 2021-10-30 | End: 2021-10-30 | Stop reason: HOSPADM

## 2021-10-30 RX ORDER — ONDANSETRON 4 MG/1
4 TABLET, ORALLY DISINTEGRATING ORAL
Status: DISCONTINUED | OUTPATIENT
Start: 2021-10-30 | End: 2021-11-02 | Stop reason: HOSPADM

## 2021-10-30 RX ORDER — FUROSEMIDE 10 MG/ML
40 INJECTION INTRAMUSCULAR; INTRAVENOUS ONCE
Status: COMPLETED | OUTPATIENT
Start: 2021-10-30 | End: 2021-10-30

## 2021-10-30 RX ORDER — PHENYLEPHRINE HCL IN 0.9% NACL 0.4MG/10ML
SYRINGE (ML) INTRAVENOUS AS NEEDED
Status: DISCONTINUED | OUTPATIENT
Start: 2021-10-30 | End: 2021-10-30 | Stop reason: HOSPADM

## 2021-10-30 RX ORDER — LEVOTHYROXINE SODIUM 50 UG/1
50 TABLET ORAL
Status: DISCONTINUED | OUTPATIENT
Start: 2021-10-30 | End: 2021-11-02 | Stop reason: HOSPADM

## 2021-10-30 RX ORDER — LIDOCAINE HYDROCHLORIDE 20 MG/ML
INJECTION, SOLUTION EPIDURAL; INFILTRATION; INTRACAUDAL; PERINEURAL AS NEEDED
Status: DISCONTINUED | OUTPATIENT
Start: 2021-10-30 | End: 2021-10-30 | Stop reason: HOSPADM

## 2021-10-30 RX ORDER — SODIUM CHLORIDE 0.9 % (FLUSH) 0.9 %
5-40 SYRINGE (ML) INJECTION AS NEEDED
Status: DISCONTINUED | OUTPATIENT
Start: 2021-10-30 | End: 2021-11-02 | Stop reason: HOSPADM

## 2021-10-30 RX ORDER — CEFAZOLIN SODIUM 1 G/3ML
INJECTION, POWDER, FOR SOLUTION INTRAMUSCULAR; INTRAVENOUS AS NEEDED
Status: DISCONTINUED | OUTPATIENT
Start: 2021-10-30 | End: 2021-10-30 | Stop reason: HOSPADM

## 2021-10-30 RX ORDER — ACETAMINOPHEN 650 MG/1
650 SUPPOSITORY RECTAL
Status: DISCONTINUED | OUTPATIENT
Start: 2021-10-30 | End: 2021-11-02 | Stop reason: HOSPADM

## 2021-10-30 RX ORDER — ACETAMINOPHEN 325 MG/1
650 TABLET ORAL ONCE
Status: COMPLETED | OUTPATIENT
Start: 2021-10-30 | End: 2021-10-30

## 2021-10-30 RX ORDER — DEXAMETHASONE SODIUM PHOSPHATE 4 MG/ML
INJECTION, SOLUTION INTRA-ARTICULAR; INTRALESIONAL; INTRAMUSCULAR; INTRAVENOUS; SOFT TISSUE AS NEEDED
Status: DISCONTINUED | OUTPATIENT
Start: 2021-10-30 | End: 2021-10-30 | Stop reason: HOSPADM

## 2021-10-30 RX ORDER — ACETAMINOPHEN 325 MG/1
650 TABLET ORAL
Status: DISCONTINUED | OUTPATIENT
Start: 2021-10-30 | End: 2021-11-02 | Stop reason: HOSPADM

## 2021-10-30 RX ORDER — NALOXONE HYDROCHLORIDE 0.4 MG/ML
0.4 INJECTION, SOLUTION INTRAMUSCULAR; INTRAVENOUS; SUBCUTANEOUS AS NEEDED
Status: DISCONTINUED | OUTPATIENT
Start: 2021-10-30 | End: 2021-11-02 | Stop reason: HOSPADM

## 2021-10-30 RX ORDER — HYDROMORPHONE HYDROCHLORIDE 1 MG/ML
0.5 INJECTION, SOLUTION INTRAMUSCULAR; INTRAVENOUS; SUBCUTANEOUS
Status: ACTIVE | OUTPATIENT
Start: 2021-10-30 | End: 2021-10-31

## 2021-10-30 RX ORDER — HYDROMORPHONE HYDROCHLORIDE 1 MG/ML
0.2 INJECTION, SOLUTION INTRAMUSCULAR; INTRAVENOUS; SUBCUTANEOUS
Status: DISCONTINUED | OUTPATIENT
Start: 2021-10-30 | End: 2021-10-30 | Stop reason: HOSPADM

## 2021-10-30 RX ORDER — SODIUM CHLORIDE, SODIUM LACTATE, POTASSIUM CHLORIDE, CALCIUM CHLORIDE 600; 310; 30; 20 MG/100ML; MG/100ML; MG/100ML; MG/100ML
75 INJECTION, SOLUTION INTRAVENOUS CONTINUOUS
Status: DISCONTINUED | OUTPATIENT
Start: 2021-10-30 | End: 2021-11-01

## 2021-10-30 RX ORDER — LATANOPROST 50 UG/ML
1 SOLUTION/ DROPS OPHTHALMIC
Status: DISCONTINUED | OUTPATIENT
Start: 2021-10-30 | End: 2021-11-02 | Stop reason: HOSPADM

## 2021-10-30 RX ORDER — POLYETHYLENE GLYCOL 3350 17 G/17G
17 POWDER, FOR SOLUTION ORAL DAILY
Status: DISCONTINUED | OUTPATIENT
Start: 2021-10-31 | End: 2021-11-02 | Stop reason: HOSPADM

## 2021-10-30 RX ORDER — TRAMADOL HYDROCHLORIDE 50 MG/1
50 TABLET ORAL
Status: DISCONTINUED | OUTPATIENT
Start: 2021-10-30 | End: 2021-11-02 | Stop reason: HOSPADM

## 2021-10-30 RX ORDER — ONDANSETRON 2 MG/ML
INJECTION INTRAMUSCULAR; INTRAVENOUS AS NEEDED
Status: DISCONTINUED | OUTPATIENT
Start: 2021-10-30 | End: 2021-10-30 | Stop reason: HOSPADM

## 2021-10-30 RX ORDER — ONDANSETRON 2 MG/ML
4 INJECTION INTRAMUSCULAR; INTRAVENOUS AS NEEDED
Status: DISCONTINUED | OUTPATIENT
Start: 2021-10-30 | End: 2021-10-30 | Stop reason: HOSPADM

## 2021-10-30 RX ORDER — ONDANSETRON 2 MG/ML
4 INJECTION INTRAMUSCULAR; INTRAVENOUS
Status: ACTIVE | OUTPATIENT
Start: 2021-10-30 | End: 2021-10-31

## 2021-10-30 RX ORDER — FERROUS SULFATE, DRIED 160(50) MG
1 TABLET, EXTENDED RELEASE ORAL
Status: DISCONTINUED | OUTPATIENT
Start: 2021-10-31 | End: 2021-11-02 | Stop reason: HOSPADM

## 2021-10-30 RX ORDER — MORPHINE SULFATE 2 MG/ML
2 INJECTION, SOLUTION INTRAMUSCULAR; INTRAVENOUS
Status: DISCONTINUED | OUTPATIENT
Start: 2021-10-30 | End: 2021-10-31

## 2021-10-30 RX ORDER — LABETALOL HCL 20 MG/4 ML
10 SYRINGE (ML) INTRAVENOUS
Status: DISCONTINUED | OUTPATIENT
Start: 2021-10-30 | End: 2021-10-31

## 2021-10-30 RX ORDER — SODIUM CHLORIDE 9 MG/ML
125 INJECTION, SOLUTION INTRAVENOUS CONTINUOUS
Status: DISCONTINUED | OUTPATIENT
Start: 2021-10-30 | End: 2021-10-30

## 2021-10-30 RX ORDER — BUPIVACAINE HYDROCHLORIDE 2.5 MG/ML
INJECTION, SOLUTION EPIDURAL; INFILTRATION; INTRACAUDAL AS NEEDED
Status: DISCONTINUED | OUTPATIENT
Start: 2021-10-30 | End: 2021-10-30 | Stop reason: HOSPADM

## 2021-10-30 RX ORDER — ENOXAPARIN SODIUM 100 MG/ML
30 INJECTION SUBCUTANEOUS EVERY 24 HOURS
Status: DISCONTINUED | OUTPATIENT
Start: 2021-10-31 | End: 2021-11-02 | Stop reason: HOSPADM

## 2021-10-30 RX ORDER — HYDRALAZINE HYDROCHLORIDE 20 MG/ML
10 INJECTION INTRAMUSCULAR; INTRAVENOUS
Status: ACTIVE | OUTPATIENT
Start: 2021-10-30 | End: 2021-10-31

## 2021-10-30 RX ORDER — FENTANYL CITRATE 50 UG/ML
INJECTION, SOLUTION INTRAMUSCULAR; INTRAVENOUS AS NEEDED
Status: DISCONTINUED | OUTPATIENT
Start: 2021-10-30 | End: 2021-10-30 | Stop reason: HOSPADM

## 2021-10-30 RX ORDER — SUCCINYLCHOLINE CHLORIDE 20 MG/ML
INJECTION INTRAMUSCULAR; INTRAVENOUS AS NEEDED
Status: DISCONTINUED | OUTPATIENT
Start: 2021-10-30 | End: 2021-10-30 | Stop reason: HOSPADM

## 2021-10-30 RX ORDER — FACIAL-BODY WIPES
10 EACH TOPICAL DAILY PRN
Status: DISCONTINUED | OUTPATIENT
Start: 2021-11-01 | End: 2021-11-02 | Stop reason: HOSPADM

## 2021-10-30 RX ORDER — AMOXICILLIN 250 MG
1 CAPSULE ORAL 2 TIMES DAILY
Status: DISCONTINUED | OUTPATIENT
Start: 2021-10-30 | End: 2021-11-02 | Stop reason: HOSPADM

## 2021-10-30 RX ORDER — MIDAZOLAM HYDROCHLORIDE 1 MG/ML
1 INJECTION, SOLUTION INTRAMUSCULAR; INTRAVENOUS AS NEEDED
Status: DISCONTINUED | OUTPATIENT
Start: 2021-10-30 | End: 2021-10-31

## 2021-10-30 RX ADMIN — ONDANSETRON HYDROCHLORIDE 4 MG: 2 INJECTION, SOLUTION INTRAMUSCULAR; INTRAVENOUS at 10:03

## 2021-10-30 RX ADMIN — LIDOCAINE HYDROCHLORIDE 40 MG: 20 INJECTION, SOLUTION EPIDURAL; INFILTRATION; INTRACAUDAL; PERINEURAL at 09:11

## 2021-10-30 RX ADMIN — FENTANYL CITRATE 25 MCG: 50 INJECTION, SOLUTION INTRAMUSCULAR; INTRAVENOUS at 09:11

## 2021-10-30 RX ADMIN — DOCUSATE SODIUM 50 MG AND SENNOSIDES 8.6 MG 1 TABLET: 8.6; 5 TABLET, FILM COATED ORAL at 17:03

## 2021-10-30 RX ADMIN — ACETAMINOPHEN 650 MG: 325 TABLET ORAL at 12:56

## 2021-10-30 RX ADMIN — FENTANYL CITRATE 25 MCG: 50 INJECTION, SOLUTION INTRAMUSCULAR; INTRAVENOUS at 10:25

## 2021-10-30 RX ADMIN — FENTANYL CITRATE 25 MCG: 50 INJECTION, SOLUTION INTRAMUSCULAR; INTRAVENOUS at 10:07

## 2021-10-30 RX ADMIN — CEFAZOLIN SODIUM 2 G: 1 INJECTION, POWDER, FOR SOLUTION INTRAMUSCULAR; INTRAVENOUS at 17:02

## 2021-10-30 RX ADMIN — LABETALOL HYDROCHLORIDE 10 MG: 5 INJECTION INTRAVENOUS at 11:04

## 2021-10-30 RX ADMIN — AMLODIPINE BESYLATE 5 MG: 5 TABLET ORAL at 12:57

## 2021-10-30 RX ADMIN — IPRATROPIUM BROMIDE AND ALBUTEROL SULFATE 3 ML: .5; 3 SOLUTION RESPIRATORY (INHALATION) at 23:15

## 2021-10-30 RX ADMIN — Medication 120 MCG: at 09:40

## 2021-10-30 RX ADMIN — Medication 10 ML: at 13:03

## 2021-10-30 RX ADMIN — SODIUM CHLORIDE, POTASSIUM CHLORIDE, SODIUM LACTATE AND CALCIUM CHLORIDE 75 ML/HR: 600; 310; 30; 20 INJECTION, SOLUTION INTRAVENOUS at 05:53

## 2021-10-30 RX ADMIN — SUGAMMADEX 150 MG: 100 INJECTION, SOLUTION INTRAVENOUS at 10:05

## 2021-10-30 RX ADMIN — SODIUM CHLORIDE 125 ML/HR: 9 INJECTION, SOLUTION INTRAVENOUS at 11:34

## 2021-10-30 RX ADMIN — CEFAZOLIN 1.5 G: 330 INJECTION, POWDER, FOR SOLUTION INTRAMUSCULAR; INTRAVENOUS at 09:28

## 2021-10-30 RX ADMIN — LEVOTHYROXINE SODIUM 50 MCG: 0.05 TABLET ORAL at 12:57

## 2021-10-30 RX ADMIN — ROCURONIUM BROMIDE 25 MG: 10 SOLUTION INTRAVENOUS at 09:25

## 2021-10-30 RX ADMIN — PROPOFOL 20 MG: 10 INJECTION, EMULSION INTRAVENOUS at 10:30

## 2021-10-30 RX ADMIN — SODIUM CHLORIDE, POTASSIUM CHLORIDE, SODIUM LACTATE AND CALCIUM CHLORIDE: 600; 310; 30; 20 INJECTION, SOLUTION INTRAVENOUS at 09:06

## 2021-10-30 RX ADMIN — FUROSEMIDE 40 MG: 10 INJECTION, SOLUTION INTRAMUSCULAR; INTRAVENOUS at 21:24

## 2021-10-30 RX ADMIN — ATORVASTATIN CALCIUM 10 MG: 10 TABLET, FILM COATED ORAL at 21:24

## 2021-10-30 RX ADMIN — PHENYLEPHRINE HYDROCHLORIDE 30 MCG/MIN: 10 INJECTION INTRAVENOUS at 09:11

## 2021-10-30 RX ADMIN — Medication 120 MCG: at 09:11

## 2021-10-30 RX ADMIN — SERTRALINE 50 MG: 50 TABLET, FILM COATED ORAL at 12:57

## 2021-10-30 RX ADMIN — SUCCINYLCHOLINE CHLORIDE 100 MG: 20 INJECTION, SOLUTION INTRAMUSCULAR; INTRAVENOUS at 09:11

## 2021-10-30 RX ADMIN — LATANOPROST 1 DROP: 50 SOLUTION OPHTHALMIC at 21:36

## 2021-10-30 RX ADMIN — DEXAMETHASONE SODIUM PHOSPHATE 4 MG: 4 INJECTION, SOLUTION INTRAMUSCULAR; INTRAVENOUS at 09:22

## 2021-10-30 RX ADMIN — FENTANYL CITRATE 25 MCG: 50 INJECTION, SOLUTION INTRAMUSCULAR; INTRAVENOUS at 09:31

## 2021-10-30 RX ADMIN — ROCURONIUM BROMIDE 5 MG: 10 SOLUTION INTRAVENOUS at 09:11

## 2021-10-30 RX ADMIN — PROPOFOL 80 MG: 10 INJECTION, EMULSION INTRAVENOUS at 09:11

## 2021-10-30 NOTE — PROGRESS NOTES
[address] [facility] Adult  Hospitalist Group                                                                                          Hospitalist Progress Note  Pedro Lopez NP  Answering service: [phone] or [phone] from in house phone        Date of Service:  10/30/2021  NAME:  Liliane Moore  :  1928  MRN:  [id number]      Admission Summary: This 59-year-old woman with past medical history significant for coronary artery disease; status post stent placement, COPD, hypertension, hypothyroidism, sick sinus syndrome, status post pacemaker insertion, dyslipidemia, memory impairment was in her usual state of health until the day of her presentation at the emergency room when it was reported that the patient fell at the nursing home where the patient resides. The circumstances surrounding her fall was not known. The fall was not witnessed. The patient was found on the floor. It was reported that the patient was unable to bear weight on her left leg. X-ray was done at the facility. The x-ray shows fractured hip. The patient was sent to the emergency room for further evaluation. When the patient arrived at the emergency room, CT scan of the head was obtained and that was negative. The CT scan of the cervical spine also did not show any evidence of fracture. The pelvic x-ray confirmed the left hip fracture. The emergency room physician consulted the orthopedic service on call. Admission to the hospitalist service was advised. The patient was referred to the hospitalist service for that purpose. The patient is not able to provide any meaningful history because of underlying memory impairment. The patient is not able to state whether she was having pain or not, the character, or the quality of the pain. There was no history of fever, rigors, or chills reported. The patient was last admitted to the hospital from 2021 to 2021.   The patient was admitted and treated for rectal prolapse. She underwent perineal proctosigmoidectomy by the surgical service. Interval history / Subjective:    Seen and examined patient laying in bed with eyes open. States that she is feeling ok. Having right hip pain. Patient being transported to [facility] [place] :     Closed intertrochanteric fracture of left hip   - due to ground level fall   - s/p Cephalomedullary nailing left hip (10/30)  - Ortho following   - Pain control and neurovascular checks  - PT/OT consulted   - CM for discharge planning    GLF   - Head CT negative   - PT/OT consulted     Leukocytosis   - WBC 14.7, likely reactive   - Monitor labs   - UA pending     MARILYN on CKD   - Creatinine 1.58, baseline ~1.1-1.2  - Likely prerenal   - Continue with IV fluids   - Monitor labs in am and consider nephrology consult     Hypertension   - Stable   - Continue amlodipine   - Monitor vital signs per unit routine     Hypothyroidism   - Continue levothyroxine     Dyslipidemia   - Continue atorvastatin     Dementia   - Supportive treatment     Hx of COPD- Duonebs as needed   Hx of CAD s/p stent placement   Hx of SSS s/p pacemaker placement      Code status: Full   DVT prophylaxis: Lovenox     Care Plan discussed with: Patient/Family, Nurse and   Anticipated Disposition: SNF/LTC  Anticipated Discharge: Greater than 48 hours     Hospital Problems  Date Reviewed: 10/30/2021        Codes Class Noted POA    * (Principal) Hip fracture requiring operative repair [facility]) ICD-10-CM: G11.012K  ICD-9-CM: 820.8  10/29/2021 Yes                Review of Systems:   A comprehensive review of systems was negative except for that written in the HPI. Vital Signs:    Last 24hrs VS reviewed since prior progress note.  Most recent are:  Visit Vitals  /61   Pulse 74   Temp 97.7 °F (36.5 °C)   Resp 18   Ht 4' 11\" (1.499 m)   Wt 47.6 kg (105 lb)   SpO2 95%   BMI 21.21 kg/m²         Intake/Output Summary (Last 24 hours) at 10/30/2021 1731  Last data filed at 10/30/2021 1125  Gross per 24 hour   Intake 800 ml   Output 335 ml   Net 465 ml        Physical Examination:             Constitutional:  No acute distress, cooperative, pleasant    ENT:  Oral mucosa moist, oropharynx benign. Resp:  CTA bilaterally. No wheezing/rhonchi/rales. No accessory muscle use   CV:  Regular rhythm, normal rate, no murmurs, gallops, rubs    GI:  Soft, non distended, non tender. normoactive bowel sounds    Musculoskeletal:  No edema, warm, 2+ pulses throughout    Neurologic:  Moves all extremities. AAOx1, CN II-XII reviewed     Psych:  Not anxious or agitated       Data Review:    Review and/or order of clinical lab test  Review and/or order of tests in the radiology section of CPT  Review and/or order of tests in the medicine section of CPT      Labs:     Recent Labs     10/30/21  0517 10/29/21  2115   WBC 14.7* 13.8*   HGB 12.0 12.6   HCT 37.1 37.7    273     Recent Labs     10/30/21  0517 10/29/21  2115    138   K 3.7 3.6    108   CO2 25 24   BUN 40* 41*   CREA 1.58* 1.49*   * 121*   CA 9.8 10.0   MG 2.1  --    PHOS 3.9  --      Recent Labs     10/30/21  0517   ALT 21   AP 60   TBILI 0.5   TP 7.0   ALB 3.3*   GLOB 3.7     No results for input(s): INR, PTP, APTT, INREXT in the last 72 hours. No results for input(s): FE, TIBC, PSAT, FERR in the last 72 hours. No results found for: FOL, RBCF   No results for input(s): PH, PCO2, PO2 in the last 72 hours.   Recent Labs     10/29/21  2115   CPK 47     No results found for: CHOL, CHOLX, CHLST, CHOLV, HDL, HDLP, LDL, LDLC, DLDLP, TGLX, TRIGL, TRIGP, CHHD, CHHDX  Lab Results   Component Value Date/Time    Glucose (POC) 89 11/13/2014 07:42 AM     Lab Results   Component Value Date/Time    Color YELLOW/STRAW 01/20/2021 04:05 PM    Appearance CLEAR 01/20/2021 04:05 PM    Specific gravity 1.018 01/20/2021 04:05 PM    pH (UA) 7.0 01/20/2021 04:05 PM    Protein TRACE (A) 01/20/2021 04:05 PM    Glucose Negative 01/20/2021 04:05 PM    Ketone TRACE (A) 01/20/2021 04:05 PM    Bilirubin Negative 01/20/2021 04:05 PM    Urobilinogen 0.2 01/20/2021 04:05 PM    Nitrites Negative 01/20/2021 04:05 PM    Leukocyte Esterase Negative 01/20/2021 04:05 PM    Epithelial cells FEW 01/20/2021 04:05 PM    Bacteria Negative 01/20/2021 04:05 PM    WBC 0-4 01/20/2021 04:05 PM    RBC 0-5 01/20/2021 04:05 PM         Medications Reviewed:     Current Facility-Administered Medications   Medication Dose Route Frequency    ceFAZolin (ANCEF) 2 g in sterile water (preservative free) 20 mL IV syringe  2 g IntraVENous ONCE    amLODIPine (NORVASC) tablet 5 mg  5 mg Oral DAILY    atorvastatin (LIPITOR) tablet 10 mg  10 mg Oral QHS    sertraline (ZOLOFT) tablet 50 mg  50 mg Oral DAILY    levothyroxine (SYNTHROID) tablet 50 mcg  50 mcg Oral ACB    latanoprost (XALATAN) 0.005 % ophthalmic solution 1 Drop  1 Drop Both Eyes QHS    sodium chloride (NS) flush 5-40 mL  5-40 mL IntraVENous Q8H    sodium chloride (NS) flush 5-40 mL  5-40 mL IntraVENous PRN    acetaminophen (TYLENOL) tablet 650 mg  650 mg Oral Q6H PRN    Or    acetaminophen (TYLENOL) suppository 650 mg  650 mg Rectal Q6H PRN    polyethylene glycol (MIRALAX) packet 17 g  17 g Oral DAILY PRN    ondansetron (ZOFRAN ODT) tablet 4 mg  4 mg Oral Q8H PRN    Or    ondansetron (ZOFRAN) injection 4 mg  4 mg IntraVENous Q6H PRN    lactated Ringers infusion  75 mL/hr IntraVENous CONTINUOUS    morphine injection 2 mg  2 mg IntraVENous Q4H PRN    lactated Ringers infusion  50 mL/hr IntraVENous CONTINUOUS    sodium chloride (NS) flush 5-40 mL  5-40 mL IntraVENous Q8H    sodium chloride (NS) flush 5-40 mL  5-40 mL IntraVENous PRN    lidocaine (PF) (XYLOCAINE) 10 mg/mL (1 %) injection 0.1 mL  0.1 mL SubCUTAneous PRN    fentaNYL citrate (PF) injection 50 mcg  50 mcg IntraVENous PRN    midazolam (VERSED) injection 1 mg  1 mg IntraVENous PRN    0.9% sodium chloride infusion  125 mL/hr IntraVENous CONTINUOUS    sodium chloride (NS) flush 5-40 mL  5-40 mL IntraVENous Q8H    sodium chloride (NS) flush 5-40 mL  5-40 mL IntraVENous PRN    traMADoL (ULTRAM) tablet 50 mg  50 mg Oral Q6H PRN    HYDROmorphone (DILAUDID) injection 0.5 mg  0.5 mg IntraVENous Q4H PRN    hydrOXYzine HCL (ATARAX) tablet 10 mg  10 mg Oral Q8H PRN    ceFAZolin (ANCEF) 2 g in sterile water (preservative free) 20 mL IV syringe  2 g IntraVENous Q8H    naloxone (NARCAN) injection 0.4 mg  0.4 mg IntraVENous PRN    ondansetron (ZOFRAN) injection 4 mg  4 mg IntraVENous Q4H PRN    [START ON 10/31/2021] calcium-vitamin D (OS-ZAMZAM +D3) 500 mg-200 unit per tablet 1 Tablet  1 Tablet Oral TID WITH MEALS    senna-docusate (PERICOLACE) 8.6-50 mg per tablet 1 Tablet  1 Tablet Oral BID    [START ON 10/31/2021] polyethylene glycol (MIRALAX) packet 17 g  17 g Oral DAILY    [START ON 11/1/2021] bisacodyL (DULCOLAX) suppository 10 mg  10 mg Rectal DAILY PRN    [START ON 10/31/2021] enoxaparin (LOVENOX) injection 30 mg  30 mg SubCUTAneous Q24H    labetaloL (NORMODYNE;TRANDATE) 20 mg/4 mL (5 mg/mL) injection 10 mg  10 mg IntraVENous Q5MIN PRN    hydrALAZINE (APRESOLINE) 20 mg/mL injection 10 mg  10 mg IntraVENous ONCE PRN    labetaloL (NORMODYNE;TRANDATE) 5 mg/mL injection         ______________________________________________________________________  EXPECTED LENGTH OF STAY: - - -  ACTUAL LENGTH OF STAY:          [id number] Jonny Lujan, NP

## 2021-10-30 NOTE — ED PROVIDER NOTES
The history is provided by the patient and medical records. The history is limited by the condition of the patient. Fall  The accident occurred 6 to 12 hours ago. The fall occurred in unknown circumstances. She fell from a height of ground level. She landed on hard floor. The point of impact was the left hip. The pain is present in the left hip. The pain is mild. She was not ambulatory at the scene. Pertinent negatives include no abdominal pain. The risk factors include being elderly, recurrent falls and dementia. She has tried rest for the symptoms. The treatment provided no relief.         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 - [place] RADIATION    Cancer ([address]     LUNG    Chronic obstructive pulmonary disease (HCC)     Elevated cholesterol     Glaucoma     H/O: pneumonia 2013    History of pelvic fracture 2018    Hypertension     Hypothyroid     Pacemaker 01/09/2018    Sick sinus syndrome ([address]     Syncope        Past Surgical History:   Procedure Laterality Date    HX CATARACT REMOVAL Bilateral 2001    HX HEART CATHETERIZATION      HX OTHER SURGICAL  2013    EXC LESIONS FACE, LIP    HX PACEMAKER  2018    HX PACEMAKER PLACEMENT Left 01/2018    AR BREAST SURGERY PROCEDURE UNLISTED  1998    right mastectomy    AR CHEST SURGERY PROCEDURE UNLISTED Left 1998    EXC LUNG MASS         Family History:   Family history unknown: Yes       Social History     Socioeconomic History    Marital status:      Spouse name: Not on file    Number of children: Not on file    Years of education: Not on file    Highest education level: Not on file   Occupational History    Not on file   Tobacco Use    Smoking status: Former Smoker     Packs/day: 0.25     Years: 10.00     Pack years: 2.50     Quit date: 1989     Years since quittin.8    Smokeless tobacco: Never Used   Substance and Sexual Activity    Alcohol use: No    Drug use: No    Sexual activity: Not Currently   Other Topics Concern    Not on file   Social History Narrative    Not on file     Social Determinants of Health     Financial Resource Strain:     Difficulty of Paying Living Expenses:    Food Insecurity:     Worried About Running Out of Food in the Last Year:     [address] in the Last Year:    Transportation Needs:     Lack of Transportation (Medical):  Lack of Transportation (Non-Medical):    Physical Activity:     Days of Exercise per Week:     Minutes of Exercise per Session:    Stress:     Feeling of Stress :    Social Connections:     Frequency of Communication with Friends and Family:     Frequency of Social Gatherings with Friends and Family:     Attends Taoist Services:     Active Member of Clubs or Organizations:     Attends Club or Organization Meetings:     Marital Status:    Intimate Partner Violence:     Fear of Current or Ex-Partner:     Emotionally Abused:     Physically Abused:     Sexually Abused: ALLERGIES: Patient has no known allergies. Review of Systems   Unable to perform ROS: Dementia   Respiratory: Negative for shortness of breath. Cardiovascular: Negative for chest pain. Gastrointestinal: Negative for abdominal pain. Musculoskeletal: Positive for gait problem and myalgias. All other systems reviewed and are negative. Vitals:    10/29/21 1942   BP: (!) 167/64   Pulse: 98   Resp: 20   Temp: 98.3 °F (36.8 °C)   SpO2: (!) 83%   Weight: 47.6 kg (105 lb)   Height: 4' 11\" (1.499 m)            Physical Exam  Vitals and nursing note reviewed. Constitutional:       Appearance: She is well-developed. HENT:      Head: Normocephalic and atraumatic. Eyes:      Pupils: Pupils are equal, round, and reactive to light.    Cardiovascular:      Rate and Rhythm: Normal rate and regular rhythm. Pulmonary:      Effort: Pulmonary effort is normal.      Breath sounds: Normal breath sounds. Abdominal:      General: There is no distension. Palpations: Abdomen is soft. Tenderness: There is no abdominal tenderness. Musculoskeletal:      Cervical back: Normal, normal range of motion and neck supple. Right hip: Normal.      Left hip: Tenderness present. Decreased range of motion. Right upper leg: Normal.      Left upper leg: Swelling and tenderness present. Right knee: Normal.      Left knee: Normal.      Right lower leg: Normal.      Left lower leg: Normal.      Right foot: Normal pulse. Left foot: Normal pulse. Comments: Left leg shortened and externally rotated   Skin:     General: Skin is warm and dry. Capillary Refill: Capillary refill takes less than 2 seconds. Neurological:      General: No focal deficit present. Mental Status: She is alert. She is disoriented.    Psychiatric:         Mood and Affect: Mood normal.         Behavior: Behavior normal.          MDM  Number of Diagnoses or Management Options    ED Course as of Oct 29 2230   Fri Oct 29, 2021   2123 EKG at 9:0990 bpm, normal sinus rhythm, sinus arrhythmia, left axis deviation, no STEMI, no ectopyEKG interpreted by Trever Ge MD     [IO]      ED Course User Index  [IO] Dmitry Crain MD       Procedures        MEDICAL DECISION MAKIN y.o. female presents with Fall    Differential diagnosis includes but not limited to: Fracture, dislocation, closed head injury, syncope, arrhythmia, compartment syndrome      LABORATORY TESTS:  Labs Reviewed   CBC W/O DIFF - Abnormal; Notable for the following components:       Result Value    WBC 13.8 (*)     RDW 15.2 (*)     All other components within normal limits   METABOLIC PANEL, BASIC - Abnormal; Notable for the following components:    Glucose 121 (*)     BUN 41 (*)     Creatinine 1.49 (*)     BUN/Creatinine ratio 28 (*) GFR est AA 40 (*)     GFR est non-AA 33 (*)     All other components within normal limits   COVID-19 RAPID TEST   CK   SAMPLES BEING HELD       IMAGING RESULTS:  CT HEAD WO CONT   Final Result   Generalized atrophy. Mild small vessel ischemic changes. No acute abnormality. CT SPINE CERV WO CONT   Final Result   Multilevel spondylosis without acute abnormality. XR CHEST PORT   Final Result   No acute process. XR PELV AP ONLY   Final Result   Comminuted left intertrochanteric hip fracture with medial   angulation. MEDICATIONS GIVEN:  Medications - No data to display        IMPRESSION:  1. Closed intertrochanteric fracture of hip, left, initial encounter [facility])        PLAN:  - Admit to hospitalist     [address] [place] [address] for Admission  10:32 PM    ED Room Number: ER26/26  Patient Name and age:  Cornelia Salinas 80 y.o.  female  Working Diagnosis:   1. Closed intertrochanteric fracture of hip, left, initial encounter ([facility]      COVID-19 Suspicion:  no  Sepsis present:  no  Reassessment needed: no  Code Status:  Do Not Resuscitate  Readmission: no  Isolation Requirements:  no  Recommended Level of Care:  med/surg  Department:[facility] Adult ED - 21         Kris Faulkner MD          Please note that this dictation was completed with [employer], the computer voice recognition software. Quite often unanticipated grammatical, syntax, homophones, and other interpretive errors are inadvertently transcribed by the computer software. Please disregard these errors. Please excuse any errors that have escaped final proofreading.

## 2021-10-30 NOTE — PROGRESS NOTES
TRANSFER - IN REPORT:    Verbal report received from [address] RN(name) on Nereida Fonseca  being received from [facility](unit) for routine post - op      Report consisted of patients Situation, Background, Assessment and   Recommendations(SBAR). Information from the following report(s) SBAR, OR Summary, MAR, Recent Results and Med Rec Status was reviewed with the receiving nurse. Opportunity for questions and clarification was provided. Assessment completed upon patients arrival to unit and care assumed.

## 2021-10-30 NOTE — ED NOTES
Bedside and Verbal shift change report given to Gina  (oncoming nurse) by Maria Teresa Guadalupe  (offgoing nurse).  Report included the following information SBAR and ED Summary       Patient now going to room 577

## 2021-10-30 NOTE — ANESTHESIA POSTPROCEDURE EVALUATION
Post-Anesthesia Evaluation and Assessment    Patient: Marcelo Almonte MRN: [id number]  SSN: [id number]    YOB: 1928  Age: 80 y.o. Sex: female      I have evaluated the patient and they are stable and ready for discharge from the PACU. Cardiovascular Function/Vital Signs  Visit Vitals  BP (!) 119/47   Pulse 66   Temp 36.3 °C (97.4 °F)   Resp 20   Ht 4' 11\" (1.499 m)   Wt 47.6 kg (105 lb)   SpO2 99%   BMI 21.21 kg/m²       Patient is status post Local anesthesia for Procedure(s):  LEFT HIP GAMMA NAIL INSETION  CAT AWARE  ESSENTIAL. Nausea/Vomiting: None    Postoperative hydration reviewed and adequate. Pain:  Pain Scale 1: Numeric (0 - 10) (shakes head no to pain) (10/30/21 1113)  Pain Intensity 1: 0 (10/30/21 1034)   Managed    Neurological Status:   Neuro (WDL): Exceptions to WDL (10/30/21 1113)  Neuro  Neurologic State: Drowsy;Sleeping (10/30/21 1113)  Orientation Level: Oriented to person;Disoriented to time;Disoriented to situation;Disoriented to place (10/30/21 0203)  LUE Motor Response: Purposeful (10/30/21 1113)  LLE Motor Response: Purposeful (10/30/21 1113)  RUE Motor Response: Purposeful (10/30/21 1113)  RLE Motor Response: Purposeful (10/30/21 1113)   At baseline    Mental Status, Level of Consciousness: Alert and  oriented to person, place, and time    Pulmonary Status:   O2 Device: CO2 nasal cannula (10/30/21 1034)   Adequate oxygenation and airway patent    Complications related to anesthesia: None    Post-anesthesia assessment completed.  No concerns    Signed By: Ayde Valderrama MD     October 30, 2021

## 2021-10-30 NOTE — PERIOP NOTES
TRANSFER - OUT REPORT:    Verbal report given to KELLY Soto(name) on Rosas Beech  being transferred to [facility](unit) for routine post - op       Report consisted of patients Situation, Background, Assessment and   Recommendations(SBAR). Time Pre op antibiotic given:9:28 am Ancef  Anesthesia Stop time: 10;36  Nicholson Present on Transfer to floor:yes  Order for Nicholson on Chart:yes  Discharge Prescriptions with Chart:no    Information from the following report(s) SBAR, Kardex, OR Summary, Procedure Summary, Intake/Output, MAR, Recent Results, Med Rec Status and Cardiac Rhythm SR was reviewed with the receiving nurse. Opportunity for questions and clarification was provided. Is the patient on 02? NO       L/Min        Other     Is the patient on a monitor? NO    Is the nurse transporting with the patient? NO    Surgical Waiting Area notified of patient's transfer from PACU?  YES      The following personal items collected during your admission accompanied patient upon transfer:   Dental Appliance:    Vision:    Hearing Aid:    Jewelry:    Clothing: Clothing: None  Other Valuables:    Valuables sent to safe:

## 2021-10-30 NOTE — H&P
[address] [facility]  HISTORY AND PHYSICAL    Name:  Nichole Graves  MR#:  [id number]  :  1928  ACCOUNT #:  [de-identified]  ADMIT DATE:  10/29/2021      The patient was seen, evaluated, and admitted by me on 10/29/2021. PRIMARY CARE PHYSICIAN:  Marcel Buchanan MD    SOURCE OF INFORMATION:  The patient who is not a good historian because of her underlying dementia and review of ED and old electronic medical records. CHIEF COMPLAINT:  Fall. HISTORY OF PRESENT ILLNESS:  This 19-year-old woman with past medical history significant for coronary artery disease; status post stent placement, COPD, hypertension, hypothyroidism, sick sinus syndrome, status post pacemaker insertion, dyslipidemia, memory impairment was in her usual state of health until the day of her presentation at the emergency room when it was reported that the patient fell at the nursing home where the patient resides. The circumstances surrounding her fall was not known. The fall was not witnessed. The patient was found on the floor. It was reported that the patient was unable to bear weight on her left leg. X-ray was done at the facility. The x-ray shows fractured hip. The patient was sent to the emergency room for further evaluation. When the patient arrived at the emergency room, CT scan of the head was obtained and that was negative. The CT scan of the cervical spine also did not show any evidence of fracture. The pelvic x-ray confirmed the left hip fracture. The emergency room physician consulted the orthopedic service on call. Admission to the hospitalist service was advised. The patient was referred to the hospitalist service for that purpose. The patient is not able to provide any meaningful history because of underlying memory impairment. The patient is not able to state whether she was having pain or not, the character, or the quality of the pain.   There was no history of fever, rigors, or chills reported. The patient was last admitted to the hospital from 01/18/2021 to 01/20/2021. The patient was admitted and treated for rectal prolapse. She underwent perineal proctosigmoidectomy by the surgical service. PAST MEDICAL HISTORY:  Coronary artery disease; status post stent placement, COPD, hypertension, hypothyroidism, sick sinus syndrome; status post pacemaker insertion, dyslipidemia, and memory impairment. ALLERGIES:  NO KNOWN DRUG ALLERGIES. MEDICATIONS:  1. Tylenol 325 mg every 4 hours as needed for pain. 2.  Norvasc 5 mg daily. 3.  Aspirin 325 mg daily. 4.  Xalatan 0.005% ophthalmic solution 1 drop in to each eye daily at bedtime. 5.  Synthroid  mcg daily. 6.  Multivitamin 1 tablet daily. 7.  Zoloft 50 mg daily. 8.  Zocor 10 mg daily at bedtime. FAMILY HISTORY:  Unable to obtain because of the patient's memory impairment. PAST SURGICAL HISTORY:  This is significant for bilateral cataract extraction, pacemaker insertion, and right mastectomy secondary to breast cancer. SOCIAL HISTORY:  The patient is a former smoker. She quit smoking in 12/1989. No history of alcohol abuse. REVIEW OF SYSTEMS:  Unable to obtain because of the patient's memory impairment//dementia. PHYSICAL EXAMINATION:  GENERAL APPEARANCE:  The patient appeared ill and in moderate distress. VITAL SIGNS:  On arrival at the emergency room; temperature 98.3, pulse 98, respiratory rate 20, blood pressure 167/64, and oxygen saturation 83% on room air. HEAD:  Normocephalic, atraumatic. EYES:  Normal eye movement. No redness, no drainage, and no discharge. EARS:  Normal external ears with no obvious drainage. NOSE:  No deformity and no drainage. MOUTH AND THROAT:  No visible oral lesion. Dry oral mucosa. NECK:  Neck is supple. No JVD and no thyromegaly. CHEST:  Clear breath sounds. No wheezing and no crackles. HEART:  Normal S1 and S2, regular. No clinically appreciable murmur.   ABDOMEN:  Soft, nontender. Normal bowel sounds. CNS:  Alert. Not oriented. No gross focal neurological deficit. EXTREMITIES:  No edema. Pulses 2+ bilaterally. MUSCULOSKELETAL SYSTEM:  Deformity and tenderness of left hip noted and present on admission. SKIN:  No active skin lesions seen in the exposed part of the body. PSYCHIATRY:  Normal mood and affect. LYMPHATIC SYSTEM:  No cervical lymphadenopathy. DIAGNOSTIC DATA:  The EKG shows normal sinus rhythm and nonspecific ST and T-waves abnormalities. X-ray of the pelvis shows left intertrochanteric hip fracture with medial angulation. Chest x-ray shows no acute process. The CT scan of the cervical spine, no acute abnormalities. The CT scan of the head is negative for acute pathology. LABORATORY DATA:  Hematology; WBC 13.8, hemoglobin 12.6, hematocrit 37.7, platelets 022. Chemistry; sodium 138, potassium 3.6, chloride 108, CO2 is 24, glucose 121, BUN 41, creatinine 1.49, and calcium 10.0. COVID-19 rapid test negative. ASSESSMENT:  1.  Closed intertrochanteric fracture of left hip. 2.  Fall. 3.  Coronary artery disease, status post stent placement. 4.  Chronic obstructive pulmonary disease. 5.  Hypertension. 6.  Hypothyroidism. 7.  Sick sinus syndrome, status post pacemaker insertion. 8.  Dyslipidemia. 9.  Acute kidney injury. 10.  Leukocytosis. 11.  Dementia. PLAN:  1. Closed intertrochanteric fracture of left hip. We will admit the patient for further evaluation and treatment. We will carry out conservative treatment which included pain control while awaiting definitive treatment from the orthopedic service. The patient is scheduled for surgical intervention. The patient will be kept n.p.o. in anticipation of the surgical intervention. 2.  Fall. The CT scan of the head is negative. The CT scan of the cervical spine, no fracture. The patient will require PT, OT evaluation and treatment after surgical repair of the left hip fracture.   3. Coronary artery disease, status post stent placement. The patient is chest pain free. We will continue to monitor. The patient is not on guideline medications. 4.  COPD. The patient is asymptomatic, not on any breathing treatment prior to admission. We will continue to monitor DuoNeb as needed if indicated. 5.  Hypertension. We will resume preadmission medication. We will monitor the patient's blood pressure closely. 6.  Hypothyroidism. We will continue with Synthroid. We will check a TSH level. 7.  Sick sinus syndrome, status post pacemaker insertion. We will continue to monitor. 8.  Dyslipidemia. We will continue Lipitor. 9.  Acute kidney injury. This is most likely due to volume depletion. We will carry out gentle fluid hydration and monitor the patient's renal function. 10.  Leukocytosis. The patient is afebrile and not toxic. Chest x-ray is negative. We will check urinalysis. 11.  Dementia. We will continue supportive treatment. 12.  Other issues. Code status, the patient is a full code. We will request SCD for DVT prophylaxis. FUNCTIONAL STATUS PRIOR TO ADMISSION:  The patient came from nursing home. The patient is ambulatory with assistive device. COVID PRECAUTION:  The patient was wearing a face mask. I was wearing a face mask and gloves for this patient's encounter.       Shani Shaver MD      [id number]  D:  10/30/2021 7:49  T:  10/30/2021 9:13  JOB #:  [id number]  CC:  Meryle Budge, MD

## 2021-10-30 NOTE — PROGRESS NOTES
Lovenox Monitoring  Indication: DVT Prophylaxis, s/p hip fracture surgery  Recent Labs     10/30/21  0517 10/29/21  2115   HGB 12.0 12.6    273   CREA 1.58* 1.49*     Current Weight: 47.6 kg  Est. CrCl = 16.7 ml/min  Current Dose: 40 mg subcutaneously every 24 hours.   Plan: Change to 30 mg q24h for CrCl < 30 ml/min

## 2021-10-30 NOTE — CONSULTS
Orthopedic Perfect Serve Consult      Patient: Luiz Morales                   MRN: [id number]  Sex: female  YOB: 1928           Age: 80 y.o. Perfect Serve consult with Dr Zeeshan Ramires at [facility] ER who describes a 81yo female with an acute intertrochanteric fracture of the left hip. NVI per ER physician. Xrays reviewed by me and attending orthopedic surgeon, Dr. Eve Sever. Recommend admission to medical service and surgical repair. Rapid COVID-19 test ordered. Patient will be NPO after midnight. OR notified of plan for left hip gamma nail at 9am on 10/30/21.       Allen Oro, [facility] [address]   10/29/2021   10:16 PM      .

## 2021-10-30 NOTE — CONSULTS
FRACTURE CONSULT NOTE    Subjective:     Date of Consultation:  2021  Referring Physician:  Gisselle Josue is a 80 y.o. female who presents after GLF onto left side. Xrays revealed left IT femur fracture. She was unable to walk after the fall. Patient Active Problem List    Diagnosis Date Noted    Hip fracture requiring operative repair ([facility] 10/29/2021    SSS (sick sinus syndrome) ([facility] 2018    Heart palpitations 2017    Shortness of breath 2016     Family History   Family history unknown: Yes      Social History     Tobacco Use    Smoking status: Former Smoker     Packs/day: 0.25     Years: 10.00     Pack years: 2.50     Quit date: 1989     Years since quittin.8    Smokeless tobacco: Never Used   Substance Use Topics    Alcohol use: No     Past Medical History:   Diagnosis Date    Arm fracture, left 2013    Arm fracture, right     Bell's palsy ,     H/O BELL'S PALSY X2    CAD (coronary artery disease)     MI , STENT    Cancer ([facility] 2008    BREAST RIGHT - BALLON RADIATION    Cancer ([facility]     LUNG    Chronic obstructive pulmonary disease ([facility]     Elevated cholesterol     Glaucoma     H/O: pneumonia     History of pelvic fracture     Hypertension     Hypothyroid     Pacemaker 2018    Sick sinus syndrome ([facility]     Syncope       Past Surgical History:   Procedure Laterality Date    HX CATARACT REMOVAL Bilateral     HX HEART CATHETERIZATION      HX OTHER SURGICAL  [address] [facility] [address] LIP    HX PACEMAKER  2018    HX PACEMAKER PLACEMENT Left 2018    MN BREAST SURGERY PROCEDURE UNLISTED  1998    right mastectomy    MN CHEST SURGERY PROCEDURE UNLISTED Left     EXC LUNG MASS      Prior to Admission medications    Medication Sig Start Date End Date Taking? Authorizing Provider   sertraline (ZOLOFT) 50 mg tablet Take 50 mg by mouth daily.  21   Provider, Historical   levothyroxine (SYNTHROID) 50 mcg tablet Take 50 mcg by mouth Daily (before breakfast). 7/12/21   Provider, Historical   simvastatin (ZOCOR) 10 mg tablet Take 10 mg by mouth nightly. 7/17/21   Provider, Historical   amLODIPine (NORVASC) 5 mg tablet TAKE 1 TABLET BY MOUTH EVERY DAY 3/15/18   Provider, Historical   acetaminophen (TYLENOL) 325 mg tablet Take 2 Tabs by mouth every four (4) hours as needed. 2/9/18   Mary Rodríguez MD   calcium citrate-vitamin d3 (CITRACAL+D) 315-200 mg-unit tab Take 1 Tab by mouth daily. Provider, Historical   latanoprost (XALATAN) 0.005 % ophthalmic solution Administer 1 Drop to both eyes nightly. 11/24/17   Provider, Historical   multivitamin (ONE A DAY) tablet Take 1 Tab by mouth daily. Provider, Historical   aspirin (ASPIRIN) 325 mg tablet Take 325 mg by mouth nightly.     Other, MD Janak     Current Facility-Administered Medications   Medication Dose Route Frequency    amLODIPine (NORVASC) tablet 5 mg  5 mg Oral DAILY    atorvastatin (LIPITOR) tablet 10 mg  10 mg Oral QHS    sertraline (ZOLOFT) tablet 50 mg  50 mg Oral DAILY    levothyroxine (SYNTHROID) tablet 50 mcg  50 mcg Oral ACB    latanoprost (XALATAN) 0.005 % ophthalmic solution 1 Drop  1 Drop Both Eyes QHS    sodium chloride (NS) flush 5-40 mL  5-40 mL IntraVENous Q8H    sodium chloride (NS) flush 5-40 mL  5-40 mL IntraVENous PRN    acetaminophen (TYLENOL) tablet 650 mg  650 mg Oral Q6H PRN    Or    acetaminophen (TYLENOL) suppository 650 mg  650 mg Rectal Q6H PRN    polyethylene glycol (MIRALAX) packet 17 g  17 g Oral DAILY PRN    ondansetron (ZOFRAN ODT) tablet 4 mg  4 mg Oral Q8H PRN    Or    ondansetron (ZOFRAN) injection 4 mg  4 mg IntraVENous Q6H PRN    lactated Ringers infusion  75 mL/hr IntraVENous CONTINUOUS    morphine injection 2 mg  2 mg IntraVENous Q4H PRN    bupivacaine (PF) (MARCAINE) 0.25 % (2.5 mg/mL) injection    PRN     Facility-Administered Medications Ordered in Other Encounters   Medication Dose Route Frequency    ceFAZolin (ANCEF) injection   IntraVENous PRN    fentaNYL citrate (PF) injection   IntraVENous PRN    lidocaine (PF) (XYLOCAINE) 20 mg/mL (2 %) injection   IntraVENous PRN    propofoL (DIPRIVAN) 10 mg/mL injection   IntraVENous PRN    rocuronium injection   IntraVENous PRN    succinylcholine (ANECTINE) injection   IntraVENous PRN    PHENYLephrine (NEOSYNEPHRINE) in NS syringe   IntraVENous PRN    PHENYLephrine (ERINN-SYNEPHRINE) 10 mg in 0.9% sodium chloride 250 mL infusion   IntraVENous CONTINUOUS    lactated Ringers infusion   IntraVENous CONTINUOUS    dexamethasone (DECADRON) 4 mg/mL injection   IntraVENous PRN    ondansetron (ZOFRAN) injection   IntraVENous PRN    sugammadex (BRIDION) 100 mg/mL injection   IntraVENous PRN      No Known Allergies     Review of Systems:    Negative for fevers, chills, nausea, vomiting, chest pain, shortness of breath, headaches.       Objective:     Patient Vitals for the past 24 hrs:   Temp Pulse Resp BP SpO2   10/30/21 0203 97.7 °F (36.5 °C) 88 18 (!) 156/83 97 %   10/30/21 0046     97 %   10/30/21 0045    (!) 153/79    10/29/21 2343     95 %   10/29/21 2342 98.6 °F (37 °C) 85 16 (!) 163/78 92 %   10/29/21 1942 98.3 °F (36.8 °C) 98 20 (!) 167/64 (!) 83 %       Temp (24hrs), Av.2 °F (36.8 °C), Min:97.7 °F (36.5 °C), Max:98.6 °F (37 °C)      Gen: NAD, A&Ox3  Resp: Non-labored breathing  CV: Extremities well perfused  Abd: soft, NT  LLE: shortened, rotated, did not range due to pain, skin intact, warm well perfused, SILT in al distributions L3-S1, palpable pedal pulses, no calf tenderness  RLE: no pain with ROM, no swelling about knee or ankle, skin intact, warm well perfused, SILT in al distributions L3-S1, palpable pedal pulses, no calf tenderness    Imaging Review: Left peritrochanteric femur fracture    Labs:   Recent Results (from the past 24 hour(s))   EKG, 12 LEAD, INITIAL    Collection Time: 10/29/21  9:08 PM   Result Value Ref Range    Ventricular Rate 89 BPM    Atrial Rate 89 BPM    P-R Interval 126 ms    QRS Duration 88 ms    Q-T Interval 386 ms    QTC Calculation (Bezet) 469 ms    Calculated P Axis 79 degrees    Calculated R Axis -84 degrees    Calculated T Axis 78 degrees    Diagnosis       Normal sinus rhythm  Possible Left atrial enlargement  Left axis deviation  Minimal voltage criteria for LVH, may be normal variant ( Solo product )  Nonspecific ST and T wave abnormality  Abnormal ECG  When compared with ECG of 14-JAN-2021 16:22,  Nonspecific T wave abnormality no longer evident in Inferior leads     CK    Collection Time: 10/29/21  9:15 PM   Result Value Ref Range    CK 47 26 - 192 U/L   CBC W/O DIFF    Collection Time: 10/29/21  9:15 PM   Result Value Ref Range    WBC 13.8 (H) 3.6 - 11.0 K/uL    RBC 4.23 3.80 - 5.20 M/uL    HGB 12.6 11.5 - 16.0 g/dL    HCT 37.7 35.0 - 47.0 %    MCV 89.1 80.0 - 99.0 FL    MCH 29.8 26.0 - 34.0 PG    MCHC 33.4 30.0 - 36.5 g/dL    RDW 15.2 (H) 11.5 - 14.5 %    PLATELET 498 982 - 263 K/uL    MPV 10.9 8.9 - 12.9 FL    NRBC 0.0 0  WBC    ABSOLUTE NRBC 0.00 0.00 - 4.73 K/uL   METABOLIC PANEL, BASIC    Collection Time: 10/29/21  9:15 PM   Result Value Ref Range    Sodium 138 136 - 145 mmol/L    Potassium 3.6 3.5 - 5.1 mmol/L    Chloride 108 97 - 108 mmol/L    CO2 24 21 - 32 mmol/L    Anion gap 6 5 - 15 mmol/L    Glucose 121 (H) 65 - 100 mg/dL    BUN 41 (H) 6 - 20 MG/DL    Creatinine 1.49 (H) 0.55 - 1.02 MG/DL    BUN/Creatinine ratio 28 (H) 12 - 20      GFR est AA 40 (L) >60 ml/min/1.73m2    GFR est non-AA 33 (L) >60 ml/min/1.73m2    Calcium 10.0 8.5 - 10.1 MG/DL   SAMPLES BEING HELD    Collection Time: 10/29/21  9:15 PM   Result Value Ref Range    SAMPLES BEING HELD 1red,1blu,1dark grn     COMMENT        Add-on orders for these samples will be processed based on acceptable specimen integrity and analyte stability, which may vary by analyte.    COVID-19 RAPID TEST    Collection Time: 10/30/21 12:24 AM   Result Value Ref Range    Specimen source Nasopharyngeal      COVID-19 rapid test Not detected NOTD     METABOLIC PANEL, COMPREHENSIVE    Collection Time: 10/30/21  5:17 AM   Result Value Ref Range    Sodium 140 136 - 145 mmol/L    Potassium 3.7 3.5 - 5.1 mmol/L    Chloride 107 97 - 108 mmol/L    CO2 25 21 - 32 mmol/L    Anion gap 8 5 - 15 mmol/L    Glucose 127 (H) 65 - 100 mg/dL    BUN 40 (H) 6 - 20 MG/DL    Creatinine 1.58 (H) 0.55 - 1.02 MG/DL    BUN/Creatinine ratio 25 (H) 12 - 20      GFR est AA 37 (L) >60 ml/min/1.73m2    GFR est non-AA 31 (L) >60 ml/min/1.73m2    Calcium 9.8 8.5 - 10.1 MG/DL    Bilirubin, total 0.5 0.2 - 1.0 MG/DL    ALT (SGPT) 21 12 - 78 U/L    AST (SGOT) 16 15 - 37 U/L    Alk. phosphatase 60 45 - 117 U/L    Protein, total 7.0 6.4 - 8.2 g/dL    Albumin 3.3 (L) 3.5 - 5.0 g/dL    Globulin 3.7 2.0 - 4.0 g/dL    A-G Ratio 0.9 (L) 1.1 - 2.2     CBC WITH AUTOMATED DIFF    Collection Time: 10/30/21  5:17 AM   Result Value Ref Range    WBC 14.7 (H) 3.6 - 11.0 K/uL    RBC 4.07 3.80 - 5.20 M/uL    HGB 12.0 11.5 - 16.0 g/dL    HCT 37.1 35.0 - 47.0 %    MCV 91.2 80.0 - 99.0 FL    MCH 29.5 26.0 - 34.0 PG    MCHC 32.3 30.0 - 36.5 g/dL    RDW 14.9 (H) 11.5 - 14.5 %    PLATELET 612 737 - 533 K/uL    MPV 11.3 8.9 - 12.9 FL    NRBC 0.0 0  WBC    ABSOLUTE NRBC 0.00 0.00 - 0.01 K/uL    NEUTROPHILS 81 (H) 32 - 75 %    LYMPHOCYTES 6 (L) 12 - 49 %    MONOCYTES 10 5 - 13 %    EOSINOPHILS 1 0 - 7 %    BASOPHILS 1 0 - 1 %    IMMATURE GRANULOCYTES 1 (H) 0.0 - 0.5 %    ABS. NEUTROPHILS 12.0 (H) 1.8 - 8.0 K/UL    ABS. LYMPHOCYTES 0.9 0.8 - 3.5 K/UL    ABS. MONOCYTES 1.5 (H) 0.0 - 1.0 K/UL    ABS. EOSINOPHILS 0.1 0.0 - 0.4 K/UL    ABS. BASOPHILS 0.1 0.0 - 0.1 K/UL    ABS. IMM.  GRANS. 0.1 (H) 0.00 - 0.04 K/UL    DF AUTOMATED     TSH 3RD GENERATION    Collection Time: 10/30/21  5:17 AM   Result Value Ref Range    TSH 8.57 (H) 0.36 - 3.74 uIU/mL   MAGNESIUM    Collection Time: 10/30/21  5:17 AM   Result Value Ref Range    Magnesium 2.1 1.6 - 2.4 mg/dL   PHOSPHORUS    Collection Time: 10/30/21  5:17 AM   Result Value Ref Range    Phosphorus 3.9 2.6 - 4.7 MG/DL         Current Facility-Administered Medications   Medication Dose Route Frequency    amLODIPine (NORVASC) tablet 5 mg  5 mg Oral DAILY    atorvastatin (LIPITOR) tablet 10 mg  10 mg Oral QHS    sertraline (ZOLOFT) tablet 50 mg  50 mg Oral DAILY    levothyroxine (SYNTHROID) tablet 50 mcg  50 mcg Oral ACB    latanoprost (XALATAN) 0.005 % ophthalmic solution 1 Drop  1 Drop Both Eyes QHS    sodium chloride (NS) flush 5-40 mL  5-40 mL IntraVENous Q8H    sodium chloride (NS) flush 5-40 mL  5-40 mL IntraVENous PRN    acetaminophen (TYLENOL) tablet 650 mg  650 mg Oral Q6H PRN    Or    acetaminophen (TYLENOL) suppository 650 mg  650 mg Rectal Q6H PRN    polyethylene glycol (MIRALAX) packet 17 g  17 g Oral DAILY PRN    ondansetron (ZOFRAN ODT) tablet 4 mg  4 mg Oral Q8H PRN    Or    ondansetron (ZOFRAN) injection 4 mg  4 mg IntraVENous Q6H PRN    lactated Ringers infusion  75 mL/hr IntraVENous CONTINUOUS    morphine injection 2 mg  2 mg IntraVENous Q4H PRN    bupivacaine (PF) (MARCAINE) 0.25 % (2.5 mg/mL) injection    PRN     Facility-Administered Medications Ordered in Other Encounters   Medication Dose Route Frequency    ceFAZolin (ANCEF) injection   IntraVENous PRN    fentaNYL citrate (PF) injection   IntraVENous PRN    lidocaine (PF) (XYLOCAINE) 20 mg/mL (2 %) injection   IntraVENous PRN    propofoL (DIPRIVAN) 10 mg/mL injection   IntraVENous PRN    rocuronium injection   IntraVENous PRN    succinylcholine (ANECTINE) injection   IntraVENous PRN    PHENYLephrine (NEOSYNEPHRINE) in NS syringe   IntraVENous PRN    PHENYLephrine (ERINN-SYNEPHRINE) 10 mg in 0.9% sodium chloride 250 mL infusion   IntraVENous CONTINUOUS    lactated Ringers infusion   IntraVENous CONTINUOUS    dexamethasone (DECADRON) 4 mg/mL injection   IntraVENous PRN    ondansetron (ZOFRAN) injection   IntraVENous PRN    sugammadex (BRIDION) 100 mg/mL injection   IntraVENous PRN         Impression:     Principal Problem:    Hip fracture requiring operative repair [facility]) (10/29/2021)    79 yo female with peritrochanteric femur fracture after GLF. Deemed medically optimized by hospitalist service.      Plan:   Plan for left IM nail        Kathleen Robles MD

## 2021-10-30 NOTE — OP NOTES
Name: Dorota Walters  MRN:  [id number]  : 1928  Age:  80 y.o. Surgery Date: 10/30/2021      OPERATIVE REPORT -LEFT HIP CEPHALOMEDULLARY NAIL      PREOPERATIVE DIAGNOSIS: Left hip fracture    POSTOPERATIVE DIAGNOSIS: Left hip fracture    PROCEDURE PERFORMED: Cephalomedullary nailing left hip    SURGEON: Jan Rodriguez MD    FIRST ASSISTANT:  None    ANESTHESIA: Spinal with sedation. PRE-OP ANTIBIOTIC: Ancef 2g    COMPLICATIONS: None. ESTIMATED BLOOD LOSS: 100 mL    SPECIMENS REMOVED: None. COMPONENTS IMPLANTED:   Implant Name Type Inv. Item Serial No.  Lot No. LRB No. Used Action   Trochanteric Nail Kit   NA  [id number] Left 1 Implanted   SCR BNE LAG GAMMA 3 10.5X85MM -- TI STRL - SNA  SCR BNE LAG GAMMA 3 10.5X85MM -- TI STRL NA [employer] [id number] Left 1 Implanted   SCR BNE LCK T2 FT 5X35MM TI -- STRL - SNA  SCR BNE LCK T2 FT 5X35MM TI -- STRL NA [employer] [id number] Left 1 Implanted       INDICATIONS: The patient is an 80 yrs female with a left hip fracture. Pre-operaitve medical optimization was confirmed with the medical team.  Risks, benefits, alternatives of the procedure were reviewed with him in detail and he desires to proceed. He understands the increased risk for perioperative medical complications due to his medical comorbidities. DESCRIPTION OF PROCEDURE: Anesthetic was initiated. Preoperative dose of IV  antibiotic was given. Nicholson catheter was placed. The left side was confirmed as the operative side, prepped and draped in the usual sterile fashion. Skin was covered with Ioban occlusive dressing. After a time out was held, xrays were taken to confirm fracture site as well as entry for skin incision. An incision was made proximal to the tip of the greater trochanter and carried down through skin and subcutaneous tissue. The IT band was incised and a verdin scissor was used to clear a track to the greater trochanter.  A guide wire was drilled into the tip of the greater trochanter and position was check under fluoroscopy. An exntry reamer was then used with a protective sleeve to gain access to the intramedullary canal. A ball tipped guidewire was advanced to the physeal scar and the length was measure. Flexible reamers were used sequentially to 1.5mm above the planned nail. The nail was then opened (size mentioned above) and advanced. Position was confirmed under fluoroscopy. Using the 125 degree jig a skin incision was made in the appropriate position and a guide wire was drilled to within 2 mm of the cortex of the femoral head. This was again check fluoroscopically in AP and lateral planes and the wire was then measured and overdrilled. The above mentioned cephalomedullary screw was advanced and locked into place. One screw was placed distally using the jig. Final shots were taken. After copious irrigation, the wounds were closed with #2 Vicryl sutures. I irrigated the skin, subcutaneous tissue. Soft tissues were infiltrated with local anesthetic. Skin and subcutaneous were closed in a standard fashion with 2-0 vicryl and 3-0 monocryl followed by Dermabond. Sterile dressings were applied. There were no complications. No specimen was sent. The patient was transferred to the recovery room in stable condition. I was present for the entirety of the case.     Alpesh Beal MD

## 2021-10-30 NOTE — ANESTHESIA PREPROCEDURE EVALUATION
Relevant Problems   RESPIRATORY SYSTEM   (+) Shortness of breath      CARDIOVASCULAR   (+) SSS (sick sinus syndrome) (HCC)       Anesthetic History   No history of anesthetic complications            Review of Systems / Medical History  Patient summary reviewed, nursing notes reviewed and pertinent labs reviewed    Pulmonary    COPD               Neuro/Psych   Within defined limits           Cardiovascular    Hypertension        Dysrhythmias   Pacemaker, past MI, CAD and cardiac stents      Comments: Normal stress test 2016   GI/Hepatic/Renal  Within defined limits              Endo/Other      Hypothyroidism  Cancer     Other Findings              Physical Exam    Airway  Mallampati: II  TM Distance: 4 - 6 cm  Neck ROM: normal range of motion   Mouth opening: Normal     Cardiovascular  Regular rate and rhythm,  S1 and S2 normal,  no murmur, click, rub, or gallop             Dental    Dentition: Full upper dentures and Full lower dentures     Pulmonary  Breath sounds clear to auscultation               Abdominal  GI exam deferred       Other Findings            Anesthetic Plan    ASA: 3  Anesthesia type: general          Induction: Intravenous  Anesthetic plan and risks discussed with: Patient and Son / Daughter      Phone consent via Son

## 2021-10-30 NOTE — PROGRESS NOTES
TRANSFER - IN REPORT:    Verbal report received from Saritha(name) on Guillermina [id number]  being received from ED(unit) for routine progression of care      Report consisted of patients Situation, Background, Assessment and   Recommendations(SBAR). Information from the following report(s) SBAR was reviewed with the receiving nurse. Opportunity for questions and clarification was provided. Assessment completed upon patients arrival to unit and care assumed. Patient arrived via stretcher, transferred to bed. Patient alert and orient to self only      Primary Nurse Yahaira Reinoso RN and Bhavana Rodriges RN performed a dual skin assessment on this patient No impairment noted  Gerardo score is 13    Bruises noted left upper arm, below left knee, scab noted on right arm.  Sacrum red but blanchable

## 2021-10-31 LAB
ANION GAP SERPL CALC-SCNC: 5 MMOL/L (ref 5–15)
ANION GAP SERPL CALC-SCNC: 8 MMOL/L (ref 5–15)
APPEARANCE UR: CLEAR
ATRIAL RATE: 90 BPM
BACTERIA URNS QL MICRO: ABNORMAL /HPF
BASOPHILS # BLD: 0.1 K/UL (ref 0–0.1)
BASOPHILS NFR BLD: 0 % (ref 0–1)
BILIRUB UR QL: NEGATIVE
BUN SERPL-MCNC: 48 MG/DL (ref 6–20)
BUN SERPL-MCNC: 56 MG/DL (ref 6–20)
BUN/CREAT SERPL: 26 (ref 12–20)
BUN/CREAT SERPL: 31 (ref 12–20)
CALCIUM SERPL-MCNC: 8.6 MG/DL (ref 8.5–10.1)
CALCIUM SERPL-MCNC: 9.4 MG/DL (ref 8.5–10.1)
CALCULATED P AXIS, ECG09: 84 DEGREES
CALCULATED R AXIS, ECG10: -83 DEGREES
CALCULATED T AXIS, ECG11: 61 DEGREES
CHLORIDE SERPL-SCNC: 109 MMOL/L (ref 97–108)
CHLORIDE SERPL-SCNC: 109 MMOL/L (ref 97–108)
CO2 SERPL-SCNC: 24 MMOL/L (ref 21–32)
CO2 SERPL-SCNC: 26 MMOL/L (ref 21–32)
COLOR UR: ABNORMAL
CREAT SERPL-MCNC: 1.81 MG/DL (ref 0.55–1.02)
CREAT SERPL-MCNC: 1.87 MG/DL (ref 0.55–1.02)
DIAGNOSIS, 93000: NORMAL
DIFFERENTIAL METHOD BLD: ABNORMAL
EOSINOPHIL # BLD: 0 K/UL (ref 0–0.4)
EOSINOPHIL NFR BLD: 0 % (ref 0–7)
EPITH CASTS URNS QL MICRO: ABNORMAL /LPF
ERYTHROCYTE [DISTWIDTH] IN BLOOD BY AUTOMATED COUNT: 15.4 % (ref 11.5–14.5)
GLUCOSE SERPL-MCNC: 111 MG/DL (ref 65–100)
GLUCOSE SERPL-MCNC: 123 MG/DL (ref 65–100)
GLUCOSE UR STRIP.AUTO-MCNC: NEGATIVE MG/DL
HCT VFR BLD AUTO: 30.9 % (ref 35–47)
HGB BLD-MCNC: 9.9 G/DL (ref 11.5–16)
HGB UR QL STRIP: ABNORMAL
IMM GRANULOCYTES # BLD AUTO: 0.1 K/UL (ref 0–0.04)
IMM GRANULOCYTES NFR BLD AUTO: 1 % (ref 0–0.5)
KETONES UR QL STRIP.AUTO: ABNORMAL MG/DL
LEUKOCYTE ESTERASE UR QL STRIP.AUTO: ABNORMAL
LYMPHOCYTES # BLD: 1.2 K/UL (ref 0.8–3.5)
LYMPHOCYTES NFR BLD: 7 % (ref 12–49)
MCH RBC QN AUTO: 29.3 PG (ref 26–34)
MCHC RBC AUTO-ENTMCNC: 32 G/DL (ref 30–36.5)
MCV RBC AUTO: 91.4 FL (ref 80–99)
MONOCYTES # BLD: 1.9 K/UL (ref 0–1)
MONOCYTES NFR BLD: 12 % (ref 5–13)
NEUTS SEG # BLD: 13.2 K/UL (ref 1.8–8)
NEUTS SEG NFR BLD: 80 % (ref 32–75)
NITRITE UR QL STRIP.AUTO: NEGATIVE
NRBC # BLD: 0 K/UL (ref 0–0.01)
NRBC BLD-RTO: 0 PER 100 WBC
P-R INTERVAL, ECG05: 124 MS
PH UR STRIP: 5 [PH] (ref 5–8)
PLATELET # BLD AUTO: 287 K/UL (ref 150–400)
PMV BLD AUTO: 11.3 FL (ref 8.9–12.9)
POTASSIUM SERPL-SCNC: 4 MMOL/L (ref 3.5–5.1)
POTASSIUM SERPL-SCNC: 4 MMOL/L (ref 3.5–5.1)
PROT UR STRIP-MCNC: 30 MG/DL
Q-T INTERVAL, ECG07: 388 MS
QRS DURATION, ECG06: 88 MS
QTC CALCULATION (BEZET), ECG08: 474 MS
RBC # BLD AUTO: 3.38 M/UL (ref 3.8–5.2)
RBC #/AREA URNS HPF: ABNORMAL /HPF (ref 0–5)
SODIUM SERPL-SCNC: 140 MMOL/L (ref 136–145)
SODIUM SERPL-SCNC: 141 MMOL/L (ref 136–145)
SP GR UR REFRACTOMETRY: 1.02 (ref 1–1.03)
UROBILINOGEN UR QL STRIP.AUTO: 0.2 EU/DL (ref 0.2–1)
VENTRICULAR RATE, ECG03: 90 BPM
WBC # BLD AUTO: 16.5 K/UL (ref 3.6–11)
WBC URNS QL MICRO: ABNORMAL /HPF (ref 0–4)

## 2021-10-31 PROCEDURE — 97161 PT EVAL LOW COMPLEX 20 MIN: CPT

## 2021-10-31 PROCEDURE — 97530 THERAPEUTIC ACTIVITIES: CPT

## 2021-10-31 PROCEDURE — 80048 BASIC METABOLIC PNL TOTAL CA: CPT

## 2021-10-31 PROCEDURE — 74011250636 HC RX REV CODE- 250/636: Performed by: NURSE PRACTITIONER

## 2021-10-31 PROCEDURE — 74011250637 HC RX REV CODE- 250/637: Performed by: ORTHOPAEDIC SURGERY

## 2021-10-31 PROCEDURE — 74011000250 HC RX REV CODE- 250: Performed by: INTERNAL MEDICINE

## 2021-10-31 PROCEDURE — 81001 URINALYSIS AUTO W/SCOPE: CPT

## 2021-10-31 PROCEDURE — 65270000029 HC RM PRIVATE

## 2021-10-31 PROCEDURE — 74011000250 HC RX REV CODE- 250: Performed by: ORTHOPAEDIC SURGERY

## 2021-10-31 PROCEDURE — 74011250636 HC RX REV CODE- 250/636: Performed by: ORTHOPAEDIC SURGERY

## 2021-10-31 PROCEDURE — 97165 OT EVAL LOW COMPLEX 30 MIN: CPT

## 2021-10-31 PROCEDURE — 97535 SELF CARE MNGMENT TRAINING: CPT

## 2021-10-31 PROCEDURE — 85025 COMPLETE CBC W/AUTO DIFF WBC: CPT

## 2021-10-31 PROCEDURE — 94640 AIRWAY INHALATION TREATMENT: CPT

## 2021-10-31 PROCEDURE — 36415 COLL VENOUS BLD VENIPUNCTURE: CPT

## 2021-10-31 RX ORDER — SODIUM CHLORIDE, SODIUM LACTATE, POTASSIUM CHLORIDE, CALCIUM CHLORIDE 600; 310; 30; 20 MG/100ML; MG/100ML; MG/100ML; MG/100ML
50 INJECTION, SOLUTION INTRAVENOUS CONTINUOUS
Status: DISCONTINUED | OUTPATIENT
Start: 2021-10-31 | End: 2021-11-02

## 2021-10-31 RX ORDER — MORPHINE SULFATE 2 MG/ML
1 INJECTION, SOLUTION INTRAMUSCULAR; INTRAVENOUS
Status: DISCONTINUED | OUTPATIENT
Start: 2021-10-31 | End: 2021-11-02 | Stop reason: HOSPADM

## 2021-10-31 RX ADMIN — LATANOPROST 1 DROP: 50 SOLUTION OPHTHALMIC at 21:37

## 2021-10-31 RX ADMIN — AMLODIPINE BESYLATE 5 MG: 5 TABLET ORAL at 10:42

## 2021-10-31 RX ADMIN — LEVOTHYROXINE SODIUM 50 MCG: 0.05 TABLET ORAL at 07:21

## 2021-10-31 RX ADMIN — POLYETHYLENE GLYCOL 3350 17 G: 17 POWDER, FOR SOLUTION ORAL at 10:42

## 2021-10-31 RX ADMIN — ATORVASTATIN CALCIUM 10 MG: 10 TABLET, FILM COATED ORAL at 21:37

## 2021-10-31 RX ADMIN — ACETAMINOPHEN 650 MG: 325 TABLET ORAL at 07:21

## 2021-10-31 RX ADMIN — CEFAZOLIN SODIUM 2 G: 1 INJECTION, POWDER, FOR SOLUTION INTRAMUSCULAR; INTRAVENOUS at 01:10

## 2021-10-31 RX ADMIN — DOCUSATE SODIUM 50 MG AND SENNOSIDES 8.6 MG 1 TABLET: 8.6; 5 TABLET, FILM COATED ORAL at 10:42

## 2021-10-31 RX ADMIN — ENOXAPARIN SODIUM 30 MG: 100 INJECTION SUBCUTANEOUS at 07:21

## 2021-10-31 RX ADMIN — IPRATROPIUM BROMIDE AND ALBUTEROL SULFATE 3 ML: .5; 3 SOLUTION RESPIRATORY (INHALATION) at 03:11

## 2021-10-31 RX ADMIN — Medication 1 TABLET: at 17:57

## 2021-10-31 RX ADMIN — SERTRALINE 50 MG: 50 TABLET, FILM COATED ORAL at 10:42

## 2021-10-31 RX ADMIN — IPRATROPIUM BROMIDE AND ALBUTEROL SULFATE 3 ML: .5; 3 SOLUTION RESPIRATORY (INHALATION) at 07:06

## 2021-10-31 RX ADMIN — DOCUSATE SODIUM 50 MG AND SENNOSIDES 8.6 MG 1 TABLET: 8.6; 5 TABLET, FILM COATED ORAL at 17:57

## 2021-10-31 RX ADMIN — Medication 1 TABLET: at 11:38

## 2021-10-31 RX ADMIN — SODIUM CHLORIDE, POTASSIUM CHLORIDE, SODIUM LACTATE AND CALCIUM CHLORIDE 50 ML/HR: 600; 310; 30; 20 INJECTION, SOLUTION INTRAVENOUS at 21:00

## 2021-10-31 NOTE — PROGRESS NOTES
[facility] Adult  Hospitalist Group                                                                                          Hospitalist Progress Note  Dedra Vizcaino NP  Answering service: [phone] or [phone] from in house phone        Date of Service:  10/31/2021  NAME:  Monica Wolfe  :  1928  MRN:  [id number]      Admission Summary:    This 27-year-old woman with past medical history significant for coronary artery disease; status post stent placement, COPD, hypertension, hypothyroidism, sick sinus syndrome, status post pacemaker insertion, dyslipidemia, memory impairment was in her usual state of health until the day of her presentation at the emergency room when it was reported that the patient fell at the nursing home where the patient resides. Sally Johnson circumstances surrounding her fall was not known.  The fall was not witnessed.  The patient was found on the floor. Homero Neely was reported that the patient was unable to bear weight on her left leg.  X-ray was done at the facility.  The x-ray shows fractured hip.  The patient was sent to the emergency room for further evaluation.  When the patient arrived at the emergency room, CT scan of the head was obtained and that was negative.  The CT scan of the cervical spine also did not show any evidence of fracture.  The pelvic x-ray confirmed the left hip fracture.  The emergency room physician consulted the orthopedic service on call. Dipesh Kauffman to the hospitalist service was advised.  The patient was referred to the hospitalist service for that purpose.  The patient is not able to provide any meaningful history because of underlying memory impairment.  The patient is not able to state whether she was having pain or not, the character, or the quality of the pain. Cookie Quinn was no history of fever, rigors, or chills reported.  The patient was last admitted to the hospital from 2021 to 2021.  The patient was admitted and treated for rectal prolapse.  She underwent perineal proctosigmoidectomy by the surgical service. Interval history / Subjective:    Seen and examined patient laying I bed with eyes open. States that she is feeling fine today. Overnight O2s dropped. Denies any shortness of breath. O2 sats on bedside monitor 96%. On O2 NC. Left hip dressing is clean, dry and intact. LLE is positive for circulation, sensation and movement. Spoke with patient's son Elvia Foley ([phone]) Updated him on patients condition and plan of care. He states patient is on 2L NC O2 at home 24 hours a day. Assessment & Plan:     Closed intertrochanteric fracture of left hip   - due to ground level fall   - s/p Cephalomedullary nailing left hip (10/30)  - Ortho following   - Pain control and neurovascular checks  - PT/OT consulted   - CM for discharge planning     GLF    - Head CT negative   - PT/OT consulted      Leukocytosis   - WBC 16.5    - Monitor labs   - UA completed      MARILYN on CKD   - Creatinine 1.87, baseline ~1.1-1.2  - Likely prerenal   - Continue with IV fluids   - Monitor labs in am and consider nephrology consult      Hypertension   - Stable   - Continue amlodipine   - Monitor vital signs per unit routine      Hypothyroidism   - TSH 8.57  - Continue levothyroxine  - Will need to follow closely at discharge with pcp for medication adjustments.       Dyslipidemia   - Continue atorvastatin      Dementia   - Supportive treatment     Hx of Lung CA s/p resection, chemo and XRT. Completed in 2000.    - On 2L NC O2 continuous at home, continue while inpatient  - Overnight had episode of hypoxia- Chest xray showing no acute process.      Hx of COPD- Duonebs as needed   Hx of CAD s/p stent placement   Hx of SSS s/p pacemaker placement      Code status: Full   DVT prophylaxis: Lovenox    Care Plan discussed with: Patient/Family, Nurse and   Anticipated Disposition: SNF/LTC  Anticipated Discharge: 24 hours to 48 hours     Hospital Problems  Date Reviewed: 10/30/2021        Codes Class Noted POA    * (Principal) Hip fracture requiring operative repair [facility]) ICD-10-CM: B18.585H  ICD-9-CM: 820.8  10/29/2021 Yes                Review of Systems:   A comprehensive review of systems was negative except for that written in the HPI. Vital Signs:    Last 24hrs VS reviewed since prior progress note. Most recent are:  Visit Vitals  BP (!) 128/57 (BP 1 Location: Left upper arm, BP Patient Position: At rest)   Pulse 81   Temp 98 °F (36.7 °C)   Resp 17   Ht 4' 11\" (1.499 m)   Wt 47.6 kg (105 lb)   SpO2 92%   BMI 21.21 kg/m²         Intake/Output Summary (Last 24 hours) at 10/31/2021 1009  Last data filed at 10/30/2021 1913  Gross per 24 hour   Intake 800 ml   Output 435 ml   Net 365 ml        Physical Examination:             Constitutional:  No acute distress, cooperative, pleasant    ENT:  Oral mucosa moist, oropharynx benign. Resp:  CTA bilaterally. No wheezing/rhonchi/rales. No accessory muscle use   CV:  Regular rhythm, normal rate, no murmurs, gallops, rubs    GI:  Soft, non distended, non tender. normoactive bowel sounds,     Musculoskeletal:  No edema, warm, 2+ pulses throughout. Left hip dressing is clean, dry and intact. LLE is positive for circulation, sensation and movement. Neurologic:  Moves all extremities.   AAOx1-2, CN II-XII reviewed     Psych:  Not anxious or agitated       Data Review:    Review and/or order of clinical lab test  Review and/or order of tests in the medicine section of CPT      Labs:     Recent Labs     10/31/21  0126 10/30/21  0517   WBC 16.5* 14.7*   HGB 9.9* 12.0   HCT 30.9* 37.1    275     Recent Labs     10/31/21  0126 10/30/21  0517 10/29/21  2115    140 138   K 4.0 3.7 3.6   * 107 108   CO2 26 25 24   BUN 48* 40* 41*   CREA 1.87* 1.58* 1.49*   * 127* 121*   CA 8.6 9.8 10.0   MG  --  2.1  --    PHOS  --  3.9  --      Recent Labs     10/30/21  0517   ALT 21   AP 60   TBILI 0.5 TP 7.0   ALB 3.3*   GLOB 3.7     No results for input(s): INR, PTP, APTT, INREXT in the last 72 hours. No results for input(s): FE, TIBC, PSAT, FERR in the last 72 hours. No results found for: FOL, RBCF   No results for input(s): PH, PCO2, PO2 in the last 72 hours.   Recent Labs     10/29/21  2115   CPK 47     No results found for: CHOL, CHOLX, CHLST, CHOLV, HDL, HDLP, LDL, LDLC, DLDLP, TGLX, TRIGL, TRIGP, CHHD, CHHDX  Lab Results   Component Value Date/Time    Glucose (POC) 89 11/13/2014 07:42 AM     Lab Results   Component Value Date/Time    Color YELLOW/STRAW 10/31/2021 01:26 AM    Appearance CLEAR 10/31/2021 01:26 AM    Specific gravity 1.020 10/31/2021 01:26 AM    pH (UA) 5.0 10/31/2021 01:26 AM    Protein 30 (A) 10/31/2021 01:26 AM    Glucose Negative 10/31/2021 01:26 AM    Ketone TRACE (A) 10/31/2021 01:26 AM    Bilirubin Negative 10/31/2021 01:26 AM    Urobilinogen 0.2 10/31/2021 01:26 AM    Nitrites Negative 10/31/2021 01:26 AM    Leukocyte Esterase SMALL (A) 10/31/2021 01:26 AM    Epithelial cells FEW 10/31/2021 01:26 AM    Bacteria 1+ (A) 10/31/2021 01:26 AM    WBC 5-10 10/31/2021 01:26 AM    RBC 10-20 10/31/2021 01:26 AM         Medications Reviewed:     Current Facility-Administered Medications   Medication Dose Route Frequency    amLODIPine (NORVASC) tablet 5 mg  5 mg Oral DAILY    atorvastatin (LIPITOR) tablet 10 mg  10 mg Oral QHS    sertraline (ZOLOFT) tablet 50 mg  50 mg Oral DAILY    levothyroxine (SYNTHROID) tablet 50 mcg  50 mcg Oral ACB    latanoprost (XALATAN) 0.005 % ophthalmic solution 1 Drop  1 Drop Both Eyes QHS    sodium chloride (NS) flush 5-40 mL  5-40 mL IntraVENous Q8H    sodium chloride (NS) flush 5-40 mL  5-40 mL IntraVENous PRN    acetaminophen (TYLENOL) tablet 650 mg  650 mg Oral Q6H PRN    Or    acetaminophen (TYLENOL) suppository 650 mg  650 mg Rectal Q6H PRN    polyethylene glycol (MIRALAX) packet 17 g  17 g Oral DAILY PRN    ondansetron (ZOFRAN ODT) tablet 4 mg 4 mg Oral Q8H PRN    Or    ondansetron (ZOFRAN) injection 4 mg  4 mg IntraVENous Q6H PRN    lactated Ringers infusion  75 mL/hr IntraVENous CONTINUOUS    morphine injection 2 mg  2 mg IntraVENous Q4H PRN    lactated Ringers infusion  50 mL/hr IntraVENous CONTINUOUS    sodium chloride (NS) flush 5-40 mL  5-40 mL IntraVENous Q8H    sodium chloride (NS) flush 5-40 mL  5-40 mL IntraVENous PRN    lidocaine (PF) (XYLOCAINE) 10 mg/mL (1 %) injection 0.1 mL  0.1 mL SubCUTAneous PRN    fentaNYL citrate (PF) injection 50 mcg  50 mcg IntraVENous PRN    midazolam (VERSED) injection 1 mg  1 mg IntraVENous PRN    sodium chloride (NS) flush 5-40 mL  5-40 mL IntraVENous Q8H    sodium chloride (NS) flush 5-40 mL  5-40 mL IntraVENous PRN    traMADoL (ULTRAM) tablet 50 mg  50 mg Oral Q6H PRN    HYDROmorphone (DILAUDID) injection 0.5 mg  0.5 mg IntraVENous Q4H PRN    hydrOXYzine HCL (ATARAX) tablet 10 mg  10 mg Oral Q8H PRN    naloxone (NARCAN) injection 0.4 mg  0.4 mg IntraVENous PRN    ondansetron (ZOFRAN) injection 4 mg  4 mg IntraVENous Q4H PRN    calcium-vitamin D (OS-ZAMZAM +D3) 500 mg-200 unit per tablet 1 Tablet  1 Tablet Oral TID WITH MEALS    senna-docusate (PERICOLACE) 8.6-50 mg per tablet 1 Tablet  1 Tablet Oral BID    polyethylene glycol (MIRALAX) packet 17 g  17 g Oral DAILY    [START ON 11/1/2021] bisacodyL (DULCOLAX) suppository 10 mg  10 mg Rectal DAILY PRN    enoxaparin (LOVENOX) injection 30 mg  30 mg SubCUTAneous Q24H    labetaloL (NORMODYNE;TRANDATE) 20 mg/4 mL (5 mg/mL) injection 10 mg  10 mg IntraVENous Q5MIN PRN    hydrALAZINE (APRESOLINE) 20 mg/mL injection 10 mg  10 mg IntraVENous ONCE PRN    albuterol-ipratropium (DUO-NEB) 2.5 MG-0.5 MG/3 ML  3 mL Nebulization Q4H PRN     ______________________________________________________________________  EXPECTED LENGTH OF STAY: - - -  ACTUAL LENGTH OF STAY:          2                 Dago Schroeder NP

## 2021-10-31 NOTE — PROGRESS NOTES
Problem: Mobility Impaired (Adult and Pediatric)  Goal: *Acute Goals and Plan of Care (Insert Text)  Description: FUNCTIONAL STATUS PRIOR TO ADMISSION: Pt poor historian, unable to provide PLOF. Per chart review, pt has a history of dementia, lives at a nursing home, and was ambulating with a RW. Physical Therapy Goals  Initiated 10/31/2021  1. Patient will move from supine to sit and sit to supine , scoot up and down, and roll side to side in bed with moderate assistance  within 4 days. 2. Patient will perform sit to stand with minimal assistance/contact guard assist within 4 days. 3. Patient will ambulate with moderate assistance  for 20 feet with the least restrictive device within 4 days. 4. Patient will perform hip home exercise program per protocol with Zarina within 4 days. Outcome: Not Met  PHYSICAL THERAPY EVALUATION  Patient: Maren Perez (68 y.o. female)  Date: 10/31/2021  Primary Diagnosis: Hip fracture requiring operative repair [facility] [S72.009A]  Procedure(s) (LRB):  LEFT HIP GAMMA NAIL INSETION  CAT AWARE  ESSENTIAL (Left) 1 Day Post-Op   Precautions: WBAT LLE, Fall       ASSESSMENT  Based on the objective data described below, the patient presents with confusion (baseline dementia), pain, impaired balance, decreased activity tolerance (on 4L O2), and overall decline in functional mobility s/p left hip nail insertion now POD1. Pt poor historian, unable to provide PLOF. Per chart review, pt has a history of dementia, lives at a nursing home, and was ambulating with a RW. Pt required maxA x2 for supine<>sit transfers, modA x2 for sit<>stand transfers, and took 2 side steps along EOB with modA x2 using RW - required assistance for weight shifting and step by step verbal cuing. Pt returned to bed and left with all needs met. Recommending SNF upon discharge.         Current Level of Function Impacting Discharge (mobility/balance): mod-maxA x2 for all mobility     Functional Outcome Measure: The patient scored 5/100 on the Barthel outcome measure which is indicative of 95% impairment and dependence on others for basic mobility and self care tasks. Other factors to consider for discharge: Fall risk, hx fall, pain, baseline dementia, from nursing home      Patient will benefit from skilled therapy intervention to address the above noted impairments. PLAN :  Recommendations and Planned Interventions: bed mobility training, transfer training, gait training, therapeutic exercises, neuromuscular re-education, patient and family training/education, and therapeutic activities      Frequency/Duration: Patient will be followed by physical therapy:  daily to address goals. Recommendation for discharge: (in order for the patient to meet his/her long term goals)  Therapy up to 5 days/week in SNF setting    This discharge recommendation:  Has not yet been discussed the attending provider and/or case management    IF patient discharges home will need the following DME: to be determined (TBD)         SUBJECTIVE:   Patient stated I think I live in a townhouse.     OBJECTIVE DATA SUMMARY:   HISTORY:    Past Medical History:   Diagnosis Date    Arm fracture, left 2013    Arm fracture, right 1999    Bell's palsy 2005, 2011    H/O BELL'S PALSY X2    CAD (coronary artery disease) 1996    MI , STENT    Cancer ([address] 2008    BREAST RIGHT - [place] RADIATION    Cancer ([facility]     LUNG    Chronic obstructive pulmonary disease (HCC)     Elevated cholesterol     Glaucoma     H/O: pneumonia 2013    History of pelvic fracture 2018    Hypertension     Hypothyroid     Pacemaker 01/09/2018    Sick sinus syndrome ([facility]     Syncope      Past Surgical History:   Procedure Laterality Date    HX CATARACT REMOVAL Bilateral 2001    HX HEART CATHETERIZATION      HX OTHER SURGICAL  2013    [address] LIP    HX PACEMAKER  2018    HX PACEMAKER PLACEMENT Left 01/2018    SC BREAST SURGERY PROCEDURE UNLISTED  1998 right mastectomy    ND CHEST SURGERY PROCEDURE UNLISTED Left     EXC LUNG MASS     EXAMINATION/PRESENTATION/DECISION MAKING:   Critical Behavior:  Neurologic State: Alert  Orientation Level: Oriented to person, Disoriented to place, Disoriented to situation, Disoriented to time    Range Of Motion:  AROM: Grossly decreased, non-functional (LLE)           PROM: Grossly decreased, non-functional (LLE)           Strength:    Strength: Grossly decreased, non-functional (LLE)                    Tone & Sensation:   Tone: Normal              Sensation: Intact               Coordination:  Coordination: Generally decreased, functional  Vision:      Functional Mobility:  Bed Mobility:     Supine to Sit: Maximum assistance;Assist x2  Sit to Supine: Maximum assistance;Assist x2  Scooting: Total assistance;Assist x2  Transfers:  Sit to Stand: Moderate assistance;Assist x2  Stand to Sit: Moderate assistance;Assist x2                       Balance:   Sitting: Intact  Standing: Impaired; With support  Standing - Static: Poor;Constant support  Standing - Dynamic : Poor;Constant support  Ambulation/Gait Training:  Distance (ft): 1 Feet (ft) (side stepping)  Assistive Device: Gait belt;Walker, rolling  Ambulation - Level of Assistance: Moderate assistance;Assist x2        Gait Abnormalities: Antalgic;Decreased step clearance;Shuffling gait        Base of Support: Narrowed  Stance: Left decreased  Speed/Lara: Slow;Shuffled  Step Length: Right shortened;Left shortened  Swing Pattern: Left asymmetrical    Functional Measure:  Barthel Index:    Bathin  Bladder: 0  Bowels: 0  Groomin  Dressin  Feedin  Mobility: 0  Stairs: 0  Toilet Use: 0  Transfer (Bed to Chair and Back): 0  Total: 5/100       The Barthel ADL Index: Guidelines  1. The index should be used as a record of what a patient does, not as a record of what a patient could do.   2. The main aim is to establish degree of independence from any help, physical or verbal, however minor and for whatever reason. 3. The need for supervision renders the patient not independent. 4. A patient's performance should be established using the best available evidence. Asking the patient, friends/relatives and nurses are the usual sources, but direct observation and common sense are also important. However direct testing is not needed. 5. Usually the patient's performance over the preceding 24-48 hours is important, but occasionally longer periods will be relevant. 6. Middle categories imply that the patient supplies over 50 per cent of the effort. 7. Use of aids to be independent is allowed. Fanny Cifuentes., Barthel, DObduliaW. (1057). Functional evaluation: the Barthel Index. [facility] (14)2. DENA Thompson, Rajesh Park., Zen Salcido., Terry, [address] (). Measuring the change indisability after inpatient rehabilitation; comparison of the responsiveness of the Barthel Index and Functional [place]ardo Measure. Journal of Neurology, Neurosurgery, and Psychiatry, 66(4), 925-608. Cornelia Kyle, N.J.A, LILIAN Chandra, & Mark Burnette MANTWAN. (2004.) Assessment of post-stroke quality of life in cost-effectiveness studies: The usefulness of the Barthel Index and the EuroQoL-5D.  Quality of Life Research, 15, 280-13           Physical Therapy Evaluation Charge Determination   History Examination Presentation Decision-Making   HIGH Complexity :3+ comorbidities / personal factors will impact the outcome/ POC  HIGH Complexity : 4+ Standardized tests and measures addressing body structure, function, activity limitation and / or participation in recreation  LOW Complexity : Stable, uncomplicated  Other outcome measures Barthel  HIGH       Based on the above components, the patient evaluation is determined to be of the following complexity level: LOW     Pain Rating:  L hip pain    Activity Tolerance:   Poor    After treatment patient left in no apparent distress:   Supine in bed, Call bell within reach, Bed / chair alarm activated, and Side rails x 3    COMMUNICATION/EDUCATION:   The patients plan of care was discussed with: Occupational therapist and Registered nurse. Fall prevention education was provided and the patient/caregiver indicated understanding. and Patient is unable to participate in goal setting and plan of care.     Thank you for this referral.  Yahaira Rodriguez, PT, DPT   Time Calculation: 23 mins

## 2021-10-31 NOTE — PROGRESS NOTES
Bedside and Verbal shift change report given to [address] [place] [address] Blvd (oncoming nurse) by [address] (offgoing nurse). Report included the following information SBAR.

## 2021-10-31 NOTE — PROGRESS NOTES
Ortho Daily Progress Note      Patient: Nereida Fonseca                   MRN: [id number]  Sex: female  YOB: 1928           Age: 80 y.o.    1 Day Post-Op    Procedure(s): LEFT HIP GAMMA NAIL    Subjective: No specific complaints, denies left hip pain     Visit Vitals  BP (!) 128/57 (BP 1 Location: Left upper arm, BP Patient Position: At rest)   Pulse 81   Temp 98 °F (36.7 °C)   Resp 17   Ht 4' 11\" (1.499 m)   Wt 47.6 kg (105 lb)   SpO2 92%   BMI 21.21 kg/m²        Lab Results:  HGB   Date/Time Value Ref Range Status   10/31/2021 01:26 AM 9.9 (L) 11.5 - 16.0 g/dL Final     INR   Date/Time Value Ref Range Status   02/06/2018 01:12 PM 1.1 0.9 - 1.1   Final     Comment:     A single therapeutic range for Vit K antagonists may not be optimal for all indications - see June, 2008 issue of Chest, American College of Chest Physicians Evidence-Based Clinical Practice Guidelines, 8th Edition.        Physical Exam:  81yo female, confused, disoriented, cooperative, no distress, left hip dressing clean/dry, mild swelling left thigh as expected, sensation grossly intact left leg, moving foot/ankle normally, strong pedal pulse, denies pain with gentle passive ROM left hip, no significant calf swelling or tenderness      Plan:    DVT prophylaxisLovenox 30mg daily, SCDs  Weight bearing restriction WBAT  Follow-up with Dr. Hadley Gray 3-4 weeks post-op      JULIÁN Betancur  10/31/2021   11:02 AM      .

## 2021-10-31 NOTE — PROGRESS NOTES
Patient 02 sat at 78% on 2L on assessment. Paged Dr Franki Ball who ordered chest x ray, stopped IVF, and ordered 40mg IV lasix. Patient was still satting low, obtained order for PRN breathing treatment. Still struggling to keep O2 above 90 on 6L. Patient denies SOB or chest pain.

## 2021-10-31 NOTE — PROGRESS NOTES
Problem: Self Care Deficits Care Plan (Adult)  Goal: *Acute Goals and Plan of Care (Insert Text)  Description: FUNCTIONAL STATUS PRIOR TO ADMISSION: Patient is a questionable historian per chart which includes memory impairment in PMH. Patient last seen by OT in January 2021, at which time \"She lives alone in a townhouse and PTA was indep without AD. She was walking to the convenience store in her neighborhood. \" Note discharge plan of return home with increased family assistance at that time. HOME SUPPORT: Patient reports living alone in a townhouse. Questionable historian. Occupational Therapy Goals  Initiated 10/31/2021   1. Patient will perform lower body dressing using AE prn at minimal assistance level within 7 days. 2. Patient will perform toilet transfers to [facility] at minimal assistance level using RW prn within 7 days. 3. Patient will perform all aspects of toileting at minimal assistance level using RW prn within 7 days. 4. Patient will perform self-feeding with supervision/set-up within 7 days. Outcome: Progressing Towards Goal   OCCUPATIONAL THERAPY EVALUATION  Patient: Gillian Conn (28 y.o. female)  Date: 10/31/2021  Primary Diagnosis: Hip fracture requiring operative repair ([facility] [address] Janis Julio  Procedure(s) (LRB):  LEFT HIP GAMMA NAIL INSETION  CAT AWARE  ESSENTIAL (Left) 1 Day Post-Op   Precautions: Fall      ASSESSMENT  Based on the objective data described below, the patient presents with generalized weakness, impaired functional mobility and balance, impaired cardiopulmonary endurance, limited lower body reach, AMS, decreased independence in ADLs, and post-operative pain following L hip gamma nail insertion, now POD 1. Patient received in bed on 4L O2 humidified with resting SpO2 91-97%. Patient oriented to self and aware of being in hospital, receptive to reorientation regarding situation but needing occasional reminders throughout session.  Patient demonstrating decreased command-following, benefiting from multimodal cues and increased time for all activity / mobility. Patient benefiting from max assist x2 for bed mobility, maintaining fair seated balance at EOB for several minutes before standing with mod assist x2. Of note, patient SpO2 briefly as low as 76%, however rapidly increasing to the mid-80s and recovering to low-90s with cues for breathing. Assisted patient with meal set-up at end of session with patient requiring max hand-over-hand to sip from water glass (unable to effectively use straw, repeatedly saying \"It's closed\"). Minimal coughing with thin liquids and RN agreeable to monitor. Patient currently well below baseline, requiring mod-total assist for BADLs. Although most recent baseline is unclear, anticipate patient is well below baseline as she was modified independent earlier this year. Recommend discharge to SNF to maximize functional gains and facilitate patient safety and independence. Current Level of Function Impacting Discharge (ADLs/self-care): mod - total A    Functional Outcome Measure: The patient scored 5/100 on the Barthel Index. Other factors to consider for discharge: AMS     Patient will benefit from skilled therapy intervention to address the above noted impairments. PLAN :  Recommendations and Planned Interventions: self care training, functional mobility training, therapeutic exercise, balance training, visual/perceptual training, therapeutic activities, endurance activities, patient education, home safety training, and family training/education    Frequency/Duration: Patient will be followed by occupational therapy 5 times a week to address goals.     Recommendation for discharge: (in order for the patient to meet his/her long term goals)  Therapy up to 5 days/week in SNF setting    This discharge recommendation:  Has been made in collaboration with the attending provider and/or case management    IF patient discharges home will need the following DME: TBD       SUBJECTIVE:   Patient stated my daddy was paralyzed.     OBJECTIVE DATA SUMMARY:   HISTORY:   Past Medical History:   Diagnosis Date    Arm fracture, left 2013    Arm fracture, right 1999    Bell's palsy 2005, 2011    H/O BELL'S PALSY X2    CAD (coronary artery disease) 1996    MI , STENT    Cancer ([facility] [address] 2008    BREAST RIGHT - BALLON RADIATION    Cancer ([facility] [address]     LUNG    Chronic obstructive pulmonary disease (HCC)     Elevated cholesterol     Glaucoma     H/O: pneumonia 2013    History of pelvic fracture 2018    Hypertension     Hypothyroid     Pacemaker 01/09/2018    Sick sinus syndrome ([facility] [address]     Syncope      Past Surgical History:   Procedure Laterality Date    HX CATARACT REMOVAL Bilateral 2001    HX HEART CATHETERIZATION      HX OTHER SURGICAL  2013    [address] LIP    HX PACEMAKER  2018    HX PACEMAKER PLACEMENT Left 01/2018    RI BREAST SURGERY PROCEDURE UNLISTED  1998    right mastectomy    RI CHEST SURGERY PROCEDURE UNLISTED Left 1998    EXC LUNG MASS       Expanded or extensive additional review of patient history:          Hand dominance: Right    EXAMINATION OF PERFORMANCE DEFICITS:  Cognitive/Behavioral Status:  Neurologic State: Alert  Orientation Level: Oriented to person;Oriented to place; Disoriented to situation;Disoriented to time  Cognition: Decreased command following;Decreased attention/concentration  Perception: Verbal;Visual;Tactile  Perseveration: Perseverates during conversation  Safety/Judgement: Awareness of environment; Fall prevention;Decreased insight into deficits    Skin:     Edema:     Hearing:       Vision/Perceptual:     Tracking appropriately. May benefit from further testing if cognition clears and patient can effectively participate.       Range of Motion:  AROM: Generally decreased, functional  PROM: Generally decreased, functional    Strength:  Strength: Generally decreased, functional    Coordination:  Coordination: Generally decreased, functional    Tone & Sensation:  Tone: Normal  Sensation: Intact    Balance:  Sitting: Intact  Standing: Impaired; With support  Standing - Static: Poor;Constant support  Standing - Dynamic : Poor;Constant support    Functional Mobility and Transfers for ADLs:  Bed Mobility:  Supine to Sit: Maximum assistance;Assist x2  Sit to Supine: Maximum assistance;Assist x2  Scooting: Total assistance;Assist x2    Transfers:  Sit to Stand: Moderate assistance;Assist x2  Stand to Sit: Moderate assistance;Assist x2  Toilet Transfer : Maximum assistance;Assist x2 (infer to [facility]  via SPT)    ADL Assessment:  Patient recalled and demonstrated avoiding extreme planes of movement with Left LE during ADLs and functional mobility with verbal cues. Feeding: Maximum assistance    Oral Facial Hygiene/Grooming: Moderate assistance    Upper Body Dressing: Moderate assistance    Lower Body Dressing: Total assistance    Toileting: Total assistance    ADL Intervention and task modifications:  Feeding  Feeding Assistance: Maximum assistance  Container Management: Total assistance (dependent)  Drink to Mouth: Maximum assistance (hand-over-hand)  Cues: Verbal cues provided;Visual cues provided;Physical assistance    Lower Body Dressing Assistance  Dressing Assistance: Total assistance(dependent)  Socks: Total assistance (dependent)  Position Performed: Supine  Cues: Verbal cues provided;Physical assistance    Toileting  Bladder Hygiene: Total assistance (dependent) (w/ winston)    Cognitive Retraining  Orientation Retraining: Reorienting;Situation;Place; Awareness of environment  Organizing/Sequencing: Breaking task down  Attention to Task: Single task  Safety/Judgement: Awareness of environment; Fall prevention;Decreased insight into deficits    Home safety: Patient instructed on home modifications and safety (raise height of ADL objects, appropriate height of chair surfaces, recliner safety, change of floor surfaces, clear pathways) to increase independence and fall prevention. Patient indicated understanding. Standing: Patient instructed and demonstrated to step into walker to increase safety of joint and fall prevention. Functional Measure:    Barthel Index:  Bathin  Bladder: 0  Bowels: 0  Groomin  Dressin  Feedin  Mobility: 0  Stairs: 0  Toilet Use: 0  Transfer (Bed to Chair and Back): 0  Total: 5/100      The Barthel ADL Index: Guidelines  1. The index should be used as a record of what a patient does, not as a record of what a patient could do. 2. The main aim is to establish degree of independence from any help, physical or verbal, however minor and for whatever reason. 3. The need for supervision renders the patient not independent. 4. A patient's performance should be established using the best available evidence. Asking the patient, friends/relatives and nurses are the usual sources, but direct observation and common sense are also important. However direct testing is not needed. 5. Usually the patient's performance over the preceding 24-48 hours is important, but occasionally longer periods will be relevant. 6. Middle categories imply that the patient supplies over 50 per cent of the effort. 7. Use of aids to be independent is allowed. Score Interpretation (from [facility])    Independent   60-79 Minimally independent   40-59 Partially dependent   20-39 Very dependent   <20 Totally dependent     -Godfrey Altamirano., Barthel, D.W. (1965). Functional evaluation: the Barthel Index. [facility] ([address] (1997). The Barthel activities of daily living index: self-reporting versus actual performance in the old (> or = 75 years). Journal of [address] [place] [address] 45(7), [facility], J.JObduliaMObduliaF, Rickie Ayala., Jeffry Pizarro. (1999).  Measuring the change in disability after inpatient rehabilitation; comparison of the responsiveness of the Barthel Index and Functional [place] Measure. Journal of Neurology, Neurosurgery, and Psychiatry, 66(4), 787-654. ASH Ellison, LILIAN Chandra, & Apolinar Noe M.A. (2004) Assessment of post-stroke quality of life in cost-effectiveness studies: The usefulness of the Barthel Index and the EuroQoL-5D. Quality of Life Research, 15, 383-31         Occupational Therapy Evaluation Charge Determination   History Examination Decision-Making   LOW Complexity : Brief history review  MEDIUM Complexity : 3-5 performance deficits relating to physical, cognitive , or psychosocial skils that result in activity limitations and / or participation restrictions MEDIUM Complexity : Patient may present with comorbidities that affect occupational performnce. Miniml to moderate modification of tasks or assistance (eg, physical or verbal ) with assesment(s) is necessary to enable patient to complete evaluation       Based on the above components, the patient evaluation is determined to be of the following complexity level: LOW   Pain Rating:  Patient reporting mild-moderate pain in L hip with movement, improved with repositioning and rest.    Activity Tolerance:   Fair, desaturates with exertion and requires oxygen, and requires rest breaks    After treatment patient left in no apparent distress:    Supine in bed, Call bell within reach, Bed / chair alarm activated, and Side rails x 3    COMMUNICATION/EDUCATION:   The patients plan of care was discussed with: Physical therapist and Registered nurse. Home safety education was provided and the patient/caregiver indicated understanding., Patient/family have participated as able in goal setting and plan of care. , and Patient/family agree to work toward stated goals and plan of care.     Thank you for this referral.  Ravi Soares OT  Time Calculation: 35 mins

## 2021-11-01 LAB
ANION GAP SERPL CALC-SCNC: 1 MMOL/L (ref 5–15)
BASOPHILS # BLD: 0.1 K/UL (ref 0–0.1)
BASOPHILS NFR BLD: 1 % (ref 0–1)
BUN SERPL-MCNC: 44 MG/DL (ref 6–20)
BUN/CREAT SERPL: 36 (ref 12–20)
CALCIUM SERPL-MCNC: 9.1 MG/DL (ref 8.5–10.1)
CHLORIDE SERPL-SCNC: 111 MMOL/L (ref 97–108)
CO2 SERPL-SCNC: 31 MMOL/L (ref 21–32)
COVID-19 RAPID TEST, COVR: NOT DETECTED
CREAT SERPL-MCNC: 1.23 MG/DL (ref 0.55–1.02)
DIFFERENTIAL METHOD BLD: ABNORMAL
EOSINOPHIL # BLD: 0.4 K/UL (ref 0–0.4)
EOSINOPHIL NFR BLD: 3 % (ref 0–7)
ERYTHROCYTE [DISTWIDTH] IN BLOOD BY AUTOMATED COUNT: 15.6 % (ref 11.5–14.5)
GLUCOSE SERPL-MCNC: 110 MG/DL (ref 65–100)
HCT VFR BLD AUTO: 28.7 % (ref 35–47)
HGB BLD-MCNC: 9.1 G/DL (ref 11.5–16)
HGB BLD-MCNC: 9.2 G/DL (ref 11.5–16)
IMM GRANULOCYTES # BLD AUTO: 0.1 K/UL (ref 0–0.04)
IMM GRANULOCYTES NFR BLD AUTO: 1 % (ref 0–0.5)
LYMPHOCYTES # BLD: 0.9 K/UL (ref 0.8–3.5)
LYMPHOCYTES NFR BLD: 7 % (ref 12–49)
MCH RBC QN AUTO: 29.2 PG (ref 26–34)
MCHC RBC AUTO-ENTMCNC: 31.7 G/DL (ref 30–36.5)
MCV RBC AUTO: 92 FL (ref 80–99)
MONOCYTES # BLD: 1.1 K/UL (ref 0–1)
MONOCYTES NFR BLD: 9 % (ref 5–13)
NEUTS SEG # BLD: 9.8 K/UL (ref 1.8–8)
NEUTS SEG NFR BLD: 79 % (ref 32–75)
NRBC # BLD: 0 K/UL (ref 0–0.01)
NRBC BLD-RTO: 0 PER 100 WBC
PLATELET # BLD AUTO: 308 K/UL (ref 150–400)
PMV BLD AUTO: 11.3 FL (ref 8.9–12.9)
POTASSIUM SERPL-SCNC: 3.9 MMOL/L (ref 3.5–5.1)
RBC # BLD AUTO: 3.12 M/UL (ref 3.8–5.2)
SODIUM SERPL-SCNC: 143 MMOL/L (ref 136–145)
SOURCE, COVRS: NORMAL
WBC # BLD AUTO: 12.5 K/UL (ref 3.6–11)

## 2021-11-01 PROCEDURE — 97535 SELF CARE MNGMENT TRAINING: CPT

## 2021-11-01 PROCEDURE — 97530 THERAPEUTIC ACTIVITIES: CPT

## 2021-11-01 PROCEDURE — 87635 SARS-COV-2 COVID-19 AMP PRB: CPT

## 2021-11-01 PROCEDURE — 74011250637 HC RX REV CODE- 250/637: Performed by: ORTHOPAEDIC SURGERY

## 2021-11-01 PROCEDURE — 97116 GAIT TRAINING THERAPY: CPT

## 2021-11-01 PROCEDURE — 77030037878 HC DRSG MEPILEX >48IN BORD MOLN -B

## 2021-11-01 PROCEDURE — 77030038269 HC DRN EXT URIN PURWCK BARD -A

## 2021-11-01 PROCEDURE — 80048 BASIC METABOLIC PNL TOTAL CA: CPT

## 2021-11-01 PROCEDURE — 74011250636 HC RX REV CODE- 250/636: Performed by: NURSE PRACTITIONER

## 2021-11-01 PROCEDURE — 74011250636 HC RX REV CODE- 250/636: Performed by: ORTHOPAEDIC SURGERY

## 2021-11-01 PROCEDURE — 85018 HEMOGLOBIN: CPT

## 2021-11-01 PROCEDURE — 36415 COLL VENOUS BLD VENIPUNCTURE: CPT

## 2021-11-01 PROCEDURE — 65270000029 HC RM PRIVATE

## 2021-11-01 PROCEDURE — 51798 US URINE CAPACITY MEASURE: CPT

## 2021-11-01 PROCEDURE — 77010033678 HC OXYGEN DAILY

## 2021-11-01 PROCEDURE — 85025 COMPLETE CBC W/AUTO DIFF WBC: CPT

## 2021-11-01 RX ADMIN — ATORVASTATIN CALCIUM 10 MG: 10 TABLET, FILM COATED ORAL at 22:18

## 2021-11-01 RX ADMIN — AMLODIPINE BESYLATE 5 MG: 5 TABLET ORAL at 09:27

## 2021-11-01 RX ADMIN — SODIUM CHLORIDE, POTASSIUM CHLORIDE, SODIUM LACTATE AND CALCIUM CHLORIDE 50 ML/HR: 600; 310; 30; 20 INJECTION, SOLUTION INTRAVENOUS at 18:35

## 2021-11-01 RX ADMIN — POLYETHYLENE GLYCOL 3350 17 G: 17 POWDER, FOR SOLUTION ORAL at 09:31

## 2021-11-01 RX ADMIN — Medication 10 ML: at 18:28

## 2021-11-01 RX ADMIN — Medication 1 TABLET: at 09:27

## 2021-11-01 RX ADMIN — ACETAMINOPHEN 650 MG: 325 TABLET ORAL at 09:27

## 2021-11-01 RX ADMIN — SERTRALINE 50 MG: 50 TABLET, FILM COATED ORAL at 09:27

## 2021-11-01 RX ADMIN — DOCUSATE SODIUM 50 MG AND SENNOSIDES 8.6 MG 1 TABLET: 8.6; 5 TABLET, FILM COATED ORAL at 18:29

## 2021-11-01 RX ADMIN — DOCUSATE SODIUM 50 MG AND SENNOSIDES 8.6 MG 1 TABLET: 8.6; 5 TABLET, FILM COATED ORAL at 09:27

## 2021-11-01 RX ADMIN — LATANOPROST 1 DROP: 50 SOLUTION OPHTHALMIC at 23:25

## 2021-11-01 RX ADMIN — LEVOTHYROXINE SODIUM 50 MCG: 0.05 TABLET ORAL at 06:31

## 2021-11-01 RX ADMIN — Medication 1 TABLET: at 18:29

## 2021-11-01 RX ADMIN — ENOXAPARIN SODIUM 30 MG: 100 INJECTION SUBCUTANEOUS at 06:31

## 2021-11-01 NOTE — PROGRESS NOTES
Physician Progress Note      Erlinda Dumont  CSN #:                  [id number]  :                       1928  ADMIT DATE:       10/29/2021 7:35 PM  DISCH DATE:  RESPONDING  PROVIDER #:        Bonifacio Gaffney NP        QUERY TEXT:    Stage of Chronic Kidney Disease: Please provide further specificity, if known. Clinical indicators include: ckd, creatinine, bun  Options provided:  -- Chronic kidney disease stage 1  -- Chronic kidney disease stage 2  -- Chronic kidney disease stage 3  -- Chronic kidney disease stage 3a  -- Chronic kidney disease stage 3b  -- Chronic kidney disease stage 4  -- Chronic kidney disease stage 5  -- Chronic kidney disease stage 5, requiring dialysis  -- End stage renal disease  -- Other - I will add my own diagnosis  -- Disagree - Not applicable / Not valid  -- Disagree - Clinically Unable to determine / Unknown        PROVIDER RESPONSE TEXT:    The patient has chronic kidney disease stage 1.       Electronically signed by:  Bonifacio Gaffney NP 2021 11:35 AM

## 2021-11-01 NOTE — PROGRESS NOTES
Ortho Daily Progress Note    11/1/2021    POD:  2 Days Post-Op  S/P:  Procedure(s):  Left hip gamma nail    HPI: 81 yo F that is POD2, s/p Left hip gamma nail with Dr. Michael De La Rosa on 10/30/21. Patient is sleeping, easily aroused but closes eyes and falls back asleep. Does not answer questions. Does not appear to be in pain. No other orthopedic complaints. LABS:  Lab Results   Component Value Date/Time    HGB 9.1 (L) 11/01/2021 10:33 AM    INR 1.1 02/06/2018 01:12 PM     Recent Results (from the past 12 hour(s))   HEMOGLOBIN    Collection Time: 11/01/21  4:32 AM   Result Value Ref Range    HGB 9.2 (L) 11.5 - 46.3 g/dL   METABOLIC PANEL, BASIC    Collection Time: 11/01/21 10:33 AM   Result Value Ref Range    Sodium 143 136 - 145 mmol/L    Potassium 3.9 3.5 - 5.1 mmol/L    Chloride 111 (H) 97 - 108 mmol/L    CO2 31 21 - 32 mmol/L    Anion gap 1 (L) 5 - 15 mmol/L    Glucose 110 (H) 65 - 100 mg/dL    BUN 44 (H) 6 - 20 MG/DL    Creatinine 1.23 (H) 0.55 - 1.02 MG/DL    BUN/Creatinine ratio 36 (H) 12 - 20      GFR est AA 49 (L) >60 ml/min/1.73m2    GFR est non-AA 41 (L) >60 ml/min/1.73m2    Calcium 9.1 8.5 - 10.1 MG/DL   CBC WITH AUTOMATED DIFF    Collection Time: 11/01/21 10:33 AM   Result Value Ref Range    WBC 12.5 (H) 3.6 - 11.0 K/uL    RBC 3.12 (L) 3.80 - 5.20 M/uL    HGB 9.1 (L) 11.5 - 16.0 g/dL    HCT 28.7 (L) 35.0 - 47.0 %    MCV 92.0 80.0 - 99.0 FL    MCH 29.2 26.0 - 34.0 PG    MCHC 31.7 30.0 - 36.5 g/dL    RDW 15.6 (H) 11.5 - 14.5 %    PLATELET 991 230 - 192 K/uL    MPV 11.3 8.9 - 12.9 FL    NRBC 0.0 0  WBC    ABSOLUTE NRBC 0.00 0.00 - 0.01 K/uL    NEUTROPHILS 79 (H) 32 - 75 %    LYMPHOCYTES 7 (L) 12 - 49 %    MONOCYTES 9 5 - 13 %    EOSINOPHILS 3 0 - 7 %    BASOPHILS 1 0 - 1 %    IMMATURE GRANULOCYTES 1 (H) 0.0 - 0.5 %    ABS. NEUTROPHILS 9.8 (H) 1.8 - 8.0 K/UL    ABS. LYMPHOCYTES 0.9 0.8 - 3.5 K/UL    ABS. MONOCYTES 1.1 (H) 0.0 - 1.0 K/UL    ABS. EOSINOPHILS 0.4 0.0 - 0.4 K/UL    ABS.  BASOPHILS 0.1 0.0 - 0.1 K/UL    ABS. IMM. GRANS. 0.1 (H) 0.00 - 0.04 K/UL    DF AUTOMATED     COVID-19 RAPID TEST    Collection Time: 11/01/21 11:22 AM   Result Value Ref Range    Specimen source Nasopharyngeal      COVID-19 rapid test Not detected NOTD           ORTHOPEDIC PHYSICAL EXAM:  LLE MSK: Left hip with dressing to the lateral side. Dressing is C/D/I. Compartments of the LLE are soft and compressible, without pain. 2+ DP pulse. Cap refill is brisk. No pain on passive flexion of great toe. Negative Hommans sign. ORTHOPEDIC ASSESSMENT:   Left hip fracture that is now s/p cephalomedullary nail to the left hip    ORTHOPEDIC PLAN:  1. Followed by Maria Eugenia  2. Ortho plan: No further ortho surgical plans  3. DVT ppx: Lovenox 30mg every day; SCDs  4. Pain control: Adequate; per admitting team, ice and elevation. 5. Dressing: Change prn for saturation with dry sterile dressing. 6. Activity: WBAT LLE   8. Dispo: PT/OT recs SNF placement. Patient is okay from an ortho standpoint for d/c. Pt to f/u with Dr. John Holman in 3-4 weeks. Please see d/c instructions for further detail.     [place], [address] [facility]

## 2021-11-01 NOTE — PROGRESS NOTES
Problem: Self Care Deficits Care Plan (Adult)  Goal: *Acute Goals and Plan of Care (Insert Text)  Description: FUNCTIONAL STATUS PRIOR TO ADMISSION: Patient is a questionable historian per chart which includes memory impairment in PMH. Patient last seen by OT in January 2021, at which time \"She lives alone in a townhouse and PTA was indep without AD. She was walking to the convenience store in her neighborhood. \" Note discharge plan of return home with increased family assistance at that time. HOME SUPPORT: Patient reports living alone in a townhouse. Questionable historian. Occupational Therapy Goals  Initiated 10/31/2021   1. Patient will perform lower body dressing using AE prn at minimal assistance level within 7 days. 2. Patient will perform toilet transfers to [facility] at minimal assistance level using RW prn within 7 days. 3. Patient will perform all aspects of toileting at minimal assistance level using RW prn within 7 days. 4. Patient will perform self-feeding with supervision/set-up within 7 days. Outcome: Progressing Towards Goal     OCCUPATIONAL THERAPY TREATMENT  Patient: Kamran Jones (34 y.o. female)  Date: 11/1/2021  Diagnosis: Hip fracture requiring operative repair ([facility] [address] Artelia Organ Hip fracture requiring operative repair ([facility] [address]  Procedure(s) (LRB):  LEFT HIP GAMMA NAIL INSETION  CAT AWARE  ESSENTIAL (Left) 2 Days Post-Op  Precautions:    Chart, occupational therapy assessment, plan of care, and goals were reviewed. ASSESSMENT  Patient continues with skilled OT services and is progressing towards goals. Patient presented supine in bed, drowsy but able to alert to voice and with environmental input (I.e lights on, covers down). Pt performed supine<>sit with max A x2, sit<>stand with mod A x2, sit<>supine with mod A x2, scooting to EOB with max A. Pt performed light grooming tasks while seated EOB with set-up assistance and motivational verbal cueing.  Pt continues to be limited by general fatigue, intermittent impaired sitting balance, impaired standing balance, and decreased activity tolerance. Pt positioned in bedside chair to promote independence in self-feeding post tx session. RN notified and aware. Continue to recommend SNF at d/c     Current Level of Function Impacting Discharge (ADLs): set up for light grooming seated EOB, infer max A for all other ADLs    Other factors to consider for discharge: PLOF         PLAN :  Patient continues to benefit from skilled intervention to address the above impairments. Continue treatment per established plan of care to address goals. Recommendation for discharge: (in order for the patient to meet his/her long term goals)  Therapy up to 5 days/week in SNF setting    This discharge recommendation:  Has been made in collaboration with the attending provider and/or case management         SUBJECTIVE:   Patient stated I'm okay.     OBJECTIVE DATA SUMMARY:   Cognitive/Behavioral Status:  Neurologic State: Drowsy; Eyes open to voice  Orientation Level: Oriented to person  Cognition: Decreased attention/concentration; Follows commands             Functional Mobility and Transfers for ADLs:  Bed Mobility:  Rolling: Maximum assistance  Supine to Sit: Maximum assistance;Assist x2  Sit to Supine: Maximum assistance;Assist x2  Scooting: Total assistance;Maximum assistance    Transfers:  Sit to Stand: Moderate assistance;Assist x2          Balance:  Sitting: Impaired  Sitting - Static: Fair (occasional)  Sitting - Dynamic: Fair (occasional)  Standing: Impaired; With support  Standing - Static: Fair;Constant support  Standing - Dynamic : Poor;Constant support    ADL Intervention:       Grooming  Grooming Assistance: Set-up  Position Performed: Seated edge of bed  Washing Face: Set-up  Washing Hands: Set-up  Brushing/Combing Hair: Set-up  Cues: Verbal cues provided                                  Pain:  Patient did not verbalize any pain this session. Activity Tolerance:   Fair    After treatment patient left in no apparent distress:   Sitting in chair, Call bell within reach and Bed / chair alarm activated, RN notified    COMMUNICATION/COLLABORATION:   The patients plan of care was discussed with: Physical therapist and Registered nurse.      Michelle Quevedo OT  Time Calculation: 37 mins

## 2021-11-01 NOTE — PROGRESS NOTES
ALYSE: Plan for discharge to [address], bed available tomorrow (Tuesday). Patient has received the covid vaccine. Rapid covid test is required prior to discharge. BLS transport at discharge. CM called the son, Shay Luciano #[phone]. Patient resides at [address] [place] [address] in assisted living. Confirmed plans for discharge to the healthcare center at [address]. CM called [facility] #[phone] and spoke with Neeta Rodrigez. They can accept patient into skilled care tomorrow. Rapid test is needed for admission. CM notified Rn.    CM updated the son, Shay Luciano. Care Management Interventions  PCP Verified by CM:  Yes  Mode of Transport at Discharge: BLS  Transition of Care Consult (CM Consult): SNF  MyChart Signup: No  Discharge Durable Medical Equipment: No  Physical Therapy Consult: Yes  Occupational Therapy Consult: Yes  Support Systems: [place]  Confirm Follow Up Transport: Other (see comment) (BLS)  The Patient and/or Patient Representative was Provided with a Choice of Provider and Agrees with the Discharge Plan?: Yes  Freedom of Choice List was Provided with Basic Dialogue that Supports the Patient's Individualized Plan of Care/Goals, Treatment Preferences and Shares the Quality Data Associated with the Providers?: Yes  Discharge Location  Discharge Placement: Skilled nursing facility      Reason for Admission:   Hip fracture                    RUR Score:   17%               PCP: First and Last name:   Gloria Ghosh MD     Name of Practice:    Are you a current patient: Yes/No: yes   Approximate date of last visit:    Can you participate in a virtual visit if needed:     Do you (patient/family) have any concerns for transition/discharge?     none              Plan for utilizing home health:   SNF    Current Advanced Directive/Advance Care Plan:  Full Code      Healthcare Decision Maker:   Click here to complete [employer] including selection of the [employer] Relationship (ie \"Primary\")            Primary Decision Maker: Ekta Heller 4, Legal Guardian - [phone]    Secondary Decision Maker: Darshan Tesfaye - Daughter-in-Law - [phone]    Transition of Care Plan:     SNF at [facility], BSW/CRM Yes

## 2021-11-01 NOTE — PROGRESS NOTES
Bedside shift change report given to Randy  (oncoming nurse) by Leah Pino  (offgoing nurse). Report included the following information SBAR, Kardex, Intake/Output and MAR.

## 2021-11-01 NOTE — PROGRESS NOTES
Problem: Mobility Impaired (Adult and Pediatric)  Goal: *Acute Goals and Plan of Care (Insert Text)  Description: FUNCTIONAL STATUS PRIOR TO ADMISSION: Pt poor historian, unable to provide PLOF. Per chart review, pt has a history of dementia, lives at a nursing home, and was ambulating with a RW. Physical Therapy Goals  Initiated 10/31/2021  1. Patient will move from supine to sit and sit to supine , scoot up and down, and roll side to side in bed with moderate assistance  within 4 days. 2. Patient will perform sit to stand with minimal assistance/contact guard assist within 4 days. 3. Patient will ambulate with moderate assistance  for 20 feet with the least restrictive device within 4 days. 4. Patient will perform hip home exercise program per protocol with Zarina within 4 days. Outcome: Progressing Towards Goal   PHYSICAL THERAPY TREATMENT  Patient: David Gonsales (81 y.o. female)  Date: 11/1/2021  Diagnosis: Hip fracture requiring operative repair ([address] Pecolia Nickels Hip fracture requiring operative repair ([address]  Procedure(s) (LRB):  LEFT HIP GAMMA NAIL INSETION  CAT AWARE  ESSENTIAL (Left) 2 Days Post-Op  Precautions:  fall  Chart, physical therapy assessment, plan of care and goals were reviewed. ASSESSMENT  Patient continues with skilled PT services and is progressing towards goals. Pt continues with lethargy/sleeping most of time - will arouse but requires constant verbal/tactile stimulation to maintain eyes open and pt's participation. Pt standing with mod assist x 2 and performing 4-6 steps to bedside recliner w/c. Current Level of Function Impacting Discharge (mobility/balance): bed mobility max assist x 2; sit to stand mod assist x 2; transfers/amb min to mod assist x 2    Other factors to consider for discharge:          PLAN :  Patient continues to benefit from skilled intervention to address the above impairments. Continue treatment per established plan of care.   to address goals. Recommendation for discharge: (in order for the patient to meet his/her long term goals)  Therapy up to 5 days/week in SNF setting    This discharge recommendation:  Has been made in collaboration with the attending provider and/or case management    IF patient discharges home will need the following DME: patient owns DME required for discharge       SUBJECTIVE:       OBJECTIVE DATA SUMMARY:   Critical Behavior:  Neurologic State: Drowsy, Eyes open to voice  Orientation Level: Oriented to person  Cognition: Decreased attention/concentration, Follows commands  Safety/Judgement: Awareness of environment, Fall prevention, Decreased insight into deficits  Functional Mobility Training:  Bed Mobility:  Rolling: Maximum assistance;Assist x2  Supine to Sit: Maximum assistance;Assist x2  Sit to Supine: Maximum assistance;Assist x2  Scooting: Total assistance        Transfers:  Sit to Stand: Moderate assistance;Assist x2  Stand to Sit: Moderate assistance;Assist x2                             Balance:  Sitting: Impaired  Sitting - Static: Fair (occasional)  Sitting - Dynamic: Fair (occasional)  Standing: Impaired; With support  Standing - Static: Fair;Constant support  Standing - Dynamic : Constant support; Fair  Ambulation/Gait Training:  Distance (ft): 2 Feet (ft)  Assistive Device: Walker, rolling;Gait belt  Ambulation - Level of Assistance: Moderate assistance;Assist x1        Gait Abnormalities: Antalgic;Decreased step clearance        Base of Support: Narrowed  Stance: Left decreased  Speed/Lara: Slow;Shuffled  Step Length: Right shortened;Left shortened  Swing Pattern: Left asymmetrical     Pain Rating:  Pt unable to rate.     Activity Tolerance:   Poor - difficult to keep awake     After treatment patient left in no apparent distress:   Call bell within reach, Bed / chair alarm activated, and sitting in recliner chair    COMMUNICATION/COLLABORATION:   The patients plan of care was discussed with: Occupational therapist and Registered nurse.      Neal Lai, PT   Time Calculation: 35 mins

## 2021-11-01 NOTE — PROGRESS NOTES
Bedside and Verbal shift change report given to [address] [place] [address] Kandy (oncoming nurse) by Dalila Ojeda (offgoing nurse). Report included the following information SBAR, Kardex, Intake/Output, MAR, Accordion and Recent Results.

## 2021-11-01 NOTE — PROGRESS NOTES
[facility] Adult  Hospitalist Group                                                                                          Hospitalist Progress Note  Kit ALVARO Mathias  Answering service: [phone] or [phone] from in house phone        Date of Service:  2021  NAME:  Semaj Smalls  :  1928  MRN:  [id number]      Admission Summary:    This 44-year-old woman with past medical history significant for coronary artery disease; status post stent placement, COPD, hypertension, hypothyroidism, sick sinus syndrome, status post pacemaker insertion, dyslipidemia, memory impairment was in her usual state of health until the day of her presentation at the emergency room when it was reported that the patient fell at the nursing home where the patient resides. Torres Mass circumstances surrounding her fall was not known.  The fall was not witnessed.  The patient was found on the floor. Shi Coates was reported that the patient was unable to bear weight on her left leg.  X-ray was done at the facility.  The x-ray shows fractured hip.  The patient was sent to the emergency room for further evaluation.  When the patient arrived at the emergency room, CT scan of the head was obtained and that was negative.  The CT scan of the cervical spine also did not show any evidence of fracture.  The pelvic x-ray confirmed the left hip fracture.  The emergency room physician consulted the orthopedic service on call. Pastora Post to the hospitalist service was advised.  The patient was referred to the hospitalist service for that purpose.  The patient is not able to provide any meaningful history because of underlying memory impairment.  The patient is not able to state whether she was having pain or not, the character, or the quality of the pain. Toshia Ulloaer was no history of fever, rigors, or chills reported.  The patient was last admitted to the hospital from 2021 to 2021.  The patient was admitted and treated for rectal prolapse.  She underwent perineal proctosigmoidectomy by the surgical service. Interval history / Subjective:    Seen and examined patient laying in bed with eyes closed. Easily arousable. States that she is ok. Left hip dressing is CDI  No new complaints. No overnight events noted. AM labs pending   1130- Spoke with patient's son Lana Connor ([phone]) Updated him on patient's condition and plan of care. Questions answered. Assessment & Plan:     Closed intertrochanteric fracture of left hip   - due to ground level fall   - s/p Cephalomedullary nailing left hip (10/30)  - Ortho following   - Pain control and neurovascular checks  - PT/OT following   - CM for discharge planning     GLF    - Head CT negative   - PT/OT consulted      Leukocytosis   - WBC 16.5 -> 14.6  - Monitor labs   - UA completed      MARILYN on CKD stage 1  - Creatinine 1.87, baseline ~1.1-1.2  - Likely prerenal   - Continue with IV fluids   - Monitor labs in am and consider nephrology consult      Hypertension   - Stable   - Continue amlodipine   - Monitor vital signs per unit routine      Hypothyroidism   - TSH 8.57  - Continue levothyroxine  - Will need to follow closely at discharge with pcp for medication adjustments.       Dyslipidemia   - Continue atorvastatin      Dementia   - Supportive treatment      Hx of Lung CA s/p resection, chemo and XRT. Completed in 2000.    - On 2L NC O2 continuous at home, continue while inpatient  - Overnight had episode of hypoxia- Chest xray showing no acute process.      Hx of COPD- Duonebs as needed   Hx of CAD s/p stent placement   Hx of SSS s/p pacemaker placement      Code status: Full   DVT prophylaxis: Lovenox     Care Plan discussed with: Patient/Family, Nurse and   Anticipated Disposition: SNF/LTC  Anticipated Discharge: Less than 24 hours     Hospital Problems  Date Reviewed: 10/30/2021        Codes Class Noted POA    * (Principal) Hip fracture requiring operative repair ([facility] [address] ICD-10-CM: P74.350L  ICD-9-CM: 820.8  10/29/2021 Yes                Review of Systems:   A comprehensive review of systems was negative except for that written in the HPI. Vital Signs:    Last 24hrs VS reviewed since prior progress note. Most recent are:  Visit Vitals  BP (!) 163/64 (BP 1 Location: Left upper arm)   Pulse 82   Temp 97.5 °F (36.4 °C)   Resp 18   Ht 4' 11\" (1.499 m)   Wt 47.6 kg (105 lb)   SpO2 95%   BMI 21.21 kg/m²         Intake/Output Summary (Last 24 hours) at 11/1/2021 1026  Last data filed at 11/1/2021 0814  Gross per 24 hour   Intake 100 ml   Output 580 ml   Net -480 ml        Physical Examination:             Constitutional:  No acute distress, cooperative, pleasant    ENT:  Oral mucosa moist, oropharynx benign. Resp:  CTA bilaterally. No wheezing/rhonchi/rales. No accessory muscle use   CV:  Regular rhythm, normal rate, no murmurs, gallops, rubs    GI:  Soft, non distended, non tender. normoactive bowel sounds    Musculoskeletal:  No edema, warm, 2+ pulses throughout. Left hip dressing noted- CDI. LLE + for circulation, movement and sensation. Neurologic:  Moves all extremities. AAOx3, CN II-XII reviewed     Psych:  Not anxious or agitated       Data Review:    Review and/or order of clinical lab test  Review and/or order of tests in the medicine section of [facility]      Labs:     Recent Labs     11/01/21  0432 10/31/21  0126 10/30/21  0517 10/30/21  0517   WBC  --  16.5*  --  14.7*   HGB 9.2* 9.9*   < > 12.0   HCT  --  30.9*  --  37.1   PLT  --  287  --  275    < > = values in this interval not displayed.      Recent Labs     10/31/21  1254 10/31/21  0126 10/30/21  0517    140 140   K 4.0 4.0 3.7   * 109* 107   CO2 24 26 25   BUN 56* 48* 40*   CREA 1.81* 1.87* 1.58*   * 123* 127*   CA 9.4 8.6 9.8   MG  --   --  2.1   PHOS  --   --  3.9     Recent Labs     10/30/21  0517   ALT 21   AP 60   TBILI 0.5   TP 7.0   ALB 3.3*   GLOB 3.7     No results for input(s): INR, PTP, APTT, INREXT in the last 72 hours. No results for input(s): FE, TIBC, PSAT, FERR in the last 72 hours. No results found for: FOL, RBCF   No results for input(s): PH, PCO2, PO2 in the last 72 hours.   Recent Labs     10/29/21  2115   CPK 47     No results found for: CHOL, CHOLX, CHLST, CHOLV, HDL, HDLP, LDL, LDLC, DLDLP, TGLX, TRIGL, TRIGP, CHHD, CHHDX  Lab Results   Component Value Date/Time    Glucose (POC) 89 11/13/2014 07:42 AM     Lab Results   Component Value Date/Time    Color YELLOW/STRAW 10/31/2021 01:26 AM    Appearance CLEAR 10/31/2021 01:26 AM    Specific gravity 1.020 10/31/2021 01:26 AM    pH (UA) 5.0 10/31/2021 01:26 AM    Protein 30 (A) 10/31/2021 01:26 AM    Glucose Negative 10/31/2021 01:26 AM    Ketone TRACE (A) 10/31/2021 01:26 AM    Bilirubin Negative 10/31/2021 01:26 AM    Urobilinogen 0.2 10/31/2021 01:26 AM    Nitrites Negative 10/31/2021 01:26 AM    Leukocyte Esterase SMALL (A) 10/31/2021 01:26 AM    Epithelial cells FEW 10/31/2021 01:26 AM    Bacteria 1+ (A) 10/31/2021 01:26 AM    WBC 5-10 10/31/2021 01:26 AM    RBC 10-20 10/31/2021 01:26 AM         Medications Reviewed:     Current Facility-Administered Medications   Medication Dose Route Frequency    morphine injection 1 mg  1 mg IntraVENous Q4H PRN    lactated Ringers infusion  50 mL/hr IntraVENous CONTINUOUS    amLODIPine (NORVASC) tablet 5 mg  5 mg Oral DAILY    atorvastatin (LIPITOR) tablet 10 mg  10 mg Oral QHS    sertraline (ZOLOFT) tablet 50 mg  50 mg Oral DAILY    levothyroxine (SYNTHROID) tablet 50 mcg  50 mcg Oral ACB    latanoprost (XALATAN) 0.005 % ophthalmic solution 1 Drop  1 Drop Both Eyes QHS    sodium chloride (NS) flush 5-40 mL  5-40 mL IntraVENous Q8H    sodium chloride (NS) flush 5-40 mL  5-40 mL IntraVENous PRN    acetaminophen (TYLENOL) tablet 650 mg  650 mg Oral Q6H PRN    Or    acetaminophen (TYLENOL) suppository 650 mg  650 mg Rectal Q6H PRN    polyethylene glycol (MIRALAX) packet 17 g  17 g Oral DAILY PRN    ondansetron (ZOFRAN ODT) tablet 4 mg  4 mg Oral Q8H PRN    Or    ondansetron (ZOFRAN) injection 4 mg  4 mg IntraVENous Q6H PRN    sodium chloride (NS) flush 5-40 mL  5-40 mL IntraVENous Q8H    sodium chloride (NS) flush 5-40 mL  5-40 mL IntraVENous PRN    sodium chloride (NS) flush 5-40 mL  5-40 mL IntraVENous Q8H    sodium chloride (NS) flush 5-40 mL  5-40 mL IntraVENous PRN    traMADoL (ULTRAM) tablet 50 mg  50 mg Oral Q6H PRN    hydrOXYzine HCL (ATARAX) tablet 10 mg  10 mg Oral Q8H PRN    naloxone (NARCAN) injection 0.4 mg  0.4 mg IntraVENous PRN    calcium-vitamin D (OS-ZAMZAM +D3) 500 mg-200 unit per tablet 1 Tablet  1 Tablet Oral TID WITH MEALS    senna-docusate (PERICOLACE) 8.6-50 mg per tablet 1 Tablet  1 Tablet Oral BID    polyethylene glycol (MIRALAX) packet 17 g  17 g Oral DAILY    bisacodyL (DULCOLAX) suppository 10 mg  10 mg Rectal DAILY PRN    enoxaparin (LOVENOX) injection 30 mg  30 mg SubCUTAneous Q24H    albuterol-ipratropium (DUO-NEB) 2.5 MG-0.5 MG/3 ML  3 mL Nebulization Q4H PRN     ______________________________________________________________________  EXPECTED LENGTH OF STAY: - - -  ACTUAL LENGTH OF STAY:          3                 Tosha Morrison NP

## 2021-11-02 VITALS
OXYGEN SATURATION: 95 % | HEIGHT: 59 IN | BODY MASS INDEX: 21.17 KG/M2 | DIASTOLIC BLOOD PRESSURE: 56 MMHG | TEMPERATURE: 98.6 F | WEIGHT: 105 LBS | RESPIRATION RATE: 16 BRPM | SYSTOLIC BLOOD PRESSURE: 168 MMHG | HEART RATE: 87 BPM

## 2021-11-02 LAB
ANION GAP SERPL CALC-SCNC: 3 MMOL/L (ref 5–15)
BASOPHILS # BLD: 0.1 K/UL (ref 0–0.1)
BASOPHILS NFR BLD: 1 % (ref 0–1)
BUN SERPL-MCNC: 37 MG/DL (ref 6–20)
BUN/CREAT SERPL: 39 (ref 12–20)
CALCIUM SERPL-MCNC: 9.1 MG/DL (ref 8.5–10.1)
CHLORIDE SERPL-SCNC: 114 MMOL/L (ref 97–108)
CO2 SERPL-SCNC: 26 MMOL/L (ref 21–32)
CREAT SERPL-MCNC: 0.94 MG/DL (ref 0.55–1.02)
DIFFERENTIAL METHOD BLD: ABNORMAL
EOSINOPHIL # BLD: 0.5 K/UL (ref 0–0.4)
EOSINOPHIL NFR BLD: 4 % (ref 0–7)
ERYTHROCYTE [DISTWIDTH] IN BLOOD BY AUTOMATED COUNT: 15.6 % (ref 11.5–14.5)
GLUCOSE SERPL-MCNC: 99 MG/DL (ref 65–100)
HCT VFR BLD AUTO: 27.7 % (ref 35–47)
HGB BLD-MCNC: 8.9 G/DL (ref 11.5–16)
IMM GRANULOCYTES # BLD AUTO: 0.1 K/UL (ref 0–0.04)
IMM GRANULOCYTES NFR BLD AUTO: 1 % (ref 0–0.5)
LYMPHOCYTES # BLD: 0.9 K/UL (ref 0.8–3.5)
LYMPHOCYTES NFR BLD: 8 % (ref 12–49)
MCH RBC QN AUTO: 29.5 PG (ref 26–34)
MCHC RBC AUTO-ENTMCNC: 32.1 G/DL (ref 30–36.5)
MCV RBC AUTO: 91.7 FL (ref 80–99)
MONOCYTES # BLD: 0.8 K/UL (ref 0–1)
MONOCYTES NFR BLD: 7 % (ref 5–13)
NEUTS SEG # BLD: 8.9 K/UL (ref 1.8–8)
NEUTS SEG NFR BLD: 79 % (ref 32–75)
NRBC # BLD: 0 K/UL (ref 0–0.01)
NRBC BLD-RTO: 0 PER 100 WBC
PLATELET # BLD AUTO: 318 K/UL (ref 150–400)
PMV BLD AUTO: 11 FL (ref 8.9–12.9)
POTASSIUM SERPL-SCNC: 3.8 MMOL/L (ref 3.5–5.1)
RBC # BLD AUTO: 3.02 M/UL (ref 3.8–5.2)
SODIUM SERPL-SCNC: 143 MMOL/L (ref 136–145)
WBC # BLD AUTO: 11.2 K/UL (ref 3.6–11)

## 2021-11-02 PROCEDURE — 74011250636 HC RX REV CODE- 250/636: Performed by: ORTHOPAEDIC SURGERY

## 2021-11-02 PROCEDURE — 85025 COMPLETE CBC W/AUTO DIFF WBC: CPT

## 2021-11-02 PROCEDURE — 36415 COLL VENOUS BLD VENIPUNCTURE: CPT

## 2021-11-02 PROCEDURE — 77030019905 HC CATH URETH INTMIT MDII -A

## 2021-11-02 PROCEDURE — 74011250637 HC RX REV CODE- 250/637: Performed by: ORTHOPAEDIC SURGERY

## 2021-11-02 PROCEDURE — 80048 BASIC METABOLIC PNL TOTAL CA: CPT

## 2021-11-02 RX ORDER — ENOXAPARIN SODIUM 100 MG/ML
30 INJECTION SUBCUTANEOUS EVERY 24 HOURS
Qty: 21 EACH | Refills: 0 | Status: SHIPPED
Start: 2021-11-02

## 2021-11-02 RX ORDER — AMOXICILLIN 250 MG
1 CAPSULE ORAL 2 TIMES DAILY
Qty: 14 TABLET | Refills: 0 | Status: SHIPPED
Start: 2021-11-02

## 2021-11-02 RX ORDER — FERROUS SULFATE, DRIED 160(50) MG
1 TABLET, EXTENDED RELEASE ORAL
Qty: 30 TABLET | Refills: 0 | Status: SHIPPED
Start: 2021-11-02

## 2021-11-02 RX ORDER — TRAMADOL HYDROCHLORIDE 50 MG/1
50 TABLET ORAL
Qty: 4 TABLET | Refills: 0 | Status: SHIPPED | OUTPATIENT
Start: 2021-11-02 | End: 2021-11-06

## 2021-11-02 RX ADMIN — SERTRALINE 50 MG: 50 TABLET, FILM COATED ORAL at 09:14

## 2021-11-02 RX ADMIN — AMLODIPINE BESYLATE 5 MG: 5 TABLET ORAL at 09:14

## 2021-11-02 RX ADMIN — ENOXAPARIN SODIUM 30 MG: 100 INJECTION SUBCUTANEOUS at 06:32

## 2021-11-02 RX ADMIN — LEVOTHYROXINE SODIUM 50 MCG: 0.05 TABLET ORAL at 06:30

## 2021-11-02 RX ADMIN — DOCUSATE SODIUM 50 MG AND SENNOSIDES 8.6 MG 1 TABLET: 8.6; 5 TABLET, FILM COATED ORAL at 09:14

## 2021-11-02 RX ADMIN — ACETAMINOPHEN 650 MG: 325 TABLET ORAL at 09:14

## 2021-11-02 RX ADMIN — Medication 1 TABLET: at 09:14

## 2021-11-02 RX ADMIN — POLYETHYLENE GLYCOL 3350 17 G: 17 POWDER, FOR SOLUTION ORAL at 09:15

## 2021-11-02 NOTE — PROGRESS NOTES
No acute issues    GEN:  NAD  ABD:  S/NT/ND   LLE:  Dressing C/D/I , 5/5 motor, Calf nttp (Bilat), Sensation rossly intact to light touch throughout, 1+ dp/pt pulses, foot perfused    Patient Vitals for the past 24 hrs:   Temp Pulse Resp BP SpO2   11/01/21 1430 97.8 °F (36.6 °C) 65 16 119/63 95 %   11/01/21 0814 97.5 °F (36.4 °C) 82 18 (!) 163/64 95 %   11/01/21 0451 97.5 °F (36.4 °C) 85 18 (!) 150/82 100 %       Current Facility-Administered Medications   Medication Dose Route Frequency    morphine injection 1 mg  1 mg IntraVENous Q4H PRN    lactated Ringers infusion  50 mL/hr IntraVENous CONTINUOUS    amLODIPine (NORVASC) tablet 5 mg  5 mg Oral DAILY    atorvastatin (LIPITOR) tablet 10 mg  10 mg Oral QHS    sertraline (ZOLOFT) tablet 50 mg  50 mg Oral DAILY    levothyroxine (SYNTHROID) tablet 50 mcg  50 mcg Oral ACB    latanoprost (XALATAN) 0.005 % ophthalmic solution 1 Drop  1 Drop Both Eyes QHS    sodium chloride (NS) flush 5-40 mL  5-40 mL IntraVENous Q8H    sodium chloride (NS) flush 5-40 mL  5-40 mL IntraVENous PRN    acetaminophen (TYLENOL) tablet 650 mg  650 mg Oral Q6H PRN    Or    acetaminophen (TYLENOL) suppository 650 mg  650 mg Rectal Q6H PRN    polyethylene glycol (MIRALAX) packet 17 g  17 g Oral DAILY PRN    ondansetron (ZOFRAN ODT) tablet 4 mg  4 mg Oral Q8H PRN    Or    ondansetron (ZOFRAN) injection 4 mg  4 mg IntraVENous Q6H PRN    sodium chloride (NS) flush 5-40 mL  5-40 mL IntraVENous Q8H    sodium chloride (NS) flush 5-40 mL  5-40 mL IntraVENous PRN    sodium chloride (NS) flush 5-40 mL  5-40 mL IntraVENous Q8H    sodium chloride (NS) flush 5-40 mL  5-40 mL IntraVENous PRN    traMADoL (ULTRAM) tablet 50 mg  50 mg Oral Q6H PRN    hydrOXYzine HCL (ATARAX) tablet 10 mg  10 mg Oral Q8H PRN    naloxone (NARCAN) injection 0.4 mg  0.4 mg IntraVENous PRN    calcium-vitamin D (OS-ZAMZAM +D3) 500 mg-200 unit per tablet 1 Tablet  1 Tablet Oral TID WITH MEALS    senna-docusate (PERICOLACE) 8.6-50 mg per tablet 1 Tablet  1 Tablet Oral BID    polyethylene glycol (MIRALAX) packet 17 g  17 g Oral DAILY    bisacodyL (DULCOLAX) suppository 10 mg  10 mg Rectal DAILY PRN    enoxaparin (LOVENOX) injection 30 mg  30 mg SubCUTAneous Q24H    albuterol-ipratropium (DUO-NEB) 2.5 MG-0.5 MG/3 ML  3 mL Nebulization Q4H PRN       Lab Results   Component Value Date/Time    HGB 9.1 (L) 11/01/2021 10:33 AM    INR 1.1 02/06/2018 01:12 PM       Lab Results   Component Value Date/Time    Sodium 143 11/01/2021 10:33 AM    Potassium 3.9 11/01/2021 10:33 AM    Chloride 111 (H) 11/01/2021 10:33 AM    CO2 31 11/01/2021 10:33 AM    BUN 44 (H) 11/01/2021 10:33 AM    Creatinine 1.23 (H) 11/01/2021 10:33 AM    Calcium 9.1 11/01/2021 10:33 AM    Magnesium 2.1 10/30/2021 05:17 AM    Phosphorus 3.9 10/30/2021 05:17 AM            80 y.o. female s/p cephalomedullary nail left hip on 10/30/2021  . Doing well.      Precautions: None  ABX: Complete 24 hours perioperative abx  PATHWAY: D/C Bharat per protocol on POD 1  DVT Prophylaxis: Lovenox 40 sub Q for 21 days after discharge  Weight Bearing: WBAT LLE   Pain Control: POh  Disposition: SNF/Rehab

## 2021-11-02 NOTE — PROGRESS NOTES
ALYSE; Plan for discharge to [facility] [address]. [facility] phone [phone] transport time set for 11:30 AM. Chart reviewed. Noted discharge orders. CM called [facility] and left message for Miquel Rubin in admissions [phone] to find out what time bed is available. CM faxed discharge papers and covid results to facility at [phone]. CM called the son, Philip Mark #[phone] and notified of discharge transport time. Medicare pt has received, reviewed, and signed 2nd IM letter informing them of their right to appeal the discharge. Signed copy has been placed on pt bedside chart. BISHOP Trejo/CRM

## 2021-11-02 NOTE — DISCHARGE INSTRUCTIONS
Discharge SNF/Rehab Instructions/LTAC       PATIENT ID: Luiz Morales  MRN: [id number]   YOB: 1928    DATE OF ADMISSION: 10/29/2021  7:35 PM    DATE OF DISCHARGE: 11/2/2021    PRIMARY CARE PROVIDER: Diego Gardner MD       ATTENDING PHYSICIAN: Yaron Us MD  DISCHARGING PROVIDER: Hoa Jones NP     To contact this individual call [phone] and ask the  to page. If unavailable ask to be transferred the Adult Hospitalist Department.     CONSULTATIONS: None    PROCEDURES/SURGERIES: Procedure(s):  LEFT HIP GAMMA NAIL INSETION  CAT AWARE  ESSENTIAL    ADMITTING [facility] COURSE:   This 51-year-old woman with past medical history significant for coronary artery disease; status post stent placement, COPD, hypertension, hypothyroidism, sick sinus syndrome, status post pacemaker insertion, dyslipidemia, memory impairment was in her usual state of health until the day of her presentation at the emergency room when it was reported that the patient fell at the nursing home where the patient resides. Jessica Joshua circumstances surrounding her fall was not known.  The fall was not witnessed.  The patient was found on the floor. Wilfred Ma was reported that the patient was unable to bear weight on her left leg.  X-ray was done at the facility.  The x-ray shows fractured hip.  The patient was sent to the emergency room for further evaluation.  When the patient arrived at the emergency room, CT scan of the head was obtained and that was negative.  The CT scan of the cervical spine also did not show any evidence of fracture.  The pelvic x-ray confirmed the left hip fracture.  The emergency room physician consulted the orthopedic service on call. Rin Uriostegui to the hospitalist service was advised.  The patient was referred to the hospitalist service for that purpose.  The patient is not able to provide any meaningful history because of underlying memory impairment.  The patient is not able to state whether she was having pain or not, the character, or the quality of the pain. Corin Rader was no history of fever, rigors, or chills reported.  The patient was last admitted to the hospital from 01/18/2021 to 01/20/2021.  The patient was admitted and treated for rectal prolapse.  She underwent perineal proctosigmoidectomy by the surgical service. DISCHARGE DIAGNOSES / PLAN:      Closed intertrochanteric fracture of left hip   - due to ground level fall   - s/p Cephalomedullary nailing left hip (10/30)  - Rx Lovenox for 21 days at discharge   - Follow up with Dr. Aubrey Conn in 3-4 weeks      GLF    - Head CT negative      Leukocytosis   - WBC 16.5 -> 11.2      MARILYN on CKD stage 1, improved  - Creatinine 0.94, baseline ~1.1-1.2  - Likely prerenal   - Continue with IV fluids   - Monitor labs in am and consider nephrology consult      Hypertension   - Stable   - Continue amlodipine      Hypothyroidism   - TSH 8.57  - Continue levothyroxine  - Will need to follow closely at discharge with pcp for medication adjustments.       Dyslipidemia   - Continue atorvastatin      Dementia   - Supportive treatment      Hx of Lung CA s/p resection, chemo and XRT. Completed in 2000.    - On 2L NC O2 continuous at home, continue while inpatient  - Overnight had episode of hypoxia- Chest xray showing no acute process.      Hx of COPD- Duonebs as needed   Hx of CAD s/p stent placement   Hx of SSS s/p pacemaker placement       PENDING TEST RESULTS:   At the time of discharge the following test results are still pending: None     FOLLOW UP APPOINTMENTS:    Follow-up Information     Follow up With Specialties Details Why Contact Info    Chau Ryan MD [facility] Schedule an appointment as soon as possible for a visit For follow up with in one week  Man Reich [address] Delois Holstein, MD Orthopedic Surgery Schedule an appointment as soon as possible for a visit Make a follow up appointment in 3-4 weeks [address]  [facility] [address]  [phone]             ADDITIONAL CARE RECOMMENDATIONS:   Take medications as prescribed     Post op Discharge Instructions Hip Fracture   Ena Blount, [address] [facility]  [phone]    Patient Name: Pastor Rodriguez  Date of admission:  10/29/2021  Date of procedure: 10/30/2021   Procedure: Procedure(s):  LEFT HIP GAMMA NAIL INSETION  CAT AWARE  ESSENTIAL  PCP: Gloria Ghosh MD  Date of discharge: No discharge date for patient encounter. Follow up office visit   See Dr. Manisha Resendiz approximately 3-4 weeks from date of surgery. Call [phone] (ext [phone]) to make an appointment. Activity  Walk with your walker with weight bearing restrictions as instructed by your physical therapist.  Continue using your walker until seen for follow-up visit. You should walk daily with the physical therapist.  Perform your exercise routine 3 times a day as instructed by the physical therapist.    Incision Care  Surgical dressing is to remain on the hip for 7 days. You may replace it with a dry sterile gauze dressing; change it daily. Once the incision is not draining, leave it open to air. After 1 week, may shower and get your incision wet but do not submerge your incision under water in a bath tub, hot tub or swimming pool for 6 weeks after surgery. Preventing blood clots  Lovenox injections 30mg sub q once daily for 4 weeks following your surgery date      Pain management  - Please take scheduled 650 mg tylenol every 6 hours for 6 weeks  - Please take scheduled meloxicam 7.5 mg daily for 6 week to decrease pain and swelling  - Please take tramadol every 6 hours for pain as needed for pain. - Avoid alcoholic beverages while taking pain medication  - Do not take any over-the-counter medication for pain except Tylenol (acetaminophen)  - Please be aware that many medications contain Tylenol.   We do not want you to over medicate so please read the information below as a guide. Do not take more than 4 Grams of Tylenol in a 24 hour  - You may place an ice bag on your knee for 15-20 minutes after exercising and as needed throughout the day and night      Diet  Resume usual diet; encourage fluids; provide foods high in fiber, calcium and vitamin D  Provide stool softeners/laxatives as needed      After [address]  for SNF/Rehab (to be followed by post-acute provider)    Nursing  Complete head to toe assessment, vital signs  Medication reconciliation  Review pain management  Manage chronic medical conditions  Remove Aquacel dressing 7 days after surgery    Physical Therapy-status at discharge from the hospital    Weight bearing status:             Mobility Status:                Gait:                ADL status overall composite:                Physical Therapy-exercises, transfers, gait-training (partial weight bearing on the surgical leg)    Exercises related to strengthening the surgical hip:  Supine Exercises: Ankle pumps  Quad Sets  Gluteal Sets  Hip Abduction slides supine  Hip external rotation  Heel Slides    Standing Exercises:  Heel Raises  Mini-squats  Heel/toe touches and knee bend  Marching   Hip Abduction  Hip External Rotation  Hip Extension    Advance exercises with equipment for strengthening, flexibility, balance needs as appropriate per setting. Avoid active hip flexion exercised for 4 weeks. Repetitions and number of sets to be established per patient tolerance     Reasons to call the Surgeon  1. Increased redness, swelling or drainage from the incision site  2. Temperature consistently greater than 101 degrees  3.   Increased pain or unrelieved pain in hip or calf                DIET: Regular Diet      TUBE FEEDING INSTRUCTIONS: None     OXYGEN / BiPAP SETTINGS: None     ACTIVITY: See ortho discharge instructions     WOUND CARE: See ortho discharged instructions     EQUIPMENT needed: Per PT/OT DISCHARGE MEDICATIONS:   See Medication Reconciliation Form      NOTIFY YOUR PHYSICIAN FOR ANY OF THE FOLLOWING:   Fever over 101 degrees for 24 hours. Chest pain, shortness of breath, fever, chills, nausea, vomiting, diarrhea, change in mentation, falling, weakness, bleeding. Severe pain or pain not relieved by medications. Or, any other signs or symptoms that you may have questions about.     DISPOSITION:    Home With:   OT  PT  HH  RN      X SNF/Inpatient Rehab/LTAC    Independent/assisted living    Hospice    Other:       PATIENT CONDITION AT DISCHARGE:     Functional status   X Poor     Deconditioned     Independent      Cognition     Lucid     Forgetful    X Dementia      Catheters/lines (plus indication)    Nicholson     PICC     PEG    X None      Code status     Full code     DNR      PHYSICAL EXAMINATION AT DISCHARGE:   Refer to Progress Note***      CHRONIC MEDICAL DIAGNOSES:  Problem List as of 11/2/2021 Date Reviewed: 10/30/2021        Codes Class Noted - Resolved    * (Principal) Hip fracture requiring operative repair [facility]) ICD-10-CM: A60.395Q  ICD-9-CM: 820.8  10/29/2021 - Present        SSS (sick sinus syndrome) ([facility] ICD-10-CM: I49.5  ICD-9-CM: 427.81  1/9/2018 - Present        Heart palpitations ICD-10-CM: R00.2  ICD-9-CM: 785.1  12/18/2017 - Present        Shortness of breath ICD-10-CM: R06.02  ICD-9-CM: 786.05  9/23/2016 - Present        RESOLVED: Rectal prolapse ICD-10-CM: K62.3  ICD-9-CM: 569.1  1/18/2021 - 1/20/2021        RESOLVED: UTI (urinary tract infection) ICD-10-CM: N39.0  ICD-9-CM: 599.0  2/9/2018 - 1/21/2021        RESOLVED: Pubic bone fracture ([facility] ICD-10-CM: S32.509A  ICD-9-CM: 808.2  2/6/2018 - 1/21/2021        RESOLVED: Pneumonia ICD-10-CM: J18.9  ICD-9-CM: 173  11/13/2014 - 1/21/2021    Overview Signed 11/13/2014 10:13 AM by Immanuel Lee MD     Presumed aspiration related to epistaxis (nose bleed)             RESOLVED: Left humeral fracture ICD-10-CM: M05.051F  ICD-9-CM: 812.20  11/13/2014 - 1/21/2021        RESOLVED: Acute respiratory failure with hypoxia [facility]) ICD-10-CM: J96.01  ICD-9-CM: 518.81  11/13/2014 - 1/21/2021    Overview Signed 11/13/2014 10:16 AM by Oscar Alfonso MD     From Pneumonia, resolved             RESOLVED: Sepsis ([facility] ICD-10-CM: A41.9  ICD-9-CM: 038.9, 995.91  11/10/2014 - 1/21/2021    Overview Signed 11/13/2014 10:13 AM by Oscar Alfonso MD     resolved             RESOLVED: CAD (coronary artery disease) ICD-10-CM: I25.10  ICD-9-CM: 414.00  1/1/1996 - 1/21/2021    Overview Signed 9/23/2016 11:46 AM by Michael Slater NP     [place]                      Signed:   Leroy Arthur NP  11/2/2021  6:17 AM

## 2021-11-02 NOTE — PROGRESS NOTES
Bedside and Verbal shift change report given to Shavonne Meek RN and Javi Wilcox LPN (oncoming nurse) by Shavonne Meek RN (offgoing nurse). Report included the following information SBAR.

## 2021-11-02 NOTE — DISCHARGE SUMMARY
Discharge Summary       PATIENT ID: Monica Wolfe  MRN: [id number]   YOB: 1928    DATE OF ADMISSION: 10/29/2021  7:35 PM    DATE OF DISCHARGE: 11/02/2021  PRIMARY CARE PROVIDER: Vick Snyder MD     ATTENDING PHYSICIAN: Edith Saul MD  DISCHARGING PROVIDER: Dedra Vizcaino NP    To contact this individual call [phone] and ask the  to page. If unavailable ask to be transferred the Adult Hospitalist Department.     CONSULTATIONS: None    PROCEDURES/SURGERIES: Procedure(s):  LEFT HIP GAMMA NAIL INSETION  CAT AWARE  ESSENTIAL    ADMITTING [address] COURSE:   This 60-year-old woman with past medical history significant for coronary artery disease; status post stent placement, COPD, hypertension, hypothyroidism, sick sinus syndrome, status post pacemaker insertion, dyslipidemia, memory impairment was in her usual state of health until the day of her presentation at the emergency room when it was reported that the patient fell at the nursing home where the patient resides. Ranny Pepper circumstances surrounding her fall was not known.  The fall was not witnessed.  The patient was found on the floor. Homero Neely was reported that the patient was unable to bear weight on her left leg.  X-ray was done at the facility.  The x-ray shows fractured hip.  The patient was sent to the emergency room for further evaluation.  When the patient arrived at the emergency room, CT scan of the head was obtained and that was negative.  The CT scan of the cervical spine also did not show any evidence of fracture.  The pelvic x-ray confirmed the left hip fracture.  The emergency room physician consulted the orthopedic service on call. Dipesh Kauffman to the hospitalist service was advised.  The patient was referred to the hospitalist service for that purpose.  The patient is not able to provide any meaningful history because of underlying memory impairment.  The patient is not able to state whether she was having pain or not, the character, or the quality of the pain. Lili Azevedo was no history of fever, rigors, or chills reported.  The patient was last admitted to the hospital from 01/18/2021 to 01/20/2021.  The patient was admitted and treated for rectal prolapse.  She underwent perineal proctosigmoidectomy by the surgical service. DISCHARGE DIAGNOSES / PLAN:      Closed intertrochanteric fracture of left hip   - due to ground level fall   - s/p Cephalomedullary nailing left hip (10/30)  - Rx Lovenox for 21 days at discharge   - Follow up with Dr. Robbie Leo in 3-4 weeks      GLF    - Head CT negative      Leukocytosis   - WBC 16.5 -> 11.2      MARILYN on CKD stage 1, improved  - Creatinine 0.94, baseline ~1.1-1.2  - Likely prerenal   - Continue with IV fluids   - Monitor labs in am and consider nephrology consult      Hypertension   - Stable   - Continue amlodipine      Hypothyroidism   - TSH 8.57  - Continue levothyroxine  - Will need to follow closely at discharge with pcp for medication adjustments.       Dyslipidemia   - Continue atorvastatin      Dementia   - Supportive treatment      Hx of Lung CA s/p resection, chemo and XRT. Completed in 2000. - On 2L NC O2 continuous at home, continue while inpatient  - Overnight had episode of hypoxia- Chest xray showing no acute process.      Hx of COPD- Duonebs as needed   Hx of CAD s/p stent placement   Hx of SSS s/p pacemaker placement        ADDITIONAL CARE RECOMMENDATIONS:   Take medications as prescribed    Post op Discharge Instructions Hip Fracture   Radu Roderick, [facility]  [phone]    Patient Name: Esthela Dougherty  Date of admission:  10/29/2021  Date of procedure: 10/30/2021   Procedure: Procedure(s):  LEFT  [address]  PCP: Arnie Javier MD  Date of discharge: No discharge date for patient encounter. Follow up office visit   See Dr. Robbie Leo approximately 3-4 weeks from date of surgery.    Call [phone] (ext [phone]) to make an appointment. Activity  Walk with your walker with weight bearing restrictions as instructed by your physical therapist.  Continue using your walker until seen for follow-up visit. You should walk daily with the physical therapist.  Perform your exercise routine 3 times a day as instructed by the physical therapist.    Incision Care  Surgical dressing is to remain on the hip for 7 days. You may replace it with a dry sterile gauze dressing; change it daily. Once the incision is not draining, leave it open to air. After 1 week, may shower and get your incision wet but do not submerge your incision under water in a bath tub, hot tub or swimming pool for 6 weeks after surgery. Preventing blood clots  Lovenox injections 30mg sub q once daily for 4 weeks following your surgery date      Pain management  - Please take scheduled 650 mg tylenol every 6 hours for 6 weeks  - Please take scheduled meloxicam 7.5 mg daily for 6 week to decrease pain and swelling  - Please take tramadol every 6 hours for pain as needed for pain. - Avoid alcoholic beverages while taking pain medication  - Do not take any over-the-counter medication for pain except Tylenol (acetaminophen)  - Please be aware that many medications contain Tylenol. We do not want you to over medicate so please read the information below as a guide.   Do not take more than 4 Grams of Tylenol in a 24 hour  - You may place an ice bag on your knee for 15-20 minutes after exercising and as needed throughout the day and night      Diet  Resume usual diet; encourage fluids; provide foods high in fiber, calcium and vitamin D  Provide stool softeners/laxatives as needed      After [facility] for SNF/Rehab (to be followed by post-acute provider)    Nursing  Complete head to toe assessment, vital signs  Medication reconciliation  Review pain management  Manage chronic medical conditions  Remove Aquacel dressing 7 days after surgery    Physical Therapy-status at discharge from the hospital    Weight bearing status:             Mobility Status:                Gait:                ADL status overall composite:                Physical Therapy-exercises, transfers, gait-training (partial weight bearing on the surgical leg)    Exercises related to strengthening the surgical hip:  Supine Exercises: Ankle pumps  Quad Sets  Gluteal Sets  Hip Abduction slides supine  Hip external rotation  Heel Slides    Standing Exercises:  Heel Raises  Mini-squats  Heel/toe touches and knee bend  Marching   Hip Abduction  Hip External Rotation  Hip Extension    Advance exercises with equipment for strengthening, flexibility, balance needs as appropriate per setting. Avoid active hip flexion exercised for 4 weeks. Repetitions and number of sets to be established per patient tolerance     Reasons to call the Surgeon  1. Increased redness, swelling or drainage from the incision site  2. Temperature consistently greater than 101 degrees  3.   Increased pain or unrelieved pain in hip or calf                PENDING TEST RESULTS:   At the time of discharge the following test results are still pending: None     FOLLOW UP APPOINTMENTS:    Follow-up Information     Follow up With Specialties Details Why Contact Info    Dale Dumas MD [facility] Medicine Schedule an appointment as soon as possible for a visit For follow up with in one week  [facility] [address] [place] [address]      Laurence Garsia MD Orthopedic Surgery Schedule an appointment as soon as possible for a visit Make a follow up appointment in 3-4 weeks [facility]                DIET: Regular Diet      ACTIVITY: See Ortho instructions    WOUND CARE: See ortho instructions     EQUIPMENT needed: Per PT/OT       DISCHARGE MEDICATIONS:  Current Discharge Medication List      START taking these medications    Details calcium-vitamin D (OS-ZAMZAM +D3) 500 mg-200 unit per tablet Take 1 Tablet by mouth three (3) times daily (with meals). Qty: 30 Tablet, Refills: 0  Start date: 11/2/2021      enoxaparin (LOVENOX) 30 mg/0.3 mL injection 0.3 mL by SubCUTAneous route every twenty-four (24) hours. Qty: 21 Each, Refills: 0  Start date: 11/2/2021      senna-docusate (PERICOLACE) 8.6-50 mg per tablet Take 1 Tablet by mouth two (2) times a day. Qty: 14 Tablet, Refills: 0  Start date: 11/2/2021      traMADoL (ULTRAM) 50 mg tablet Take 1 Tablet by mouth every six (6) hours as needed for Pain for up to 4 days. Max Daily Amount: 200 mg. Indications: pain  Qty: 4 Tablet, Refills: 0  Start date: 11/2/2021, End date: 11/6/2021    Associated Diagnoses: Closed intertrochanteric fracture of hip, left, initial [facility] ([facility]         CONTINUE these medications which have NOT CHANGED    Details   sertraline (ZOLOFT) 50 mg tablet Take 50 mg by mouth daily. levothyroxine (SYNTHROID) 50 mcg tablet Take 50 mcg by mouth Daily (before breakfast). simvastatin (ZOCOR) 10 mg tablet Take 10 mg by mouth nightly. amLODIPine (NORVASC) 5 mg tablet TAKE 1 TABLET BY MOUTH EVERY DAY  Refills: 4      acetaminophen (TYLENOL) 325 mg tablet Take 2 Tabs by mouth every four (4) hours as needed. Qty: 40 Tab, Refills: 0      calcium citrate-vitamin d3 (CITRACAL+D) 315-200 mg-unit tab Take 1 Tab by mouth daily. latanoprost (XALATAN) 0.005 % ophthalmic solution Administer 1 Drop to both eyes nightly. Refills: 0    Associated Diagnoses: Heart palpitations; SSS (sick sinus syndrome) ([facility] Dizziness      multivitamin (ONE A DAY) tablet Take 1 Tab by mouth daily. Associated Diagnoses: Precordial pain;  Shortness of breath; Essential hypertension, hypertension with unspecified goal         STOP taking these medications       aspirin (ASPIRIN) 325 mg tablet Comments:   Reason for Stopping:                 NOTIFY YOUR PHYSICIAN FOR ANY OF THE FOLLOWING: Fever over 101 degrees for 24 hours. Chest pain, shortness of breath, fever, chills, nausea, vomiting, diarrhea, change in mentation, falling, weakness, bleeding. Severe pain or pain not relieved by medications. Or, any other signs or symptoms that you may have questions about. DISPOSITION:    Home With:   OT  PT  HH  RN      X Long term SNF/Inpatient Rehab    Independent/assisted living    Hospice    Other:       PATIENT CONDITION AT DISCHARGE:     Functional status   X Poor     Deconditioned     Independent      Cognition     Lucid     Forgetful    X Dementia      Catheters/lines (plus indication)    Nicholson     PICC     PEG    X None      Code status     Full code     DNR      PHYSICAL EXAMINATION AT DISCHARGE:  General:          Alert, cooperative, no distress, appears stated age. HEENT:           Atraumatic, anicteric sclerae, pink conjunctivae                          No oral ulcers, mucosa moist, throat clear, dentition fair  Neck:               Supple, symmetrical  Lungs:             Clear to auscultation bilaterally. No Wheezing or Rhonchi. No rales. Chest wall:      No tenderness  No Accessory muscle use. Heart:              Regular  rhythm,  No  murmur   No edema  Abdomen:        Soft, non-tender. Not distended. Bowel sounds normal  Extremities:     No cyanosis. No clubbing,                            Skin turgor normal, Capillary refill normal  Skin:                Not pale. Not Jaundiced  No rashes. Left hip dressing is CDI. LLE is + for sensation, movement and circulation   Psych:             Not anxious or agitated.   Neurologic:      Alert, moves all extremities, answers questions appropriately and responds to commands       CHRONIC MEDICAL DIAGNOSES:  Problem List as of 11/2/2021 Date Reviewed: 10/30/2021        Codes Class Noted - Resolved    * (Principal) Hip fracture requiring operative repair [facility]) ICD-10-CM: Z72.550J  ICD-9-CM: 820.8  10/29/2021 - Present        SSS (sick sinus syndrome) ([facility] ICD-10-CM: I49.5  ICD-9-CM: 427.81  1/9/2018 - Present        Heart palpitations ICD-10-CM: R00.2  ICD-9-CM: 785.1  12/18/2017 - Present        Shortness of breath ICD-10-CM: R06.02  ICD-9-CM: 786.05  9/23/2016 - Present        RESOLVED: Rectal prolapse ICD-10-CM: K62.3  ICD-9-CM: 569.1  1/18/2021 - 1/20/2021        RESOLVED: UTI (urinary tract infection) ICD-10-CM: N39.0  ICD-9-CM: 599.0  2/9/2018 - 1/21/2021        RESOLVED: Pubic bone fracture ([facility] ICD-10-CM: S32.509A  ICD-9-CM: 808.2  2/6/2018 - 1/21/2021        RESOLVED: Pneumonia ICD-10-CM: J18.9  ICD-9-CM: 639  11/13/2014 - 1/21/2021    Overview Signed 11/13/2014 10:13 AM by Karla Piña MD     Presumed aspiration related to epistaxis (nose bleed)             RESOLVED: Left humeral fracture ICD-10-CM: J64.318C  ICD-9-CM: 812.20  11/13/2014 - 1/21/2021        RESOLVED: Acute respiratory failure with hypoxia ([facility] ICD-10-CM: J96.01  ICD-9-CM: 518.81  11/13/2014 - 1/21/2021    Overview Signed 11/13/2014 10:16 AM by Karla Piña MD     From Pneumonia, resolved             RESOLVED: Sepsis ([facility] ICD-10-CM: A41.9  ICD-9-CM: 038.9, 995.91  11/10/2014 - 1/21/2021    Overview Signed 11/13/2014 10:13 AM by Karla Piña MD     resolved             RESOLVED: CAD (coronary artery disease) ICD-10-CM: I25.10  ICD-9-CM: 414.00  1/1/1996 - 1/21/2021    Overview Signed 9/23/2016 11:46 AM by Taye Leung NP     MI                    Greater than 45  minutes were spent with the patient on counseling and coordination of care    Signed:   Adrianne Lo NP  11/2/2021  6:17 AM

## 2021-11-02 NOTE — PROGRESS NOTES
ALYSE; Plan for discharge to [facility] today. [facility] phone [phone] transport time set for 11:30 AM. Chart reviewed. Noted discharge orders. CM called [facility] and spoke with Bridget Alcala, confirmed they can accept today. CM faxed discharge papers and covid results to facility at P#[phone]. CM called the son, Reina Ceja #[phone] and notified of discharge transport time. Medicare pt has received, reviewed, and signed 2nd IM letter informing them of their right to appeal the discharge. Signed copy has been placed on pt bedside chart. Danay Vargas, BSW/CRM

## 2021-11-15 ENCOUNTER — TELEPHONE (OUTPATIENT)
Dept: CARDIOLOGY CLINIC | Age: 86
End: 2021-11-15

## 2021-11-15 NOTE — TELEPHONE ENCOUNTER
MICHAEL at [facility] for them to send a transmission to us. I also called Jorgito Hoffman (son) about the transmission and he states that his sister will be visiting his mother tomorrow and can send a transmission.

## 2021-11-16 ENCOUNTER — OFFICE VISIT (OUTPATIENT)
Dept: CARDIOLOGY CLINIC | Age: 86
End: 2021-11-16
Payer: MEDICARE

## 2021-11-16 DIAGNOSIS — Z95.0 CARDIAC PACEMAKER IN SITU: Primary | ICD-10-CM

## 2021-11-16 DIAGNOSIS — I49.5 SSS (SICK SINUS SYNDROME) (HCC): ICD-10-CM

## 2021-11-16 PROCEDURE — 93296 REM INTERROG EVL PM/IDS: CPT | Performed by: INTERNAL MEDICINE

## 2021-11-16 PROCEDURE — 93294 REM INTERROG EVL PM/LDLS PM: CPT | Performed by: INTERNAL MEDICINE

## 2021-12-02 ENCOUNTER — OFFICE VISIT (OUTPATIENT)
Dept: ORTHOPEDIC SURGERY | Age: 86
End: 2021-12-02
Payer: MEDICARE

## 2021-12-02 VITALS — BODY MASS INDEX: 20.99 KG/M2 | HEIGHT: 58 IN | WEIGHT: 100 LBS

## 2021-12-02 DIAGNOSIS — Z98.890 STATUS POST HIP SURGERY: Primary | ICD-10-CM

## 2021-12-02 DIAGNOSIS — Z98.890 STATUS POST SURGERY: ICD-10-CM

## 2021-12-02 PROCEDURE — 99024 POSTOP FOLLOW-UP VISIT: CPT | Performed by: ORTHOPAEDIC SURGERY

## 2021-12-02 NOTE — PROGRESS NOTES
Naomy Feng (: 1928) is a 80 y.o. female patient, here for evaluation of the following chief complaint(s):  Hip Pain (left hip follow up)       ASSESSMENT/PLAN:  Below is the assessment and plan developed based on review of pertinent history, physical exam, labs, studies, and medications. Status post intramedullary nail left hip. No pain. She is walking per her baseline. Plan for follow-up in 6 months or as needed      1. Status post hip surgery  -     XR HIP LT W OR WO PELV 2-3 VWS; Future  2. Status post surgery      Encounter Diagnoses   Name Primary?  Status post hip surgery Yes    Status post surgery         No follow-ups on file. SUBJECTIVE/OBJECTIVE:  Naomy Feng (: 1928) is a 80 y.o. female who presents today for the following:  Chief Complaint   Patient presents with    Hip Pain     left hip follow up       Status post IM nail left hip. She is 10 weeks postop. Doing great no issues. No pain. She is walking per her baseline. She is using a walker per baseline very happy with progress. No complaints today    IMAGING:  XR Results (most recent):  Results from Hospital Encounter encounter on 10/29/21    XR CHEST PORT    Narrative  PORTABLE CHEST RADIOGRAPH/S: 10/30/2021 9:47 PM    INDICATION: Hypoxia. COMPARISON: 10/29/2021, 2021, 2018. CT cervical 10/29/2021. TECHNIQUE: Portable frontal upright radiograph/s of the chest.    FINDINGS:  The lungs are emphysematous. There is extensive scarring and volume loss in the  upper lung fields. The central airways are patent. No pleural effusion and no  large pneumothorax. A pacemaker is in place. Impression  1. No acute process. 2. Emphysema. 3. Upper lobe scarring. No Known Allergies    Current Outpatient Medications   Medication Sig    sertraline (ZOLOFT) 50 mg tablet Take 50 mg by mouth daily.  levothyroxine (SYNTHROID) 50 mcg tablet Take 50 mcg by mouth Daily (before breakfast).     simvastatin (ZOCOR) 10 mg tablet Take 10 mg by mouth nightly.  amLODIPine (NORVASC) 5 mg tablet TAKE 1 TABLET BY MOUTH EVERY DAY    latanoprost (XALATAN) 0.005 % ophthalmic solution Administer 1 Drop to both eyes nightly.  calcium-vitamin D (OS-ZAMZAM +D3) 500 mg-200 unit per tablet Take 1 Tablet by mouth three (3) times daily (with meals). (Patient not taking: Reported on 12/2/2021)    enoxaparin (LOVENOX) 30 mg/0.3 mL injection 0.3 mL by SubCUTAneous route every twenty-four (24) hours. (Patient not taking: Reported on 12/2/2021)    senna-docusate (PERICOLACE) 8.6-50 mg per tablet Take 1 Tablet by mouth two (2) times a day. (Patient not taking: Reported on 12/2/2021)    acetaminophen (TYLENOL) 325 mg tablet Take 2 Tabs by mouth every four (4) hours as needed. (Patient not taking: Reported on 12/2/2021)    calcium citrate-vitamin d3 (CITRACAL+D) 315-200 mg-unit tab Take 1 Tab by mouth daily. (Patient not taking: Reported on 12/2/2021)    multivitamin (ONE A DAY) tablet Take 1 Tab by mouth daily. (Patient not taking: Reported on 12/2/2021)     No current facility-administered medications for this visit.        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 - [place] RADIATION    Cancer ([address]     LUNG    Chronic obstructive pulmonary disease (HCC)     Elevated cholesterol     Glaucoma     H/O: pneumonia 2013    History of pelvic fracture 2018    Hypertension     Hypothyroid     Pacemaker 01/09/2018    Sick sinus syndrome ([address]     Syncope         Past Surgical History:   Procedure Laterality Date    HX CATARACT REMOVAL Bilateral 2001    HX HEART CATHETERIZATION      HX OTHER SURGICAL  2013    EXC LESIONS FACE, LIP    HX PACEMAKER  2018    HX PACEMAKER PLACEMENT Left 01/2018    NH BREAST SURGERY PROCEDURE UNLISTED  1998    right mastectomy    [place] CHEST SURGERY PROCEDURE UNLISTED Left 1998    EXC LUNG MASS       Family History   Family history unknown: Yes        Social History     Tobacco Use    Smoking status: Former Smoker     Packs/day: 0.25     Years: 10.00     Pack years: 2.50     Quit date: 1989     Years since quittin.9    Smokeless tobacco: Never Used   Substance Use Topics    Alcohol use: No        All systems reviewed x 12 and were negative with the exception of none      No flowsheet data found. Vitals:  Ht 4' 10\" (1.473 m)   Wt 100 lb (45.4 kg)   BMI 20.90 kg/m²    Body mass index is 20.9 kg/m². Physical Exam    General: NAD    Cardiac: Extremities well perfused. Respiratory: Nonlabored breathing. LLE: Incision clean dry and intact with no erythema. In a wheelchair. Normal gait and station. Negative stinchfield. No pain with gentle internal and external rotation. .  Motor strength is grossly intact. Skin warm well perfused. Capillary refill <2 sec. An electronic signature was used to authenticate this note.   -- Dane Joel MD

## 2022-01-02 ENCOUNTER — HOSPITAL ENCOUNTER (EMERGENCY)
Age: 87
Discharge: HOME | End: 2022-01-02
Attending: EMERGENCY MEDICINE
Payer: MEDICARE

## 2022-01-02 ENCOUNTER — APPOINTMENT (OUTPATIENT)
Dept: CT IMAGING | Age: 87
End: 2022-01-02
Attending: EMERGENCY MEDICINE
Payer: MEDICARE

## 2022-01-02 VITALS
DIASTOLIC BLOOD PRESSURE: 55 MMHG | TEMPERATURE: 98.1 F | RESPIRATION RATE: 18 BRPM | OXYGEN SATURATION: 99 % | SYSTOLIC BLOOD PRESSURE: 132 MMHG | HEART RATE: 66 BPM | WEIGHT: 99.21 LBS | BODY MASS INDEX: 20.73 KG/M2

## 2022-01-02 DIAGNOSIS — S00.81XA ABRASION OF FACE, INITIAL ENCOUNTER: ICD-10-CM

## 2022-01-02 DIAGNOSIS — S00.83XA CONTUSION OF FACE, INITIAL ENCOUNTER: Primary | ICD-10-CM

## 2022-01-02 PROCEDURE — 70450 CT HEAD/BRAIN W/O DYE: CPT

## 2022-01-02 PROCEDURE — 99284 EMERGENCY DEPT VISIT MOD MDM: CPT

## 2022-01-02 RX ORDER — ASPIRIN 325 MG
325 TABLET ORAL DAILY
COMMUNITY

## 2022-01-03 NOTE — ED PROVIDER NOTES
The history is provided by the patient and the EMS personnel. Fall  The accident occurred 1 to 2 hours ago. The fall occurred while standing. She fell from a height of 3 - 5 ft. She landed on hard floor. The point of impact was the head. The pain is present in the head. The pain is mild. She was ambulatory at the scene. There was no entrapment after the fall. There was no drug use involved in the accident. There was no alcohol use involved in the accident. Pertinent negatives include no visual change, no vomiting, no headaches, no extremity weakness, no loss of consciousness and no laceration. The risk factors include dementia. She has tried nothing for the symptoms.         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 - BALLON RADIATION    Cancer ([address]     LUNG    Chronic obstructive pulmonary disease (HCC)     Elevated cholesterol     Glaucoma     H/O: pneumonia 2013    History of pelvic fracture 2018    Hypertension     Hypothyroid     Pacemaker 01/09/2018    Sick sinus syndrome ([address]     Syncope        Past Surgical History:   Procedure Laterality Date    HX CATARACT REMOVAL Bilateral 2001    HX HEART CATHETERIZATION      HX OTHER SURGICAL  2013    EXC LESIONS FACE, LIP    HX PACEMAKER  2018    HX PACEMAKER PLACEMENT Left 01/2018    [place] BREAST SURGERY PROCEDURE UNLISTED  1998    right mastectomy    [place] CHEST SURGERY PROCEDURE UNLISTED Left 1998    EXC LUNG MASS         Family History:   Family history unknown: Yes       Social History     Socioeconomic History    Marital status:      Spouse name: Not on file    Number of children: Not on file    Years of education: Not on file    Highest education level: Not on file   Occupational History    Not on file   Tobacco Use    Smoking status: Former Smoker     Packs/day: 0.25     Years: 10.00     Pack years: 2.50     Quit date: 1989     Years since quittin.0    Smokeless tobacco: Never Used   Substance and Sexual Activity    Alcohol use: No    Drug use: No    Sexual activity: Not Currently   Other Topics Concern    Not on file   Social History Narrative    Not on file     Social Determinants of Health     Financial Resource Strain:     Difficulty of Paying Living Expenses: Not on file   Food Insecurity:     Worried About Running Out of Food in the Last Year: Not on file    Chava of Food in the Last Year: Not on file   Transportation Needs:     Lack of Transportation (Medical): Not on file    Lack of Transportation (Non-Medical): Not on file   Physical Activity:     Days of Exercise per Week: Not on file    Minutes of Exercise per Session: Not on file   Stress:     Feeling of Stress : Not on file   Social Connections:     Frequency of Communication with Friends and Family: Not on file    Frequency of Social Gatherings with Friends and Family: Not on file    Attends [employer] Services: Not on file    Active Member of [address] [place] [address] or Organizations: Not on file    Attends Club or Organization Meetings: Not on file    Marital Status: Not on file   Intimate Partner Violence:     Fear of Current or Ex-Partner: Not on file    Emotionally Abused: Not on file    Physically Abused: Not on file    Sexually Abused: Not on file   Housing Stability:     Unable to Pay for Housing in the Last Year: Not on file    Number of [place] in the Last Year: Not on file    Unstable Housing in the Last Year: Not on file         ALLERGIES: Patient has no known allergies. Review of Systems   Gastrointestinal: Negative for vomiting. Musculoskeletal: Negative for extremity weakness. Neurological: Negative for loss of consciousness and headaches. All other systems reviewed and are negative.       Vitals:    22   BP: (!) 132/55   Pulse: 66   Resp: 18   Temp: 98.1 °F (36.7 °C)   SpO2: 99%   Weight: 45 kg (99 lb 3.3 oz)            Physical Exam  Vitals and nursing note reviewed. Constitutional:       General: She is not in acute distress. Appearance: She is well-developed. HENT:      Head: Normocephalic. Abrasion (above left eye lateral to eyebrow with dried blood) and contusion present. No laceration. Eyes:      Conjunctiva/sclera: Conjunctivae normal.   Cardiovascular:      Rate and Rhythm: Normal rate and regular rhythm. Pulmonary:      Effort: Pulmonary effort is normal. No respiratory distress. Abdominal:      General: There is no distension. Musculoskeletal:         General: No deformity. Normal range of motion. Cervical back: Neck supple. Skin:     General: Skin is warm and dry. Findings: No laceration. Neurological:      Mental Status: She is alert. Cranial Nerves: No cranial nerve deficit. Psychiatric:         Behavior: Behavior normal.          MDM     80 y.o. female presents with fall and head injury. No evidence of ICH. No laceration requiring repair. Provided instructions for supportive care measures. Plan to follow up with PCP as needed and return precautions discussed for worsening or new concerning symptoms.      Procedures

## 2022-01-03 NOTE — ED TRIAGE NOTES
[facility], Michele Was found on the ground by staff. GLF. Staff reports when she was found, she was complaining of pain to back of head. Currently she complains of pain in the left eyebrow. Puncture and bruising noted. States she tripped and fell into the wall.

## 2022-02-21 ENCOUNTER — OFFICE VISIT (OUTPATIENT)
Dept: CARDIOLOGY CLINIC | Age: 87
End: 2022-02-21
Payer: MEDICARE

## 2022-02-21 DIAGNOSIS — R00.1 BRADYCARDIA: ICD-10-CM

## 2022-02-21 DIAGNOSIS — Z95.0 CARDIAC PACEMAKER IN SITU: Primary | ICD-10-CM

## 2022-02-21 DIAGNOSIS — I49.5 SSS (SICK SINUS SYNDROME) (HCC): ICD-10-CM

## 2022-02-21 PROCEDURE — 93296 REM INTERROG EVL PM/IDS: CPT | Performed by: INTERNAL MEDICINE

## 2022-10-01 NOTE — ANESTHESIA PREPROCEDURE EVALUATION
Relevant Problems   RESPIRATORY SYSTEM   (+) Pneumonia   (+) Shortness of breath      CARDIOVASCULAR   (+) CAD (coronary artery disease)   (+) SSS (sick sinus syndrome) (HCC)       Anesthetic History   No history of anesthetic complications            Review of Systems / Medical History  Patient summary reviewed, nursing notes reviewed and pertinent labs reviewed    Pulmonary  Within defined limits                 Neuro/Psych   Within defined limits           Cardiovascular    Hypertension        Dysrhythmias   Pacemaker, past MI, CAD and cardiac stents         GI/Hepatic/Renal  Within defined limits              Endo/Other      Hypothyroidism  Cancer     Other Findings              Physical Exam    Airway  Mallampati: II  TM Distance: > 6 cm  Neck ROM: normal range of motion   Mouth opening: Normal     Cardiovascular  Regular rate and rhythm,  S1 and S2 normal,  no murmur, click, rub, or gallop             Dental    Dentition: Full upper dentures and Full lower dentures     Pulmonary  Breath sounds clear to auscultation               Abdominal  GI exam deferred       Other Findings            Anesthetic Plan    ASA: 3  Anesthesia type: general          Induction: Intravenous  Anesthetic plan and risks discussed with: Patient and Son / Daughter
breath sounds clear and equal bilaterally.

## 2023-01-01 ENCOUNTER — HOME CARE VISIT (OUTPATIENT)
Dept: HOSPICE | Facility: HOSPICE | Age: 88
End: 2023-01-01
Payer: MEDICARE

## 2023-01-01 ENCOUNTER — HOME CARE VISIT (OUTPATIENT)
Dept: SCHEDULING | Facility: HOME HEALTH | Age: 88
End: 2023-01-01
Payer: MEDICARE

## 2023-01-01 ENCOUNTER — APPOINTMENT (OUTPATIENT)
Dept: GENERAL RADIOLOGY | Age: 88
DRG: 871 | End: 2023-01-01
Attending: EMERGENCY MEDICINE
Payer: MEDICARE

## 2023-01-01 ENCOUNTER — HOSPICE ADMISSION (OUTPATIENT)
Dept: HOSPICE | Facility: HOSPICE | Age: 88
End: 2023-01-01
Payer: MEDICARE

## 2023-01-01 ENCOUNTER — HOSPITAL ENCOUNTER (INPATIENT)
Age: 88
LOS: 10 days | Discharge: HOSPICE | DRG: 871 | End: 2023-03-15
Attending: EMERGENCY MEDICINE | Admitting: HOSPITALIST
Payer: MEDICARE

## 2023-01-01 ENCOUNTER — APPOINTMENT (OUTPATIENT)
Dept: GENERAL RADIOLOGY | Age: 88
DRG: 871 | End: 2023-01-01
Attending: HOSPITALIST
Payer: MEDICARE

## 2023-01-01 VITALS
DIASTOLIC BLOOD PRESSURE: 84 MMHG | HEIGHT: 62 IN | HEART RATE: 76 BPM | SYSTOLIC BLOOD PRESSURE: 131 MMHG | TEMPERATURE: 97.3 F | BODY MASS INDEX: 17.36 KG/M2 | OXYGEN SATURATION: 93 % | RESPIRATION RATE: 16 BRPM | WEIGHT: 94.36 LBS

## 2023-01-01 VITALS
RESPIRATION RATE: 20 BRPM | TEMPERATURE: 97.6 F | DIASTOLIC BLOOD PRESSURE: 60 MMHG | SYSTOLIC BLOOD PRESSURE: 110 MMHG | HEART RATE: 90 BPM

## 2023-01-01 VITALS
RESPIRATION RATE: 28 BRPM | TEMPERATURE: 98.1 F | DIASTOLIC BLOOD PRESSURE: 82 MMHG | HEART RATE: 84 BPM | SYSTOLIC BLOOD PRESSURE: 119 MMHG | OXYGEN SATURATION: 89 %

## 2023-01-01 DIAGNOSIS — B33.8 RSV (RESPIRATORY SYNCYTIAL VIRUS INFECTION): ICD-10-CM

## 2023-01-01 DIAGNOSIS — J96.21 ACUTE ON CHRONIC RESPIRATORY FAILURE WITH HYPOXIA (HCC): Primary | ICD-10-CM

## 2023-01-01 LAB
ALBUMIN SERPL-MCNC: 3 G/DL (ref 3.5–5)
ALBUMIN/GLOB SERPL: 0.7 (ref 1.1–2.2)
ALP SERPL-CCNC: 72 U/L (ref 45–117)
ALT SERPL-CCNC: 34 U/L (ref 12–78)
ANION GAP SERPL CALC-SCNC: 4 MMOL/L (ref 5–15)
ANION GAP SERPL CALC-SCNC: 4 MMOL/L (ref 5–15)
ANION GAP SERPL CALC-SCNC: 5 MMOL/L (ref 5–15)
ANION GAP SERPL CALC-SCNC: 5 MMOL/L (ref 5–15)
ANION GAP SERPL CALC-SCNC: 6 MMOL/L (ref 5–15)
ANION GAP SERPL CALC-SCNC: 8 MMOL/L (ref 5–15)
ARTERIAL PATENCY WRIST A: POSITIVE
AST SERPL-CCNC: 27 U/L (ref 15–37)
ATRIAL RATE: 86 BPM
BACTERIA SPEC CULT: NORMAL
BASE EXCESS BLD CALC-SCNC: 5.3 MMOL/L
BASOPHILS # BLD: 0 K/UL (ref 0–0.1)
BASOPHILS NFR BLD: 0 % (ref 0–1)
BDY SITE: ABNORMAL
BILIRUB SERPL-MCNC: 0.5 MG/DL (ref 0.2–1)
BNP SERPL-MCNC: 1686 PG/ML
BUN SERPL-MCNC: 27 MG/DL (ref 6–20)
BUN SERPL-MCNC: 31 MG/DL (ref 6–20)
BUN SERPL-MCNC: 33 MG/DL (ref 6–20)
BUN SERPL-MCNC: 37 MG/DL (ref 6–20)
BUN SERPL-MCNC: 42 MG/DL (ref 6–20)
BUN SERPL-MCNC: 47 MG/DL (ref 6–20)
BUN/CREAT SERPL: 25 (ref 12–20)
BUN/CREAT SERPL: 28 (ref 12–20)
BUN/CREAT SERPL: 33 (ref 12–20)
BUN/CREAT SERPL: 34 (ref 12–20)
BUN/CREAT SERPL: 37 (ref 12–20)
BUN/CREAT SERPL: 43 (ref 12–20)
CALCIUM SERPL-MCNC: 8.7 MG/DL (ref 8.5–10.1)
CALCIUM SERPL-MCNC: 8.9 MG/DL (ref 8.5–10.1)
CALCIUM SERPL-MCNC: 8.9 MG/DL (ref 8.5–10.1)
CALCIUM SERPL-MCNC: 9.3 MG/DL (ref 8.5–10.1)
CALCIUM SERPL-MCNC: 9.4 MG/DL (ref 8.5–10.1)
CALCIUM SERPL-MCNC: 9.6 MG/DL (ref 8.5–10.1)
CALCULATED P AXIS, ECG09: 17 DEGREES
CALCULATED R AXIS, ECG10: -72 DEGREES
CALCULATED T AXIS, ECG11: 72 DEGREES
CHLORIDE SERPL-SCNC: 105 MMOL/L (ref 97–108)
CHLORIDE SERPL-SCNC: 106 MMOL/L (ref 97–108)
CHLORIDE SERPL-SCNC: 106 MMOL/L (ref 97–108)
CHLORIDE SERPL-SCNC: 108 MMOL/L (ref 97–108)
CHLORIDE SERPL-SCNC: 109 MMOL/L (ref 97–108)
CHLORIDE SERPL-SCNC: 109 MMOL/L (ref 97–108)
CO2 SERPL-SCNC: 25 MMOL/L (ref 21–32)
CO2 SERPL-SCNC: 26 MMOL/L (ref 21–32)
CO2 SERPL-SCNC: 28 MMOL/L (ref 21–32)
CO2 SERPL-SCNC: 28 MMOL/L (ref 21–32)
CO2 SERPL-SCNC: 29 MMOL/L (ref 21–32)
CO2 SERPL-SCNC: 30 MMOL/L (ref 21–32)
CREAT SERPL-MCNC: 0.94 MG/DL (ref 0.55–1.02)
CREAT SERPL-MCNC: 0.98 MG/DL (ref 0.55–1.02)
CREAT SERPL-MCNC: 1.08 MG/DL (ref 0.55–1.02)
CREAT SERPL-MCNC: 1.08 MG/DL (ref 0.55–1.02)
CREAT SERPL-MCNC: 1.16 MG/DL (ref 0.55–1.02)
CREAT SERPL-MCNC: 1.27 MG/DL (ref 0.55–1.02)
DIAGNOSIS, 93000: NORMAL
DIFFERENTIAL METHOD BLD: ABNORMAL
EOSINOPHIL # BLD: 0 K/UL (ref 0–0.4)
EOSINOPHIL NFR BLD: 0 % (ref 0–7)
ERYTHROCYTE [DISTWIDTH] IN BLOOD BY AUTOMATED COUNT: 14.9 % (ref 11.5–14.5)
ERYTHROCYTE [DISTWIDTH] IN BLOOD BY AUTOMATED COUNT: 15.1 % (ref 11.5–14.5)
ERYTHROCYTE [DISTWIDTH] IN BLOOD BY AUTOMATED COUNT: 15.3 % (ref 11.5–14.5)
ERYTHROCYTE [DISTWIDTH] IN BLOOD BY AUTOMATED COUNT: 15.4 % (ref 11.5–14.5)
ERYTHROCYTE [DISTWIDTH] IN BLOOD BY AUTOMATED COUNT: 15.4 % (ref 11.5–14.5)
ERYTHROCYTE [DISTWIDTH] IN BLOOD BY AUTOMATED COUNT: 15.5 % (ref 11.5–14.5)
GAS FLOW.O2 O2 DELIVERY SYS: ABNORMAL
GLOBULIN SER CALC-MCNC: 4.2 G/DL (ref 2–4)
GLUCOSE BLD STRIP.AUTO-MCNC: 101 MG/DL (ref 65–117)
GLUCOSE BLD STRIP.AUTO-MCNC: 103 MG/DL (ref 65–117)
GLUCOSE BLD STRIP.AUTO-MCNC: 114 MG/DL (ref 65–117)
GLUCOSE BLD STRIP.AUTO-MCNC: 121 MG/DL (ref 65–117)
GLUCOSE BLD STRIP.AUTO-MCNC: 73 MG/DL (ref 65–117)
GLUCOSE SERPL-MCNC: 116 MG/DL (ref 65–100)
GLUCOSE SERPL-MCNC: 125 MG/DL (ref 65–100)
GLUCOSE SERPL-MCNC: 134 MG/DL (ref 65–100)
GLUCOSE SERPL-MCNC: 78 MG/DL (ref 65–100)
GLUCOSE SERPL-MCNC: 84 MG/DL (ref 65–100)
GLUCOSE SERPL-MCNC: 96 MG/DL (ref 65–100)
HCO3 BLD-SCNC: 29 MMOL/L (ref 22–26)
HCT VFR BLD AUTO: 42 % (ref 35–47)
HCT VFR BLD AUTO: 43.2 % (ref 35–47)
HCT VFR BLD AUTO: 46.8 % (ref 35–47)
HCT VFR BLD AUTO: 46.8 % (ref 35–47)
HCT VFR BLD AUTO: 49.2 % (ref 35–47)
HCT VFR BLD AUTO: 52.5 % (ref 35–47)
HGB BLD-MCNC: 13.3 G/DL (ref 11.5–16)
HGB BLD-MCNC: 13.5 G/DL (ref 11.5–16)
HGB BLD-MCNC: 14.7 G/DL (ref 11.5–16)
HGB BLD-MCNC: 14.9 G/DL (ref 11.5–16)
HGB BLD-MCNC: 15.7 G/DL (ref 11.5–16)
HGB BLD-MCNC: 16.4 G/DL (ref 11.5–16)
IMM GRANULOCYTES # BLD AUTO: 0.4 K/UL (ref 0–0.04)
IMM GRANULOCYTES NFR BLD AUTO: 2 % (ref 0–0.5)
LACTATE SERPL-SCNC: 2 MMOL/L (ref 0.4–2)
LYMPHOCYTES # BLD: 0.6 K/UL (ref 0.8–3.5)
LYMPHOCYTES NFR BLD: 3 % (ref 12–49)
MAGNESIUM SERPL-MCNC: 2.2 MG/DL (ref 1.6–2.4)
MAGNESIUM SERPL-MCNC: 2.3 MG/DL (ref 1.6–2.4)
MAGNESIUM SERPL-MCNC: 2.6 MG/DL (ref 1.6–2.4)
MCH RBC QN AUTO: 29 PG (ref 26–34)
MCH RBC QN AUTO: 29.4 PG (ref 26–34)
MCH RBC QN AUTO: 29.4 PG (ref 26–34)
MCH RBC QN AUTO: 29.5 PG (ref 26–34)
MCH RBC QN AUTO: 29.5 PG (ref 26–34)
MCH RBC QN AUTO: 29.6 PG (ref 26–34)
MCHC RBC AUTO-ENTMCNC: 30.8 G/DL (ref 30–36.5)
MCHC RBC AUTO-ENTMCNC: 31.2 G/DL (ref 30–36.5)
MCHC RBC AUTO-ENTMCNC: 31.4 G/DL (ref 30–36.5)
MCHC RBC AUTO-ENTMCNC: 31.8 G/DL (ref 30–36.5)
MCHC RBC AUTO-ENTMCNC: 31.9 G/DL (ref 30–36.5)
MCHC RBC AUTO-ENTMCNC: 32.1 G/DL (ref 30–36.5)
MCV RBC AUTO: 92.1 FL (ref 80–99)
MCV RBC AUTO: 92.1 FL (ref 80–99)
MCV RBC AUTO: 92.7 FL (ref 80–99)
MCV RBC AUTO: 93.8 FL (ref 80–99)
MCV RBC AUTO: 94.1 FL (ref 80–99)
MCV RBC AUTO: 94.3 FL (ref 80–99)
MONOCYTES # BLD: 1.2 K/UL (ref 0–1)
MONOCYTES NFR BLD: 6 % (ref 5–13)
NEUTS SEG # BLD: 17 K/UL (ref 1.8–8)
NEUTS SEG NFR BLD: 89 % (ref 32–75)
NRBC # BLD: 0 K/UL (ref 0–0.01)
NRBC BLD-RTO: 0 PER 100 WBC
P-R INTERVAL, ECG05: 114 MS
PCO2 BLD: 38.5 MMHG (ref 35–45)
PH BLD: 7.49 (ref 7.35–7.45)
PHOSPHATE SERPL-MCNC: 2.3 MG/DL (ref 2.6–4.7)
PLATELET # BLD AUTO: 362 K/UL (ref 150–400)
PLATELET # BLD AUTO: 406 K/UL (ref 150–400)
PLATELET # BLD AUTO: 407 K/UL (ref 150–400)
PLATELET # BLD AUTO: 416 K/UL (ref 150–400)
PLATELET # BLD AUTO: 426 K/UL (ref 150–400)
PLATELET # BLD AUTO: 458 K/UL (ref 150–400)
PMV BLD AUTO: 11.2 FL (ref 8.9–12.9)
PMV BLD AUTO: 11.3 FL (ref 8.9–12.9)
PMV BLD AUTO: 11.4 FL (ref 8.9–12.9)
PMV BLD AUTO: 11.5 FL (ref 8.9–12.9)
PMV BLD AUTO: 11.7 FL (ref 8.9–12.9)
PMV BLD AUTO: 11.7 FL (ref 8.9–12.9)
PO2 BLD: 54 MMHG (ref 80–100)
POTASSIUM SERPL-SCNC: 3.8 MMOL/L (ref 3.5–5.1)
POTASSIUM SERPL-SCNC: 3.9 MMOL/L (ref 3.5–5.1)
POTASSIUM SERPL-SCNC: 4.1 MMOL/L (ref 3.5–5.1)
POTASSIUM SERPL-SCNC: 4.2 MMOL/L (ref 3.5–5.1)
POTASSIUM SERPL-SCNC: 4.2 MMOL/L (ref 3.5–5.1)
POTASSIUM SERPL-SCNC: 5.1 MMOL/L (ref 3.5–5.1)
PROCALCITONIN SERPL-MCNC: <0.05 NG/ML
PROT SERPL-MCNC: 7.2 G/DL (ref 6.4–8.2)
Q-T INTERVAL, ECG07: 384 MS
QRS DURATION, ECG06: 88 MS
QTC CALCULATION (BEZET), ECG08: 459 MS
RBC # BLD AUTO: 4.56 M/UL (ref 3.8–5.2)
RBC # BLD AUTO: 4.58 M/UL (ref 3.8–5.2)
RBC # BLD AUTO: 4.99 M/UL (ref 3.8–5.2)
RBC # BLD AUTO: 5.05 M/UL (ref 3.8–5.2)
RBC # BLD AUTO: 5.34 M/UL (ref 3.8–5.2)
RBC # BLD AUTO: 5.58 M/UL (ref 3.8–5.2)
RBC MORPH BLD: ABNORMAL
SAO2 % BLD: 90.2 % (ref 92–97)
SARS-COV-2 RDRP RESP QL NAA+PROBE: NOT DETECTED
SERVICE CMNT-IMP: ABNORMAL
SERVICE CMNT-IMP: NORMAL
SODIUM SERPL-SCNC: 139 MMOL/L (ref 136–145)
SODIUM SERPL-SCNC: 139 MMOL/L (ref 136–145)
SODIUM SERPL-SCNC: 140 MMOL/L (ref 136–145)
SODIUM SERPL-SCNC: 140 MMOL/L (ref 136–145)
SODIUM SERPL-SCNC: 141 MMOL/L (ref 136–145)
SODIUM SERPL-SCNC: 142 MMOL/L (ref 136–145)
SOURCE, COVRS: NORMAL
SPECIMEN TYPE: ABNORMAL
TROPONIN I SERPL HS-MCNC: 60 NG/L (ref 0–37)
TSH SERPL DL<=0.05 MIU/L-ACNC: 6.48 UIU/ML (ref 0.36–3.74)
VENTRICULAR RATE, ECG03: 86 BPM
WBC # BLD AUTO: 14 K/UL (ref 3.6–11)
WBC # BLD AUTO: 15.3 K/UL (ref 3.6–11)
WBC # BLD AUTO: 19.2 K/UL (ref 3.6–11)
WBC # BLD AUTO: 20.4 K/UL (ref 3.6–11)
WBC # BLD AUTO: 23.2 K/UL (ref 3.6–11)
WBC # BLD AUTO: 27.5 K/UL (ref 3.6–11)

## 2023-01-01 PROCEDURE — 74011000250 HC RX REV CODE- 250: Performed by: HOSPITALIST

## 2023-01-01 PROCEDURE — 74011250637 HC RX REV CODE- 250/637: Performed by: HOSPITALIST

## 2023-01-01 PROCEDURE — 65660000001 HC RM ICU INTERMED STEPDOWN

## 2023-01-01 PROCEDURE — 65270000032 HC RM SEMIPRIVATE

## 2023-01-01 PROCEDURE — 83735 ASSAY OF MAGNESIUM: CPT

## 2023-01-01 PROCEDURE — 94640 AIRWAY INHALATION TREATMENT: CPT

## 2023-01-01 PROCEDURE — 36415 COLL VENOUS BLD VENIPUNCTURE: CPT

## 2023-01-01 PROCEDURE — 94760 N-INVAS EAR/PLS OXIMETRY 1: CPT

## 2023-01-01 PROCEDURE — 85027 COMPLETE CBC AUTOMATED: CPT

## 2023-01-01 PROCEDURE — 74011636637 HC RX REV CODE- 636/637: Performed by: HOSPITALIST

## 2023-01-01 PROCEDURE — 71045 X-RAY EXAM CHEST 1 VIEW: CPT

## 2023-01-01 PROCEDURE — 74011250636 HC RX REV CODE- 250/636: Performed by: HOSPITALIST

## 2023-01-01 PROCEDURE — 97161 PT EVAL LOW COMPLEX 20 MIN: CPT

## 2023-01-01 PROCEDURE — 84484 ASSAY OF TROPONIN QUANT: CPT

## 2023-01-01 PROCEDURE — 74011000250 HC RX REV CODE- 250: Performed by: INTERNAL MEDICINE

## 2023-01-01 PROCEDURE — 97165 OT EVAL LOW COMPLEX 30 MIN: CPT

## 2023-01-01 PROCEDURE — G0156 HHCP-SVS OF AIDE,EA 15 MIN: HCPCS

## 2023-01-01 PROCEDURE — 93005 ELECTROCARDIOGRAM TRACING: CPT

## 2023-01-01 PROCEDURE — 74011000258 HC RX REV CODE- 258: Performed by: HOSPITALIST

## 2023-01-01 PROCEDURE — 74011250636 HC RX REV CODE- 250/636: Performed by: INTERNAL MEDICINE

## 2023-01-01 PROCEDURE — 82962 GLUCOSE BLOOD TEST: CPT

## 2023-01-01 PROCEDURE — 83880 ASSAY OF NATRIURETIC PEPTIDE: CPT

## 2023-01-01 PROCEDURE — 84443 ASSAY THYROID STIM HORMONE: CPT

## 2023-01-01 PROCEDURE — 97535 SELF CARE MNGMENT TRAINING: CPT

## 2023-01-01 PROCEDURE — 74011000258 HC RX REV CODE- 258: Performed by: INTERNAL MEDICINE

## 2023-01-01 PROCEDURE — 87635 SARS-COV-2 COVID-19 AMP PRB: CPT

## 2023-01-01 PROCEDURE — 85025 COMPLETE CBC W/AUTO DIFF WBC: CPT

## 2023-01-01 PROCEDURE — 3331090004 HSPC SERVICE INTENSITY ADD-ON

## 2023-01-01 PROCEDURE — 80048 BASIC METABOLIC PNL TOTAL CA: CPT

## 2023-01-01 PROCEDURE — 83605 ASSAY OF LACTIC ACID: CPT

## 2023-01-01 PROCEDURE — 77010033711 HC HIGH FLOW OXYGEN

## 2023-01-01 PROCEDURE — 94664 DEMO&/EVAL PT USE INHALER: CPT

## 2023-01-01 PROCEDURE — 0651 HSPC ROUTINE HOME CARE

## 2023-01-01 PROCEDURE — 74011250637 HC RX REV CODE- 250/637: Performed by: INTERNAL MEDICINE

## 2023-01-01 PROCEDURE — G0299 HHS/HOSPICE OF RN EA 15 MIN: HCPCS

## 2023-01-01 PROCEDURE — 97530 THERAPEUTIC ACTIVITIES: CPT

## 2023-01-01 PROCEDURE — 99285 EMERGENCY DEPT VISIT HI MDM: CPT

## 2023-01-01 PROCEDURE — 84100 ASSAY OF PHOSPHORUS: CPT

## 2023-01-01 PROCEDURE — 36600 WITHDRAWAL OF ARTERIAL BLOOD: CPT

## 2023-01-01 PROCEDURE — 77010033678 HC OXYGEN DAILY

## 2023-01-01 PROCEDURE — 74011000250 HC RX REV CODE- 250: Performed by: EMERGENCY MEDICINE

## 2023-01-01 PROCEDURE — 82803 BLOOD GASES ANY COMBINATION: CPT

## 2023-01-01 PROCEDURE — G0155 HHCP-SVS OF CSW,EA 15 MIN: HCPCS

## 2023-01-01 PROCEDURE — 80053 COMPREHEN METABOLIC PANEL: CPT

## 2023-01-01 PROCEDURE — 84145 PROCALCITONIN (PCT): CPT

## 2023-01-01 PROCEDURE — 87040 BLOOD CULTURE FOR BACTERIA: CPT

## 2023-01-01 PROCEDURE — 94762 N-INVAS EAR/PLS OXIMTRY CONT: CPT

## 2023-01-01 RX ORDER — ONDANSETRON 4 MG/1
4 TABLET, ORALLY DISINTEGRATING ORAL
Status: DISCONTINUED | OUTPATIENT
Start: 2023-01-01 | End: 2023-01-01 | Stop reason: HOSPADM

## 2023-01-01 RX ORDER — SODIUM CHLORIDE 0.9 % (FLUSH) 0.9 %
5-40 SYRINGE (ML) INJECTION EVERY 8 HOURS
Status: DISCONTINUED | OUTPATIENT
Start: 2023-01-01 | End: 2023-01-01 | Stop reason: HOSPADM

## 2023-01-01 RX ORDER — SERTRALINE HYDROCHLORIDE 50 MG/1
50 TABLET, FILM COATED ORAL
Status: DISCONTINUED | OUTPATIENT
Start: 2023-01-01 | End: 2023-01-01

## 2023-01-01 RX ORDER — AMLODIPINE BESYLATE 5 MG/1
10 TABLET ORAL DAILY
Status: DISCONTINUED | OUTPATIENT
Start: 2023-01-01 | End: 2023-01-01

## 2023-01-01 RX ORDER — POLYETHYLENE GLYCOL 3350 17 G/17G
17 POWDER, FOR SOLUTION ORAL DAILY PRN
Status: DISCONTINUED | OUTPATIENT
Start: 2023-01-01 | End: 2023-01-01 | Stop reason: HOSPADM

## 2023-01-01 RX ORDER — HYDROMORPHONE HYDROCHLORIDE 5 MG/5ML
2 SOLUTION ORAL
Status: DISCONTINUED | OUTPATIENT
Start: 2023-01-01 | End: 2023-01-01 | Stop reason: HOSPADM

## 2023-01-01 RX ORDER — SODIUM CHLORIDE 0.9 % (FLUSH) 0.9 %
5-40 SYRINGE (ML) INJECTION AS NEEDED
Status: DISCONTINUED | OUTPATIENT
Start: 2023-01-01 | End: 2023-01-01 | Stop reason: HOSPADM

## 2023-01-01 RX ORDER — ACETAMINOPHEN 325 MG/1
650 TABLET ORAL
Status: DISCONTINUED | OUTPATIENT
Start: 2023-01-01 | End: 2023-01-01 | Stop reason: HOSPADM

## 2023-01-01 RX ORDER — HEPARIN SODIUM 5000 [USP'U]/ML
5000 INJECTION, SOLUTION INTRAVENOUS; SUBCUTANEOUS EVERY 12 HOURS
Status: DISCONTINUED | OUTPATIENT
Start: 2023-01-01 | End: 2023-01-01

## 2023-01-01 RX ORDER — GUAIFENESIN 600 MG/1
600 TABLET, EXTENDED RELEASE ORAL 2 TIMES DAILY
Status: DISCONTINUED | OUTPATIENT
Start: 2023-01-01 | End: 2023-01-01

## 2023-01-01 RX ORDER — NYSTATIN 100000 [USP'U]/ML
500000 SUSPENSION ORAL 2 TIMES DAILY
Status: DISCONTINUED | OUTPATIENT
Start: 2023-01-01 | End: 2023-01-01

## 2023-01-01 RX ORDER — ONDANSETRON 2 MG/ML
4 INJECTION INTRAMUSCULAR; INTRAVENOUS
Status: DISCONTINUED | OUTPATIENT
Start: 2023-01-01 | End: 2023-01-01 | Stop reason: HOSPADM

## 2023-01-01 RX ORDER — IPRATROPIUM BROMIDE AND ALBUTEROL SULFATE 2.5; .5 MG/3ML; MG/3ML
3 SOLUTION RESPIRATORY (INHALATION)
Status: COMPLETED | OUTPATIENT
Start: 2023-01-01 | End: 2023-01-01

## 2023-01-01 RX ORDER — FUROSEMIDE 10 MG/ML
20 INJECTION INTRAMUSCULAR; INTRAVENOUS ONCE
Status: COMPLETED | OUTPATIENT
Start: 2023-01-01 | End: 2023-01-01

## 2023-01-01 RX ORDER — HYDROMORPHONE HYDROCHLORIDE 1 MG/ML
0.5 INJECTION, SOLUTION INTRAMUSCULAR; INTRAVENOUS; SUBCUTANEOUS
Status: DISCONTINUED | OUTPATIENT
Start: 2023-01-01 | End: 2023-01-01 | Stop reason: HOSPADM

## 2023-01-01 RX ORDER — NYSTATIN 100000 [USP'U]/ML
500000 SUSPENSION ORAL 4 TIMES DAILY
Status: DISCONTINUED | OUTPATIENT
Start: 2023-01-01 | End: 2023-01-01

## 2023-01-01 RX ORDER — IPRATROPIUM BROMIDE AND ALBUTEROL SULFATE 2.5; .5 MG/3ML; MG/3ML
3 SOLUTION RESPIRATORY (INHALATION)
Status: DISCONTINUED | OUTPATIENT
Start: 2023-01-01 | End: 2023-01-01

## 2023-01-01 RX ORDER — GLYCOPYRROLATE 0.2 MG/ML
0.2 INJECTION INTRAMUSCULAR; INTRAVENOUS EVERY 6 HOURS
Status: DISCONTINUED | OUTPATIENT
Start: 2023-01-01 | End: 2023-01-01 | Stop reason: HOSPADM

## 2023-01-01 RX ORDER — SODIUM,POTASSIUM PHOSPHATES 280-250MG
1 POWDER IN PACKET (EA) ORAL 4 TIMES DAILY
Status: COMPLETED | OUTPATIENT
Start: 2023-01-01 | End: 2023-01-01

## 2023-01-01 RX ORDER — AZITHROMYCIN 250 MG/1
500 TABLET, FILM COATED ORAL DAILY
Status: COMPLETED | OUTPATIENT
Start: 2023-01-01 | End: 2023-01-01

## 2023-01-01 RX ORDER — LEVOTHYROXINE SODIUM 75 UG/1
75 TABLET ORAL
Status: DISCONTINUED | OUTPATIENT
Start: 2023-01-01 | End: 2023-01-01

## 2023-01-01 RX ORDER — FUROSEMIDE 20 MG/1
20 TABLET ORAL DAILY
Status: DISCONTINUED | OUTPATIENT
Start: 2023-01-01 | End: 2023-01-01

## 2023-01-01 RX ORDER — ENOXAPARIN SODIUM 100 MG/ML
30 INJECTION SUBCUTANEOUS DAILY
Status: DISCONTINUED | OUTPATIENT
Start: 2023-01-01 | End: 2023-01-01

## 2023-01-01 RX ORDER — ALBUTEROL SULFATE 0.83 MG/ML
2.5 SOLUTION RESPIRATORY (INHALATION)
Status: DISCONTINUED | OUTPATIENT
Start: 2023-01-01 | End: 2023-01-01 | Stop reason: HOSPADM

## 2023-01-01 RX ORDER — GUAIFENESIN 100 MG/5ML
81 LIQUID (ML) ORAL DAILY
Status: DISCONTINUED | OUTPATIENT
Start: 2023-01-01 | End: 2023-01-01

## 2023-01-01 RX ORDER — LEVOTHYROXINE SODIUM 88 UG/1
88 TABLET ORAL
Status: DISCONTINUED | OUTPATIENT
Start: 2023-01-01 | End: 2023-01-01

## 2023-01-01 RX ORDER — ACETAMINOPHEN 650 MG/1
650 SUPPOSITORY RECTAL
Status: DISCONTINUED | OUTPATIENT
Start: 2023-01-01 | End: 2023-01-01 | Stop reason: HOSPADM

## 2023-01-01 RX ADMIN — Medication 10 ML: at 22:25

## 2023-01-01 RX ADMIN — HEPARIN SODIUM 5000 UNITS: 5000 INJECTION INTRAVENOUS; SUBCUTANEOUS at 22:25

## 2023-01-01 RX ADMIN — LEVOTHYROXINE SODIUM 88 MCG: 0.09 TABLET ORAL at 07:00

## 2023-01-01 RX ADMIN — IPRATROPIUM BROMIDE AND ALBUTEROL SULFATE 3 ML: 2.5; .5 SOLUTION RESPIRATORY (INHALATION) at 16:46

## 2023-01-01 RX ADMIN — HEPARIN SODIUM 5000 UNITS: 5000 INJECTION INTRAVENOUS; SUBCUTANEOUS at 21:04

## 2023-01-01 RX ADMIN — NYSTATIN 500000 UNITS: 100000 SUSPENSION ORAL at 13:01

## 2023-01-01 RX ADMIN — NYSTATIN 500000 UNITS: 100000 SUSPENSION ORAL at 21:04

## 2023-01-01 RX ADMIN — GLYCOPYRROLATE 0.2 MG: 0.2 INJECTION INTRAMUSCULAR; INTRAVENOUS at 06:16

## 2023-01-01 RX ADMIN — NYSTATIN 500000 UNITS: 100000 SUSPENSION ORAL at 17:06

## 2023-01-01 RX ADMIN — GUAIFENESIN 600 MG: 600 TABLET, EXTENDED RELEASE ORAL at 18:33

## 2023-01-01 RX ADMIN — NYSTATIN 500000 UNITS: 100000 SUSPENSION ORAL at 13:31

## 2023-01-01 RX ADMIN — SERTRALINE HYDROCHLORIDE 50 MG: 50 TABLET ORAL at 21:41

## 2023-01-01 RX ADMIN — GUAIFENESIN 600 MG: 600 TABLET, EXTENDED RELEASE ORAL at 10:10

## 2023-01-01 RX ADMIN — AMLODIPINE BESYLATE 10 MG: 5 TABLET ORAL at 09:02

## 2023-01-01 RX ADMIN — GUAIFENESIN 600 MG: 600 TABLET, EXTENDED RELEASE ORAL at 09:01

## 2023-01-01 RX ADMIN — IPRATROPIUM BROMIDE AND ALBUTEROL SULFATE 3 ML: 2.5; .5 SOLUTION RESPIRATORY (INHALATION) at 15:12

## 2023-01-01 RX ADMIN — ASPIRIN 81 MG CHEWABLE TABLET 81 MG: 81 TABLET CHEWABLE at 09:52

## 2023-01-01 RX ADMIN — ASPIRIN 81 MG CHEWABLE TABLET 81 MG: 81 TABLET CHEWABLE at 09:10

## 2023-01-01 RX ADMIN — NYSTATIN 500000 UNITS: 100000 SUSPENSION ORAL at 10:11

## 2023-01-01 RX ADMIN — CEFEPIME 1 G: 1 INJECTION, POWDER, FOR SOLUTION INTRAMUSCULAR; INTRAVENOUS at 09:50

## 2023-01-01 RX ADMIN — HEPARIN SODIUM 5000 UNITS: 5000 INJECTION INTRAVENOUS; SUBCUTANEOUS at 09:50

## 2023-01-01 RX ADMIN — AMLODIPINE BESYLATE 10 MG: 5 TABLET ORAL at 10:10

## 2023-01-01 RX ADMIN — FUROSEMIDE 20 MG: 40 TABLET ORAL at 09:51

## 2023-01-01 RX ADMIN — CEFEPIME 1 G: 1 INJECTION, POWDER, FOR SOLUTION INTRAMUSCULAR; INTRAVENOUS at 19:33

## 2023-01-01 RX ADMIN — GUAIFENESIN 600 MG: 600 TABLET, EXTENDED RELEASE ORAL at 09:52

## 2023-01-01 RX ADMIN — IPRATROPIUM BROMIDE AND ALBUTEROL SULFATE 3 ML: 2.5; .5 SOLUTION RESPIRATORY (INHALATION) at 21:24

## 2023-01-01 RX ADMIN — CEFEPIME 1 G: 1 INJECTION, POWDER, FOR SOLUTION INTRAMUSCULAR; INTRAVENOUS at 21:41

## 2023-01-01 RX ADMIN — CEFEPIME 1 G: 1 INJECTION, POWDER, FOR SOLUTION INTRAMUSCULAR; INTRAVENOUS at 10:10

## 2023-01-01 RX ADMIN — Medication 10 ML: at 06:52

## 2023-01-01 RX ADMIN — HEPARIN SODIUM 5000 UNITS: 5000 INJECTION INTRAVENOUS; SUBCUTANEOUS at 09:05

## 2023-01-01 RX ADMIN — IPRATROPIUM BROMIDE AND ALBUTEROL SULFATE 3 ML: 2.5; .5 SOLUTION RESPIRATORY (INHALATION) at 11:24

## 2023-01-01 RX ADMIN — GLYCOPYRROLATE 0.2 MG: 0.2 INJECTION INTRAMUSCULAR; INTRAVENOUS at 12:11

## 2023-01-01 RX ADMIN — AMLODIPINE BESYLATE 10 MG: 5 TABLET ORAL at 09:52

## 2023-01-01 RX ADMIN — Medication 10 ML: at 06:16

## 2023-01-01 RX ADMIN — ASPIRIN 81 MG CHEWABLE TABLET 81 MG: 81 TABLET CHEWABLE at 09:51

## 2023-01-01 RX ADMIN — HEPARIN SODIUM 5000 UNITS: 5000 INJECTION INTRAVENOUS; SUBCUTANEOUS at 21:41

## 2023-01-01 RX ADMIN — POTASSIUM & SODIUM PHOSPHATES POWDER PACK 280-160-250 MG 1 PACKET: 280-160-250 PACK at 21:10

## 2023-01-01 RX ADMIN — Medication 5 ML: at 22:00

## 2023-01-01 RX ADMIN — CEFEPIME 1 G: 1 INJECTION, POWDER, FOR SOLUTION INTRAMUSCULAR; INTRAVENOUS at 09:53

## 2023-01-01 RX ADMIN — LEVOTHYROXINE SODIUM 75 MCG: 50 TABLET ORAL at 06:55

## 2023-01-01 RX ADMIN — Medication 10 ML: at 23:05

## 2023-01-01 RX ADMIN — NYSTATIN 500000 UNITS: 100000 SUSPENSION ORAL at 09:10

## 2023-01-01 RX ADMIN — GLYCOPYRROLATE 0.2 MG: 0.2 INJECTION INTRAMUSCULAR; INTRAVENOUS at 07:42

## 2023-01-01 RX ADMIN — GLYCOPYRROLATE 0.2 MG: 0.2 INJECTION INTRAMUSCULAR; INTRAVENOUS at 06:52

## 2023-01-01 RX ADMIN — IPRATROPIUM BROMIDE AND ALBUTEROL SULFATE 3 ML: 2.5; .5 SOLUTION RESPIRATORY (INHALATION) at 20:11

## 2023-01-01 RX ADMIN — Medication 10 ML: at 14:00

## 2023-01-01 RX ADMIN — FUROSEMIDE 20 MG: 40 TABLET ORAL at 09:06

## 2023-01-01 RX ADMIN — AZITHROMYCIN MONOHYDRATE 500 MG: 250 TABLET ORAL at 09:06

## 2023-01-01 RX ADMIN — Medication 10 ML: at 05:45

## 2023-01-01 RX ADMIN — SODIUM CHLORIDE, PRESERVATIVE FREE 10 ML: 5 INJECTION INTRAVENOUS at 23:56

## 2023-01-01 RX ADMIN — LEVOTHYROXINE SODIUM 88 MCG: 0.09 TABLET ORAL at 06:52

## 2023-01-01 RX ADMIN — IPRATROPIUM BROMIDE AND ALBUTEROL SULFATE 3 ML: 2.5; .5 SOLUTION RESPIRATORY (INHALATION) at 18:26

## 2023-01-01 RX ADMIN — SODIUM CHLORIDE, PRESERVATIVE FREE 10 ML: 5 INJECTION INTRAVENOUS at 23:37

## 2023-01-01 RX ADMIN — HYDROMORPHONE HYDROCHLORIDE 2 MG: 5 SOLUTION ORAL at 11:20

## 2023-01-01 RX ADMIN — IPRATROPIUM BROMIDE AND ALBUTEROL SULFATE 3 ML: 2.5; .5 SOLUTION RESPIRATORY (INHALATION) at 16:07

## 2023-01-01 RX ADMIN — HYDROMORPHONE HYDROCHLORIDE 0.5 MG: 1 INJECTION, SOLUTION INTRAMUSCULAR; INTRAVENOUS; SUBCUTANEOUS at 12:12

## 2023-01-01 RX ADMIN — Medication 10 ML: at 21:11

## 2023-01-01 RX ADMIN — Medication 10 ML: at 18:41

## 2023-01-01 RX ADMIN — HEPARIN SODIUM 5000 UNITS: 5000 INJECTION INTRAVENOUS; SUBCUTANEOUS at 10:10

## 2023-01-01 RX ADMIN — IPRATROPIUM BROMIDE AND ALBUTEROL SULFATE 3 ML: 2.5; .5 SOLUTION RESPIRATORY (INHALATION) at 08:07

## 2023-01-01 RX ADMIN — AZITHROMYCIN MONOHYDRATE 500 MG: 250 TABLET ORAL at 17:43

## 2023-01-01 RX ADMIN — CEFEPIME 1 G: 1 INJECTION, POWDER, FOR SOLUTION INTRAMUSCULAR; INTRAVENOUS at 21:04

## 2023-01-01 RX ADMIN — LEVOTHYROXINE SODIUM 75 MCG: 50 TABLET ORAL at 09:51

## 2023-01-01 RX ADMIN — SERTRALINE HYDROCHLORIDE 50 MG: 50 TABLET ORAL at 21:04

## 2023-01-01 RX ADMIN — AZITHROMYCIN MONOHYDRATE 500 MG: 250 TABLET ORAL at 09:55

## 2023-01-01 RX ADMIN — CEFEPIME 1 G: 1 INJECTION, POWDER, FOR SOLUTION INTRAMUSCULAR; INTRAVENOUS at 07:42

## 2023-01-01 RX ADMIN — GLYCOPYRROLATE 0.2 MG: 0.2 INJECTION INTRAMUSCULAR; INTRAVENOUS at 23:55

## 2023-01-01 RX ADMIN — GLYCOPYRROLATE 0.2 MG: 0.2 INJECTION INTRAMUSCULAR; INTRAVENOUS at 05:45

## 2023-01-01 RX ADMIN — HEPARIN SODIUM 5000 UNITS: 5000 INJECTION INTRAVENOUS; SUBCUTANEOUS at 22:01

## 2023-01-01 RX ADMIN — LEVOTHYROXINE SODIUM 75 MCG: 50 TABLET ORAL at 07:59

## 2023-01-01 RX ADMIN — SODIUM CHLORIDE, PRESERVATIVE FREE 10 ML: 5 INJECTION INTRAVENOUS at 15:12

## 2023-01-01 RX ADMIN — SERTRALINE HYDROCHLORIDE 50 MG: 50 TABLET ORAL at 22:01

## 2023-01-01 RX ADMIN — ASPIRIN 81 MG CHEWABLE TABLET 81 MG: 81 TABLET CHEWABLE at 10:10

## 2023-01-01 RX ADMIN — NYSTATIN 500000 UNITS: 100000 SUSPENSION ORAL at 17:49

## 2023-01-01 RX ADMIN — SERTRALINE HYDROCHLORIDE 50 MG: 50 TABLET ORAL at 21:10

## 2023-01-01 RX ADMIN — Medication 10 ML: at 13:05

## 2023-01-01 RX ADMIN — PREDNISONE 30 MG: 20 TABLET ORAL at 09:05

## 2023-01-01 RX ADMIN — GLYCOPYRROLATE 0.2 MG: 0.2 INJECTION INTRAMUSCULAR; INTRAVENOUS at 14:08

## 2023-01-01 RX ADMIN — IPRATROPIUM BROMIDE AND ALBUTEROL SULFATE 3 ML: 2.5; .5 SOLUTION RESPIRATORY (INHALATION) at 12:00

## 2023-01-01 RX ADMIN — GUAIFENESIN 600 MG: 600 TABLET, EXTENDED RELEASE ORAL at 22:25

## 2023-01-01 RX ADMIN — GUAIFENESIN 600 MG: 600 TABLET, EXTENDED RELEASE ORAL at 17:49

## 2023-01-01 RX ADMIN — CEFEPIME 1 G: 1 INJECTION, POWDER, FOR SOLUTION INTRAMUSCULAR; INTRAVENOUS at 21:11

## 2023-01-01 RX ADMIN — AMLODIPINE BESYLATE 10 MG: 5 TABLET ORAL at 09:10

## 2023-01-01 RX ADMIN — IPRATROPIUM BROMIDE AND ALBUTEROL SULFATE 3 ML: 2.5; .5 SOLUTION RESPIRATORY (INHALATION) at 08:31

## 2023-01-01 RX ADMIN — IPRATROPIUM BROMIDE AND ALBUTEROL SULFATE 3 ML: 2.5; .5 SOLUTION RESPIRATORY (INHALATION) at 20:25

## 2023-01-01 RX ADMIN — HYDROMORPHONE HYDROCHLORIDE 0.5 MG: 1 INJECTION, SOLUTION INTRAMUSCULAR; INTRAVENOUS; SUBCUTANEOUS at 16:37

## 2023-01-01 RX ADMIN — Medication 10 ML: at 06:00

## 2023-01-01 RX ADMIN — SERTRALINE HYDROCHLORIDE 50 MG: 50 TABLET ORAL at 23:18

## 2023-01-01 RX ADMIN — IPRATROPIUM BROMIDE AND ALBUTEROL SULFATE 3 ML: 2.5; .5 SOLUTION RESPIRATORY (INHALATION) at 11:21

## 2023-01-01 RX ADMIN — GLYCOPYRROLATE 0.2 MG: 0.2 INJECTION INTRAMUSCULAR; INTRAVENOUS at 23:41

## 2023-01-01 RX ADMIN — GUAIFENESIN 600 MG: 600 TABLET, EXTENDED RELEASE ORAL at 17:06

## 2023-01-01 RX ADMIN — IPRATROPIUM BROMIDE AND ALBUTEROL SULFATE 3 ML: 2.5; .5 SOLUTION RESPIRATORY (INHALATION) at 09:05

## 2023-01-01 RX ADMIN — CEFEPIME 1 G: 1 INJECTION, POWDER, FOR SOLUTION INTRAMUSCULAR; INTRAVENOUS at 19:59

## 2023-01-01 RX ADMIN — PREDNISONE 30 MG: 20 TABLET ORAL at 10:10

## 2023-01-01 RX ADMIN — SERTRALINE HYDROCHLORIDE 50 MG: 50 TABLET ORAL at 23:05

## 2023-01-01 RX ADMIN — GLYCOPYRROLATE 0.2 MG: 0.2 INJECTION INTRAMUSCULAR; INTRAVENOUS at 12:02

## 2023-01-01 RX ADMIN — GLYCOPYRROLATE 0.2 MG: 0.2 INJECTION INTRAMUSCULAR; INTRAVENOUS at 23:37

## 2023-01-01 RX ADMIN — NYSTATIN 500000 UNITS: 100000 SUSPENSION ORAL at 21:10

## 2023-01-01 RX ADMIN — LEVOTHYROXINE SODIUM 75 MCG: 50 TABLET ORAL at 08:57

## 2023-01-01 RX ADMIN — CEFEPIME 1 G: 1 INJECTION, POWDER, FOR SOLUTION INTRAMUSCULAR; INTRAVENOUS at 20:01

## 2023-01-01 RX ADMIN — ASPIRIN 81 MG CHEWABLE TABLET 81 MG: 81 TABLET CHEWABLE at 09:01

## 2023-01-01 RX ADMIN — HEPARIN SODIUM 5000 UNITS: 5000 INJECTION INTRAVENOUS; SUBCUTANEOUS at 23:05

## 2023-01-01 RX ADMIN — POTASSIUM & SODIUM PHOSPHATES POWDER PACK 280-160-250 MG 1 PACKET: 280-160-250 PACK at 17:50

## 2023-01-01 RX ADMIN — NYSTATIN 500000 UNITS: 100000 SUSPENSION ORAL at 22:25

## 2023-01-01 RX ADMIN — METHYLPREDNISOLONE SODIUM SUCCINATE 60 MG: 125 INJECTION, POWDER, FOR SOLUTION INTRAMUSCULAR; INTRAVENOUS at 18:40

## 2023-01-01 RX ADMIN — NYSTATIN 500000 UNITS: 100000 SUSPENSION ORAL at 09:04

## 2023-01-01 RX ADMIN — Medication 10 ML: at 07:43

## 2023-01-01 RX ADMIN — ASPIRIN 81 MG CHEWABLE TABLET 81 MG: 81 TABLET CHEWABLE at 09:06

## 2023-01-01 RX ADMIN — HEPARIN SODIUM 5000 UNITS: 5000 INJECTION INTRAVENOUS; SUBCUTANEOUS at 09:53

## 2023-01-01 RX ADMIN — CEFEPIME 1 G: 1 INJECTION, POWDER, FOR SOLUTION INTRAMUSCULAR; INTRAVENOUS at 05:24

## 2023-01-01 RX ADMIN — GLYCOPYRROLATE 0.2 MG: 0.2 INJECTION INTRAMUSCULAR; INTRAVENOUS at 18:13

## 2023-01-01 RX ADMIN — GLYCOPYRROLATE 0.2 MG: 0.2 INJECTION INTRAMUSCULAR; INTRAVENOUS at 18:01

## 2023-01-01 RX ADMIN — CEFEPIME 1 G: 1 INJECTION, POWDER, FOR SOLUTION INTRAMUSCULAR; INTRAVENOUS at 09:07

## 2023-01-01 RX ADMIN — Medication 10 ML: at 21:07

## 2023-01-01 RX ADMIN — GUAIFENESIN 600 MG: 600 TABLET, EXTENDED RELEASE ORAL at 09:10

## 2023-01-01 RX ADMIN — LEVOTHYROXINE SODIUM 88 MCG: 0.09 TABLET ORAL at 07:42

## 2023-01-01 RX ADMIN — HEPARIN SODIUM 5000 UNITS: 5000 INJECTION INTRAVENOUS; SUBCUTANEOUS at 21:12

## 2023-01-01 RX ADMIN — IPRATROPIUM BROMIDE AND ALBUTEROL SULFATE 3 ML: 2.5; .5 SOLUTION RESPIRATORY (INHALATION) at 07:38

## 2023-01-01 RX ADMIN — GUAIFENESIN 600 MG: 600 TABLET, EXTENDED RELEASE ORAL at 18:13

## 2023-01-01 RX ADMIN — AMLODIPINE BESYLATE 10 MG: 5 TABLET ORAL at 12:13

## 2023-01-01 RX ADMIN — CEFEPIME 1 G: 1 INJECTION, POWDER, FOR SOLUTION INTRAMUSCULAR; INTRAVENOUS at 09:05

## 2023-01-01 RX ADMIN — PREDNISONE 30 MG: 20 TABLET ORAL at 09:01

## 2023-01-01 RX ADMIN — Medication 10 ML: at 14:08

## 2023-01-01 RX ADMIN — HEPARIN SODIUM 5000 UNITS: 5000 INJECTION INTRAVENOUS; SUBCUTANEOUS at 09:10

## 2023-01-01 RX ADMIN — LEVOTHYROXINE SODIUM 75 MCG: 50 TABLET ORAL at 09:00

## 2023-01-01 RX ADMIN — IPRATROPIUM BROMIDE AND ALBUTEROL SULFATE 3 ML: 2.5; .5 SOLUTION RESPIRATORY (INHALATION) at 07:19

## 2023-01-01 RX ADMIN — AZITHROMYCIN MONOHYDRATE 500 MG: 250 TABLET ORAL at 10:10

## 2023-01-01 RX ADMIN — POTASSIUM & SODIUM PHOSPHATES POWDER PACK 280-160-250 MG 1 PACKET: 280-160-250 PACK at 13:01

## 2023-01-01 RX ADMIN — Medication 10 ML: at 22:11

## 2023-01-01 RX ADMIN — GLYCOPYRROLATE 0.2 MG: 0.2 INJECTION INTRAMUSCULAR; INTRAVENOUS at 18:57

## 2023-01-01 RX ADMIN — AZITHROMYCIN MONOHYDRATE 500 MG: 250 TABLET ORAL at 09:01

## 2023-01-01 RX ADMIN — IPRATROPIUM BROMIDE AND ALBUTEROL SULFATE 3 ML: 2.5; .5 SOLUTION RESPIRATORY (INHALATION) at 07:56

## 2023-01-01 RX ADMIN — GLYCOPYRROLATE 0.2 MG: 0.2 INJECTION INTRAMUSCULAR; INTRAVENOUS at 23:05

## 2023-01-01 RX ADMIN — GLYCOPYRROLATE 0.2 MG: 0.2 INJECTION INTRAMUSCULAR; INTRAVENOUS at 17:38

## 2023-01-01 RX ADMIN — IPRATROPIUM BROMIDE AND ALBUTEROL SULFATE 3 ML: 2.5; .5 SOLUTION RESPIRATORY (INHALATION) at 19:58

## 2023-01-01 RX ADMIN — Medication 10 ML: at 08:00

## 2023-01-01 RX ADMIN — GUAIFENESIN 600 MG: 600 TABLET, EXTENDED RELEASE ORAL at 12:13

## 2023-01-01 RX ADMIN — ASPIRIN 81 MG CHEWABLE TABLET 81 MG: 81 TABLET CHEWABLE at 08:57

## 2023-01-01 RX ADMIN — SERTRALINE HYDROCHLORIDE 50 MG: 50 TABLET ORAL at 22:25

## 2023-01-01 RX ADMIN — Medication 10 ML: at 06:55

## 2023-01-01 RX ADMIN — Medication 10 ML: at 21:42

## 2023-01-01 RX ADMIN — Medication 10 ML: at 21:40

## 2023-01-01 RX ADMIN — IPRATROPIUM BROMIDE AND ALBUTEROL SULFATE 3 ML: 2.5; .5 SOLUTION RESPIRATORY (INHALATION) at 14:35

## 2023-01-01 RX ADMIN — IPRATROPIUM BROMIDE AND ALBUTEROL SULFATE 3 ML: 2.5; .5 SOLUTION RESPIRATORY (INHALATION) at 21:55

## 2023-01-01 RX ADMIN — Medication 10 ML: at 15:57

## 2023-01-01 RX ADMIN — Medication 10 ML: at 07:01

## 2023-01-01 RX ADMIN — Medication 10 ML: at 13:31

## 2023-01-01 RX ADMIN — PREDNISONE 30 MG: 20 TABLET ORAL at 09:52

## 2023-01-01 RX ADMIN — PREDNISONE 30 MG: 20 TABLET ORAL at 09:51

## 2023-01-01 RX ADMIN — FUROSEMIDE 20 MG: 10 INJECTION, SOLUTION INTRAMUSCULAR; INTRAVENOUS at 17:40

## 2023-01-01 RX ADMIN — GLYCOPYRROLATE 0.2 MG: 0.2 INJECTION INTRAMUSCULAR; INTRAVENOUS at 12:49

## 2023-01-01 RX ADMIN — IPRATROPIUM BROMIDE AND ALBUTEROL SULFATE 3 ML: 2.5; .5 SOLUTION RESPIRATORY (INHALATION) at 16:16

## 2023-01-01 RX ADMIN — CEFEPIME 2 G: 2 INJECTION, POWDER, FOR SOLUTION INTRAVENOUS at 17:53

## 2023-01-01 RX ADMIN — IPRATROPIUM BROMIDE AND ALBUTEROL SULFATE 3 ML: 2.5; .5 SOLUTION RESPIRATORY (INHALATION) at 11:22

## 2023-01-01 RX ADMIN — HEPARIN SODIUM 5000 UNITS: 5000 INJECTION INTRAVENOUS; SUBCUTANEOUS at 08:57

## 2023-02-23 ENCOUNTER — APPOINTMENT (OUTPATIENT)
Dept: GENERAL RADIOLOGY | Age: 88
DRG: 871 | End: 2023-02-23
Attending: EMERGENCY MEDICINE
Payer: MEDICARE

## 2023-02-23 ENCOUNTER — APPOINTMENT (OUTPATIENT)
Dept: CT IMAGING | Age: 88
DRG: 871 | End: 2023-02-23
Attending: INTERNAL MEDICINE
Payer: MEDICARE

## 2023-02-23 ENCOUNTER — HOSPITAL ENCOUNTER (INPATIENT)
Age: 88
LOS: 5 days | Discharge: NURSING, REHAB OR LONG-TERM CARE | DRG: 871 | End: 2023-02-28
Attending: EMERGENCY MEDICINE | Admitting: INTERNAL MEDICINE
Payer: MEDICARE

## 2023-02-23 DIAGNOSIS — A41.9 SEPSIS, DUE TO UNSPECIFIED ORGANISM, UNSPECIFIED WHETHER ACUTE ORGAN DYSFUNCTION PRESENT (HCC): Primary | ICD-10-CM

## 2023-02-23 DIAGNOSIS — J18.9 COMMUNITY ACQUIRED PNEUMONIA, UNSPECIFIED LATERALITY: ICD-10-CM

## 2023-02-23 DIAGNOSIS — J96.01 ACUTE RESPIRATORY FAILURE WITH HYPOXIA (HCC): ICD-10-CM

## 2023-02-23 LAB
ALBUMIN SERPL-MCNC: 3 G/DL (ref 3.5–5)
ALBUMIN/GLOB SERPL: 0.6 (ref 1.1–2.2)
ALP SERPL-CCNC: 77 U/L (ref 45–117)
ALT SERPL-CCNC: 18 U/L (ref 12–78)
ANION GAP SERPL CALC-SCNC: 9 MMOL/L (ref 5–15)
APPEARANCE UR: CLEAR
ARTERIAL PATENCY WRIST A: POSITIVE
AST SERPL-CCNC: 23 U/L (ref 15–37)
B PERT DNA SPEC QL NAA+PROBE: NOT DETECTED
BACTERIA URNS QL MICRO: NEGATIVE /HPF
BASE DEFICIT BLD-SCNC: 0.7 MMOL/L
BASOPHILS # BLD: 0 K/UL (ref 0–0.1)
BASOPHILS NFR BLD: 0 % (ref 0–1)
BDY SITE: ABNORMAL
BILIRUB SERPL-MCNC: 0.6 MG/DL (ref 0.2–1)
BILIRUB UR QL: NEGATIVE
BNP SERPL-MCNC: 2927 PG/ML
BORDETELLA PARAPERTUSSIS PCR, BORPAR: NOT DETECTED
BUN SERPL-MCNC: 63 MG/DL (ref 6–20)
BUN/CREAT SERPL: 33 (ref 12–20)
C PNEUM DNA SPEC QL NAA+PROBE: NOT DETECTED
CALCIUM SERPL-MCNC: 9.5 MG/DL (ref 8.5–10.1)
CHLORIDE SERPL-SCNC: 112 MMOL/L (ref 97–108)
CO2 SERPL-SCNC: 22 MMOL/L (ref 21–32)
COLOR UR: ABNORMAL
COMMENT, HOLDF: NORMAL
CREAT SERPL-MCNC: 1.92 MG/DL (ref 0.55–1.02)
D DIMER PPP FEU-MCNC: 4.37 MG/L FEU (ref 0–0.65)
DIFFERENTIAL METHOD BLD: ABNORMAL
EOSINOPHIL # BLD: 0 K/UL (ref 0–0.4)
EOSINOPHIL NFR BLD: 0 % (ref 0–7)
EPITH CASTS URNS QL MICRO: ABNORMAL /LPF
ERYTHROCYTE [DISTWIDTH] IN BLOOD BY AUTOMATED COUNT: 15 % (ref 11.5–14.5)
FLUAV AG NPH QL IA: NEGATIVE
FLUAV SUBTYP SPEC NAA+PROBE: NOT DETECTED
FLUBV AG NOSE QL IA: NEGATIVE
FLUBV RNA SPEC QL NAA+PROBE: NOT DETECTED
GAS FLOW.O2 O2 DELIVERY SYS: ABNORMAL
GAS FLOW.O2 SETTING OXYMISER: 30 BPM
GLOBULIN SER CALC-MCNC: 4.7 G/DL (ref 2–4)
GLUCOSE SERPL-MCNC: 244 MG/DL (ref 65–100)
GLUCOSE UR STRIP.AUTO-MCNC: NEGATIVE MG/DL
HADV DNA SPEC QL NAA+PROBE: NOT DETECTED
HCO3 BLD-SCNC: 23.5 MMOL/L (ref 22–26)
HCOV 229E RNA SPEC QL NAA+PROBE: NOT DETECTED
HCOV HKU1 RNA SPEC QL NAA+PROBE: NOT DETECTED
HCOV NL63 RNA SPEC QL NAA+PROBE: NOT DETECTED
HCOV OC43 RNA SPEC QL NAA+PROBE: NOT DETECTED
HCT VFR BLD AUTO: 43.6 % (ref 35–47)
HGB BLD-MCNC: 14.2 G/DL (ref 11.5–16)
HGB UR QL STRIP: NEGATIVE
HMPV RNA SPEC QL NAA+PROBE: NOT DETECTED
HPIV1 RNA SPEC QL NAA+PROBE: NOT DETECTED
HPIV2 RNA SPEC QL NAA+PROBE: NOT DETECTED
HPIV3 RNA SPEC QL NAA+PROBE: NOT DETECTED
HPIV4 RNA SPEC QL NAA+PROBE: NOT DETECTED
HYALINE CASTS URNS QL MICRO: ABNORMAL /LPF (ref 0–5)
IMM GRANULOCYTES # BLD AUTO: 0 K/UL
IMM GRANULOCYTES NFR BLD AUTO: 0 %
KETONES UR QL STRIP.AUTO: NEGATIVE MG/DL
LACTATE SERPL-SCNC: 1.7 MMOL/L (ref 0.4–2)
LEUKOCYTE ESTERASE UR QL STRIP.AUTO: NEGATIVE
LYMPHOCYTES # BLD: 0.2 K/UL (ref 0.8–3.5)
LYMPHOCYTES NFR BLD: 1 % (ref 12–49)
M PNEUMO DNA SPEC QL NAA+PROBE: NOT DETECTED
MAGNESIUM SERPL-MCNC: 2.5 MG/DL (ref 1.6–2.4)
MCH RBC QN AUTO: 29.3 PG (ref 26–34)
MCHC RBC AUTO-ENTMCNC: 32.6 G/DL (ref 30–36.5)
MCV RBC AUTO: 89.9 FL (ref 80–99)
MONOCYTES # BLD: 2 K/UL (ref 0–1)
MONOCYTES NFR BLD: 11 % (ref 5–13)
NEUTS SEG # BLD: 16.2 K/UL (ref 1.8–8)
NEUTS SEG NFR BLD: 88 % (ref 32–75)
NITRITE UR QL STRIP.AUTO: NEGATIVE
NRBC # BLD: 0.02 K/UL (ref 0–0.01)
NRBC BLD-RTO: 0.1 PER 100 WBC
O2/TOTAL GAS SETTING VFR VENT: 6 %
PCO2 BLD: 36.8 MMHG (ref 35–45)
PH BLD: 7.41 (ref 7.35–7.45)
PH UR STRIP: 5 (ref 5–8)
PLATELET # BLD AUTO: 295 K/UL (ref 150–400)
PMV BLD AUTO: 12.1 FL (ref 8.9–12.9)
PO2 BLD: 56 MMHG (ref 80–100)
POTASSIUM SERPL-SCNC: 3.4 MMOL/L (ref 3.5–5.1)
PROCALCITONIN SERPL-MCNC: 3.98 NG/ML
PROT SERPL-MCNC: 7.7 G/DL (ref 6.4–8.2)
PROT UR STRIP-MCNC: 30 MG/DL
RBC # BLD AUTO: 4.85 M/UL (ref 3.8–5.2)
RBC #/AREA URNS HPF: ABNORMAL /HPF (ref 0–5)
RBC MORPH BLD: ABNORMAL
RSV RNA SPEC QL NAA+PROBE: DETECTED
RV+EV RNA SPEC QL NAA+PROBE: NOT DETECTED
SAMPLES BEING HELD,HOLD: NORMAL
SAO2 % BLD: 89.3 % (ref 92–97)
SARS-COV-2 RDRP RESP QL NAA+PROBE: NOT DETECTED
SARS-COV-2 RNA RESP QL NAA+PROBE: NOT DETECTED
SODIUM SERPL-SCNC: 143 MMOL/L (ref 136–145)
SOURCE, COVRS: NORMAL
SP GR UR REFRACTOMETRY: 1.02 (ref 1–1.03)
SPECIMEN TYPE: ABNORMAL
TROPONIN I SERPL HS-MCNC: 73 NG/L (ref 0–37)
UA: UC IF INDICATED,UAUC: ABNORMAL
UROBILINOGEN UR QL STRIP.AUTO: 1 EU/DL (ref 0.2–1)
WBC # BLD AUTO: 18.4 K/UL (ref 3.6–11)
WBC URNS QL MICRO: ABNORMAL /HPF (ref 0–4)

## 2023-02-23 PROCEDURE — 65660000001 HC RM ICU INTERMED STEPDOWN

## 2023-02-23 PROCEDURE — 83735 ASSAY OF MAGNESIUM: CPT

## 2023-02-23 PROCEDURE — 71250 CT THORAX DX C-: CPT

## 2023-02-23 PROCEDURE — 87040 BLOOD CULTURE FOR BACTERIA: CPT

## 2023-02-23 PROCEDURE — 71045 X-RAY EXAM CHEST 1 VIEW: CPT

## 2023-02-23 PROCEDURE — 85379 FIBRIN DEGRADATION QUANT: CPT

## 2023-02-23 PROCEDURE — 0202U NFCT DS 22 TRGT SARS-COV-2: CPT

## 2023-02-23 PROCEDURE — 93005 ELECTROCARDIOGRAM TRACING: CPT

## 2023-02-23 PROCEDURE — 83605 ASSAY OF LACTIC ACID: CPT

## 2023-02-23 PROCEDURE — 84145 PROCALCITONIN (PCT): CPT

## 2023-02-23 PROCEDURE — 99285 EMERGENCY DEPT VISIT HI MDM: CPT

## 2023-02-23 PROCEDURE — 87804 INFLUENZA ASSAY W/OPTIC: CPT

## 2023-02-23 PROCEDURE — 74011250636 HC RX REV CODE- 250/636: Performed by: EMERGENCY MEDICINE

## 2023-02-23 PROCEDURE — 80053 COMPREHEN METABOLIC PANEL: CPT

## 2023-02-23 PROCEDURE — 85025 COMPLETE CBC W/AUTO DIFF WBC: CPT

## 2023-02-23 PROCEDURE — 74011000250 HC RX REV CODE- 250: Performed by: EMERGENCY MEDICINE

## 2023-02-23 PROCEDURE — 82803 BLOOD GASES ANY COMBINATION: CPT

## 2023-02-23 PROCEDURE — 87635 SARS-COV-2 COVID-19 AMP PRB: CPT

## 2023-02-23 PROCEDURE — 83880 ASSAY OF NATRIURETIC PEPTIDE: CPT

## 2023-02-23 PROCEDURE — 81001 URINALYSIS AUTO W/SCOPE: CPT

## 2023-02-23 PROCEDURE — 36600 WITHDRAWAL OF ARTERIAL BLOOD: CPT

## 2023-02-23 PROCEDURE — 74011000250 HC RX REV CODE- 250: Performed by: INTERNAL MEDICINE

## 2023-02-23 PROCEDURE — 36415 COLL VENOUS BLD VENIPUNCTURE: CPT

## 2023-02-23 PROCEDURE — 84484 ASSAY OF TROPONIN QUANT: CPT

## 2023-02-23 PROCEDURE — 74011250636 HC RX REV CODE- 250/636: Performed by: INTERNAL MEDICINE

## 2023-02-23 RX ORDER — ACETAMINOPHEN 325 MG/1
650 TABLET ORAL
Status: DISCONTINUED | OUTPATIENT
Start: 2023-02-23 | End: 2023-02-28 | Stop reason: HOSPADM

## 2023-02-23 RX ORDER — IPRATROPIUM BROMIDE AND ALBUTEROL SULFATE 2.5; .5 MG/3ML; MG/3ML
3 SOLUTION RESPIRATORY (INHALATION)
Status: DISCONTINUED | OUTPATIENT
Start: 2023-02-23 | End: 2023-02-24

## 2023-02-23 RX ORDER — ACETAMINOPHEN 650 MG/1
650 SUPPOSITORY RECTAL
Status: DISCONTINUED | OUTPATIENT
Start: 2023-02-23 | End: 2023-02-28 | Stop reason: HOSPADM

## 2023-02-23 RX ORDER — ALBUTEROL SULFATE 90 UG/1
2 AEROSOL, METERED RESPIRATORY (INHALATION)
Status: ON HOLD | COMMUNITY
End: 2023-02-28 | Stop reason: SDUPTHER

## 2023-02-23 RX ORDER — FUROSEMIDE 40 MG/1
40 TABLET ORAL DAILY
COMMUNITY
Start: 2023-02-22 | End: 2023-02-28

## 2023-02-23 RX ORDER — THERA TABS 400 MCG
1 TAB ORAL DAILY
COMMUNITY

## 2023-02-23 RX ORDER — ONDANSETRON 4 MG/1
4 TABLET, ORALLY DISINTEGRATING ORAL
Status: DISCONTINUED | OUTPATIENT
Start: 2023-02-23 | End: 2023-02-28 | Stop reason: HOSPADM

## 2023-02-23 RX ORDER — GUAIFENESIN 600 MG/1
600 TABLET, EXTENDED RELEASE ORAL 2 TIMES DAILY
COMMUNITY

## 2023-02-23 RX ORDER — ACETAMINOPHEN 325 MG/1
650 TABLET ORAL
COMMUNITY

## 2023-02-23 RX ORDER — SODIUM CHLORIDE 9 MG/ML
125 INJECTION, SOLUTION INTRAVENOUS CONTINUOUS
Status: DISCONTINUED | OUTPATIENT
Start: 2023-02-23 | End: 2023-02-24

## 2023-02-23 RX ORDER — SODIUM CHLORIDE 0.9 % (FLUSH) 0.9 %
5-40 SYRINGE (ML) INJECTION EVERY 8 HOURS
Status: DISCONTINUED | OUTPATIENT
Start: 2023-02-23 | End: 2023-02-28 | Stop reason: HOSPADM

## 2023-02-23 RX ORDER — AMOXICILLIN 250 MG
2 CAPSULE ORAL
COMMUNITY

## 2023-02-23 RX ORDER — SODIUM CHLORIDE 0.9 % (FLUSH) 0.9 %
5-40 SYRINGE (ML) INJECTION AS NEEDED
Status: DISCONTINUED | OUTPATIENT
Start: 2023-02-23 | End: 2023-02-28 | Stop reason: HOSPADM

## 2023-02-23 RX ORDER — ONDANSETRON 2 MG/ML
4 INJECTION INTRAMUSCULAR; INTRAVENOUS
Status: DISCONTINUED | OUTPATIENT
Start: 2023-02-23 | End: 2023-02-28 | Stop reason: HOSPADM

## 2023-02-23 RX ORDER — POLYETHYLENE GLYCOL 3350 17 G/17G
17 POWDER, FOR SOLUTION ORAL DAILY PRN
Status: DISCONTINUED | OUTPATIENT
Start: 2023-02-23 | End: 2023-02-28 | Stop reason: HOSPADM

## 2023-02-23 RX ORDER — FLUTICASONE FUROATE, UMECLIDINIUM BROMIDE AND VILANTEROL TRIFENATATE 100; 62.5; 25 UG/1; UG/1; UG/1
1 POWDER RESPIRATORY (INHALATION) DAILY
COMMUNITY

## 2023-02-23 RX ORDER — HEPARIN SODIUM 5000 [USP'U]/ML
5000 INJECTION, SOLUTION INTRAVENOUS; SUBCUTANEOUS EVERY 8 HOURS
Status: DISCONTINUED | OUTPATIENT
Start: 2023-02-23 | End: 2023-02-28 | Stop reason: HOSPADM

## 2023-02-23 RX ORDER — DOCUSATE SODIUM 100 MG/1
100 CAPSULE, LIQUID FILLED ORAL 2 TIMES DAILY
COMMUNITY

## 2023-02-23 RX ORDER — AZITHROMYCIN 250 MG/1
250 TABLET, FILM COATED ORAL DAILY
COMMUNITY
Start: 2023-02-22 | End: 2023-02-28

## 2023-02-23 RX ORDER — SODIUM CHLORIDE 0.9 % (FLUSH) 0.9 %
5-10 SYRINGE (ML) INJECTION AS NEEDED
Status: DISCONTINUED | OUTPATIENT
Start: 2023-02-23 | End: 2023-02-28 | Stop reason: HOSPADM

## 2023-02-23 RX ADMIN — SODIUM CHLORIDE 125 ML/HR: 900 INJECTION, SOLUTION INTRAVENOUS at 18:11

## 2023-02-23 RX ADMIN — AZITHROMYCIN MONOHYDRATE 500 MG: 500 INJECTION, POWDER, LYOPHILIZED, FOR SOLUTION INTRAVENOUS at 18:07

## 2023-02-23 RX ADMIN — CEFTRIAXONE SODIUM 1 G: 1 INJECTION, POWDER, FOR SOLUTION INTRAMUSCULAR; INTRAVENOUS at 18:07

## 2023-02-23 RX ADMIN — SODIUM CHLORIDE 1000 ML: 9 INJECTION, SOLUTION INTRAVENOUS at 18:30

## 2023-02-23 RX ADMIN — SODIUM CHLORIDE 125 ML/HR: 900 INJECTION, SOLUTION INTRAVENOUS at 20:11

## 2023-02-23 RX ADMIN — HEPARIN SODIUM 5000 UNITS: 5000 INJECTION INTRAVENOUS; SUBCUTANEOUS at 22:20

## 2023-02-23 RX ADMIN — METHYLPREDNISOLONE SODIUM SUCCINATE 125 MG: 125 INJECTION, POWDER, FOR SOLUTION INTRAMUSCULAR; INTRAVENOUS at 18:06

## 2023-02-23 RX ADMIN — Medication 10 ML: at 22:00

## 2023-02-23 RX ADMIN — IPRATROPIUM BROMIDE AND ALBUTEROL SULFATE 3 ML: 2.5; .5 SOLUTION RESPIRATORY (INHALATION) at 22:20

## 2023-02-23 RX ADMIN — Medication 5 ML: at 18:28

## 2023-02-23 NOTE — ED TRIAGE NOTES
Pt arrives from [address] via EMS with CC respiratory distress. Baseline 2L via NC but facility increased oxygen twice. SpO2 in 80%s. Chest xray showed CHF/ Pneumonia. Started on lasix yesterday. . EMS placed on NRB - SpO2 sustaining low 90%s. Rhonchi noted.

## 2023-02-23 NOTE — ED PROVIDER NOTES
79-year-old female with a history of CAD, hypercholesterolemia, hypertension, COPD presents with shortness of breath. Patient reportedly wears oxygen at home. She was also recently diagnosed with pneumonia. She presents today by EMS with nonrebreather.   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 - BALLON RADIATION    Cancer ([address]     LUNG    Chronic obstructive pulmonary disease (HCC)     Elevated cholesterol     Glaucoma     H/O: pneumonia     History of pelvic fracture     Hypertension     Hypothyroid     Pacemaker 2018    Sick [facility] syndrome [address]     Syncope        Past Surgical History:   Procedure Laterality Date    HX CATARACT REMOVAL Bilateral     HX HEART CATHETERIZATION      HX OTHER SURGICAL  2013    EXC LESIONS FACE, LIP    HX PACEMAKER  2018    HX PACEMAKER PLACEMENT Left 2018    [place] BREAST SURGERY PROCEDURE UNLISTED  1998    right mastectomy    RI CHEST SURGERY PROCEDURE UNLISTED Left     EXC LUNG MASS         Family History:   Family history unknown: Yes       Social History     Socioeconomic History    Marital status:      Spouse name: Not on file    Number of children: Not on file    Years of education: Not on file    Highest education level: Not on file   Occupational History    Not on file   Tobacco Use    Smoking status: Former     Packs/day: 0.25     Years: 10.00     Pack years: 2.50     Types: Cigarettes     Quit date: 1989     Years since quittin.1    Smokeless tobacco: Never   Substance and Sexual Activity    Alcohol use: No    Drug use: No    Sexual activity: Not Currently   Other Topics Concern    Not on file   Social History Narrative    Not on file     Social Determinants of Health     Financial Resource Strain: Not on file   Food Insecurity: Not on file   Transportation Needs: Not on file   Physical Activity: Not on file   Stress: Not on file   Social Connections: Not on file   Intimate Partner Violence: Not on file   Housing Stability: Not on file         ALLERGIES: Patient has no known allergies. Review of Systems   Unable to perform ROS: Mental status change   Respiratory:  Positive for shortness of breath. There were no vitals filed for this visit. Physical Exam  Vitals and nursing note reviewed. Constitutional:       General: She is not in acute distress. Appearance: Normal appearance. She is not ill-appearing, toxic-appearing or diaphoretic. HENT:      Head: Normocephalic and atraumatic. Eyes:      Extraocular Movements: Extraocular movements intact. Cardiovascular:      Rate and Rhythm: Normal rate. Pulses: Normal pulses. Pulmonary:      Effort: Respiratory distress present. Breath sounds: Rhonchi present. Abdominal:      General: There is no distension. Musculoskeletal:         General: Normal range of motion. Cervical back: Normal range of motion. Skin:     General: Skin is dry. Neurological:      General: No focal deficit present. Mental Status: She is alert. Psychiatric:         Mood and Affect: Mood normal.        Medical Decision Making    Patient presents with shortness of breath. She was hypoxic for EMS and is now requiring 6 L nasal cannula to maintain saturations in the low 90s. Influenza negative. COVID-negative. White blood cell count 18,000. BNP is elevated to 2900. Chest x-ray shows pleural effusion with chronic interstitial disease and volume loss in the left lower lobe. Will cover for community-acquired pneumonia. Patient placed on high flow nasal cannula and admitted to hospital medicine. She was also given IV Solu-Medrol.       Perfect Serve Consult for Admission  5:19 PM    ED Room Number: ER15/15  Patient Name and age:  Leonardo Wang 80 y.o.  female  Working Diagnosis: Sepsis, due to unspecified organism, unspecified whether acute organ dysfunction present [facility])  (primary encounter diagnosis)  Community acquired pneumonia, unspecified laterality    COVID-19 Suspicion:  no  Sepsis present:  yes  Reassessment needed: no  Code Status:  DNR  Readmission: no  Isolation Requirements:  no  Recommended Level of Care:  telemetry  Department: [facility] Adult ED - 21     Other: DNR, abx ordered, Dimer pending    Total critical care time spent exclusive of procedures:  45 minutes. Amount and/or Complexity of Data Reviewed  Labs: ordered. Radiology: ordered. ECG/medicine tests: ordered. Risk  Prescription drug management. Decision regarding hospitalization.            Procedures

## 2023-02-23 NOTE — H&P
History and Physical    Date of Service:  2/23/2023  Primary Care Provider: Other, MD Janak  Source of information: Chart review and Spouse/family member    Chief Complaint: Respiratory Distress      History of Presenting Illness:   Shannan García is a 80 y.o. female with pmh of CAD, HLD, HTN, COPD on chronic 2LNC presents from [facility] with increased work of breathing and hypoxia. Information obtained via chart review and family at bedside. Pt had been in usual state of health until 2 days ago when she had low grade fever, and slightly increased O2 requirements, CXR per family showed possible PNA. Started on antibiotics. She had initially improved but today worsened, had ongoing hypoxemia, and EMS was called. Pt currently confused, and unable to provide much information. She was able to say her son's name. ABG currently being done. CXR showed interstitial finding, and effusion        REVIEW OF SYSTEMS:  Review of systems not obtained due to patient factors.      Past Medical History:   Diagnosis Date    Arm fracture, left 2013    Arm fracture, right 1999    Bell's palsy 2005, 2011    H/O BELL'S PALSY X2    CAD (coronary artery disease) 1996    MI , STENT    Cancer ([address] 2008    BREAST RIGHT - BALLON RADIATION    Cancer ([facility] [address]     LUNG    Chronic obstructive pulmonary disease (HCC)     Elevated cholesterol     Glaucoma     H/O: pneumonia 2013    History of pelvic fracture 2018    Hypertension     Hypothyroid     Pacemaker 01/09/2018    Sick sinus syndrome ([facility] [address]     Syncope       Past Surgical History:   Procedure Laterality Date    HX CATARACT REMOVAL Bilateral 2001    HX HEART CATHETERIZATION      HX OTHER SURGICAL  2013    [address] LIP    HX PACEMAKER  2018    HX PACEMAKER PLACEMENT Left 01/2018    PA BREAST SURGERY PROCEDURE UNLISTED  1998    right mastectomy    PA CHEST SURGERY PROCEDURE UNLISTED Left 1998    EXC LUNG MASS     Prior to Admission medications    Medication Sig Start Date End Date Taking? Authorizing Provider   azithromycin (ZITHROMAX) 250 mg tablet Take 250 mg by mouth daily. Started 2/22 500 mg x 1, then 250 mg daily for 4 days 2/22/23 2/26/23 Yes Provider, Historical   docusate sodium (COLACE) 100 mg capsule Take 100 mg by mouth two (2) times a day. 0900 and 1700   Yes Provider, Historical   furosemide (LASIX) 40 mg tablet Take 40 mg by mouth daily. Started 2/22 x 5 days for CHF 2/22/23 2/26/23 Yes Provider, Historical   guaiFENesin ER (MUCINEX) 600 mg ER tablet Take 600 mg by mouth two (2) times a day. Indications: COPD and chronic congestion   Yes Provider, Historical   therapeutic multivitamin (Thera) tablet Take 1 Tablet by mouth daily. Yes Provider, Historical   fluticasone-umeclidinium-vilanterol (Trelegy Ellipta) 100-62.5-25 mcg inhaler Take 1 Puff by inhalation daily. Rinse mouth with water after use   Yes Provider, Historical   acetaminophen (TYLENOL) 325 mg tablet Take 650 mg by mouth every four (4) hours as needed (Mild Pain). Yes Provider, Historical   albuterol (PROVENTIL HFA, VENTOLIN HFA, PROAIR HFA) 90 mcg/actuation inhaler Take 2 Puffs by inhalation every four (4) hours as needed for Shortness of Breath. Yes Provider, Historical   senna-docusate (Senexon-S) 8.6-50 mg per tablet Take 2 Tablets by mouth two (2) times daily as needed for Constipation. Yes Provider, Historical   aspirin 81 mg chewable tablet Take 81 mg by mouth daily. Yes Other, MD Janak   sertraline (ZOLOFT) 50 mg tablet Take 50 mg by mouth nightly. 7/25/21  Yes Provider, Historical   levothyroxine (SYNTHROID) 75 mcg tablet Take 75 mcg by mouth Daily (before breakfast). 7/12/21  Yes Provider, Historical   amLODIPine (NORVASC) 10 mg tablet Take 10 mg by mouth daily. 3/15/18  Yes Provider, Historical   latanoprost (XALATAN) 0.005 % ophthalmic solution Administer 1 Drop to both eyes nightly. 11/24/17  Yes Provider, Historical     No Known Allergies   Family History   Family history unknown:  Yes Social History:  reports that she quit smoking about 33 years ago. She has a 2.50 pack-year smoking history. She has never used smokeless tobacco. She reports that she does not drink alcohol and does not use drugs. Social Determinants of Health     Tobacco Use: Not on file   Alcohol Use: Not on file   Financial Resource Strain: Not on file   Food Insecurity: Not on file   Transportation Needs: Not on file   Physical Activity: Not on file   Stress: Not on file   Social Connections: Not on file   Intimate Partner Violence: Not on file   Depression: Not on file   Housing Stability: Not on file        Medications were reconciled to the best of my ability given all available resources at the time of admission. Route is PO if not otherwise noted. Family and social history were personally reviewed, all pertinent and relevant details are outlined as above. Objective:   Visit Vitals  BP (!) 129/54   Pulse 84   Resp 28   SpO2 90%    O2 Flow Rate (L/min): 6 l/min O2 Device: Nasal cannula    PHYSICAL EXAM:   General: Alert not oriented. Fatigued   HEENT: PEERL, EOMI, moist mucus membranes   Chest: Course bilaterally, increased work of breathing on 6LNC.    CVS: RRR, S1 S2 heard, no murmurs/rubs/gallops  Abd: Soft, non-tender, non-distended, +bowel sounds   Ext: No clubbing, no cyanosis, no edema  Neuro/Psych: Pleasant mood and affect, CN 2-12 grossly intact,   Cap refill: Brisk, less than 3 seconds  Pulses: 2+, symmetric in all extremities  Skin: Warm, dry, without rashes or lesions    Data Review:   I have independently reviewed and interpreted patient's lab and all other diagnostic data    Abnormal Labs Reviewed   TROPONIN-HIGH SENSITIVITY - Abnormal; Notable for the following components:       Result Value    Troponin-High Sensitivity 73 (*)     All other components within normal limits   NT-PRO BNP - Abnormal; Notable for the following components:    NT pro-BNP 2,927 (*)     All other components within normal limits   MAGNESIUM - Abnormal; Notable for the following components:    Magnesium 2.5 (*)     All other components within normal limits   URINALYSIS W/ REFLEX CULTURE - Abnormal; Notable for the following components:    Protein 30 (*)     All other components within normal limits   METABOLIC PANEL, COMPREHENSIVE - Abnormal; Notable for the following components:    Potassium 3.4 (*)     Chloride 112 (*)     Glucose 244 (*)     BUN 63 (*)     Creatinine 1.92 (*)     BUN/Creatinine ratio 33 (*)     eGFR 24 (*)     Albumin 3.0 (*)     Globulin 4.7 (*)     A-G Ratio 0.6 (*)     All other components within normal limits   CBC WITH AUTOMATED DIFF - Abnormal; Notable for the following components:    WBC 18.4 (*)     RDW 15.0 (*)     NRBC 0.1 (*)     ABSOLUTE NRBC 0.02 (*)     NEUTROPHILS 88 (*)     LYMPHOCYTES 1 (*)     ABS. NEUTROPHILS 16.2 (*)     ABS. LYMPHOCYTES 0.2 (*)     ABS. MONOCYTES 2.0 (*)     All other components within normal limits   D DIMER - Abnormal; Notable for the following components:    D-dimer 4.37 (*)     All other components within normal limits       All Micro Results       Procedure Component Value Units Date/Time    RESPIRATORY VIRUS PANEL W/COVID-19, PCR [[id number]]     Order Status: Sent Specimen: NASOPHARYNGEAL SWAB     CULTURE, BLOOD, PAIRED [[id number]] Collected: 02/23/23 1458    Order Status: Sent Specimen: Blood Updated: 02/23/23 1709    COVID-19 RAPID TEST [[id number]] Collected: 02/23/23 1605    Order Status: Completed Specimen: Nasopharyngeal Updated: 02/23/23 1704     Specimen source Nasopharyngeal        COVID-19 rapid test Not detected        Comment: Rapid Abbott ID Now       Rapid NAAT:  The specimen is NEGATIVE for SARS-CoV-2, the novel coronavirus associated with COVID-19. Negative results should be treated as presumptive and, if inconsistent with clinical signs and symptoms or necessary for patient management, should be tested with an alternative molecular assay.   Negative results do not preclude SARS-CoV-2 infection and should not be used as the sole basis for patient management decisions. This test has been authorized by the FDA under an Emergency Use Authorization (EUA) for use by authorized laboratories. Fact sheet for Healthcare Providers:  http://www.dominga.rom/  Fact sheet for Patients: http://www.dominga.rom/       Methodology: Isothermal Nucleic Acid Amplification         CULTURE, BLOOD [[id number]]     Order Status: Sent Specimen: Blood     CULTURE, BLOOD [[id number]]     Order Status: Sent Specimen: Blood             IMAGING:   XR CHEST PORT   Final Result      Chronic interstitial lung disease. Interval development of a small left effusion   and left lower lobe volume loss. .              ECG/ECHO:    Results for orders placed or performed during the hospital encounter of 02/23/23   EKG, 12 LEAD, INITIAL   Result Value Ref Range    Ventricular Rate 90 BPM    Atrial Rate 90 BPM    P-R Interval 124 ms    QRS Duration 104 ms    Q-T Interval 364 ms    QTC Calculation (Bezet) 445 ms    Calculated P Axis 82 degrees    Calculated R Axis -96 degrees    Calculated T Axis 70 degrees    Diagnosis       Sinus rhythm with premature atrial complexes  Right superior axis deviation  Minimal voltage criteria for LVH, may be normal variant ( Solo product )  Nonspecific ST and T wave abnormality  Abnormal ECG  When compared with ECG of 29-OCT-2021 21:09,  premature atrial complexes are now present            Notes reviewed from all clinical/nonclinical/nursing services involved in patient's clinical care. Care coordination discussions were held with appropriate clinical/nonclinical/ nursing providers based on care coordination needs. Assessment:   Given the patient's current clinical presentation, there is a high level of concern for decompensation if discharged from the emergency department.  Complex decision making was performed, which includes reviewing the patient's available past medical records, laboratory results, and imaging studies. Active Problems:    Pneumonia (2/23/2023)        Plan:     Acute on chronic hypoxic respiratory failure  Sepsis (WBC, RR)  CAP vs. COPD exacerbation   - O2, titrate for saturation over 90% --> to HFNC now   - CTX and azithromycin   - ABG now, if needed for elevated CO2 start BiPAP  - Check CT of the chest   - Check viral panel   - Start IV steroids, scheduled nebs   - Check echo  - FU blood culture     MARILYN - Cr up to 1.94  - suspect pre-renal   - UA bland  - IVF   - Recheck BMP     Elevated D dimer - check VQ scan when stable   H/o HTN - hold home amlodipine for now   Hypothyroid - home synthroid   Depression/Anxiety - zoloft         DIET: ADULT DIET Regular   ISOLATION PRECAUTIONS: There are currently no Active Isolations  CODE STATUS: DNR   DVT PROPHYLAXIS: Heparin  FUNCTIONAL STATUS PRIOR TO HOSPITALIZATION: Capable of only limited self-care; confined to bed or chair likely more than 50% of waking hours. Ambulatory status/function: By self   EARLY MOBILITY ASSESSMENT: Recommend an assessment from physical therapy and/or occupational therapy  ANTICIPATED DISCHARGE: Greater than 48 hours. ANTICIPATED DISPOSITION: SNF  EMERGENCY CONTACT/SURROGATE DECISION MAKER: Daughter and Son     CRITICAL CARE WAS PERFORMED FOR THIS ENCOUNTER: YES. I had a face to face encounter with the patient, reviewed and interpreted patient data including clinical events, labs, images, vital signs, I/O's, and examined patient. I have discussed the case and the plan and management of the patient's care with the consulting services, the bedside nurses and necessary ancillary providers. This patient has a high probability of imminent, clinically significant deterioration, which requires the highest level of preparedness to intervene urgently.  I participated in the decision-making and personally managed or directed the management of the following life and organ supporting interventions that required my frequent assessment to treat or prevent imminent deterioration. I personally spent 45 minutes of critical care time. This is time spent at this critically ill patient's bedside actively involved in patient care as well as the coordination of care and discussions with the patient's family. This does not include any procedural time which has been billed separately. Signed By: Estelita Paulino MD     February 23, 2023         Please note that this dictation may have been completed with Dragon, the Skyrider voice recognition software. Quite often unanticipated grammatical, syntax, homophones, and other interpretive errors are inadvertently transcribed by the computer software. Please disregard these errors. Please excuse any errors that have escaped final proofreading.

## 2023-02-23 NOTE — PROGRESS NOTES
RT notified of ABG by  [phone] for first time   ABG order placed around 1430  RT informed RN ABG will be perform

## 2023-02-23 NOTE — PROGRESS NOTES
Admission Medication Reconciliation:    Information obtained from:  [facility] from [facility] of [address]:  YES    Comments/Recommendations: Updated PTA meds/reviewed patient's allergies. 1)  Medication list updated from [facility] from [address] and Furosemide started 2/22 for URI and Edema 2/2 CHF, respectively    2)  Medication changes (since last review): Added  - Docusate  - Guaifenesin  mg Q 12hrs   - Trelegy Ellipta inhaler  - Albuterol inhaler    Adjusted  - ASA now 81 mg daily (vs. 325 mg daily)  - Amlodipine now 10 mg daily (vs. 5 mg daily)  - Levothyroxine now 75 mcg daily (vs. 50 mcg daily)   - Sertraline 50 mg QHS (vs. 50 mg daily)   - Docusate+Senna now Twice daily PRN (vs. Twice daily)    Removed  - Calcium citrate +D  - Calcium carbonate +D  - Lovenox 30 mg Q 24hrs  - Simvastatin 10 mg QHS    3)  Attending notified of completed medication reconciliation        ¹RxQuery pharmacy benefit data reflects medications filled and processed through the patient's insurance, however   this data does NOT capture whether the medication was picked up or is currently being taken by the patient. Allergies:  Patient has no known allergies.     Significant PMH/Disease States:   Past Medical History:   Diagnosis Date    Arm fracture, left 2013    Arm fracture, right 1999    Bell's palsy 2005, 2011    H/O BELL'S PALSY X2    CAD (coronary artery disease) 1996    MI , STENT    Cancer ([facility] [address] 2008    BREAST RIGHT - BALLON RADIATION    Cancer ([facility] [address]     LUNG    Chronic obstructive pulmonary disease (HCC)     Elevated cholesterol     Glaucoma     H/O: pneumonia 2013    History of pelvic fracture 2018    Hypertension     Hypothyroid     Pacemaker 01/09/2018    Sick sinus syndrome [facility])     Syncope      Chief Complaint for this Admission:    Chief Complaint   Patient presents with    Respiratory Distress     Prior to Admission Medications:   Prior to Admission Medications   Prescriptions Last Dose Informant Taking?   acetaminophen (TYLENOL) 325 mg tablet   Yes   Sig: Take 650 mg by mouth every four (4) hours as needed (Mild Pain). albuterol (PROVENTIL HFA, VENTOLIN HFA, PROAIR HFA) 90 mcg/actuation inhaler   Yes   Sig: Take 2 Puffs by inhalation every four (4) hours as needed for Shortness of Breath. amLODIPine (NORVASC) 10 mg tablet   Yes   Sig: Take 10 mg by mouth daily. aspirin 81 mg chewable tablet   Yes   Sig: Take 81 mg by mouth daily. azithromycin (ZITHROMAX) 250 mg tablet   Yes   Sig: Take 250 mg by mouth daily. Started 2/22 500 mg x 1, then 250 mg daily for 4 days   docusate sodium (COLACE) 100 mg capsule   Yes   Sig: Take 100 mg by mouth two (2) times a day. 0900 and 1700   fluticasone-umeclidinium-vilanterol (Trelegy Ellipta) 100-62.5-25 mcg inhaler   Yes   Sig: Take 1 Puff by inhalation daily. Rinse mouth with water after use   furosemide (LASIX) 40 mg tablet   Yes   Sig: Take 40 mg by mouth daily. Started 2/22 x 5 days for CHF   guaiFENesin ER (MUCINEX) 600 mg ER tablet   Yes   Sig: Take 600 mg by mouth two (2) times a day. Indications: COPD and chronic congestion   latanoprost (XALATAN) 0.005 % ophthalmic solution  Self Yes   Sig: Administer 1 Drop to both eyes nightly. levothyroxine (SYNTHROID) 75 mcg tablet   Yes   Sig: Take 75 mcg by mouth Daily (before breakfast). senna-docusate (Senexon-S) 8.6-50 mg per tablet   Yes   Sig: Take 2 Tablets by mouth two (2) times daily as needed for Constipation. sertraline (ZOLOFT) 50 mg tablet   Yes   Sig: Take 50 mg by mouth nightly. therapeutic multivitamin (Thera) tablet   Yes   Sig: Take 1 Tablet by mouth daily. Facility-Administered Medications: None     Please contact the main inpatient pharmacy with any questions or concerns at [phone] and we will direct you to the clinical pharmacist covering this patient's care while in-house.    Kandi Downing, PHARMD

## 2023-02-24 ENCOUNTER — APPOINTMENT (OUTPATIENT)
Dept: NON INVASIVE DIAGNOSTICS | Age: 88
DRG: 871 | End: 2023-02-24
Attending: INTERNAL MEDICINE
Payer: MEDICARE

## 2023-02-24 LAB
ANION GAP SERPL CALC-SCNC: 7 MMOL/L (ref 5–15)
BUN SERPL-MCNC: 57 MG/DL (ref 6–20)
BUN/CREAT SERPL: 48 (ref 12–20)
CALCIUM SERPL-MCNC: 8.2 MG/DL (ref 8.5–10.1)
CHLORIDE SERPL-SCNC: 122 MMOL/L (ref 97–108)
CO2 SERPL-SCNC: 23 MMOL/L (ref 21–32)
COMMENT, HOLDF: NORMAL
CREAT SERPL-MCNC: 1.2 MG/DL (ref 0.55–1.02)
ECHO AO ROOT DIAM: 3.2 CM
ECHO AV AREA PEAK VELOCITY: 1.6 CM2
ECHO AV PEAK GRADIENT: 6 MMHG
ECHO AV PEAK VELOCITY: 1.2 M/S
ECHO AV VELOCITY RATIO: 0.75
ECHO EST RA PRESSURE: 3 MMHG
ECHO LA DIAMETER: 4.4 CM
ECHO LA TO AORTIC ROOT RATIO: 1.38
ECHO LA VOL 2C: 58 ML (ref 22–52)
ECHO LA VOL 4C: 63 ML (ref 22–52)
ECHO LA VOLUME AREA LENGTH: 65 ML
ECHO LV E' LATERAL VELOCITY: 5 CM/S
ECHO LV E' SEPTAL VELOCITY: 5 CM/S
ECHO LV FRACTIONAL SHORTENING: 39 % (ref 28–44)
ECHO LV INTERNAL DIMENSION DIASTOLIC: 4.1 CM (ref 3.9–5.3)
ECHO LV INTERNAL DIMENSION SYSTOLIC: 2.5 CM
ECHO LV IVSD: 1.5 CM (ref 0.6–0.9)
ECHO LV MASS 2D: 204.9 G (ref 67–162)
ECHO LV POSTERIOR WALL DIASTOLIC: 1.2 CM (ref 0.6–0.9)
ECHO LV RELATIVE WALL THICKNESS RATIO: 0.59
ECHO LVOT AREA: 2.3 CM2
ECHO LVOT DIAM: 1.7 CM
ECHO LVOT PEAK GRADIENT: 3 MMHG
ECHO LVOT PEAK VELOCITY: 0.9 M/S
ECHO MV A VELOCITY: 1.35 M/S
ECHO MV E DECELERATION TIME (DT): 225.8 MS
ECHO MV E VELOCITY: 0.62 M/S
ECHO MV E/A RATIO: 0.46
ECHO MV E/E' LATERAL: 12.4
ECHO MV E/E' RATIO (AVERAGED): 12.4
ECHO MV E/E' SEPTAL: 12.4
ECHO MV MAX VELOCITY: 1.4 M/S
ECHO MV MEAN GRADIENT: 2 MMHG
ECHO MV MEAN VELOCITY: 0.6 M/S
ECHO MV PEAK GRADIENT: 8 MMHG
ECHO MV PRESSURE HALF TIME (PHT): 65.5 MS
ECHO MV PRESSURE HALF TIME (PHT): 70.1 MS
ECHO MV VTI: 22.4 CM
ECHO PV MAX VELOCITY: 0.8 M/S
ECHO PV PEAK GRADIENT: 2 MMHG
ECHO RIGHT VENTRICULAR SYSTOLIC PRESSURE (RVSP): 18 MMHG
ECHO RV FREE WALL PEAK S': 13 CM/S
ECHO RV TAPSE: 1.5 CM (ref 1.7–?)
ECHO TV REGURGITANT MAX VELOCITY: 1.92 M/S
ECHO TV REGURGITANT PEAK GRADIENT: 15 MMHG
ERYTHROCYTE [DISTWIDTH] IN BLOOD BY AUTOMATED COUNT: 15.2 % (ref 11.5–14.5)
GLUCOSE SERPL-MCNC: 143 MG/DL (ref 65–100)
HCT VFR BLD AUTO: 41.9 % (ref 35–47)
HGB BLD-MCNC: 13.4 G/DL (ref 11.5–16)
MCH RBC QN AUTO: 29.3 PG (ref 26–34)
MCHC RBC AUTO-ENTMCNC: 32 G/DL (ref 30–36.5)
MCV RBC AUTO: 91.7 FL (ref 80–99)
NRBC # BLD: 0 K/UL (ref 0–0.01)
NRBC BLD-RTO: 0 PER 100 WBC
PLATELET # BLD AUTO: 299 K/UL (ref 150–400)
PMV BLD AUTO: 11.5 FL (ref 8.9–12.9)
POTASSIUM SERPL-SCNC: 3 MMOL/L (ref 3.5–5.1)
RBC # BLD AUTO: 4.57 M/UL (ref 3.8–5.2)
SAMPLES BEING HELD,HOLD: NORMAL
SODIUM SERPL-SCNC: 152 MMOL/L (ref 136–145)
WBC # BLD AUTO: 17.8 K/UL (ref 3.6–11)

## 2023-02-24 PROCEDURE — 51702 INSERT TEMP BLADDER CATH: CPT

## 2023-02-24 PROCEDURE — 94664 DEMO&/EVAL PT USE INHALER: CPT

## 2023-02-24 PROCEDURE — 36415 COLL VENOUS BLD VENIPUNCTURE: CPT

## 2023-02-24 PROCEDURE — 94640 AIRWAY INHALATION TREATMENT: CPT

## 2023-02-24 PROCEDURE — 74011250637 HC RX REV CODE- 250/637

## 2023-02-24 PROCEDURE — 74011250636 HC RX REV CODE- 250/636

## 2023-02-24 PROCEDURE — 65660000001 HC RM ICU INTERMED STEPDOWN

## 2023-02-24 PROCEDURE — 51798 US URINE CAPACITY MEASURE: CPT

## 2023-02-24 PROCEDURE — 93306 TTE W/DOPPLER COMPLETE: CPT

## 2023-02-24 PROCEDURE — 80048 BASIC METABOLIC PNL TOTAL CA: CPT

## 2023-02-24 PROCEDURE — 74011250637 HC RX REV CODE- 250/637: Performed by: INTERNAL MEDICINE

## 2023-02-24 PROCEDURE — 74011000250 HC RX REV CODE- 250: Performed by: INTERNAL MEDICINE

## 2023-02-24 PROCEDURE — 74011250636 HC RX REV CODE- 250/636: Performed by: INTERNAL MEDICINE

## 2023-02-24 PROCEDURE — 85027 COMPLETE CBC AUTOMATED: CPT

## 2023-02-24 PROCEDURE — 93306 TTE W/DOPPLER COMPLETE: CPT | Performed by: SPECIALIST

## 2023-02-24 RX ORDER — AMOXICILLIN 250 MG
2 CAPSULE ORAL
Status: DISCONTINUED | OUTPATIENT
Start: 2023-02-24 | End: 2023-02-28 | Stop reason: HOSPADM

## 2023-02-24 RX ORDER — SERTRALINE HYDROCHLORIDE 50 MG/1
50 TABLET, FILM COATED ORAL
Status: DISCONTINUED | OUTPATIENT
Start: 2023-02-24 | End: 2023-02-28 | Stop reason: HOSPADM

## 2023-02-24 RX ORDER — DOCUSATE SODIUM 100 MG/1
100 CAPSULE, LIQUID FILLED ORAL 2 TIMES DAILY
Status: DISCONTINUED | OUTPATIENT
Start: 2023-02-24 | End: 2023-02-28 | Stop reason: HOSPADM

## 2023-02-24 RX ORDER — IPRATROPIUM BROMIDE 0.5 MG/2.5ML
0.5 SOLUTION RESPIRATORY (INHALATION) 4 TIMES DAILY
Status: DISCONTINUED | OUTPATIENT
Start: 2023-02-24 | End: 2023-02-25 | Stop reason: SDUPTHER

## 2023-02-24 RX ORDER — BUDESONIDE 0.25 MG/2ML
250 INHALANT ORAL
Status: DISCONTINUED | OUTPATIENT
Start: 2023-02-24 | End: 2023-02-28 | Stop reason: HOSPADM

## 2023-02-24 RX ORDER — GUAIFENESIN 100 MG/5ML
81 LIQUID (ML) ORAL DAILY
Status: DISCONTINUED | OUTPATIENT
Start: 2023-02-25 | End: 2023-02-28 | Stop reason: HOSPADM

## 2023-02-24 RX ORDER — LEVOTHYROXINE SODIUM 150 UG/1
75 TABLET ORAL
Status: DISCONTINUED | OUTPATIENT
Start: 2023-02-25 | End: 2023-02-28 | Stop reason: HOSPADM

## 2023-02-24 RX ORDER — IPRATROPIUM BROMIDE AND ALBUTEROL SULFATE 2.5; .5 MG/3ML; MG/3ML
3 SOLUTION RESPIRATORY (INHALATION)
Status: DISCONTINUED | OUTPATIENT
Start: 2023-02-25 | End: 2023-02-28 | Stop reason: HOSPADM

## 2023-02-24 RX ORDER — DEXTROSE MONOHYDRATE 50 MG/ML
75 INJECTION, SOLUTION INTRAVENOUS CONTINUOUS
Status: DISCONTINUED | OUTPATIENT
Start: 2023-02-24 | End: 2023-02-26

## 2023-02-24 RX ORDER — ARFORMOTEROL TARTRATE 15 UG/2ML
15 SOLUTION RESPIRATORY (INHALATION)
Status: DISCONTINUED | OUTPATIENT
Start: 2023-02-24 | End: 2023-02-28 | Stop reason: HOSPADM

## 2023-02-24 RX ORDER — GUAIFENESIN 600 MG/1
600 TABLET, EXTENDED RELEASE ORAL 2 TIMES DAILY
Status: DISCONTINUED | OUTPATIENT
Start: 2023-02-24 | End: 2023-02-28 | Stop reason: HOSPADM

## 2023-02-24 RX ORDER — POTASSIUM CHLORIDE 750 MG/1
40 TABLET, FILM COATED, EXTENDED RELEASE ORAL
Status: COMPLETED | OUTPATIENT
Start: 2023-02-24 | End: 2023-02-24

## 2023-02-24 RX ADMIN — Medication 10 ML: at 20:50

## 2023-02-24 RX ADMIN — CEFTRIAXONE SODIUM 1 G: 1 INJECTION, POWDER, FOR SOLUTION INTRAMUSCULAR; INTRAVENOUS at 17:17

## 2023-02-24 RX ADMIN — Medication 10 ML: at 06:00

## 2023-02-24 RX ADMIN — IPRATROPIUM BROMIDE AND ALBUTEROL SULFATE 3 ML: 2.5; .5 SOLUTION RESPIRATORY (INHALATION) at 17:33

## 2023-02-24 RX ADMIN — BUDESONIDE 250 MCG: 0.25 INHALANT RESPIRATORY (INHALATION) at 21:03

## 2023-02-24 RX ADMIN — SODIUM CHLORIDE 125 ML/HR: 900 INJECTION, SOLUTION INTRAVENOUS at 00:39

## 2023-02-24 RX ADMIN — Medication 10 ML: at 17:18

## 2023-02-24 RX ADMIN — POTASSIUM CHLORIDE 40 MEQ: 750 TABLET, FILM COATED, EXTENDED RELEASE ORAL at 20:49

## 2023-02-24 RX ADMIN — IPRATROPIUM BROMIDE AND ALBUTEROL SULFATE 3 ML: 2.5; .5 SOLUTION RESPIRATORY (INHALATION) at 07:38

## 2023-02-24 RX ADMIN — ARFORMOTEROL TARTRATE 15 MCG: 15 SOLUTION RESPIRATORY (INHALATION) at 21:03

## 2023-02-24 RX ADMIN — AZITHROMYCIN MONOHYDRATE 500 MG: 500 INJECTION, POWDER, LYOPHILIZED, FOR SOLUTION INTRAVENOUS at 17:17

## 2023-02-24 RX ADMIN — DEXTROSE MONOHYDRATE 50 ML/HR: 50 INJECTION, SOLUTION INTRAVENOUS at 20:49

## 2023-02-24 RX ADMIN — METHYLPREDNISOLONE SODIUM SUCCINATE 40 MG: 40 INJECTION, POWDER, FOR SOLUTION INTRAMUSCULAR; INTRAVENOUS at 17:17

## 2023-02-24 RX ADMIN — METHYLPREDNISOLONE SODIUM SUCCINATE 40 MG: 40 INJECTION, POWDER, FOR SOLUTION INTRAMUSCULAR; INTRAVENOUS at 11:34

## 2023-02-24 RX ADMIN — IPRATROPIUM BROMIDE AND ALBUTEROL SULFATE 3 ML: 2.5; .5 SOLUTION RESPIRATORY (INHALATION) at 21:03

## 2023-02-24 RX ADMIN — GUAIFENESIN 600 MG: 600 TABLET, EXTENDED RELEASE ORAL at 17:26

## 2023-02-24 RX ADMIN — SERTRALINE HYDROCHLORIDE 50 MG: 50 TABLET ORAL at 20:49

## 2023-02-24 RX ADMIN — HEPARIN SODIUM 5000 UNITS: 5000 INJECTION INTRAVENOUS; SUBCUTANEOUS at 13:11

## 2023-02-24 RX ADMIN — HEPARIN SODIUM 5000 UNITS: 5000 INJECTION INTRAVENOUS; SUBCUTANEOUS at 20:49

## 2023-02-24 RX ADMIN — HEPARIN SODIUM 5000 UNITS: 5000 INJECTION INTRAVENOUS; SUBCUTANEOUS at 07:47

## 2023-02-24 RX ADMIN — DOCUSATE SODIUM 100 MG: 100 CAPSULE, LIQUID FILLED ORAL at 17:18

## 2023-02-24 RX ADMIN — IPRATROPIUM BROMIDE AND ALBUTEROL SULFATE 3 ML: 2.5; .5 SOLUTION RESPIRATORY (INHALATION) at 11:19

## 2023-02-24 NOTE — ED NOTES
Bedside, Verbal, and Written shift change report given to Bruno Cancino (oncoming nurse) by Royal Mcgrath (offgoing nurse). Report included the following information SBAR, Kardex, ED Summary, Intake/Output, and Recent Results.

## 2023-02-24 NOTE — PROGRESS NOTES
Hospitalist Progress Note  Jonna Rosales MD  Answering service: [phone] OR [phone] from in house phone        Date of Service:  2023  NAME:  Chanel Ybarra  :  1928  MRN:  [id number]      Admission Summary:   Chanel Ybarra is a 80 y.o. female with pmh of CAD, HLD, HTN, COPD on chronic 2LNC presents from [facility] with increased work of breathing and hypoxia. Information obtained via chart review and family at bedside. Pt had been in usual state of health until 2 days ago when she had low grade fever, and slightly increased O2 requirements, CXR per family showed possible PNA. Started on antibiotics. She had initially improved but today worsened, had ongoing hypoxemia, and EMS was called. Interval history / Subjective:   Pt with some improvement overnight while on high flow, breathing is much more comfortable. Pt more alert and talkative, though still confused.       Assessment & Plan:     Acute on chronic hypoxic respiratory failure  Sepsis (WBC, RR)  COPD exacerbation   + RSV   - O2, titrate for saturation over 90% --> to HFNC now   - CTX and azithromycin continue x 24 hours   - CT of the chest showed interstitial findings bilaterally, likely due to RSV  - Continue IV steroids, scheduled nebs   - Echo pending   - FU blood culture --> NGTD     MARILYN - Cr up to 1.94  - suspect pre-renal   - UA bland  - IVF   - Recheck BMP      Elevated D dimer - check VQ scan, ordered   H/o HTN - hold home amlodipine for now   Hypothyroid - home synthroid   Depression/Anxiety - zoloft           Code status: DNR/DNI  Prophylaxis: heparin   Care Plan discussed with: pt, RN, and called son over the phone   Anticipated Disposition: Back to facility in > 48 hours       CRITICAL CARE ATTESTATION:  I had a face to face encounter with the patient, reviewed and interpreted patient data including clinical events, labs, images, vital signs, I/O's, and examined patient. I have discussed the case and the plan and management of the patient's care with the consulting services, the bedside nurses and necessary ancillary providers. NOTE OF PERSONAL INVOLVEMENT IN CARE   This patient has a high probability of imminent, clinically significant deterioration, which requires the highest level of preparedness to intervene urgently. I participated in the decision-making and personally managed or directed the management of the following life and organ supporting interventions that required my frequent assessment to treat or prevent imminent deterioration. I personally spent 42 minutes of critical care time. This is time spent at this critically ill patient's bedside actively involved in patient care as well as the coordination of care and discussions with the patient's family. This does not include any procedural time which has been billed separately. Hospital Problems  Date Reviewed: 12/2/2021            Codes Class Noted POA    Pneumonia ICD-10-CM: J18.9  ICD-9-CM: 639  2/23/2023 Unknown               Review of Systems:   A comprehensive review of systems was negative except for that written in the HPI. Vital Signs:    Last 24hrs VS reviewed since prior progress note.  Most recent are:  Visit Vitals  BP (!) 129/90   Pulse 83   Temp 97.8 °F (36.6 °C)   Resp 20   SpO2 96%         Intake/Output Summary (Last 24 hours) at 2/24/2023 1508  Last data filed at 2/24/2023 0800  Gross per 24 hour   Intake 250 ml   Output 700 ml   Net -450 ml        Physical Examination:     I had a face to face encounter with this patient and independently examined them on 2/24/2023 as outlined below:          General : alert x 1 , awake, no acute distress,   HEENT: PEERL, EOMI, moist mucus membrane,   Chest: Course to auscultation bilaterally, intermittent wheezing, no increased work of breathing on 15LNC high flow   CVS: S1 S2 heard, Capillary refill less than 2 seconds  Abd: soft/ non tender, non distended, BS physiological,   Ext: no clubbing, no cyanosis, no edema, brisk 2+ DP pulses  Neuro/Psych: pleasant mood and affect, CN 2-12 grossly intact,   Skin: warm            Data Review:    Review and/or order of clinical lab test  Review and/or order of tests in the radiology section of CPT  Review and/or order of tests in the medicine section of CPT    I have independently reviewed and interpreted patient's lab and all other diagnostic data    Notes reviewed from all clinical/nonclinical/nursing services involved in patient's clinical care. Care coordination discussions were held with appropriate clinical/nonclinical/ nursing providers based on care coordination needs. Labs:     Recent Labs     02/23/23  1605   WBC 18.4*   HGB 14.2   HCT 43.6        Recent Labs     02/23/23  1458      K 3.4*   *   CO2 22   BUN 63*   CREA 1.92*   *   CA 9.5   MG 2.5*     Recent Labs     02/23/23  1458   ALT 18   AP 77   TBILI 0.6   TP 7.7   ALB 3.0*   GLOB 4.7*     No results for input(s): INR, PTP, APTT, INREXT in the last 72 hours. No results for input(s): FE, TIBC, PSAT, FERR in the last 72 hours. No results found for: FOL, RBCF   No results for input(s): PH, PCO2, PO2 in the last 72 hours. No results for input(s): CPK, CKNDX, TROIQ in the last 72 hours.     No lab exists for component: CPKMB  No results found for: CHOL, CHOLX, CHLST, CHOLV, HDL, HDLP, LDL, LDLC, DLDLP, TGLX, TRIGL, TRIGP, CHHD, CHHDX  Lab Results   Component Value Date/Time    Glucose (POC) 89 11/13/2014 07:42 AM     Lab Results   Component Value Date/Time    Color YELLOW/STRAW 02/23/2023 04:05 PM    Appearance CLEAR 02/23/2023 04:05 PM    Specific gravity 1.018 02/23/2023 04:05 PM    pH (UA) 5.0 02/23/2023 04:05 PM    Protein 30 (A) 02/23/2023 04:05 PM    Glucose Negative 02/23/2023 04:05 PM    Ketone Negative 02/23/2023 04:05 PM    Bilirubin Negative 02/23/2023 04:05 PM    Urobilinogen 1.0 02/23/2023 04:05 PM    Nitrites Negative 02/23/2023 04:05 PM    Leukocyte Esterase Negative 02/23/2023 04:05 PM    Epithelial cells FEW 02/23/2023 04:05 PM    Bacteria Negative 02/23/2023 04:05 PM    WBC 0-4 02/23/2023 04:05 PM    RBC 0-5 02/23/2023 04:05 PM         Medications Reviewed:     Current Facility-Administered Medications   Medication Dose Route Frequency    sodium chloride (NS) flush 5-10 mL  5-10 mL IntraVENous PRN    sodium chloride (NS) flush 5-40 mL  5-40 mL IntraVENous Q8H    sodium chloride (NS) flush 5-40 mL  5-40 mL IntraVENous PRN    acetaminophen (TYLENOL) tablet 650 mg  650 mg Oral Q6H PRN    Or    acetaminophen (TYLENOL) suppository 650 mg  650 mg Rectal Q6H PRN    polyethylene glycol (MIRALAX) packet 17 g  17 g Oral DAILY PRN    ondansetron (ZOFRAN ODT) tablet 4 mg  4 mg Oral Q8H PRN    Or    ondansetron (ZOFRAN) injection 4 mg  4 mg IntraVENous Q6H PRN    methylPREDNISolone (PF) (SOLU-MEDROL) injection 40 mg  40 mg IntraVENous Q8H    0.9% sodium chloride infusion  125 mL/hr IntraVENous CONTINUOUS    albuterol-ipratropium (DUO-NEB) 2.5 MG-0.5 MG/3 ML  3 mL Nebulization Q4H RT    heparin (porcine) injection 5,000 Units  5,000 Units SubCUTAneous Q8H    azithromycin (ZITHROMAX) 500 mg in 0.9% sodium chloride 250 mL ([id number])  500 mg IntraVENous Q24H    cefTRIAXone (ROCEPHIN) 1 g in 0.9% sodium chloride 10 mL IV syringe  1 g IntraVENous Q24H     Current Outpatient Medications   Medication Sig    azithromycin (ZITHROMAX) 250 mg tablet Take 250 mg by mouth daily. Started 2/22 500 mg x 1, then 250 mg daily for 4 days    docusate sodium (COLACE) 100 mg capsule Take 100 mg by mouth two (2) times a day. 0900 and 1700    furosemide (LASIX) 40 mg tablet Take 40 mg by mouth daily. Started 2/22 x 5 days for CHF    guaiFENesin ER (MUCINEX) 600 mg ER tablet Take 600 mg by mouth two (2) times a day.  Indications: COPD and chronic congestion    therapeutic multivitamin (Thera) tablet Take 1 Tablet by mouth daily. fluticasone-umeclidinium-vilanterol (Trelegy Ellipta) 100-62.5-25 mcg inhaler Take 1 Puff by inhalation daily. Rinse mouth with water after use    acetaminophen (TYLENOL) 325 mg tablet Take 650 mg by mouth every four (4) hours as needed (Mild Pain). albuterol (PROVENTIL HFA, VENTOLIN HFA, PROAIR HFA) 90 mcg/actuation inhaler Take 2 Puffs by inhalation every four (4) hours as needed for Shortness of Breath. senna-docusate (Senexon-S) 8.6-50 mg per tablet Take 2 Tablets by mouth two (2) times daily as needed for Constipation. aspirin 81 mg chewable tablet Take 81 mg by mouth daily. sertraline (ZOLOFT) 50 mg tablet Take 50 mg by mouth nightly. levothyroxine (SYNTHROID) 75 mcg tablet Take 75 mcg by mouth Daily (before breakfast). amLODIPine (NORVASC) 10 mg tablet Take 10 mg by mouth daily.     latanoprost (XALATAN) 0.005 % ophthalmic solution Administer 1 Drop to both eyes nightly.     ______________________________________________________________________  EXPECTED LENGTH OF STAY: - - -  ACTUAL LENGTH OF STAY:          1                 Rosalba Herman MD

## 2023-02-24 NOTE — ED NOTES
Verbal shift change report given to Gomez Montiel (oncoming nurse) by Balbir Oshea (offgoing nurse). Report included the following information SBAR, ED Summary and MAR.

## 2023-02-25 LAB
ANION GAP SERPL CALC-SCNC: 8 MMOL/L (ref 5–15)
BASOPHILS # BLD: 0.2 K/UL (ref 0–0.1)
BASOPHILS NFR BLD: 1 % (ref 0–1)
BUN SERPL-MCNC: 53 MG/DL (ref 6–20)
BUN/CREAT SERPL: 42 (ref 12–20)
CALCIUM SERPL-MCNC: 8.5 MG/DL (ref 8.5–10.1)
CHLORIDE SERPL-SCNC: 120 MMOL/L (ref 97–108)
CO2 SERPL-SCNC: 22 MMOL/L (ref 21–32)
CREAT SERPL-MCNC: 1.26 MG/DL (ref 0.55–1.02)
DIFFERENTIAL METHOD BLD: ABNORMAL
EOSINOPHIL # BLD: 0 K/UL (ref 0–0.4)
EOSINOPHIL NFR BLD: 0 % (ref 0–7)
ERYTHROCYTE [DISTWIDTH] IN BLOOD BY AUTOMATED COUNT: 15.2 % (ref 11.5–14.5)
GLUCOSE SERPL-MCNC: 187 MG/DL (ref 65–100)
HCT VFR BLD AUTO: 41.5 % (ref 35–47)
HGB BLD-MCNC: 13.5 G/DL (ref 11.5–16)
IMM GRANULOCYTES # BLD AUTO: 0.4 K/UL (ref 0–0.04)
IMM GRANULOCYTES NFR BLD AUTO: 2 % (ref 0–0.5)
LYMPHOCYTES # BLD: 0.8 K/UL (ref 0.8–3.5)
LYMPHOCYTES NFR BLD: 4 % (ref 12–49)
MAGNESIUM SERPL-MCNC: 2.6 MG/DL (ref 1.6–2.4)
MCH RBC QN AUTO: 29.5 PG (ref 26–34)
MCHC RBC AUTO-ENTMCNC: 32.5 G/DL (ref 30–36.5)
MCV RBC AUTO: 90.8 FL (ref 80–99)
MONOCYTES # BLD: 1 K/UL (ref 0–1)
MONOCYTES NFR BLD: 5 % (ref 5–13)
NEUTS SEG # BLD: 17.7 K/UL (ref 1.8–8)
NEUTS SEG NFR BLD: 88 % (ref 32–75)
NRBC # BLD: 0 K/UL (ref 0–0.01)
NRBC BLD-RTO: 0 PER 100 WBC
PHOSPHATE SERPL-MCNC: 2.2 MG/DL (ref 2.6–4.7)
PLATELET # BLD AUTO: 302 K/UL (ref 150–400)
PMV BLD AUTO: 11.8 FL (ref 8.9–12.9)
POTASSIUM SERPL-SCNC: 3.6 MMOL/L (ref 3.5–5.1)
RBC # BLD AUTO: 4.57 M/UL (ref 3.8–5.2)
RBC MORPH BLD: ABNORMAL
SODIUM SERPL-SCNC: 150 MMOL/L (ref 136–145)
WBC # BLD AUTO: 20.1 K/UL (ref 3.6–11)
WBC MORPH BLD: ABNORMAL

## 2023-02-25 PROCEDURE — 65660000001 HC RM ICU INTERMED STEPDOWN

## 2023-02-25 PROCEDURE — 84100 ASSAY OF PHOSPHORUS: CPT

## 2023-02-25 PROCEDURE — 74011250636 HC RX REV CODE- 250/636: Performed by: INTERNAL MEDICINE

## 2023-02-25 PROCEDURE — 36415 COLL VENOUS BLD VENIPUNCTURE: CPT

## 2023-02-25 PROCEDURE — 87040 BLOOD CULTURE FOR BACTERIA: CPT

## 2023-02-25 PROCEDURE — 80048 BASIC METABOLIC PNL TOTAL CA: CPT

## 2023-02-25 PROCEDURE — 83735 ASSAY OF MAGNESIUM: CPT

## 2023-02-25 PROCEDURE — 85025 COMPLETE CBC W/AUTO DIFF WBC: CPT

## 2023-02-25 PROCEDURE — 94640 AIRWAY INHALATION TREATMENT: CPT

## 2023-02-25 PROCEDURE — 74011250637 HC RX REV CODE- 250/637: Performed by: INTERNAL MEDICINE

## 2023-02-25 PROCEDURE — 74011000250 HC RX REV CODE- 250: Performed by: INTERNAL MEDICINE

## 2023-02-25 RX ORDER — AMLODIPINE BESYLATE 5 MG/1
10 TABLET ORAL DAILY
Status: DISCONTINUED | OUTPATIENT
Start: 2023-02-25 | End: 2023-02-28 | Stop reason: HOSPADM

## 2023-02-25 RX ORDER — POTASSIUM CHLORIDE 750 MG/1
40 TABLET, FILM COATED, EXTENDED RELEASE ORAL
Status: COMPLETED | OUTPATIENT
Start: 2023-02-25 | End: 2023-02-25

## 2023-02-25 RX ADMIN — IPRATROPIUM BROMIDE AND ALBUTEROL SULFATE 3 ML: 2.5; .5 SOLUTION RESPIRATORY (INHALATION) at 08:08

## 2023-02-25 RX ADMIN — ARFORMOTEROL TARTRATE 15 MCG: 15 SOLUTION RESPIRATORY (INHALATION) at 08:08

## 2023-02-25 RX ADMIN — DOCUSATE SODIUM 100 MG: 100 CAPSULE, LIQUID FILLED ORAL at 09:50

## 2023-02-25 RX ADMIN — POTASSIUM CHLORIDE 40 MEQ: 750 TABLET, FILM COATED, EXTENDED RELEASE ORAL at 09:52

## 2023-02-25 RX ADMIN — HEPARIN SODIUM 5000 UNITS: 5000 INJECTION INTRAVENOUS; SUBCUTANEOUS at 22:17

## 2023-02-25 RX ADMIN — HEPARIN SODIUM 5000 UNITS: 5000 INJECTION INTRAVENOUS; SUBCUTANEOUS at 15:42

## 2023-02-25 RX ADMIN — CEFTRIAXONE SODIUM 1 G: 1 INJECTION, POWDER, FOR SOLUTION INTRAMUSCULAR; INTRAVENOUS at 17:05

## 2023-02-25 RX ADMIN — DEXTROSE MONOHYDRATE 75 ML/HR: 50 INJECTION, SOLUTION INTRAVENOUS at 15:43

## 2023-02-25 RX ADMIN — Medication 10 ML: at 22:17

## 2023-02-25 RX ADMIN — GUAIFENESIN 600 MG: 600 TABLET, EXTENDED RELEASE ORAL at 17:08

## 2023-02-25 RX ADMIN — ASPIRIN 81 MG CHEWABLE TABLET 81 MG: 81 TABLET CHEWABLE at 09:50

## 2023-02-25 RX ADMIN — BUDESONIDE 250 MCG: 0.25 INHALANT RESPIRATORY (INHALATION) at 08:08

## 2023-02-25 RX ADMIN — HEPARIN SODIUM 5000 UNITS: 5000 INJECTION INTRAVENOUS; SUBCUTANEOUS at 07:29

## 2023-02-25 RX ADMIN — SERTRALINE HYDROCHLORIDE 50 MG: 50 TABLET ORAL at 22:18

## 2023-02-25 RX ADMIN — LEVOTHYROXINE SODIUM 75 MCG: 0.15 TABLET ORAL at 07:29

## 2023-02-25 RX ADMIN — IPRATROPIUM BROMIDE 0.5 MG: 0.5 SOLUTION RESPIRATORY (INHALATION) at 08:08

## 2023-02-25 RX ADMIN — GUAIFENESIN 600 MG: 600 TABLET, EXTENDED RELEASE ORAL at 09:50

## 2023-02-25 RX ADMIN — AZITHROMYCIN MONOHYDRATE 500 MG: 500 INJECTION, POWDER, LYOPHILIZED, FOR SOLUTION INTRAVENOUS at 17:06

## 2023-02-25 RX ADMIN — DOCUSATE SODIUM 100 MG: 100 CAPSULE, LIQUID FILLED ORAL at 17:08

## 2023-02-25 RX ADMIN — METHYLPREDNISOLONE SODIUM SUCCINATE 40 MG: 40 INJECTION, POWDER, FOR SOLUTION INTRAMUSCULAR; INTRAVENOUS at 09:50

## 2023-02-25 RX ADMIN — AMLODIPINE BESYLATE 10 MG: 5 TABLET ORAL at 09:53

## 2023-02-25 RX ADMIN — Medication 10 ML: at 15:43

## 2023-02-25 RX ADMIN — Medication 10 ML: at 07:29

## 2023-02-25 RX ADMIN — METHYLPREDNISOLONE SODIUM SUCCINATE 40 MG: 40 INJECTION, POWDER, FOR SOLUTION INTRAMUSCULAR; INTRAVENOUS at 17:05

## 2023-02-25 RX ADMIN — IPRATROPIUM BROMIDE AND ALBUTEROL SULFATE 3 ML: 2.5; .5 SOLUTION RESPIRATORY (INHALATION) at 13:40

## 2023-02-25 NOTE — PROGRESS NOTES
Bedside and Verbal shift change report given to Mark Lopez (oncoming nurse) by Edwin Wagoner (offgoing nurse).  Report included the following information SBAR, Kardex, Intake/Output, MAR, Accordion, and Cardiac Rhythm SR .

## 2023-02-25 NOTE — PROGRESS NOTES
Hospitalist Progress Note  Zuly Jackson MD  Answering service: [phone] OR [phone] from in house phone        Date of Service:  2023  NAME:  Socorro Priest  :  1928  MRN:  [id number]      Admission Summary:   Socorro Priest is a 80 y.o. female with pmh of CAD, HLD, HTN, COPD on chronic 2LNC presents from [facility] with increased work of breathing and hypoxia. Information obtained via chart review and family at bedside. Pt had been in usual state of health until 2 days ago when she had low grade fever, and slightly increased O2 requirements, CXR per family showed possible PNA. Started on antibiotics. She had initially improved but today worsened, had ongoing hypoxemia, and EMS was called. Interval history / Subjective:   Improved, more comfortable and alert today      Assessment & Plan:     Acute on chronic hypoxic respiratory failure  Sepsis (WBC, RR)  COPD exacerbation   + RSV   - O2, titrate for saturation over 90% --> weaning down.  Midflow today   - CTX and azithromycin continue x 24 hours   - CT of the chest showed interstitial findings bilaterally, likely due to RSV  - Continue IV steroids, scheduled nebs   - Echo pending   - FU blood culture --> NGTD     MARILYN - Cr up to 1.94 on admission, improving   - suspect pre-renal   - UA bland  - IVF with D5   - Recheck BMP     Hypernatremia - iatrogenic from normal saline, and decreased free water intake  - Promote oral free water  - IVF switched to D5W last night  - BMP daily      Hypokalemia - replete and monitor   Hypophosphatemia - replete and monitor   Elevated D dimer - check VQ scan, ordered and pending   H/o HTN - hold home amlodipine for now   Hypothyroid - home synthroid   Depression/Anxiety - zoloft           Code status: DNR/DNI  Prophylaxis: heparin   Care Plan discussed with: pt, RN, and called son over the phone   Anticipated Disposition: Back to facility in > 48 hours       CRITICAL CARE ATTESTATION:  I had a face to face encounter with the patient, reviewed and interpreted patient data including clinical events, labs, images, vital signs, I/O's, and examined patient. I have discussed the case and the plan and management of the patient's care with the consulting services, the bedside nurses and necessary ancillary providers. NOTE OF PERSONAL INVOLVEMENT IN CARE   This patient has a high probability of imminent, clinically significant deterioration, which requires the highest level of preparedness to intervene urgently. I participated in the decision-making and personally managed or directed the management of the following life and organ supporting interventions that required my frequent assessment to treat or prevent imminent deterioration. I personally spent 32 minutes of critical care time. This is time spent at this critically ill patient's bedside actively involved in patient care as well as the coordination of care and discussions with the patient's family. This does not include any procedural time which has been billed separately. Hospital Problems  Date Reviewed: 12/2/2021            Codes Class Noted POA    Pneumonia ICD-10-CM: J18.9  ICD-9-CM: 969  2/23/2023 Unknown             Review of Systems:   A comprehensive review of systems was negative except for that written in the HPI. Vital Signs:    Last 24hrs VS reviewed since prior progress note.  Most recent are:  Visit Vitals  BP (!) 146/52 (BP 1 Location: Left upper arm, BP Patient Position: At rest)   Pulse 73   Temp 97.7 °F (36.5 °C)   Resp 28   SpO2 95%         Intake/Output Summary (Last 24 hours) at 2/25/2023 1428  Last data filed at 2/25/2023 0820  Gross per 24 hour   Intake --   Output 300 ml   Net -300 ml          Physical Examination:     I had a face to face encounter with this patient and independently examined them on 2/25/2023 as outlined below:          General : alert x 1 , awake, no acute distress,   HEENT: PEERL, EOMI, moist mucus membrane,   Chest: Course to auscultation bilaterally, intermittent wheezing, no increased work of breathing on 10LNC high flow   CVS: S1 S2 heard, Capillary refill less than 2 seconds  Abd: soft/ non tender, non distended, BS physiological,   Ext: no clubbing, no cyanosis, no edema, brisk 2+ DP pulses  Neuro/Psych: pleasant mood and affect, CN 2-12 grossly intact,   Skin: warm            Data Review:    Review and/or order of clinical lab test  Review and/or order of tests in the radiology section of CPT  Review and/or order of tests in the medicine section of CPT    I have independently reviewed and interpreted patient's lab and all other diagnostic data    Notes reviewed from all clinical/nonclinical/nursing services involved in patient's clinical care. Care coordination discussions were held with appropriate clinical/nonclinical/ nursing providers based on care coordination needs. Labs:     Recent Labs     02/25/23  0407 02/24/23  1626   WBC 20.1* 17.8*   HGB 13.5 13.4   HCT 41.5 41.9    299       Recent Labs     02/25/23  0407 02/24/23  1626 02/23/23  1458   * 152* 143   K 3.6 3.0* 3.4*   * 122* 112*   CO2 22 23 22   BUN 53* 57* 63*   CREA 1.26* 1.20* 1.92*   * 143* 244*   CA 8.5 8.2* 9.5   MG 2.6*  --  2.5*   PHOS 2.2*  --   --        Recent Labs     02/23/23  1458   ALT 18   AP 77   TBILI 0.6   TP 7.7   ALB 3.0*   GLOB 4.7*       No results for input(s): INR, PTP, APTT, INREXT, INREXT in the last 72 hours. No results for input(s): FE, TIBC, PSAT, FERR in the last 72 hours. No results found for: FOL, RBCF   No results for input(s): PH, PCO2, PO2 in the last 72 hours. No results for input(s): CPK, CKNDX, TROIQ in the last 72 hours.     No lab exists for component: CPKMB  No results found for: CHOL, CHOLX, CHLST, CHOLV, HDL, HDLP, LDL, LDLC, DLDLP, TGLX, TRIGL, TRIGP, CHHD, [facility]  Lab Results   Component Value Date/Time    Glucose (POC) 89 11/13/2014 07:42 AM     Lab Results   Component Value Date/Time    Color YELLOW/STRAW 02/23/2023 04:05 PM    Appearance CLEAR 02/23/2023 04:05 PM    Specific gravity 1.018 02/23/2023 04:05 PM    pH (UA) 5.0 02/23/2023 04:05 PM    Protein 30 (A) 02/23/2023 04:05 PM    Glucose Negative 02/23/2023 04:05 PM    Ketone Negative 02/23/2023 04:05 PM    Bilirubin Negative 02/23/2023 04:05 PM    Urobilinogen 1.0 02/23/2023 04:05 PM    Nitrites Negative 02/23/2023 04:05 PM    Leukocyte Esterase Negative 02/23/2023 04:05 PM    Epithelial cells FEW 02/23/2023 04:05 PM    Bacteria Negative 02/23/2023 04:05 PM    WBC 0-4 02/23/2023 04:05 PM    RBC 0-5 02/23/2023 04:05 PM         Medications Reviewed:     Current Facility-Administered Medications   Medication Dose Route Frequency    amLODIPine (NORVASC) tablet 10 mg  10 mg Oral DAILY    aspirin chewable tablet 81 mg  81 mg Oral DAILY    docusate sodium (COLACE) capsule 100 mg  100 mg Oral BID    guaiFENesin ER (MUCINEX) tablet 600 mg  600 mg Oral BID    levothyroxine (SYNTHROID) tablet 75 mcg  75 mcg Oral ACB    senna-docusate (PERICOLACE) 8.6-50 mg per tablet 2 Tablet  2 Tablet Oral BID PRN    sertraline (ZOLOFT) tablet 50 mg  50 mg Oral QHS    arformoteroL (BROVANA) neb solution 15 mcg  15 mcg Nebulization BID RT    And    budesonide (PULMICORT) 250 mcg/2ml nebulizer susp  250 mcg Nebulization BID RT    dextrose 5% infusion  75 mL/hr IntraVENous CONTINUOUS    albuterol-ipratropium (DUO-NEB) 2.5 MG-0.5 MG/3 ML  3 mL Nebulization Q6HWA RT    sodium chloride (NS) flush 5-10 mL  5-10 mL IntraVENous PRN    sodium chloride (NS) flush 5-40 mL  5-40 mL IntraVENous Q8H    sodium chloride (NS) flush 5-40 mL  5-40 mL IntraVENous PRN    acetaminophen (TYLENOL) tablet 650 mg  650 mg Oral Q6H PRN    Or    acetaminophen (TYLENOL) suppository 650 mg  650 mg Rectal Q6H PRN    polyethylene glycol (MIRALAX) packet 17 g  17 g Oral DAILY PRN    ondansetron (ZOFRAN ODT) tablet 4 mg  4 mg Oral Q8H PRN    Or    ondansetron (ZOFRAN) injection 4 mg  4 mg IntraVENous Q6H PRN    methylPREDNISolone (PF) (SOLU-MEDROL) injection 40 mg  40 mg IntraVENous Q8H    heparin (porcine) injection 5,000 Units  5,000 Units SubCUTAneous Q8H    azithromycin (ZITHROMAX) 500 mg in 0.9% sodium chloride 250 mL ([id number])  500 mg IntraVENous Q24H    cefTRIAXone (ROCEPHIN) 1 g in 0.9% sodium chloride 10 mL IV syringe  1 g IntraVENous Q24H     ______________________________________________________________________  EXPECTED LENGTH OF STAY: - - -  ACTUAL LENGTH OF STAY:          2                 Karuna Johnson MD

## 2023-02-26 LAB
ANION GAP SERPL CALC-SCNC: 5 MMOL/L (ref 5–15)
ATRIAL RATE: 90 BPM
BASOPHILS # BLD: 0 K/UL (ref 0–0.1)
BASOPHILS NFR BLD: 0 % (ref 0–1)
BUN SERPL-MCNC: 42 MG/DL (ref 6–20)
BUN/CREAT SERPL: 38 (ref 12–20)
CALCIUM SERPL-MCNC: 9 MG/DL (ref 8.5–10.1)
CALCULATED P AXIS, ECG09: 82 DEGREES
CALCULATED R AXIS, ECG10: -96 DEGREES
CALCULATED T AXIS, ECG11: 70 DEGREES
CHLORIDE SERPL-SCNC: 117 MMOL/L (ref 97–108)
CO2 SERPL-SCNC: 21 MMOL/L (ref 21–32)
CREAT SERPL-MCNC: 1.11 MG/DL (ref 0.55–1.02)
DIAGNOSIS, 93000: NORMAL
DIFFERENTIAL METHOD BLD: ABNORMAL
EOSINOPHIL # BLD: 0 K/UL (ref 0–0.4)
EOSINOPHIL NFR BLD: 0 % (ref 0–7)
ERYTHROCYTE [DISTWIDTH] IN BLOOD BY AUTOMATED COUNT: 15.5 % (ref 11.5–14.5)
GLUCOSE SERPL-MCNC: 134 MG/DL (ref 65–100)
HCT VFR BLD AUTO: 47.3 % (ref 35–47)
HGB BLD-MCNC: 14.9 G/DL (ref 11.5–16)
IMM GRANULOCYTES # BLD AUTO: 0 K/UL
IMM GRANULOCYTES NFR BLD AUTO: 0 %
LYMPHOCYTES # BLD: 1.1 K/UL (ref 0.8–3.5)
LYMPHOCYTES NFR BLD: 4 % (ref 12–49)
MAGNESIUM SERPL-MCNC: 2.6 MG/DL (ref 1.6–2.4)
MCH RBC QN AUTO: 29.7 PG (ref 26–34)
MCHC RBC AUTO-ENTMCNC: 31.5 G/DL (ref 30–36.5)
MCV RBC AUTO: 94.4 FL (ref 80–99)
MONOCYTES # BLD: 0.5 K/UL (ref 0–1)
MONOCYTES NFR BLD: 2 % (ref 5–13)
NEUTS BAND NFR BLD MANUAL: 1 % (ref 0–6)
NEUTS SEG # BLD: 25.3 K/UL (ref 1.8–8)
NEUTS SEG NFR BLD: 93 % (ref 32–75)
NRBC # BLD: 0 K/UL (ref 0–0.01)
NRBC BLD-RTO: 0 PER 100 WBC
P-R INTERVAL, ECG05: 124 MS
PHOSPHATE SERPL-MCNC: 2.3 MG/DL (ref 2.6–4.7)
PLATELET # BLD AUTO: 408 K/UL (ref 150–400)
PMV BLD AUTO: 11.5 FL (ref 8.9–12.9)
POTASSIUM SERPL-SCNC: 4.9 MMOL/L (ref 3.5–5.1)
Q-T INTERVAL, ECG07: 364 MS
QRS DURATION, ECG06: 104 MS
QTC CALCULATION (BEZET), ECG08: 445 MS
RBC # BLD AUTO: 5.01 M/UL (ref 3.8–5.2)
RBC MORPH BLD: ABNORMAL
SODIUM SERPL-SCNC: 143 MMOL/L (ref 136–145)
VENTRICULAR RATE, ECG03: 90 BPM
WBC # BLD AUTO: 26.9 K/UL (ref 3.6–11)

## 2023-02-26 PROCEDURE — 94640 AIRWAY INHALATION TREATMENT: CPT

## 2023-02-26 PROCEDURE — 74011250637 HC RX REV CODE- 250/637

## 2023-02-26 PROCEDURE — 65270000046 HC RM TELEMETRY

## 2023-02-26 PROCEDURE — 83735 ASSAY OF MAGNESIUM: CPT

## 2023-02-26 PROCEDURE — 80048 BASIC METABOLIC PNL TOTAL CA: CPT

## 2023-02-26 PROCEDURE — 85025 COMPLETE CBC W/AUTO DIFF WBC: CPT

## 2023-02-26 PROCEDURE — 36415 COLL VENOUS BLD VENIPUNCTURE: CPT

## 2023-02-26 PROCEDURE — 74011000250 HC RX REV CODE- 250: Performed by: INTERNAL MEDICINE

## 2023-02-26 PROCEDURE — 74011250636 HC RX REV CODE- 250/636: Performed by: INTERNAL MEDICINE

## 2023-02-26 PROCEDURE — 84100 ASSAY OF PHOSPHORUS: CPT

## 2023-02-26 PROCEDURE — 74011250637 HC RX REV CODE- 250/637: Performed by: INTERNAL MEDICINE

## 2023-02-26 RX ORDER — CLONIDINE HYDROCHLORIDE 0.1 MG/1
0.1 TABLET ORAL
Status: DISCONTINUED | OUTPATIENT
Start: 2023-02-26 | End: 2023-02-28 | Stop reason: HOSPADM

## 2023-02-26 RX ADMIN — GUAIFENESIN 600 MG: 600 TABLET, EXTENDED RELEASE ORAL at 18:19

## 2023-02-26 RX ADMIN — AZITHROMYCIN MONOHYDRATE 500 MG: 500 INJECTION, POWDER, LYOPHILIZED, FOR SOLUTION INTRAVENOUS at 18:18

## 2023-02-26 RX ADMIN — METHYLPREDNISOLONE SODIUM SUCCINATE 40 MG: 40 INJECTION, POWDER, FOR SOLUTION INTRAMUSCULAR; INTRAVENOUS at 01:21

## 2023-02-26 RX ADMIN — CLONIDINE HYDROCHLORIDE 0.1 MG: 0.1 TABLET ORAL at 09:10

## 2023-02-26 RX ADMIN — DOCUSATE SODIUM 100 MG: 100 CAPSULE, LIQUID FILLED ORAL at 18:19

## 2023-02-26 RX ADMIN — Medication 10 ML: at 05:05

## 2023-02-26 RX ADMIN — ARFORMOTEROL TARTRATE 15 MCG: 15 SOLUTION RESPIRATORY (INHALATION) at 07:59

## 2023-02-26 RX ADMIN — BUDESONIDE 250 MCG: 0.25 INHALANT RESPIRATORY (INHALATION) at 19:51

## 2023-02-26 RX ADMIN — IPRATROPIUM BROMIDE AND ALBUTEROL SULFATE 3 ML: 2.5; .5 SOLUTION RESPIRATORY (INHALATION) at 13:52

## 2023-02-26 RX ADMIN — HEPARIN SODIUM 5000 UNITS: 5000 INJECTION INTRAVENOUS; SUBCUTANEOUS at 13:51

## 2023-02-26 RX ADMIN — DEXTROSE MONOHYDRATE 75 ML/HR: 50 INJECTION, SOLUTION INTRAVENOUS at 06:54

## 2023-02-26 RX ADMIN — CEFTRIAXONE SODIUM 1 G: 1 INJECTION, POWDER, FOR SOLUTION INTRAMUSCULAR; INTRAVENOUS at 18:19

## 2023-02-26 RX ADMIN — SERTRALINE HYDROCHLORIDE 50 MG: 50 TABLET ORAL at 21:23

## 2023-02-26 RX ADMIN — IPRATROPIUM BROMIDE AND ALBUTEROL SULFATE 3 ML: 2.5; .5 SOLUTION RESPIRATORY (INHALATION) at 07:59

## 2023-02-26 RX ADMIN — ARFORMOTEROL TARTRATE 15 MCG: 15 SOLUTION RESPIRATORY (INHALATION) at 19:51

## 2023-02-26 RX ADMIN — IPRATROPIUM BROMIDE AND ALBUTEROL SULFATE 3 ML: 2.5; .5 SOLUTION RESPIRATORY (INHALATION) at 19:50

## 2023-02-26 RX ADMIN — HEPARIN SODIUM 5000 UNITS: 5000 INJECTION INTRAVENOUS; SUBCUTANEOUS at 21:24

## 2023-02-26 RX ADMIN — METHYLPREDNISOLONE SODIUM SUCCINATE 40 MG: 40 INJECTION, POWDER, FOR SOLUTION INTRAMUSCULAR; INTRAVENOUS at 21:23

## 2023-02-26 RX ADMIN — AMLODIPINE BESYLATE 10 MG: 5 TABLET ORAL at 09:06

## 2023-02-26 RX ADMIN — DOCUSATE SODIUM 100 MG: 100 CAPSULE, LIQUID FILLED ORAL at 09:09

## 2023-02-26 RX ADMIN — Medication 10 ML: at 13:52

## 2023-02-26 RX ADMIN — METHYLPREDNISOLONE SODIUM SUCCINATE 40 MG: 40 INJECTION, POWDER, FOR SOLUTION INTRAMUSCULAR; INTRAVENOUS at 09:09

## 2023-02-26 RX ADMIN — BUDESONIDE 250 MCG: 0.25 INHALANT RESPIRATORY (INHALATION) at 07:59

## 2023-02-26 RX ADMIN — GUAIFENESIN 600 MG: 600 TABLET, EXTENDED RELEASE ORAL at 09:10

## 2023-02-26 RX ADMIN — ASPIRIN 81 MG CHEWABLE TABLET 81 MG: 81 TABLET CHEWABLE at 08:57

## 2023-02-26 RX ADMIN — Medication 10 ML: at 21:24

## 2023-02-26 RX ADMIN — HEPARIN SODIUM 5000 UNITS: 5000 INJECTION INTRAVENOUS; SUBCUTANEOUS at 05:05

## 2023-02-26 RX ADMIN — LEVOTHYROXINE SODIUM 75 MCG: 0.15 TABLET ORAL at 06:54

## 2023-02-26 NOTE — PROGRESS NOTES
Hospitalist Progress Note  Theresa Gonzalez MD  Answering service: [phone] OR [phone] from in house phone        Date of Service:  2023  NAME:  Carol Adan  :  1928  MRN:  [id number]      Admission Summary:   Carol Adan is a 80 y.o. female with pmh of CAD, HLD, HTN, COPD on chronic 2LNC presents from [facility] with increased work of breathing and hypoxia. Information obtained via chart review and family at bedside. Pt had been in usual state of health until 2 days ago when she had low grade fever, and slightly increased O2 requirements, CXR per family showed possible PNA. Started on antibiotics. She had initially improved but today worsened, had ongoing hypoxemia, and EMS was called. Interval history / Subjective:   Doing much better. Alert and awake. Breathing improved.  Weaned down to [place]      Assessment & Plan:     Acute on chronic DR FLORECITA HERNANDEZ [facility]) hypoxic respiratory failure  Sepsis (WBC, RR)  COPD exacerbation   + RSV   - O2, titrate for saturation over 90% --> weaning down 4LNC today   - CTX and azithromycin continue x 24 hours   - CT of the chest showed interstitial findings bilaterally, likely due to RSV  - Continue IV steroids wean today, scheduled nebs   - Echo with normal EF, some mild concentric hypertrophy  - FU blood culture --> NGTD     MARILYN - Cr up to 1.94 on admission, improving  - suspect pre-renal   - UA bland  - DC IVF today   - Follow up BMP daily     Hypernatremia - iatrogenic from normal saline, and decreased free water intake, improved   - Promote oral free water  - DC D5W today   - BMP daily      Hypokalemia - replete and monitor   Hypophosphatemia - replete and monitor   Elevated D dimer - check VQ scan, ordered and pending (tomorrow)  H/o HTN - hold home amlodipine for now   Hypothyroid - home synthroid   Depression/Anxiety - zoloft           Code status: DNR/DNI  Prophylaxis: heparin   Care Plan discussed with: pt, RN, and called son over the phone   Anticipated Disposition: Back to facility in 24-48 hours        Hospital Problems  Date Reviewed: 12/2/2021            Codes Class Noted POA    Pneumonia ICD-10-CM: J18.9  ICD-9-CM: 092  2/23/2023 Unknown           Review of Systems:   A comprehensive review of systems was negative except for that written in the HPI. Vital Signs:    Last 24hrs VS reviewed since prior progress note. Most recent are:  Visit Vitals  BP (!) 151/50 (BP 1 Location: Left upper arm, BP Patient Position: At rest;Lying)   Pulse 72   Temp 97.4 °F (36.3 °C)   Resp 25   SpO2 100%         Intake/Output Summary (Last 24 hours) at 2/26/2023 1249  Last data filed at 2/26/2023 1239  Gross per 24 hour   Intake --   Output 2525 ml   Net -2525 ml          Physical Examination:     I had a face to face encounter with this patient and independently examined them on 2/26/2023 as outlined below:          General : alert x 2 , awake, no acute distress,   HEENT: PEERL, EOMI, moist mucus membrane,   Chest: Course to auscultation bilaterally, intermittent wheezing, no increased work of breathing on 4LNC   CVS: S1 S2 heard, Capillary refill less than 2 seconds  Abd: soft/ non tender, non distended, BS physiological,   Ext: no clubbing, no cyanosis, no edema, brisk 2+ DP pulses  Neuro/Psych: pleasant mood and affect, CN 2-12 grossly intact,   Skin: warm            Data Review:    Review and/or order of clinical lab test  Review and/or order of tests in the radiology section of CPT  Review and/or order of tests in the medicine section of CPT    I have independently reviewed and interpreted patient's lab and all other diagnostic data    Notes reviewed from all clinical/nonclinical/nursing services involved in patient's clinical care.  Care coordination discussions were held with appropriate clinical/nonclinical/ nursing providers based on care coordination needs. Labs:     Recent Labs     02/26/23  0428 02/25/23  0407   WBC 26.9* 20.1*   HGB 14.9 13.5   HCT 47.3* 41.5   * 302       Recent Labs     02/26/23  0428 02/25/23  0407 02/24/23  1626 02/23/23  1458    150* 152* 143   K 4.9 3.6 3.0* 3.4*   * 120* 122* 112*   CO2 21 22 23 22   BUN 42* 53* 57* 63*   CREA 1.11* 1.26* 1.20* 1.92*   * 187* 143* 244*   CA 9.0 8.5 8.2* 9.5   MG 2.6* 2.6*  --  2.5*   PHOS 2.3* 2.2*  --   --        Recent Labs     02/23/23  1458   ALT 18   AP 77   TBILI 0.6   TP 7.7   ALB 3.0*   GLOB 4.7*       No results for input(s): INR, PTP, APTT, INREXT, INREXT in the last 72 hours. No results for input(s): FE, TIBC, PSAT, FERR in the last 72 hours. No results found for: FOL, RBCF   No results for input(s): PH, PCO2, PO2 in the last 72 hours. No results for input(s): CPK, CKNDX, TROIQ in the last 72 hours.     No lab exists for component: CPKMB  No results found for: CHOL, CHOLX, CHLST, CHOLV, HDL, HDLP, LDL, LDLC, DLDLP, TGLX, TRIGL, TRIGP, CHHD, CHHDX  Lab Results   Component Value Date/Time    Glucose (POC) 89 11/13/2014 07:42 AM     Lab Results   Component Value Date/Time    Color YELLOW/STRAW 02/23/2023 04:05 PM    Appearance CLEAR 02/23/2023 04:05 PM    Specific gravity 1.018 02/23/2023 04:05 PM    pH (UA) 5.0 02/23/2023 04:05 PM    Protein 30 (A) 02/23/2023 04:05 PM    Glucose Negative 02/23/2023 04:05 PM    Ketone Negative 02/23/2023 04:05 PM    Bilirubin Negative 02/23/2023 04:05 PM    Urobilinogen 1.0 02/23/2023 04:05 PM    Nitrites Negative 02/23/2023 04:05 PM    Leukocyte Esterase Negative 02/23/2023 04:05 PM    Epithelial cells FEW 02/23/2023 04:05 PM    Bacteria Negative 02/23/2023 04:05 PM    WBC 0-4 02/23/2023 04:05 PM    RBC 0-5 02/23/2023 04:05 PM         Medications Reviewed:     Current Facility-Administered Medications   Medication Dose Route Frequency    cloNIDine HCL (CATAPRES) tablet 0.1 mg  0.1 mg Oral Q8H PRN    methylPREDNISolone (PF) (SOLU-MEDROL) injection 40 mg  40 mg IntraVENous Q12H    amLODIPine (NORVASC) tablet 10 mg  10 mg Oral DAILY    aspirin chewable tablet 81 mg  81 mg Oral DAILY    docusate sodium (COLACE) capsule 100 mg  100 mg Oral BID    guaiFENesin ER (MUCINEX) tablet 600 mg  600 mg Oral BID    levothyroxine (SYNTHROID) tablet 75 mcg  75 mcg Oral ACB    senna-docusate (PERICOLACE) 8.6-50 mg per tablet 2 Tablet  2 Tablet Oral BID PRN    sertraline (ZOLOFT) tablet 50 mg  50 mg Oral QHS    arformoteroL (BROVANA) neb solution 15 mcg  15 mcg Nebulization BID RT    And    budesonide (PULMICORT) 250 mcg/2ml nebulizer susp  250 mcg Nebulization BID RT    albuterol-ipratropium (DUO-NEB) 2.5 MG-0.5 MG/3 ML  3 mL Nebulization Q6HWA RT    sodium chloride (NS) flush 5-10 mL  5-10 mL IntraVENous PRN    sodium chloride (NS) flush 5-40 mL  5-40 mL IntraVENous Q8H    sodium chloride (NS) flush 5-40 mL  5-40 mL IntraVENous PRN    acetaminophen (TYLENOL) tablet 650 mg  650 mg Oral Q6H PRN    Or    acetaminophen (TYLENOL) suppository 650 mg  650 mg Rectal Q6H PRN    polyethylene glycol (MIRALAX) packet 17 g  17 g Oral DAILY PRN    ondansetron (ZOFRAN ODT) tablet 4 mg  4 mg Oral Q8H PRN    Or    ondansetron (ZOFRAN) injection 4 mg  4 mg IntraVENous Q6H PRN    heparin (porcine) injection 5,000 Units  5,000 Units SubCUTAneous Q8H    azithromycin (ZITHROMAX) 500 mg in 0.9% sodium chloride 250 mL ([id number])  500 mg IntraVENous Q24H    cefTRIAXone (ROCEPHIN) 1 g in 0.9% sodium chloride 10 mL IV syringe  1 g IntraVENous Q24H     ______________________________________________________________________  EXPECTED LENGTH OF STAY: - - -  ACTUAL LENGTH OF STAY:          3                 Kia Qureshi MD

## 2023-02-26 NOTE — PROGRESS NOTES
Reason for Admission:  RSV                     RUR Score:    15%              PCP: First and Last name:   Other, MD Janak     Name of Practice:    Are you a current patient: Yes/No:    Approximate date of last visit:    Can you participate in a virtual visit if needed: NA    Do you (patient/family) have any concerns for transition/discharge? No              Plan for utilizing home health:   No    Current Advanced Directive/Advance Care Plan:  DNR      Healthcare Decision Maker:   Click here to complete [address] including selection of the Healthcare Decision Maker Relationship (ie \"Primary\")            Primary Decision Maker: Radha Joykelton, Legal Guardian - [phone]    Secondary Decision Maker: Mu Thompson - Daughter-in-Law - [phone]    Transition of Care Plan:           Care manager met with patient and family to introduce self and explain role. Patient resides in [address] [place] [address] at [facility]. She wears Oxygen continuously at 2 L, she uses a walker to ambulate, needs assistance with ADL's. CM confirmed demographic information. Plan will be to go to Health care at discharge. Referral made through [facility]. Kandy Renee RN,Care Management  Care Management Interventions  PCP Verified by CM:  Yes (MD at [facility])  [address] [place]: Yes  Discharge Durable Medical Equipment: No  Physical Therapy Consult: No  Occupational Therapy Consult: No  Speech Therapy Consult: No  Support Systems: Child(tor) (Carlos Ruiz [phone])  Discharge Location  Patient Expects to be Discharged to[de-identified] Skilled nursing facility

## 2023-02-26 NOTE — PROGRESS NOTES
Bedside shift change report given to Opal Chavarria RN/Za RN (oncoming nurse) by Star Mejía RN (offgoing nurse). Report included the following information SBAR, Kardex, Intake/Output, MAR, and Cardiac Rhythm NSR . Problem: Pressure Injury - Risk of  Goal: *Prevention of pressure injury  Description: Document Gerardo Scale and appropriate interventions in the flowsheet. Outcome: Progressing Towards Goal  Note: Pressure Injury Interventions:  Sensory Interventions: Assess changes in LOC, Keep linens dry and wrinkle-free    Moisture Interventions: Absorbent underpads, Internal/External urinary devices    Activity Interventions: Pressure redistribution bed/mattress(bed type)    Mobility Interventions: Pressure redistribution bed/mattress (bed type), Turn and reposition approx. every two hours(pillow and wedges)    Nutrition Interventions: Document food/fluid/supplement intake    Friction and Shear Interventions: Minimize layers, Transferring/repositioning devices                Problem: Patient Education: Go to Patient Education Activity  Goal: Patient/Family Education  Outcome: Progressing Towards Goal     Problem: Falls - Risk of  Goal: *Absence of Falls  Description: Document Kranthi Fall Risk and appropriate interventions in the flowsheet.   Outcome: Progressing Towards Goal  Note: Fall Risk Interventions:  Mobility Interventions: Bed/chair exit alarm, Patient to call before getting OOB, PT Consult for mobility concerns, PT Consult for assist device competence    Mentation Interventions: Bed/chair exit alarm, Adequate sleep, hydration, pain control, More frequent rounding, Reorient patient, Room close to nurse's station    Medication Interventions: Bed/chair exit alarm, Patient to call before getting OOB, Teach patient to arise slowly    Elimination Interventions: Bed/chair exit alarm, Toilet paper/wipes in reach, Toileting schedule/hourly rounds, Stay With Me (per policy)              Problem: Patient Education: Go to Patient Education Activity  Goal: Patient/Family Education  Outcome: Progressing Towards Goal     Problem: Risk for Spread of Infection  Goal: Prevent transmission of infectious organism to others  Description: Prevent the transmission of infectious organisms to other patients, staff members, and visitors.   Outcome: Progressing Towards Goal     Problem: Patient Education:  Go to Education Activity  Goal: Patient/Family Education  Outcome: Progressing Towards Goal

## 2023-02-26 NOTE — PROGRESS NOTES
Bedside and Verbal shift change report given to Hannah Barton (oncoming nurse) by Jennie Barrios  (offgoing nurse). Report included the following information SBAR, Kardex, Procedure Summary, Intake/Output, MAR, and Cardiac Rhythm NSR .

## 2023-02-26 NOTE — PROGRESS NOTES
Bedside shift change report given to Shirley Jessica Fredy (oncoming nurse) by Deborah Somers (offgoing nurse). Report included the following information SBAR, Kardex, Intake/Output, MAR, and Cardiac Rhythm NSR/SA . Problem: Pressure Injury - Risk of  Goal: *Prevention of pressure injury  Description: Document Gerardo Scale and appropriate interventions in the flowsheet. Outcome: Progressing Towards Goal  Note: Pressure Injury Interventions:  Sensory Interventions: Assess changes in LOC, Keep linens dry and wrinkle-free, Check visual cues for pain, Maintain/enhance activity level    Moisture Interventions: Absorbent underpads, Internal/External urinary devices    Activity Interventions: Pressure redistribution bed/mattress(bed type)    Mobility Interventions: Pressure redistribution bed/mattress (bed type)    Nutrition Interventions: Document food/fluid/supplement intake    Friction and Shear Interventions: Lift sheet, Minimize layers                Problem: Patient Education: Go to Patient Education Activity  Goal: Patient/Family Education  Outcome: Progressing Towards Goal     Problem: Falls - Risk of  Goal: *Absence of Falls  Description: Document Kranthi Fall Risk and appropriate interventions in the flowsheet.   Outcome: Progressing Towards Goal  Note: Fall Risk Interventions:  Mobility Interventions: Bed/chair exit alarm, Patient to call before getting OOB, PT Consult for mobility concerns, PT Consult for assist device competence    Mentation Interventions: Bed/chair exit alarm, Adequate sleep, hydration, pain control, More frequent rounding, Reorient patient, Room close to nurse's station    Medication Interventions: Bed/chair exit alarm, Patient to call before getting OOB, Teach patient to arise slowly    Elimination Interventions: Bed/chair exit alarm, Toilet paper/wipes in reach, Toileting schedule/hourly rounds, Stay With Me (per policy)              Problem: Patient Education: Go to Patient Education Activity  Goal: Patient/Family Education  Outcome: Progressing Towards Goal     Problem: Risk for Spread of Infection  Goal: Prevent transmission of infectious organism to others  Description: Prevent the transmission of infectious organisms to other patients, staff members, and visitors.   Outcome: Progressing Towards Goal     Problem: Patient Education:  Go to Education Activity  Goal: Patient/Family Education  Outcome: Progressing Towards Goal

## 2023-02-27 ENCOUNTER — APPOINTMENT (OUTPATIENT)
Dept: NUCLEAR MEDICINE | Age: 88
DRG: 871 | End: 2023-02-27
Attending: INTERNAL MEDICINE
Payer: MEDICARE

## 2023-02-27 LAB
ANION GAP SERPL CALC-SCNC: 6 MMOL/L (ref 5–15)
BASOPHILS # BLD: 0 K/UL (ref 0–0.1)
BASOPHILS NFR BLD: 0 % (ref 0–1)
BUN SERPL-MCNC: 33 MG/DL (ref 6–20)
BUN/CREAT SERPL: 34 (ref 12–20)
CALCIUM SERPL-MCNC: 9.2 MG/DL (ref 8.5–10.1)
CHLORIDE SERPL-SCNC: 112 MMOL/L (ref 97–108)
CO2 SERPL-SCNC: 24 MMOL/L (ref 21–32)
CREAT SERPL-MCNC: 0.98 MG/DL (ref 0.55–1.02)
DIFFERENTIAL METHOD BLD: ABNORMAL
EOSINOPHIL # BLD: 0 K/UL (ref 0–0.4)
EOSINOPHIL NFR BLD: 0 % (ref 0–7)
ERYTHROCYTE [DISTWIDTH] IN BLOOD BY AUTOMATED COUNT: 15.2 % (ref 11.5–14.5)
GLUCOSE SERPL-MCNC: 87 MG/DL (ref 65–100)
HCT VFR BLD AUTO: 49.3 % (ref 35–47)
HGB BLD-MCNC: 15.8 G/DL (ref 11.5–16)
IMM GRANULOCYTES # BLD AUTO: 0 K/UL
IMM GRANULOCYTES NFR BLD AUTO: 0 %
LYMPHOCYTES # BLD: 2.2 K/UL (ref 0.8–3.5)
LYMPHOCYTES NFR BLD: 11 % (ref 12–49)
MAGNESIUM SERPL-MCNC: 2.4 MG/DL (ref 1.6–2.4)
MCH RBC QN AUTO: 28.7 PG (ref 26–34)
MCHC RBC AUTO-ENTMCNC: 32 G/DL (ref 30–36.5)
MCV RBC AUTO: 89.6 FL (ref 80–99)
MONOCYTES # BLD: 0.8 K/UL (ref 0–1)
MONOCYTES NFR BLD: 4 % (ref 5–13)
NEUTS SEG # BLD: 17.1 K/UL (ref 1.8–8)
NEUTS SEG NFR BLD: 85 % (ref 32–75)
NRBC # BLD: 0 K/UL (ref 0–0.01)
NRBC BLD-RTO: 0 PER 100 WBC
PLATELET # BLD AUTO: 378 K/UL (ref 150–400)
PMV BLD AUTO: 11.4 FL (ref 8.9–12.9)
POTASSIUM SERPL-SCNC: 4.2 MMOL/L (ref 3.5–5.1)
RBC # BLD AUTO: 5.5 M/UL (ref 3.8–5.2)
RBC MORPH BLD: ABNORMAL
SODIUM SERPL-SCNC: 142 MMOL/L (ref 136–145)
WBC # BLD AUTO: 20.1 K/UL (ref 3.6–11)

## 2023-02-27 PROCEDURE — 74011636637 HC RX REV CODE- 636/637: Performed by: INTERNAL MEDICINE

## 2023-02-27 PROCEDURE — 94640 AIRWAY INHALATION TREATMENT: CPT

## 2023-02-27 PROCEDURE — 74011250636 HC RX REV CODE- 250/636: Performed by: INTERNAL MEDICINE

## 2023-02-27 PROCEDURE — 83735 ASSAY OF MAGNESIUM: CPT

## 2023-02-27 PROCEDURE — 36415 COLL VENOUS BLD VENIPUNCTURE: CPT

## 2023-02-27 PROCEDURE — A9540 TC99M MAA: HCPCS

## 2023-02-27 PROCEDURE — 65270000046 HC RM TELEMETRY

## 2023-02-27 PROCEDURE — 74011250637 HC RX REV CODE- 250/637: Performed by: INTERNAL MEDICINE

## 2023-02-27 PROCEDURE — 74011000250 HC RX REV CODE- 250: Performed by: INTERNAL MEDICINE

## 2023-02-27 PROCEDURE — 85025 COMPLETE CBC W/AUTO DIFF WBC: CPT

## 2023-02-27 PROCEDURE — 80048 BASIC METABOLIC PNL TOTAL CA: CPT

## 2023-02-27 RX ORDER — BALSAM PERU/CASTOR OIL
OINTMENT (GRAM) TOPICAL 2 TIMES DAILY
Status: DISCONTINUED | OUTPATIENT
Start: 2023-02-27 | End: 2023-02-28 | Stop reason: HOSPADM

## 2023-02-27 RX ORDER — AMOXICILLIN AND CLAVULANATE POTASSIUM 500; 125 MG/1; MG/1
1 TABLET, FILM COATED ORAL EVERY 12 HOURS
Status: DISCONTINUED | OUTPATIENT
Start: 2023-02-27 | End: 2023-02-28 | Stop reason: HOSPADM

## 2023-02-27 RX ORDER — PREDNISONE 20 MG/1
40 TABLET ORAL
Status: DISCONTINUED | OUTPATIENT
Start: 2023-02-27 | End: 2023-02-28 | Stop reason: HOSPADM

## 2023-02-27 RX ADMIN — LEVOTHYROXINE SODIUM 75 MCG: 0.15 TABLET ORAL at 07:16

## 2023-02-27 RX ADMIN — BUDESONIDE 250 MCG: 0.25 INHALANT RESPIRATORY (INHALATION) at 20:48

## 2023-02-27 RX ADMIN — HEPARIN SODIUM 5000 UNITS: 5000 INJECTION INTRAVENOUS; SUBCUTANEOUS at 13:39

## 2023-02-27 RX ADMIN — IPRATROPIUM BROMIDE AND ALBUTEROL SULFATE 3 ML: 2.5; .5 SOLUTION RESPIRATORY (INHALATION) at 20:48

## 2023-02-27 RX ADMIN — GUAIFENESIN 600 MG: 600 TABLET, EXTENDED RELEASE ORAL at 10:11

## 2023-02-27 RX ADMIN — ASPIRIN 81 MG CHEWABLE TABLET 81 MG: 81 TABLET CHEWABLE at 10:10

## 2023-02-27 RX ADMIN — Medication 10 ML: at 07:16

## 2023-02-27 RX ADMIN — Medication 10 ML: at 14:13

## 2023-02-27 RX ADMIN — BUDESONIDE 250 MCG: 0.25 INHALANT RESPIRATORY (INHALATION) at 08:01

## 2023-02-27 RX ADMIN — HEPARIN SODIUM 5000 UNITS: 5000 INJECTION INTRAVENOUS; SUBCUTANEOUS at 22:43

## 2023-02-27 RX ADMIN — IPRATROPIUM BROMIDE AND ALBUTEROL SULFATE 3 ML: 2.5; .5 SOLUTION RESPIRATORY (INHALATION) at 08:01

## 2023-02-27 RX ADMIN — DOCUSATE SODIUM 100 MG: 100 CAPSULE, LIQUID FILLED ORAL at 18:08

## 2023-02-27 RX ADMIN — AMOXICILLIN AND CLAVULANATE POTASSIUM 1 TABLET: 500; 125 TABLET, FILM COATED ORAL at 22:43

## 2023-02-27 RX ADMIN — AMLODIPINE BESYLATE 10 MG: 5 TABLET ORAL at 10:11

## 2023-02-27 RX ADMIN — HEPARIN SODIUM 5000 UNITS: 5000 INJECTION INTRAVENOUS; SUBCUTANEOUS at 07:16

## 2023-02-27 RX ADMIN — GUAIFENESIN 600 MG: 600 TABLET, EXTENDED RELEASE ORAL at 18:08

## 2023-02-27 RX ADMIN — PREDNISONE 40 MG: 20 TABLET ORAL at 13:39

## 2023-02-27 RX ADMIN — SERTRALINE HYDROCHLORIDE 50 MG: 50 TABLET ORAL at 22:43

## 2023-02-27 RX ADMIN — DOCUSATE SODIUM 100 MG: 100 CAPSULE, LIQUID FILLED ORAL at 10:11

## 2023-02-27 RX ADMIN — Medication 10 ML: at 22:43

## 2023-02-27 RX ADMIN — AMOXICILLIN AND CLAVULANATE POTASSIUM 1 TABLET: 500; 125 TABLET, FILM COATED ORAL at 10:11

## 2023-02-27 RX ADMIN — ARFORMOTEROL TARTRATE 15 MCG: 15 SOLUTION RESPIRATORY (INHALATION) at 20:48

## 2023-02-27 RX ADMIN — ARFORMOTEROL TARTRATE 15 MCG: 15 SOLUTION RESPIRATORY (INHALATION) at 08:01

## 2023-02-27 RX ADMIN — METHYLPREDNISOLONE SODIUM SUCCINATE 40 MG: 40 INJECTION, POWDER, FOR SOLUTION INTRAMUSCULAR; INTRAVENOUS at 10:10

## 2023-02-27 NOTE — PROGRESS NOTES
Problem: Pressure Injury - Risk of  Goal: *Prevention of pressure injury  Description: Document Gerardo Scale and appropriate interventions in the flowsheet.   Outcome: Progressing Towards Goal  Note: Pressure Injury Interventions:  Sensory Interventions: Assess changes in LOC    Moisture Interventions: Absorbent underpads, Apply protective barrier, creams and emollients    Activity Interventions: Pressure redistribution bed/mattress(bed type)    Mobility Interventions: PT/OT evaluation    Nutrition Interventions: Document food/fluid/supplement intake    Friction and Shear Interventions: HOB 30 degrees or less

## 2023-02-27 NOTE — PROGRESS NOTES
Problem: Pressure Injury - Risk of  Goal: *Prevention of pressure injury  Description: Document Gerardo Scale and appropriate interventions in the flowsheet.   Outcome: Progressing Towards Goal  Note: Pressure Injury Interventions:  Sensory Interventions: Assess changes in LOC    Moisture Interventions: Limit adult briefs    Activity Interventions: Pressure redistribution bed/mattress(bed type)    Mobility Interventions: PT/OT evaluation    Nutrition Interventions: Document food/fluid/supplement intake    Friction and Shear Interventions: HOB 30 degrees or less

## 2023-02-27 NOTE — PROGRESS NOTES
ALYSE- D/c to [address] when medically ready. Earlier today, PHAM was told that this pt may be discharged today. PHAM spoke with Diamond Chopra,  at [address] and he said that she can come there when she is medically ready for d/c. Pt not ready today per Dr. Fifi Lewis. Will follow for tomorrow.  Ariane Li

## 2023-02-27 NOTE — PROGRESS NOTES
Hospitalist Progress Note  Tequila Garvin MD  Answering service: [phone] OR [phone] from in house phone        Date of Service:  2023  NAME:  Christian Gardner  :  1928  MRN:  [id number]      Admission Summary:   Christian Gardner is a 80 y.o. female with pmh of CAD, HLD, HTN, COPD on chronic 2LNC presents from [facility] with increased work of breathing and hypoxia. Information obtained via chart review and family at bedside. Pt had been in usual state of health until 2 days ago when she had low grade fever, and slightly increased O2 requirements, CXR per family showed possible PNA. Started on antibiotics. She had initially improved but today worsened, had ongoing hypoxemia, and EMS was called. Interval history / Subjective:   Pt improving, breathing well. Remains on 4LNC, but had to go up to [facility] last night. Weaning steroids. Assessment & Plan:     Acute on chronic DR FLORECITA HERNANDEZ [facility]) hypoxic respiratory failure  Sepsis (WBC, RR)  COPD exacerbation   + RSV   - O2, titrate for saturation over 90% --> weaned down 4LNC today   - CTX and azithromycin --> Augmentin PO x 5 day total   - CT of the chest showed interstitial findings bilaterally, likely due to RSV  - Continue IV steroids, wean to PO today, scheduled nebs   - Echo with normal EF, some mild concentric hypertrophy  - FU blood culture --> NGTD     MARILYN - Cr up to 1.94 on admission, improving  - suspect pre-renal   - UA bland  - s/p IVF   - Follow up BMP daily     Hypernatremia - iatrogenic from normal saline, and decreased free water intake, improved   - Promote oral free water  - DC D5W today   - BMP daily      Hypokalemia - replete and monitor   Hypophosphatemia - replete and monitor   Elevated D dimer - VQ scan done and indeterminate.  Low liklihood given improvement and known RSV/COPD  H/o HTN - hold home amlodipine for now   Hypothyroid - home synthroid   Depression/Anxiety - zoloft           Code status: DNR/DNI  Prophylaxis: heparin   Care Plan discussed with: pt, RN, and called son over the phone   Anticipated Disposition: Plan back to facility tomorrow        Hospital Problems  Date Reviewed: 12/2/2021            Codes Class Noted POA    Pneumonia ICD-10-CM: J18.9  ICD-9-CM: 570  2/23/2023 Unknown         Review of Systems:   A comprehensive review of systems was negative except for that written in the HPI. Vital Signs:    Last 24hrs VS reviewed since prior progress note. Most recent are:  Visit Vitals  BP (!) 178/75 (BP 1 Location: Right arm, BP Patient Position: At rest)   Pulse 87   Temp 97.8 °F (36.6 °C)   Resp 16   SpO2 91%         Intake/Output Summary (Last 24 hours) at 2/27/2023 1302  Last data filed at 2/27/2023 0600  Gross per 24 hour   Intake --   Output 1450 ml   Net -1450 ml          Physical Examination:     I had a face to face encounter with this patient and independently examined them on 2/27/2023 as outlined below:          General : alert x 2 , awake, no acute distress,   HEENT: PEERL, EOMI, moist mucus membrane,   Chest: Course to auscultation bilaterally, intermittent wheezing, no increased work of breathing on 4LNC   CVS: S1 S2 heard, Capillary refill less than 2 seconds  Abd: soft/ non tender, non distended, BS physiological,   Ext: no clubbing, no cyanosis, no edema, brisk 2+ DP pulses  Neuro/Psych: pleasant mood and affect, CN 2-12 grossly intact,   Skin: warm            Data Review:    Review and/or order of clinical lab test  Review and/or order of tests in the radiology section of CPT  Review and/or order of tests in the medicine section of CPT    I have independently reviewed and interpreted patient's lab and all other diagnostic data    Notes reviewed from all clinical/nonclinical/nursing services involved in patient's clinical care.  Care coordination discussions were held with appropriate clinical/nonclinical/ nursing providers based on care coordination needs. Labs:     Recent Labs     02/27/23  0139 02/26/23 0428   WBC 20.1* 26.9*   HGB 15.8 14.9   HCT 49.3* 47.3*    408*       Recent Labs     02/27/23  0139 02/26/23 0428 02/25/23  0407    143 150*   K 4.2 4.9 3.6   * 117* 120*   CO2 24 21 22   BUN 33* 42* 53*   CREA 0.98 1.11* 1.26*   GLU 87 134* 187*   CA 9.2 9.0 8.5   MG 2.4 2.6* 2.6*   PHOS  --  2.3* 2.2*       No results for input(s): ALT, AP, TBIL, TBILI, TP, ALB, GLOB, GGT, AML, LPSE in the last 72 hours. No lab exists for component: SGOT, GPT, AMYP, HLPSE    No results for input(s): INR, PTP, APTT, INREXT, INREXT in the last 72 hours. No results for input(s): FE, TIBC, PSAT, FERR in the last 72 hours. No results found for: FOL, RBCF   No results for input(s): PH, PCO2, PO2 in the last 72 hours. No results for input(s): CPK, CKNDX, TROIQ in the last 72 hours.     No lab exists for component: CPKMB  No results found for: CHOL, CHOLX, CHLST, CHOLV, HDL, HDLP, LDL, LDLC, DLDLP, TGLX, TRIGL, TRIGP, CHHD, CHHDX  Lab Results   Component Value Date/Time    Glucose (POC) 89 11/13/2014 07:42 AM     Lab Results   Component Value Date/Time    Color YELLOW/STRAW 02/23/2023 04:05 PM    Appearance CLEAR 02/23/2023 04:05 PM    Specific gravity 1.018 02/23/2023 04:05 PM    pH (UA) 5.0 02/23/2023 04:05 PM    Protein 30 (A) 02/23/2023 04:05 PM    Glucose Negative 02/23/2023 04:05 PM    Ketone Negative 02/23/2023 04:05 PM    Bilirubin Negative 02/23/2023 04:05 PM    Urobilinogen 1.0 02/23/2023 04:05 PM    Nitrites Negative 02/23/2023 04:05 PM    Leukocyte Esterase Negative 02/23/2023 04:05 PM    Epithelial cells FEW 02/23/2023 04:05 PM    Bacteria Negative 02/23/2023 04:05 PM    WBC 0-4 02/23/2023 04:05 PM    RBC 0-5 02/23/2023 04:05 PM         Medications Reviewed:     Current Facility-Administered Medications   Medication Dose Route Frequency    amoxicillin-clavulanate (AUGMENTIN) 500-125 mg per tablet 1 Tablet  1 Tablet Oral Q12H    predniSONE (DELTASONE) tablet 40 mg  40 mg Oral DAILY WITH BREAKFAST    balsam peru-castor oiL (VENELEX) ointment   Topical BID    cloNIDine HCL (CATAPRES) tablet 0.1 mg  0.1 mg Oral Q8H PRN    amLODIPine (NORVASC) tablet 10 mg  10 mg Oral DAILY    aspirin chewable tablet 81 mg  81 mg Oral DAILY    docusate sodium (COLACE) capsule 100 mg  100 mg Oral BID    guaiFENesin ER (MUCINEX) tablet 600 mg  600 mg Oral BID    levothyroxine (SYNTHROID) tablet 75 mcg  75 mcg Oral ACB    senna-docusate (PERICOLACE) 8.6-50 mg per tablet 2 Tablet  2 Tablet Oral BID PRN    sertraline (ZOLOFT) tablet 50 mg  50 mg Oral QHS    arformoteroL (BROVANA) neb solution 15 mcg  15 mcg Nebulization BID RT    And    budesonide (PULMICORT) 250 mcg/2ml nebulizer susp  250 mcg Nebulization BID RT    albuterol-ipratropium (DUO-NEB) 2.5 MG-0.5 MG/3 ML  3 mL Nebulization Q6HWA RT    sodium chloride (NS) flush 5-10 mL  5-10 mL IntraVENous PRN    sodium chloride (NS) flush 5-40 mL  5-40 mL IntraVENous Q8H    sodium chloride (NS) flush 5-40 mL  5-40 mL IntraVENous PRN    acetaminophen (TYLENOL) tablet 650 mg  650 mg Oral Q6H PRN    Or    acetaminophen (TYLENOL) suppository 650 mg  650 mg Rectal Q6H PRN    polyethylene glycol (MIRALAX) packet 17 g  17 g Oral DAILY PRN    ondansetron (ZOFRAN ODT) tablet 4 mg  4 mg Oral Q8H PRN    Or    ondansetron (ZOFRAN) injection 4 mg  4 mg IntraVENous Q6H PRN    heparin (porcine) injection 5,000 Units  5,000 Units SubCUTAneous Q8H     ______________________________________________________________________  EXPECTED LENGTH OF STAY: 5d 0h  ACTUAL LENGTH OF STAY:          4                 Jw Rowland MD

## 2023-02-27 NOTE — PROGRESS NOTES
Day #1 of Augmentin  Indication:  CAP  Current regimen:  875mg PO Q12H  Abx regimen: Augmentin  Recent Labs     23  0139 23  0428 23  0407   WBC 20.1* 26.9* 20.1*   CREA 0.98 1.11* 1.26*   BUN 33* 42* 53*     Est CrCl: 24 ml/min; UO: >1 ml/kg/hr  Temp (24hrs), Av.6 °F (36.4 °C), Min:97.3 °F (36.3 °C), Max:98 °F (36.7 °C)    Cultures:    RSV - positive   blood - NGTD, prelim    Plan: Change to Augmentin 500mg PO Q12H

## 2023-02-27 NOTE — WOUND CARE
WOCN Note:   New consult for sacral wound    Ms. Judie Ortega is a 80 y.o. female with pmh including CAD, HLD, HTN, COPD on chronic 2LNC who presented from [facility] with increased work of breathing and hypoxia. Admitted on 2/23/23    Assessment:   Alert and communicative but with garbled speech as bottom dentures were not in. Denies pain. Mild-Moderate assist in repositioning for visual assessment of sacrum   Sacrum intact without erythema  Has a Nicholson catheter in place  Surface: Versac[employer] Foam mattress  Bilateral heels intact and without erythema. Heels offloaded with pillows. 1.  Non-blanching Erythema to Left Medial Buttock     Small area of non-blanching erythema pictured above    Wound Recommendations:    Erythema to left medial buttock: apply Balsam peru-castor oil (Venelex) twice daily     PI Prevention:  Turn/reposition approximately every 2 hours  Offload heels with heels hanging off end of pillow at all times while in bed. Sacral Foam dressing: lift to assess regularly; change as needed. Discontinue if incontinence is frequently soiling dressing.      Z-guard cream to buttocks and sacrum daily and as needed with incontinence care    Discussed assessment and plan with Attending MD    Transition of Care: Plan to follow weekly and as needed while admitted to hospital.     Zi Anguiano MSN, RN  [facility] Inpatient [facility]  office [phone]

## 2023-02-28 VITALS
DIASTOLIC BLOOD PRESSURE: 76 MMHG | TEMPERATURE: 97.3 F | SYSTOLIC BLOOD PRESSURE: 173 MMHG | HEART RATE: 79 BPM | RESPIRATION RATE: 18 BRPM | OXYGEN SATURATION: 92 %

## 2023-02-28 PROBLEM — J21.0 RSV (ACUTE BRONCHIOLITIS DUE TO RESPIRATORY SYNCYTIAL VIRUS): Status: ACTIVE | Noted: 2023-02-28

## 2023-02-28 PROBLEM — J44.1 COPD EXACERBATION (HCC): Status: ACTIVE | Noted: 2023-01-01

## 2023-02-28 PROBLEM — E87.0 HYPERNATREMIA: Status: ACTIVE | Noted: 2023-01-01

## 2023-02-28 PROBLEM — N17.9 AKI (ACUTE KIDNEY INJURY) (HCC): Status: ACTIVE | Noted: 2023-02-28

## 2023-02-28 LAB
ANION GAP SERPL CALC-SCNC: 5 MMOL/L (ref 5–15)
BACTERIA SPEC CULT: NORMAL
BASOPHILS # BLD: 0 K/UL (ref 0–0.1)
BASOPHILS NFR BLD: 0 % (ref 0–1)
BUN SERPL-MCNC: 34 MG/DL (ref 6–20)
BUN/CREAT SERPL: 37 (ref 12–20)
CALCIUM SERPL-MCNC: 8.8 MG/DL (ref 8.5–10.1)
CHLORIDE SERPL-SCNC: 111 MMOL/L (ref 97–108)
CO2 SERPL-SCNC: 26 MMOL/L (ref 21–32)
CREAT SERPL-MCNC: 0.92 MG/DL (ref 0.55–1.02)
DIFFERENTIAL METHOD BLD: ABNORMAL
EOSINOPHIL # BLD: 0.2 K/UL (ref 0–0.4)
EOSINOPHIL NFR BLD: 1 % (ref 0–7)
ERYTHROCYTE [DISTWIDTH] IN BLOOD BY AUTOMATED COUNT: 14.9 % (ref 11.5–14.5)
GLUCOSE SERPL-MCNC: 99 MG/DL (ref 65–100)
HCT VFR BLD AUTO: 46.3 % (ref 35–47)
HGB BLD-MCNC: 15.4 G/DL (ref 11.5–16)
IMM GRANULOCYTES # BLD AUTO: 0 K/UL
IMM GRANULOCYTES NFR BLD AUTO: 0 %
LYMPHOCYTES # BLD: 0.5 K/UL (ref 0.8–3.5)
LYMPHOCYTES NFR BLD: 3 % (ref 12–49)
MAGNESIUM SERPL-MCNC: 2.4 MG/DL (ref 1.6–2.4)
MCH RBC QN AUTO: 29.6 PG (ref 26–34)
MCHC RBC AUTO-ENTMCNC: 33.3 G/DL (ref 30–36.5)
MCV RBC AUTO: 89 FL (ref 80–99)
MONOCYTES # BLD: 0.2 K/UL (ref 0–1)
MONOCYTES NFR BLD: 1 % (ref 5–13)
NEUTS BAND NFR BLD MANUAL: 2 % (ref 0–6)
NEUTS SEG # BLD: 16.4 K/UL (ref 1.8–8)
NEUTS SEG NFR BLD: 93 % (ref 32–75)
NRBC # BLD: 0 K/UL (ref 0–0.01)
NRBC BLD-RTO: 0 PER 100 WBC
PHOSPHATE SERPL-MCNC: 4.2 MG/DL (ref 2.6–4.7)
PLATELET # BLD AUTO: 359 K/UL (ref 150–400)
PMV BLD AUTO: 11.2 FL (ref 8.9–12.9)
POTASSIUM SERPL-SCNC: 4.4 MMOL/L (ref 3.5–5.1)
RBC # BLD AUTO: 5.2 M/UL (ref 3.8–5.2)
RBC MORPH BLD: ABNORMAL
SERVICE CMNT-IMP: NORMAL
SODIUM SERPL-SCNC: 142 MMOL/L (ref 136–145)
WBC # BLD AUTO: 17.3 K/UL (ref 3.6–11)

## 2023-02-28 PROCEDURE — 84100 ASSAY OF PHOSPHORUS: CPT

## 2023-02-28 PROCEDURE — 36415 COLL VENOUS BLD VENIPUNCTURE: CPT

## 2023-02-28 PROCEDURE — 74011000250 HC RX REV CODE- 250: Performed by: INTERNAL MEDICINE

## 2023-02-28 PROCEDURE — 85025 COMPLETE CBC W/AUTO DIFF WBC: CPT

## 2023-02-28 PROCEDURE — 83735 ASSAY OF MAGNESIUM: CPT

## 2023-02-28 PROCEDURE — 74011636637 HC RX REV CODE- 636/637: Performed by: INTERNAL MEDICINE

## 2023-02-28 PROCEDURE — 74011250636 HC RX REV CODE- 250/636: Performed by: INTERNAL MEDICINE

## 2023-02-28 PROCEDURE — 74011250637 HC RX REV CODE- 250/637: Performed by: INTERNAL MEDICINE

## 2023-02-28 PROCEDURE — 80048 BASIC METABOLIC PNL TOTAL CA: CPT

## 2023-02-28 PROCEDURE — 94640 AIRWAY INHALATION TREATMENT: CPT

## 2023-02-28 RX ORDER — ALBUTEROL SULFATE 90 UG/1
2 AEROSOL, METERED RESPIRATORY (INHALATION) EVERY 6 HOURS
Qty: 1 G | Refills: 0 | Status: SHIPPED
Start: 2023-02-28 | End: 2023-03-02

## 2023-02-28 RX ORDER — AMOXICILLIN AND CLAVULANATE POTASSIUM 500; 125 MG/1; MG/1
1 TABLET, FILM COATED ORAL EVERY 12 HOURS
Qty: 2 TABLET | Refills: 0 | Status: SHIPPED | OUTPATIENT
Start: 2023-02-28 | End: 2023-03-01

## 2023-02-28 RX ORDER — PREDNISONE 10 MG/1
TABLET ORAL
Qty: 20 TABLET | Refills: 0 | Status: SHIPPED | OUTPATIENT
Start: 2023-03-01 | End: 2023-03-09

## 2023-02-28 RX ORDER — IPRATROPIUM BROMIDE AND ALBUTEROL SULFATE 2.5; .5 MG/3ML; MG/3ML
3 SOLUTION RESPIRATORY (INHALATION)
Qty: 30 EACH | Refills: 0 | Status: SHIPPED
Start: 2023-02-28 | End: 2023-03-30

## 2023-02-28 RX ADMIN — DOCUSATE SODIUM 100 MG: 100 CAPSULE, LIQUID FILLED ORAL at 09:53

## 2023-02-28 RX ADMIN — LEVOTHYROXINE SODIUM 75 MCG: 0.15 TABLET ORAL at 07:01

## 2023-02-28 RX ADMIN — ARFORMOTEROL TARTRATE 15 MCG: 15 SOLUTION RESPIRATORY (INHALATION) at 08:43

## 2023-02-28 RX ADMIN — AMOXICILLIN AND CLAVULANATE POTASSIUM 1 TABLET: 500; 125 TABLET, FILM COATED ORAL at 09:53

## 2023-02-28 RX ADMIN — IPRATROPIUM BROMIDE AND ALBUTEROL SULFATE 3 ML: 2.5; .5 SOLUTION RESPIRATORY (INHALATION) at 08:43

## 2023-02-28 RX ADMIN — GUAIFENESIN 600 MG: 600 TABLET, EXTENDED RELEASE ORAL at 09:53

## 2023-02-28 RX ADMIN — PREDNISONE 40 MG: 20 TABLET ORAL at 07:01

## 2023-02-28 RX ADMIN — BUDESONIDE 250 MCG: 0.25 INHALANT RESPIRATORY (INHALATION) at 08:43

## 2023-02-28 RX ADMIN — ASPIRIN 81 MG CHEWABLE TABLET 81 MG: 81 TABLET CHEWABLE at 09:53

## 2023-02-28 RX ADMIN — Medication: at 09:00

## 2023-02-28 RX ADMIN — AMLODIPINE BESYLATE 10 MG: 5 TABLET ORAL at 09:53

## 2023-02-28 RX ADMIN — HEPARIN SODIUM 5000 UNITS: 5000 INJECTION INTRAVENOUS; SUBCUTANEOUS at 06:55

## 2023-02-28 RX ADMIN — Medication 10 ML: at 06:54

## 2023-02-28 NOTE — PROGRESS NOTES
2000 Report received from [facility], [address] [place] [address]. Patient alert and oriented, resting in bed and no needs expressed. Call bell within reach. 0230 Nicholson removed and pure wick placed. 4260 Bedside shift change report given to Karli Pratik by Renetta Nathan RN. Report included the following information SBAR, Kardex, MAR, Accordion, and Recent Results and Cardiac Rhythm NSR. Problem: Pressure Injury - Risk of  Goal: *Prevention of pressure injury  Description: Document Gerardo Scale and appropriate interventions in the flowsheet. Outcome: Progressing Towards Goal  Note: Pressure Injury Interventions:  Sensory Interventions: Assess changes in LOC, Minimize linen layers    Moisture Interventions: Absorbent underpads    Activity Interventions: Pressure redistribution bed/mattress(bed type), Increase time out of bed    Mobility Interventions: PT/OT evaluation, Turn and reposition approx. every two hours(pillow and wedges)    Nutrition Interventions: Discuss nutritional consult with provider, Document food/fluid/supplement intake    Friction and Shear Interventions: Minimize layers    Problem: Falls - Risk of  Goal: *Absence of Falls  Description: Document Kranthi Fall Risk and appropriate interventions in the flowsheet. Outcome: Progressing Towards Goal  Note: Fall Risk Interventions:  Mobility Interventions: Bed/chair exit alarm    Mentation Interventions: Bed/chair exit alarm    Medication Interventions: Bed/chair exit alarm    Elimination Interventions: Bed/chair exit alarm, Call light in reach    Problem: Risk for Spread of Infection  Goal: Prevent transmission of infectious organism to others  Description: Prevent the transmission of infectious organisms to other patients, staff members, and visitors.   Outcome: Progressing Towards Goal     Problem: Breathing Pattern - Ineffective  Goal: *Absence of hypoxia  Outcome: Progressing Towards Goal

## 2023-02-28 NOTE — PROGRESS NOTES
ALYSE- D/c to [address]    This pt is ready for d/c today. CM spoke with [facility] in admissions at [facility] and she can accept this pt today. Cm spoke with pt's daughter in law, Criselda Gruber, to discuss the d.c and she is in agreement as well. CM went over the IM letter with her and she is in agreement w/discharge. CM set up ambulance transport with [facility] for 12 noon. An envelope containing pt's kardex, MAR and AVS will be sent with this pt to [facility]. CM faxed same papers to the nurse's station ([phone]). Also, pt's nurse will call report.  BISHOP Martins,ACM-SW

## 2023-02-28 NOTE — DISCHARGE INSTRUCTIONS
Discharge Summary       PATIENT ID: Anh Castro  MRN: [id number]   YOB: 1928    DATE OF ADMISSION: 2/23/2023  2:39 PM    DATE OF DISCHARGE: 02/28/23     PRIMARY CARE PROVIDER: Janak Aguirre MD     ATTENDING PHYSICIAN: Kellie Serrano MD    DISCHARGING PROVIDER: Kellie Serrano MD      CONSULTATIONS: None    PROCEDURES/SURGERIES: * No surgery found *    ADMISSION SUMMARY AND HOSPITAL COURSE:   Anh Castro is a 80 y.o. female with pmh of CAD, HLD, HTN, COPD on chronic 2LNC presents from [facility] with increased work of breathing and hypoxia. Information obtained via chart review and family at bedside. Pt had been in usual state of health until 2 days ago when she had low grade fever, and slightly increased O2 requirements, CXR per family showed possible PNA. Started on antibiotics. She had initially improved but today worsened, had ongoing hypoxemia, and EMS was called. Pt was found to have COPD exacerbaiton secondary to RSV+. Started on steroids and nebs with improvement. Initially required HFNC, but has now been stable on 4LNC for over 48 hours. Stable for DC back to [facility] - healthcare. DISCHARGE DIAGNOSES / PLAN:      Acute on chronic (2LNC) hypoxic respiratory failure  Sepsis (WBC, RR)  COPD exacerbation   + RSV   - O2, titrate for saturation over 90% --> weaned down 4LNC today   - CTX and azithromycin --> Augmentin PO x 5 day total (last day tomorrow)  - CT of the chest showed interstitial findings bilaterally, likely due to RSV  - Continue PO steroids, taper   - Echo with normal EF, some mild concentric hypertrophy  - FU blood culture --> NGTD     MARILYN - Cr up to 1.94 on admission, improving  - resolved, Cr      Hypernatremia - iatrogenic from normal saline, and decreased free water intake, improved   - resolved 0.92 on DC  Hypokalemia - replete and monitor   Hypophosphatemia - replete and monitor   Elevated D dimer - VQ scan done and indeterminate.  Low liklihood given improvement and known RSV/COPD  H/o HTN - hold home amlodipine for now   Hypothyroid - home synthroid   Depression/Anxiety - zoloft          PENDING TEST RESULTS:   At the time of discharge the following test results are still pending: ***     ADDITIONAL CARE RECOMMENDATIONS:   - Please take all medications as prescribed. Note changes as below. **Start prednisone taper  **Take albuterol every 6 hours for the next 48 hours, then as needed   **Take Augmentin for one additional day to complete your course   - Please make sure to follow up with your primary care physician within 1-2 weeks of discharge for hospital follow up. You should also follow up with pulmonary if you have a pulmonologist.   - Please make sure to continue to monitor for: Chest pain, shortness of breath, high fevers,  and return to the Emergency Department with any of these symptoms.   - Please get up slowly from a seated or laying position, avoid falls. - Avoid tobacco, alcohol and other illicit drug use. - Diet restrictions: Resume prior   - Activity restrictions: As tolerated        DIET: Resume previous diet    ACTIVITY: Activity as tolerated    EQUIPMENT needed: Per PT/OT at SNF    Oxygen: 4LNC, may wean as tolerated     NOTIFY YOUR PHYSICIAN FOR ANY OF THE FOLLOWING:   Fever over 101 degrees for 24 hours. Chest pain, shortness of breath, fever, chills, nausea, vomiting, diarrhea, change in mentation, falling, weakness, bleeding. Severe pain or pain not relieved by medications, as well as any other signs or symptoms that you may have questions about.     FOLLOW UP APPOINTMENTS:    Follow-up Information       Follow up With Specialties Details Why Contact Info    primary care physician  Follow up in 1 week(s)               DISCHARGE MEDICATIONS:  Current Discharge Medication List        START taking these medications    Details   albuterol-ipratropium (DUO-NEB) 2.5 mg-0.5 mg/3 ml nebu 3 mL by Nebulization route every six (6) hours as needed for Wheezing for up to 30 days. Qty: 30 Each, Refills: 0  Start date: 2/28/2023, End date: 3/30/2023      amoxicillin-clavulanate (AUGMENTIN) 500-125 mg per tablet Take 1 Tablet by mouth every twelve (12) hours for 1 day. Qty: 2 Tablet, Refills: 0  Start date: 2/28/2023, End date: 3/1/2023      predniSONE (DELTASONE) 10 mg tablet Take 40 mg by mouth daily (with breakfast) for 2 days, THEN 30 mg daily (with breakfast) for 2 days, THEN 20 mg daily (with breakfast) for 2 days, THEN 10 mg daily (with breakfast) for 2 days. Qty: 20 Tablet, Refills: 0  Start date: 3/1/2023, End date: 3/9/2023           CONTINUE these medications which have CHANGED    Details   albuterol (PROVENTIL HFA, VENTOLIN HFA, PROAIR HFA) 90 mcg/actuation inhaler Take 2 Puffs by inhalation every six (6) hours for 2 days. Qty: 1 g, Refills: 0  Start date: 2/28/2023, End date: 3/2/2023           CONTINUE these medications which have NOT CHANGED    Details   docusate sodium (COLACE) 100 mg capsule Take 100 mg by mouth two (2) times a day. 0900 and 1700      guaiFENesin ER (MUCINEX) 600 mg ER tablet Take 600 mg by mouth two (2) times a day. Indications: COPD and chronic congestion      therapeutic multivitamin (Thera) tablet Take 1 Tablet by mouth daily. fluticasone-umeclidinium-vilanterol (Trelegy Ellipta) 100-62.5-25 mcg inhaler Take 1 Puff by inhalation daily. Rinse mouth with water after use      acetaminophen (TYLENOL) 325 mg tablet Take 650 mg by mouth every four (4) hours as needed (Mild Pain). senna-docusate (Senexon-S) 8.6-50 mg per tablet Take 2 Tablets by mouth two (2) times daily as needed for Constipation. aspirin 81 mg chewable tablet Take 81 mg by mouth daily. sertraline (ZOLOFT) 50 mg tablet Take 50 mg by mouth nightly. levothyroxine (SYNTHROID) 75 mcg tablet Take 75 mcg by mouth Daily (before breakfast). amLODIPine (NORVASC) 10 mg tablet Take 10 mg by mouth daily.   Refills: 4      latanoprost (XALATAN) 0.005 % ophthalmic solution Administer 1 Drop to both eyes nightly. Refills: 0    Associated Diagnoses: Heart palpitations; SSS (sick sinus syndrome) ([facility]  Dizziness           STOP taking these medications       azithromycin (ZITHROMAX) 250 mg tablet Comments:   Reason for Stopping:         furosemide (LASIX) 40 mg tablet Comments:   Reason for Stopping:               DISPOSITION:    Home With:   OT  PT  HH  RN      X Long term SNF/Inpatient Rehab    Independent/assisted living    Hospice    Other:     PATIENT CONDITION AT DISCHARGE:     Functional status   X Poor     Deconditioned     Independent      Cognition     Lucid    X Forgetful     Dementia      Catheters/lines (plus indication)    Nicholson     PICC     PEG    X None      Code status     Full code    X DNR      PHYSICAL EXAMINATION AT DISCHARGE:  Visit Vitals  BP (!) 173/76 (BP 1 Location: Left arm, BP Patient Position: At rest)   Pulse 79   Temp 97.3 °F (36.3 °C)   Resp 18   SpO2 92%     Gen: NAD, awake in bed, elderly female   HEENT: NC/AT, sclera anicteric, PERRL, EOMI  CV: RRR no m/r/g, normal S1 and S2, no pedal edema   Resp: Minimal exp wheezing, no increased work of breathing, 4LNC for now   Abd: NT/ND, normal bowel sounds, no rebound or guarding  Ext: 2+ pulses, no edema  Skin: No rashes or lesions        CHRONIC MEDICAL DIAGNOSES:  Problem List as of 2/28/2023 Date Reviewed: 12/2/2021            Codes Class Noted - Resolved    Pneumonia ICD-10-CM: J18.9  ICD-9-CM: 486  2/23/2023 - Present        Hip fracture requiring operative repair [facility]) ICD-10-CM: S72.009A  ICD-9-CM: 820.8  10/29/2021 - Present        SSS (sick sinus syndrome) ([facility] ICD-10-CM: I49.5  ICD-9-CM: 427.81  1/9/2018 - Present        Heart palpitations ICD-10-CM: R00.2  ICD-9-CM: 785.1  12/18/2017 - Present        Shortness of breath ICD-10-CM: R06.02  ICD-9-CM: 786.05  9/23/2016 - Present        RESOLVED: Rectal prolapse ICD-10-CM: K62.3  ICD-9-CM: 569.1  1/18/2021 - 1/20/2021        RESOLVED: UTI (urinary tract infection) ICD-10-CM: N39.0  ICD-9-CM: 599.0  2/9/2018 - 1/21/2021        RESOLVED: Pubic bone fracture ([facility] [address] ICD-10-CM: I30.719W  ICD-9-CM: 808.2  2/6/2018 - 1/21/2021        RESOLVED: Pneumonia ICD-10-CM: J18.9  ICD-9-CM: 667  11/13/2014 - 1/21/2021    Overview Signed 11/13/2014 10:13 AM by Linus Olivera MD     Presumed aspiration related to epistaxis (nose bleed)             RESOLVED: Left humeral fracture ICD-10-CM: S65.132H  ICD-9-CM: 812.20  11/13/2014 - 1/21/2021        RESOLVED: Acute respiratory failure with hypoxia ([facility] [address] ICD-10-CM: J96.01  ICD-9-CM: 518.81  11/13/2014 - 1/21/2021    Overview Signed 11/13/2014 10:16 AM by Linus Olivera MD     From Pneumonia, resolved             RESOLVED: Sepsis ([facility] [address] ICD-10-CM: A41.9  ICD-9-CM: 038.9, 995.91  11/10/2014 - 1/21/2021    Overview Signed 11/13/2014 10:13 AM by Linus Olivera MD     resolved             RESOLVED: CAD (coronary artery disease) ICD-10-CM: I25.10  ICD-9-CM: 414.00  1/1/1996 - 1/21/2021    Overview Signed 9/23/2016 11:46 AM by Stiven Prado NP     MI                 Greater than 30 minutes were spent with the patient on counseling and coordination of care    Signed:   Theresa Gonzalez MD  2/28/2023  10:17 AM

## 2023-02-28 NOTE — DISCHARGE SUMMARY
Discharge Summary       PATIENT ID: Anh Castro  MRN: [id number]   YOB: 1928    DATE OF ADMISSION: 2/23/2023  2:39 PM    DATE OF DISCHARGE: 02/28/23     PRIMARY CARE PROVIDER: Jnaak Aguirre MD     ATTENDING PHYSICIAN: Kellie Serrano MD    DISCHARGING PROVIDER: Kellie Serrano MD      CONSULTATIONS: None    PROCEDURES/SURGERIES: * No surgery found *    ADMISSION SUMMARY AND HOSPITAL COURSE:   Anh Castro is a 80 y.o. female with pmh of CAD, HLD, HTN, COPD on chronic 2LNC presents from [facility] with increased work of breathing and hypoxia. Information obtained via chart review and family at bedside. Pt had been in usual state of health until 2 days ago when she had low grade fever, and slightly increased O2 requirements, CXR per family showed possible PNA. Started on antibiotics. She had initially improved but today worsened, had ongoing hypoxemia, and EMS was called. Pt was found to have COPD exacerbaiton secondary to RSV+. Started on steroids and nebs with improvement. Initially required HFNC, but has now been stable on 4LNC for over 48 hours. Stable for DC back to [facility] - healthcare. DISCHARGE DIAGNOSES / PLAN:      Acute on chronic (2LNC) hypoxic respiratory failure  Sepsis (WBC, RR)  COPD exacerbation   + RSV   - O2, titrate for saturation over 90% --> weaned down 4LNC today   - CTX and azithromycin --> Augmentin PO x 5 day total (last day tomorrow)  - CT of the chest showed interstitial findings bilaterally, likely due to RSV  - Continue PO steroids, taper   - Echo with normal EF, some mild concentric hypertrophy  - FU blood culture --> NGTD     MARILYN - Cr up to 1.94 on admission, improving  - resolved, Cr      Hypernatremia - iatrogenic from normal saline, and decreased free water intake, improved   - resolved 0.92 on DC  Hypokalemia - replete and monitor   Hypophosphatemia - replete and monitor   Elevated D dimer - VQ scan done and indeterminate.  Low liklihood given improvement and known RSV/COPD  H/o HTN - hold home amlodipine for now   Hypothyroid - home synthroid   Depression/Anxiety - zoloft          PENDING TEST RESULTS:   At the time of discharge the following test results are still pending: None     ADDITIONAL CARE RECOMMENDATIONS:   - Please take all medications as prescribed. Note changes as below. **Start prednisone taper  **Take albuterol every 6 hours for the next 48 hours, then as needed   **Take Augmentin for one additional day to complete your course   - Please make sure to follow up with your primary care physician within 1-2 weeks of discharge for hospital follow up. You should also follow up with pulmonary if you have a pulmonologist.   - Please make sure to continue to monitor for: Chest pain, shortness of breath, high fevers,  and return to the Emergency Department with any of these symptoms.   - Please get up slowly from a seated or laying position, avoid falls. - Avoid tobacco, alcohol and other illicit drug use. - Diet restrictions: Resume prior   - Activity restrictions: As tolerated        DIET: Resume previous diet    ACTIVITY: Activity as tolerated    EQUIPMENT needed: Per PT/OT at SNF    Oxygen: 4LNC, may wean as tolerated     NOTIFY YOUR PHYSICIAN FOR ANY OF THE FOLLOWING:   Fever over 101 degrees for 24 hours. Chest pain, shortness of breath, fever, chills, nausea, vomiting, diarrhea, change in mentation, falling, weakness, bleeding. Severe pain or pain not relieved by medications, as well as any other signs or symptoms that you may have questions about.     FOLLOW UP APPOINTMENTS:    Follow-up Information       Follow up With Specialties Details Why Contact Info    primary care physician  Follow up in 1 week(s)               DISCHARGE MEDICATIONS:  Current Discharge Medication List        START taking these medications    Details   albuterol-ipratropium (DUO-NEB) 2.5 mg-0.5 mg/3 ml nebu 3 mL by Nebulization route every six (6) hours as needed for Wheezing for up to 30 days. Qty: 30 Each, Refills: 0  Start date: 2/28/2023, End date: 3/30/2023      amoxicillin-clavulanate (AUGMENTIN) 500-125 mg per tablet Take 1 Tablet by mouth every twelve (12) hours for 1 day. Qty: 2 Tablet, Refills: 0  Start date: 2/28/2023, End date: 3/1/2023      predniSONE (DELTASONE) 10 mg tablet Take 40 mg by mouth daily (with breakfast) for 2 days, THEN 30 mg daily (with breakfast) for 2 days, THEN 20 mg daily (with breakfast) for 2 days, THEN 10 mg daily (with breakfast) for 2 days. Qty: 20 Tablet, Refills: 0  Start date: 3/1/2023, End date: 3/9/2023           CONTINUE these medications which have CHANGED    Details   albuterol (PROVENTIL HFA, VENTOLIN HFA, PROAIR HFA) 90 mcg/actuation inhaler Take 2 Puffs by inhalation every six (6) hours for 2 days. Qty: 1 g, Refills: 0  Start date: 2/28/2023, End date: 3/2/2023           CONTINUE these medications which have NOT CHANGED    Details   docusate sodium (COLACE) 100 mg capsule Take 100 mg by mouth two (2) times a day. 0900 and 1700      guaiFENesin ER (MUCINEX) 600 mg ER tablet Take 600 mg by mouth two (2) times a day. Indications: COPD and chronic congestion      therapeutic multivitamin (Thera) tablet Take 1 Tablet by mouth daily. fluticasone-umeclidinium-vilanterol (Trelegy Ellipta) 100-62.5-25 mcg inhaler Take 1 Puff by inhalation daily. Rinse mouth with water after use      acetaminophen (TYLENOL) 325 mg tablet Take 650 mg by mouth every four (4) hours as needed (Mild Pain). senna-docusate (Senexon-S) 8.6-50 mg per tablet Take 2 Tablets by mouth two (2) times daily as needed for Constipation. aspirin 81 mg chewable tablet Take 81 mg by mouth daily. sertraline (ZOLOFT) 50 mg tablet Take 50 mg by mouth nightly. levothyroxine (SYNTHROID) 75 mcg tablet Take 75 mcg by mouth Daily (before breakfast). amLODIPine (NORVASC) 10 mg tablet Take 10 mg by mouth daily.   Refills: 4      latanoprost (XALATAN) 0.005 % ophthalmic solution Administer 1 Drop to both eyes nightly. Refills: 0    Associated Diagnoses: Heart palpitations; SSS (sick sinus syndrome) ([facility] [address]  Dizziness           STOP taking these medications       azithromycin (ZITHROMAX) 250 mg tablet Comments:   Reason for Stopping:         furosemide (LASIX) 40 mg tablet Comments:   Reason for Stopping:               DISPOSITION:    Home With:   OT  PT  HH  RN      X Long term SNF/Inpatient Rehab    Independent/assisted living    Hospice    Other:     PATIENT CONDITION AT DISCHARGE:     Functional status   X Poor     Deconditioned     Independent      Cognition     Lucid    X Forgetful     Dementia      Catheters/lines (plus indication)    Nicholson     PICC     PEG    X None      Code status     Full code    X DNR      PHYSICAL EXAMINATION AT DISCHARGE:  Visit Vitals  BP (!) 173/76 (BP 1 Location: Left arm, BP Patient Position: At rest)   Pulse 79   Temp 97.3 °F (36.3 °C)   Resp 18   SpO2 92%     Gen: NAD, awake in bed, elderly female   HEENT: NC/AT, sclera anicteric, PERRL, EOMI  CV: RRR no m/r/g, normal S1 and S2, no pedal edema   Resp: Minimal exp wheezing, no increased work of breathing, 4LNC for now   Abd: NT/ND, normal bowel sounds, no rebound or guarding  Ext: 2+ pulses, no edema  Skin: No rashes or lesions        CHRONIC MEDICAL DIAGNOSES:  Problem List as of 2/28/2023 Date Reviewed: 12/2/2021            Codes Class Noted - Resolved    Pneumonia ICD-10-CM: J18.9  ICD-9-CM: 486  2/23/2023 - Present        Hip fracture requiring operative repair [facility]) ICD-10-CM: S72.009A  ICD-9-CM: 820.8  10/29/2021 - Present        SSS (sick sinus syndrome) ([facility] ICD-10-CM: I49.5  ICD-9-CM: 427.81  1/9/2018 - Present        Heart palpitations ICD-10-CM: R00.2  ICD-9-CM: 785.1  12/18/2017 - Present        Shortness of breath ICD-10-CM: R06.02  ICD-9-CM: 786.05  9/23/2016 - Present        RESOLVED: Rectal prolapse ICD-10-CM: K62.3  ICD-9-CM: 569.1  1/18/2021 - 1/20/2021        RESOLVED: UTI (urinary tract infection) ICD-10-CM: N39.0  ICD-9-CM: 599.0  2/9/2018 - 1/21/2021        RESOLVED: Pubic bone fracture ([facility] ICD-10-CM: G51.305F  ICD-9-CM: 808.2  2/6/2018 - 1/21/2021        RESOLVED: Pneumonia ICD-10-CM: J18.9  ICD-9-CM: 439  11/13/2014 - 1/21/2021    Overview Signed 11/13/2014 10:13 AM by Milana Wong MD     Presumed aspiration related to epistaxis (nose bleed)             RESOLVED: Left humeral fracture ICD-10-CM: C80.247K  ICD-9-CM: 812.20  11/13/2014 - 1/21/2021        RESOLVED: Acute respiratory failure with hypoxia ([facility] [address] ICD-10-CM: J96.01  ICD-9-CM: 518.81  11/13/2014 - 1/21/2021    Overview Signed 11/13/2014 10:16 AM by Milana Wong MD     From Pneumonia, resolved             RESOLVED: Sepsis ([facility] [address] ICD-10-CM: A41.9  ICD-9-CM: 038.9, 995.91  11/10/2014 - 1/21/2021    Overview Signed 11/13/2014 10:13 AM by Milana Wong MD     resolved             RESOLVED: CAD (coronary artery disease) ICD-10-CM: I25.10  ICD-9-CM: 414.00  1/1/1996 - 1/21/2021    Overview Signed 9/23/2016 11:46 AM by Kunal John NP     MI                 Greater than 30 minutes were spent with the patient on counseling and coordination of care    Signed:   Bonifacio Fraga MD  2/28/2023  10:17 AM

## 2023-03-02 LAB
BACTERIA SPEC CULT: NORMAL
BACTERIA SPEC CULT: NORMAL
SERVICE CMNT-IMP: NORMAL
SERVICE CMNT-IMP: NORMAL

## 2023-03-05 PROBLEM — J96.21 ACUTE ON CHRONIC RESPIRATORY FAILURE WITH HYPOXIA (HCC): Status: ACTIVE | Noted: 2023-01-01

## 2023-03-05 NOTE — PROGRESS NOTES
Admission Medication Reconciliation:    Information obtained from:  MR Note 2/23/23, [facility] Discharge Summary (2/28/23), RxQuery  RxQuery data available¹:  YES    Comments/Recommendations: Updated PTA meds/reviewed patient's allergies. 1)  MR was recently performed on 2/23/23 (using MAR obtained from [facility]), and patient was hospitalized at [facility] from 2/23/23 to 2/28/23. MR note + Discharge Summary + RxQuery were reviewed to complete this MR. 2)  Medication changes (since last review): Added  - n/a    Adjusted  - Prednisone: added comment about 8 day course planned for 3/1/23 to 3/9/23    Removed  - n/a     ¹RxQuery pharmacy benefit data reflects medications filled and processed through the patient's insurance, however   this data does NOT capture whether the medication was picked up or is currently being taken by the patient. Allergies:  Patient has no known allergies. Significant PMH/Disease States:   Past Medical History:   Diagnosis Date    Arm fracture, left 2013    Arm fracture, right 1999    Bell's palsy 2005, 2011    H/O BELL'S PALSY X2    CAD (coronary artery disease) 1996    MI , STENT    Cancer ([facility] [address] 2008    BREAST RIGHT - BALLON RADIATION    Cancer ([facility] [address]     LUNG    Chronic obstructive pulmonary disease (HCC)     Elevated cholesterol     Glaucoma     H/O: pneumonia 2013    History of pelvic fracture 2018    Hypertension     Hypothyroid     Pacemaker 01/09/2018    Sick sinus syndrome ([facility] [address]     Syncope      Chief Complaint for this Admission:    Chief Complaint   Patient presents with    Shortness of Breath     Prior to Admission Medications:   Prior to Admission Medications   Prescriptions Last Dose Informant Taking?   acetaminophen (TYLENOL) 325 mg tablet   No   Sig: Take 650 mg by mouth every four (4) hours as needed (Mild Pain).    albuterol-ipratropium (DUO-NEB) 2.5 mg-0.5 mg/3 ml nebu   No   Sig: 3 mL by Nebulization route every six (6) hours as needed for Wheezing for up to 30 days. amLODIPine (NORVASC) 10 mg tablet   No   Sig: Take 10 mg by mouth daily. aspirin 81 mg chewable tablet   No   Sig: Take 81 mg by mouth daily. docusate sodium (COLACE) 100 mg capsule   No   Sig: Take 100 mg by mouth two (2) times a day. 0900 and 1700   fluticasone-umeclidinium-vilanterol (Trelegy Ellipta) 100-62.5-25 mcg inhaler   No   Sig: Take 1 Puff by inhalation daily. Rinse mouth with water after use   guaiFENesin ER (MUCINEX) 600 mg ER tablet   No   Sig: Take 600 mg by mouth two (2) times a day. Indications: COPD and chronic congestion   latanoprost (XALATAN) 0.005 % ophthalmic solution  Self No   Sig: Administer 1 Drop to both eyes nightly. levothyroxine (SYNTHROID) 75 mcg tablet   No   Sig: Take 75 mcg by mouth Daily (before breakfast). predniSONE (DELTASONE) 10 mg tablet   No   Sig: Take 40 mg by mouth daily (with breakfast) for 2 days, THEN 30 mg daily (with breakfast) for 2 days, THEN 20 mg daily (with breakfast) for 2 days, THEN 10 mg daily (with breakfast) for 2 days. Patient taking differently: Take 40 mg by mouth daily (with breakfast) for 2 days, THEN 30 mg daily (with breakfast) for 2 days, THEN 20 mg daily (with breakfast) for 2 days, THEN 10 mg daily (with breakfast) for 2 days. (3/1/23-3/9/23)   senna-docusate (Senexon-S) 8.6-50 mg per tablet   No   Sig: Take 2 Tablets by mouth two (2) times daily as needed for Constipation. sertraline (ZOLOFT) 50 mg tablet   No   Sig: Take 50 mg by mouth nightly. therapeutic multivitamin (Thera) tablet   No   Sig: Take 1 Tablet by mouth daily. Facility-Administered Medications: None     Please contact the main inpatient pharmacy with any questions or concerns at [phone] and we will direct you to the clinical pharmacist covering this patient's care while in-house.    Darien Grubbs, GRISELDAD

## 2023-03-05 NOTE — H&P
History and Physical    Date of Service:  3/5/2023  Primary Care Provider: Timothy, MD Janak  Source of information: Chart review, Spouse/family member, and Information is unobtainable from patient due to the patient's physical and/or mental condition at the time of admission    Chief Complaint: Shortness of Breath      History of Presenting Illness:   Lou Chen is a 80 y.o. female with COPD on O2 who was recently discharged from here to SNF after being hospitalized for RSV as well as bacterial pneumonia and MARILYN. She was doing well until today when nursing staff noted her to be short of breath and hypoxic. Her O2 sats were in the 80's despite 6L O2. She had to be placed on high-flow in the ED. The patient is a limited historian and can't tell me why she's here. She specifically denies pain. Family at the bedside are unaware of any fever or other symptoms. REVIEW OF SYSTEMS:  A comprehensive review of systems was negative except for that written in the History of Present Illness.      Past Medical History:   Diagnosis Date    Arm fracture, left 2013    Arm fracture, right 1999    Bell's palsy 2005, 2011    H/O BELL'S PALSY X2    CAD (coronary artery disease) 1996    MI , STENT    Cancer ([facility] [address] 2008    BREAST RIGHT - BALLON RADIATION    Cancer ([facility] [address]     LUNG    Chronic obstructive pulmonary disease (HCC)     Elevated cholesterol     Glaucoma     H/O: pneumonia 2013    History of pelvic fracture 2018    Hypertension     Hypothyroid     Pacemaker 01/09/2018    Sick sinus syndrome ([facility] [address]     Syncope       Past Surgical History:   Procedure Laterality Date    HX CATARACT REMOVAL Bilateral 2001    HX HEART CATHETERIZATION      HX OTHER SURGICAL  2013    [address] LIP    HX PACEMAKER  2018    HX PACEMAKER PLACEMENT Left 01/2018    FL UNLISTED PROCEDURE BREAST  1998    right mastectomy    FL UNLISTED PROCEDURE LUNGS & PLEURA Left 1998    EXC LUNG MASS     Prior to Admission medications Medication Sig Start Date End Date Taking? Authorizing Provider   albuterol-ipratropium (DUO-NEB) 2.5 mg-0.5 mg/3 ml nebu 3 mL by Nebulization route every six (6) hours as needed for Wheezing for up to 30 days. 2/28/23 3/30/23  Royal Temi MD   predniSONE (DELTASONE) 10 mg tablet Take 40 mg by mouth daily (with breakfast) for 2 days, THEN 30 mg daily (with breakfast) for 2 days, THEN 20 mg daily (with breakfast) for 2 days, THEN 10 mg daily (with breakfast) for 2 days. 3/1/23 3/9/23  Royal Temi OJEDA MD   docusate sodium (COLACE) 100 mg capsule Take 100 mg by mouth two (2) times a day. 0900 and 1700    Provider, Historical   guaiFENesin ER (MUCINEX) 600 mg ER tablet Take 600 mg by mouth two (2) times a day. Indications: COPD and chronic congestion    Provider, Historical   therapeutic multivitamin (Thera) tablet Take 1 Tablet by mouth daily. Provider, Historical   fluticasone-umeclidinium-vilanterol (Trelegy Ellipta) 100-62.5-25 mcg inhaler Take 1 Puff by inhalation daily. Rinse mouth with water after use    Provider, Historical   acetaminophen (TYLENOL) 325 mg tablet Take 650 mg by mouth every four (4) hours as needed (Mild Pain). Provider, Historical   senna-docusate (Senexon-S) 8.6-50 mg per tablet Take 2 Tablets by mouth two (2) times daily as needed for Constipation. Provider, Historical   aspirin 81 mg chewable tablet Take 81 mg by mouth daily. Other, MD Janak   sertraline (ZOLOFT) 50 mg tablet Take 50 mg by mouth nightly. 7/25/21   Provider, Historical   levothyroxine (SYNTHROID) 75 mcg tablet Take 75 mcg by mouth Daily (before breakfast). 7/12/21   Provider, Historical   amLODIPine (NORVASC) 10 mg tablet Take 10 mg by mouth daily. 3/15/18   Provider, Historical   latanoprost (XALATAN) 0.005 % ophthalmic solution Administer 1 Drop to both eyes nightly.  11/24/17   Provider, Historical     No Known Allergies   Family History   Family history unknown: Yes      Social History: reports that she quit smoking about 33 years ago. She has a 2.50 pack-year smoking history. She has never used smokeless tobacco. She reports that she does not drink alcohol and does not use drugs. Social Determinants of Health     Tobacco Use: Medium Risk    Smoking Tobacco Use: Former    Smokeless Tobacco Use: Never    Passive Exposure: Not on file   Alcohol Use: Not on file   Financial Resource Strain: Not on file   Food Insecurity: Not on file   Transportation Needs: Not on file   Physical Activity: Not on file   Stress: Not on file   Social Connections: Not on file   Intimate Partner Violence: Not on file   Depression: Not on file   Housing Stability: Not on file        Medications were reconciled to the best of my ability given all available resources at the time of admission. Route is PO if not otherwise noted. Family and social history were personally reviewed, all pertinent and relevant details are outlined as above.     Objective:   Visit Vitals  BP (!) 139/97 (BP 1 Location: Left upper arm, BP Patient Position: At rest;Sitting)   Pulse 88   Temp 98.1 °F (36.7 °C)   Resp 26   Ht 5' 2\" (1.575 m)   Wt 50.5 kg (111 lb 5.3 oz)   SpO2 90%   BMI 20.36 kg/m²    O2 Flow Rate (L/min): 35 l/min O2 Device: Hi flow nasal cannula    PHYSICAL EXAM:   General: mild respiratory distress on high flow O2  HEENT: anicteric sclerae normal conjunctiva  Neck: Supple, no JVD, no meningeal signs  Chest: severely diminished breath sounds globally, expiratory wheeze, diminished breath sounds in L base, mild accessory muscle use on high flow  CVS: RRR, S1 S2 heard, no murmurs/rubs/gallops  Abd: Soft, non-tender, non-distended, +bowel sounds   Ext: No clubbing, no cyanosis, no edema  Neuro/Psych: grossly non-focal, confused  Cap refill: Brisk, less than 3 seconds  Pulses: 2+, symmetric in all extremities  Skin: Warm, dry, without rashes or lesions    Data Review:   I have independently reviewed and interpreted patient's lab and all other diagnostic data    Abnormal Labs Reviewed   CBC WITH AUTOMATED DIFF - Abnormal; Notable for the following components:       Result Value    WBC 19.2 (*)     RBC 5.58 (*)     HGB 16.4 (*)     HCT 52.5 (*)     RDW 14.9 (*)     PLATELET 822 (*)     NEUTROPHILS 89 (*)     LYMPHOCYTES 3 (*)     IMMATURE GRANULOCYTES 2 (*)     ABS. NEUTROPHILS 17.0 (*)     ABS. LYMPHOCYTES 0.6 (*)     ABS. MONOCYTES 1.2 (*)     ABS. IMM. GRANS. 0.4 (*)     All other components within normal limits   METABOLIC PANEL, COMPREHENSIVE - Abnormal; Notable for the following components:    Anion gap 4 (*)     Glucose 134 (*)     BUN 27 (*)     Creatinine 1.08 (*)     BUN/Creatinine ratio 25 (*)     eGFR 47 (*)     Albumin 3.0 (*)     Globulin 4.2 (*)     A-G Ratio 0.7 (*)     All other components within normal limits   NT-PRO BNP - Abnormal; Notable for the following components:    NT pro-BNP 1,686 (*)     All other components within normal limits   TROPONIN-HIGH SENSITIVITY - Abnormal; Notable for the following components:    Troponin-High Sensitivity 60 (*)     All other components within normal limits   POC G3 - PUL - Abnormal; Notable for the following components:    pH (POC) 7.49 (*)     pO2 (POC) 54 (*)     HCO3 (POC) 29.0 (*)     sO2 (POC) 90.2 (*)     All other components within normal limits       All Micro Results       Procedure Component Value Units Date/Time    CULTURE, BLOOD, PAIRED [[id number]] Collected: 03/05/23 1620    Order Status: Sent Specimen: Blood Updated: 03/05/23 1624    COVID-19 RAPID TEST [[id number]] Collected: 03/05/23 1524    Order Status: Completed Specimen: Nasopharyngeal Updated: 03/05/23 1605     Specimen source Nasopharyngeal        COVID-19 rapid test Not detected        Comment: Rapid Abbott ID Now       Rapid NAAT:  The specimen is NEGATIVE for SARS-CoV-2, the novel coronavirus associated with COVID-19.        Negative results should be treated as presumptive and, if inconsistent with clinical signs and symptoms or necessary for patient management, should be tested with an alternative molecular assay. Negative results do not preclude SARS-CoV-2 infection and should not be used as the sole basis for patient management decisions. This test has been authorized by the FDA under an Emergency Use Authorization (EUA) for use by authorized laboratories. Fact sheet for Healthcare Providers:  http://www.dominga.rom/  Fact sheet for Patients: http://www.dominga.rom/       Methodology: Isothermal Nucleic Acid Amplification                 IMAGING:   XR CHEST PORT   Final Result   Left airspace opacity and pleural effusion concerning for pneumonia   in appropriate clinical setting. Severe chronic emphysematous change. ECG/ECHO:    Results for orders placed or performed during the hospital encounter of 02/23/23   EKG, 12 LEAD, INITIAL   Result Value Ref Range    Ventricular Rate 90 BPM    Atrial Rate 90 BPM    P-R Interval 124 ms    QRS Duration 104 ms    Q-T Interval 364 ms    QTC Calculation (Bezet) 445 ms    Calculated P Axis 82 degrees    Calculated R Axis -96 degrees    Calculated T Axis 70 degrees    Diagnosis       Sinus rhythm with premature atrial complexes  Right superior axis deviation  Minimal voltage criteria for LVH, may be normal variant ( Solo product )  Nonspecific ST and T wave abnormality  Abnormal ECG  When compared with ECG of 29-OCT-2021 21:09,  premature atrial complexes are now present  Confirmed by Rod Naidu MD. ([id number]) on 2/26/2023 10:36:30 PM            Notes reviewed from all clinical/nonclinical/nursing services involved in patient's clinical care. Care coordination discussions were held with appropriate clinical/nonclinical/ nursing providers based on care coordination needs.      Assessment:   Given the patient's current clinical presentation, there is a high level of concern for decompensation if discharged from the emergency department. Complex decision making was performed, which includes reviewing the patient's available past medical records, laboratory results, and imaging studies. Active Problems:    Acute on chronic respiratory failure with hypoxia ([facility] [address] (3/5/2023)        Plan:     Acute on chronic hypoxic respiratory failure: due to HCAP, possible pulm edema/effusion, COPD exacerbation  -continue high flow-O2, wean as able  -inhaled bronchodilators  -systemic steroids  -IV furosemide x1 and re-assess, note PO Lasix discontinued last admit presumably due to MARILYN  -recent TTE w/ normal LVEF and CLVH    HCAP with sepsis: (leukocytosis, RR, tachycardia)  -IV cefepime; treated w/ ceftriaxone/azithro then Augmentin last admit  -follow-up blood cultures    HTN:  -hold home amlodipine for now    Troponin elevation: mild elevation in the setting of resp failure, lower than last admit. No chest pain. Hypothyroidism:  -continue synthroid    Depression/anxiety:   -continue Zoloft    Recent RSV infection    Remote hx of MI, breast CA, L lung mass excision    Pending clinical course will discuss further goals of care with the family    DIET: ADULT DIET Regular   ISOLATION PRECAUTIONS: There are currently no Active Isolations  CODE STATUS: DNR   DVT PROPHYLAXIS: Lovenox  FUNCTIONAL STATUS PRIOR TO HOSPITALIZATION: Capable of only limited self-care; confined to bed or chair likely more than 50% of waking hours. Ambulatory status/function: Ambulates with assistance:  Wheelchair   EARLY MOBILITY ASSESSMENT: Recommend an assessment from physical therapy and/or occupational therapy  ANTICIPATED DISCHARGE: Greater than 48 hours. ANTICIPATED DISPOSITION: SNF  EMERGENCY CONTACT/SURROGATE DECISION MAKER: patient's son Mars Kinney    CRITICAL CARE WAS PERFORMED FOR THIS ENCOUNTER: YES.  I had a face to face encounter with the patient, reviewed and interpreted patient data including clinical events, labs, images, vital signs, I/O's, and examined patient. I have discussed the case and the plan and management of the patient's care with the consulting services, the bedside nurses and necessary ancillary providers. This patient has a high probability of imminent, clinically significant deterioration, which requires the highest level of preparedness to intervene urgently. I participated in the decision-making and personally managed or directed the management of the following life and organ supporting interventions that required my frequent assessment to treat or prevent imminent deterioration. I personally spent 35 minutes of critical care time. This is time spent at this critically ill patient's bedside actively involved in patient care as well as the coordination of care and discussions with the patient's family. This does not include any procedural time which has been billed separately. Signed By: Bartolo Cavanaugh MD     March 5, 2023         Please note that this dictation may have been completed with Dragon, the computer voice recognition software. Quite often unanticipated grammatical, syntax, homophones, and other interpretive errors are inadvertently transcribed by the computer software. Please disregard these errors. Please excuse any errors that have escaped final proofreading.

## 2023-03-05 NOTE — ED PROVIDER NOTES
80-year-old female presents from our [facility] [address] via EMS with reports of shortness of breath and hypoxia. Patient was discharged from the hospital on February 28 after being treated for hypoxia found to be RSV positive. She initially was on high flow nasal cannula and then weaned to 4 L before being discharged back to the nursing home. Today she was reporting increased shortness of breath and work of breathing. EMS states they arrived to find her on 2 L nasal cannula with O2 sats in the upper 70s. They bumped up to 6 L and got her sats up closer to 90%. Patient reports cough and shortness of breath. Past medical history significant for COPD on 2 L nasal cannula at baseline. History also significant for lung cancer, high cholesterol, pneumonia, pacemaker, sick sinus.   Patient received steroids and 1 DuoNeb in route to the hospital.       Past Medical History:   Diagnosis Date    Arm fracture, left 2013    Arm fracture, right 1999    Bell's palsy 2005, 2011    H/O BELL'S PALSY X2    CAD (coronary artery disease) 1996    MI , STENT    Cancer ([address] 2008    BREAST RIGHT - [place] RADIATION    Cancer ([address]     LUNG    Chronic obstructive pulmonary disease (HCC)     Elevated cholesterol     Glaucoma     H/O: pneumonia 2013    History of pelvic fracture 2018    Hypertension     Hypothyroid     Pacemaker 01/09/2018    Sick sinus syndrome ([address]     Syncope        Past Surgical History:   Procedure Laterality Date    HX CATARACT REMOVAL Bilateral 2001    HX HEART CATHETERIZATION      HX OTHER SURGICAL  2013    EXC LESIONS FACE, LIP    HX PACEMAKER  2018    HX PACEMAKER PLACEMENT Left 01/2018    ME UNLISTED PROCEDURE BREAST  1998    right mastectomy    ME UNLISTED PROCEDURE LUNGS & PLEURA Left 1998    EXC LUNG MASS         Family History:   Family history unknown: Yes       Social History     Socioeconomic History    Marital status:      Spouse name: Not on file    Number of children: Not on file Years of education: Not on file    Highest education level: Not on file   Occupational History    Not on file   Tobacco Use    Smoking status: Former     Packs/day: 0.25     Years: 10.00     Pack years: 2.50     Types: Cigarettes     Quit date: 1989     Years since quittin.2    Smokeless tobacco: Never   Substance and Sexual Activity    Alcohol use: No    Drug use: No    Sexual activity: Not Currently   Other Topics Concern    Not on file   Social History Narrative    Not on file     Social Determinants of Health     Financial Resource Strain: Not on file   Food Insecurity: Not on file   Transportation Needs: Not on file   Physical Activity: Not on file   Stress: Not on file   Social Connections: Not on file   Intimate Partner Violence: Not on file   Housing Stability: Not on file         ALLERGIES: Patient has no known allergies. Review of Systems   Constitutional:  Negative for fever. HENT:  Negative for facial swelling. Eyes:  Negative for visual disturbance. Respiratory:  Positive for shortness of breath. Negative for chest tightness. Cardiovascular:  Negative for chest pain. Gastrointestinal:  Negative for abdominal pain. Genitourinary:  Negative for difficulty urinating and dysuria. Musculoskeletal:  Negative for arthralgias. Skin:  Negative for rash. Neurological:  Negative for headaches. Hematological:  Negative for adenopathy. Psychiatric/Behavioral:  Negative for suicidal ideas. Vitals:    23 1554 23 1559 23 1622 23 1624   BP:  (!) 151/78  (!) 139/97   Pulse:  83  88   Resp:  22  26   Temp:    98.1 °F (36.7 °C)   SpO2: 94% 92%  90%   Weight:   50.5 kg (111 lb 5.3 oz)    Height:   5' 2\" (1.575 m)             Physical Exam  Vitals and nursing note reviewed. Constitutional:       General: She is not in acute distress. Appearance: She is well-developed. HENT:      Head: Normocephalic and atraumatic.    Eyes:      General: No scleral icterus. Conjunctiva/sclera: Conjunctivae normal.      Pupils: Pupils are equal, round, and reactive to light. Cardiovascular:      Rate and Rhythm: Normal rate. Heart sounds: No murmur heard. Pulmonary:      Effort: Respiratory distress present. Breath sounds: Wheezing present. Comments: Decreased breath sounds. Abdominal:      General: There is no distension. Musculoskeletal:         General: Normal range of motion. Cervical back: Normal range of motion and neck supple. Skin:     General: Skin is warm and dry. Findings: No rash. Neurological:      Mental Status: She is alert and oriented to person, place, and time. Medical Decision Making  Amount and/or Complexity of Data Reviewed  Labs: ordered. Radiology: ordered. ECG/medicine tests: ordered. Decision-making details documented in ED Course. Risk  Prescription drug management. Decision regarding hospitalization. ED Course as of 03/05/23 1651   Sun Mar 05, 2023   1647 EKG, 12 LEAD, INITIAL  ED EKG interpretation:  Rhythm: normal sinus rhythm. Rate (approx.): 86. Axis: left. ST segment:  No concerning ST elevations or depressions. This EKG was independently interpreted by Leo Werner MD,ED Provider. [JM]      ED Course User Index  [JM] Yuliya Perez MD     Perfect Serve Consult for Admission  4:51 PM    ED Room Number: ER20/20  Patient Name and age:  Reji Frost 80 y.o.  female  Working Diagnosis:   1. Acute on chronic respiratory failure with hypoxia (HCC)    2. RSV (respiratory syncytial virus infection)        COVID-19 Suspicion:  no  Sepsis present:  no  Reassessment needed: no  Code Status:  Do Not Resuscitate  Readmission: yes  Isolation Requirements:  yes  Recommended Level of Care:  step down  Department: [facility] Adult ED - [phone]  Admitting Provider:     Other:  discharged 2/28 RSV+. Hypoxic again satting 70% on 2LNC. On high flow again now.   CXR and labs largely unchanged. Total critical care time spent exclusive of procedures:  35 minutes.     Procedures

## 2023-03-05 NOTE — PROGRESS NOTES
Lovenox Monitoring  Indication: DVT Prophylaxis  Recent Labs     03/05/23  1520   HGB 16.4*   *   CREA 1.08*     Current Weight: 50.5 kg  Est. CrCl  24.6 ml/min  Current Dose: 30 mg subcutaneously every 24 hours.   Plan: Change to Heparin 5,000 units SQ Q12H with respect to weight < 50.9 kg  and CrCl 43.8 mL/min- per policy

## 2023-03-05 NOTE — PROGRESS NOTES
Clinical Pharmacy Note - Extended Infusion Cefepime Dosing    This patient has been ordered cefepime at 2 g IV every 12 hours. P&T protocol allows automatic substitution to extended-infusion cefepime and dose adjustment based on indication and renal function. Indication: Hospital acquired pneumonia    CrCl: 24.6 mL/min    Per P&T protocol, the cefepime will initiated with a 2 gram loading dose (given IV push over 3 minutes) which will be followed by a maintenance regimen of 1 gram Q 12 hours (each dose will be infused over 4 hours). Pharmacy will continue to monitor this patient daily for changes in clinical status and renal function. Please call pharmacy with any questions.

## 2023-03-05 NOTE — ED TRIAGE NOTES
Pt comes to ED via EMS from [address] with reports of hypoxia. EMS reports SNF report = pt was found to be 78% on her baseline oxygen of 2lpm. SNF increased to 5lpm without much improvement. SNF administered a duo neb and 20mg prednisone. EMS placed pt on  6lpm via NC. At this time, Sp02 = 86% on the 6lpm. Pt changed over to 15lpm via NRB with sats increasing to 90%. Dr Aleks Pearson notified. Per report, pt has RSV. Pt has a durable DNR at bedside.

## 2023-03-06 NOTE — PROGRESS NOTES
03/06/23 0948   Oxygen Therapy   O2 Flow Rate (L/min) (S)  25 l/min   O2 Temperature 93.2 °F (34 °C)   FIO2 (%) (S)  73 %   Pre-Treatment   Breathing Pattern Regular   Pulse 70   SpO2 93 %   Respirations 23   Weaned

## 2023-03-06 NOTE — PROGRESS NOTES
[facility]                                                                                          Hospitalist Progress Note  Talita Ryan MD  Answering service: [phone] OR 36 from in house phone        Date of Service:  3/6/2023  NAME:  Layton Mcneill  :  1928  MRN:  [id number]       Admission Summary:   Layton Mcneill is a 80 y.o. female with COPD on O2 who was recently discharged from here to SNF after being hospitalized for RSV as well as bacterial pneumonia and MARILYN. She returns with hypoxic respiratory failure and is found to have bacterial pneumonia. Interval history / Subjective:   States breathing better, limited historian, still on HF O2 but less, no n/v/d. Assessment & Plan:         Acute on chronic hypoxic respiratory failure: due to HCAP, possible pulm edema/effusion, COPD exacerbation  -continue high flow-O2, wean as able  -inhaled bronchodilators  -systemic steroids  -IV furosemide again and reassess  -recent TTE w/ normal LVEF and CLVH     HCAP with sepsis: (leukocytosis, RR, tachycardia)  -IV cefepime; treated w/ ceftriaxone/azithro then Augmentin last admit  -follow-up blood cultures     HTN:  -hold home amlodipine for now     Troponin elevation: mild elevation in the setting of resp failure, lower than last admit. No chest pain. Hypothyroidism:  -continue synthroid     Depression/anxiety:   -continue Zoloft     Recent RSV infection     Remote hx of MI, breast CA, L lung mass excision    Code status: DNR  Prophylaxis: sc Lovenox  Care Plan discussed with: pt's family over the phone   Anticipated Disposition: TBD  Inpatient  Cardiac monitoring: Telemetry    CRITICAL CARE ATTESTATION:  I had a face to face encounter with the patient, reviewed and interpreted patient data including clinical events, labs, images, vital signs, I/O's, and examined patient.   I have discussed the case and the plan and management of the patient's care with the consulting services, the bedside nurses and necessary ancillary providers. NOTE OF PERSONAL INVOLVEMENT IN CARE   This patient has a high probability of imminent, clinically significant deterioration, which requires the highest level of preparedness to intervene urgently. I participated in the decision-making and personally managed or directed the management of the following life and organ supporting interventions that required my frequent assessment to treat or prevent imminent deterioration. I personally spent 35 minutes of critical care time. This is time spent at this critically ill patient's bedside actively involved in patient care as well as the coordination of care and discussions with the patient's family. This does not include any procedural time which has been billed separately. Hospital Problems  Date Reviewed: 2/28/2023            Codes Class Noted POA    Acute on chronic respiratory failure with hypoxia [facility]) ICD-10-CM: J96.21  ICD-9-CM: 518.84, 799.02  3/5/2023 Unknown           Social Determinants of Health     Tobacco Use: Medium Risk    Smoking Tobacco Use: Former    Smokeless Tobacco Use: Never    Passive Exposure: Not on file   Alcohol Use: Not on file   Financial Resource Strain: Not on file   Food Insecurity: Not on file   Transportation Needs: Not on file   Physical Activity: Not on file   Stress: Not on file   Social Connections: Not on file   Intimate Partner Violence: Not on file   Depression: Not on file   Housing Stability: Not on file       Review of Systems:   A comprehensive review of systems was negative except for that written in the HPI. Vital Signs:    Last 24hrs VS reviewed since prior progress note.  Most recent are:  Visit Vitals  BP (!) 147/80   Pulse 79   Temp 97.6 °F (36.4 °C)   Resp 21   Ht 5' 2\" (1.575 m)   Wt 50.5 kg (111 lb 5.3 oz)   SpO2 92%   BMI 20.36 kg/m²       No intake or output data in the 24 hours ending 03/06/23 8844     Physical Examination:     I had a face to face encounter with this patient and independently examined them on 3/6/2023 as outlined below:    General: mild respiratory distress on high flow O2  HEENT: anicteric sclerae normal conjunctiva  Neck: Supple, no JVD, no meningeal signs  Chest: severely diminished breath sounds globally, expiratory wheeze, diminished breath sounds in L base, no accessory muscle use on high flow  CVS: RRR, S1 S2 heard, no murmurs/rubs/gallops  Abd: Soft, non-tender, non-distended, +bowel sounds   Ext: No clubbing, no cyanosis, no edema  Neuro/Psych: grossly non-focal, confused  Cap refill: Brisk, less than 3 seconds  Pulses: 2+, symmetric in all extremities  Skin: Warm, dry, without rashes or lesions    Data Review:    Review and/or order of clinical lab test  Review and/or order of tests in the radiology section of CPT      I have personally and independently reviewed all pertinent labs, diagnostic studies, imaging, and have provided independent interpretation of the same. Labs:     Recent Labs     03/06/23  1159 03/05/23  1520   WBC 27.5* 19.2*   HGB 15.7 16.4*   HCT 49.2* 52.5*   * 458*     Recent Labs     03/06/23  1159 03/05/23  1520    140   K 4.1 4.2    106   CO2 28 30   BUN 33* 27*   CREA 1.16* 1.08*   GLU 96 134*   CA 9.4 9.6   MG 2.3 2.3     Recent Labs     03/05/23  1520   ALT 34   AP 72   TBILI 0.5   TP 7.2   ALB 3.0*   GLOB 4.2*     No results for input(s): INR, PTP, APTT, INREXT in the last 72 hours. No results for input(s): FE, TIBC, PSAT, FERR in the last 72 hours. No results found for: FOL, RBCF   No results for input(s): PH, PCO2, PO2 in the last 72 hours. No results for input(s): CPK, CKNDX, TROIQ in the last 72 hours.     No lab exists for component: CPKMB  No results found for: CHOL, CHOLX, CHLST, CHOLV, HDL, HDLP, LDL, LDLC, DLDLP, TGLX, TRIGL, TRIGP, CHHD, CHHDX  Lab Results   Component Value Date/Time    Glucose (POC) 89 11/13/2014 07:42 AM     Lab Results   Component Value Date/Time    Color YELLOW/STRAW 02/23/2023 04:05 PM    Appearance CLEAR 02/23/2023 04:05 PM    Specific gravity 1.018 02/23/2023 04:05 PM    pH (UA) 5.0 02/23/2023 04:05 PM    Protein 30 (A) 02/23/2023 04:05 PM    Glucose Negative 02/23/2023 04:05 PM    Ketone Negative 02/23/2023 04:05 PM    Bilirubin Negative 02/23/2023 04:05 PM    Urobilinogen 1.0 02/23/2023 04:05 PM    Nitrites Negative 02/23/2023 04:05 PM    Leukocyte Esterase Negative 02/23/2023 04:05 PM    Epithelial cells FEW 02/23/2023 04:05 PM    Bacteria Negative 02/23/2023 04:05 PM    WBC 0-4 02/23/2023 04:05 PM    RBC 0-5 02/23/2023 04:05 PM       Notes reviewed from all clinical/nonclinical/nursing services involved in patient's clinical care. Care coordination discussions were held with appropriate clinical/nonclinical/ nursing providers based on care coordination needs. Patients current active Medications were reviewed, considered, added and adjusted based on the clinical condition today. Home Medications were reconciled to the best of my ability given all available resources at the time of admission. Route is PO if not otherwise noted.       Admission Status:[id number]:::1}      Medications Reviewed:     Current Facility-Administered Medications   Medication Dose Route Frequency    [START ON 3/7/2023] furosemide (LASIX) tablet 20 mg  20 mg Oral DAILY    sodium chloride (NS) flush 5-40 mL  5-40 mL IntraVENous Q8H    sodium chloride (NS) flush 5-40 mL  5-40 mL IntraVENous PRN    acetaminophen (TYLENOL) tablet 650 mg  650 mg Oral Q6H PRN    Or    acetaminophen (TYLENOL) suppository 650 mg  650 mg Rectal Q6H PRN    polyethylene glycol (MIRALAX) packet 17 g  17 g Oral DAILY PRN    ondansetron (ZOFRAN ODT) tablet 4 mg  4 mg Oral Q8H PRN    Or    ondansetron (ZOFRAN) injection 4 mg  4 mg IntraVENous Q6H PRN    albuterol (PROVENTIL VENTOLIN) nebulizer solution 2.5 mg  2.5 mg Nebulization Q4H PRN    aspirin chewable tablet 81 mg  81 mg Oral DAILY    levothyroxine (SYNTHROID) tablet 75 mcg  75 mcg Oral ACB    sertraline (ZOLOFT) tablet 50 mg  50 mg Oral QHS    albuterol-ipratropium (DUO-NEB) 2.5 MG-0.5 MG/3 ML  3 mL Nebulization QID RT    heparin (porcine) injection 5,000 Units  5,000 Units SubCUTAneous Q12H    cefepime (MAXIPIME) 1 g in 0.9% sodium chloride (MBP/ADV) 50 mL MBP  1 g IntraVENous Q12H     Current Outpatient Medications   Medication Sig    albuterol-ipratropium (DUO-NEB) 2.5 mg-0.5 mg/3 ml nebu 3 mL by Nebulization route every six (6) hours as needed for Wheezing for up to 30 days. predniSONE (DELTASONE) 10 mg tablet Take 40 mg by mouth daily (with breakfast) for 2 days, THEN 30 mg daily (with breakfast) for 2 days, THEN 20 mg daily (with breakfast) for 2 days, THEN 10 mg daily (with breakfast) for 2 days. (Patient taking differently: Take 40 mg by mouth daily (with breakfast) for 2 days, THEN 30 mg daily (with breakfast) for 2 days, THEN 20 mg daily (with breakfast) for 2 days, THEN 10 mg daily (with breakfast) for 2 days. (3/1/23-3/9/23))    docusate sodium (COLACE) 100 mg capsule Take 100 mg by mouth two (2) times a day. 0900 and 1700    guaiFENesin ER (MUCINEX) 600 mg ER tablet Take 600 mg by mouth two (2) times a day. Indications: COPD and chronic congestion    therapeutic multivitamin (Thera) tablet Take 1 Tablet by mouth daily. fluticasone-umeclidinium-vilanterol (Trelegy Ellipta) 100-62.5-25 mcg inhaler Take 1 Puff by inhalation daily. Rinse mouth with water after use    acetaminophen (TYLENOL) 325 mg tablet Take 650 mg by mouth every four (4) hours as needed (Mild Pain). senna-docusate (Senexon-S) 8.6-50 mg per tablet Take 2 Tablets by mouth two (2) times daily as needed for Constipation. aspirin 81 mg chewable tablet Take 81 mg by mouth daily. sertraline (ZOLOFT) 50 mg tablet Take 50 mg by mouth nightly.     levothyroxine (SYNTHROID) 75 mcg tablet Take 75 mcg by mouth Daily (before breakfast). amLODIPine (NORVASC) 10 mg tablet Take 10 mg by mouth daily.     latanoprost (XALATAN) 0.005 % ophthalmic solution Administer 1 Drop to both eyes nightly.     ______________________________________________________________________  EXPECTED LENGTH OF STAY: - - -  ACTUAL LENGTH OF STAY:          1                 Jerrell Bull MD

## 2023-03-07 NOTE — ED NOTES
TRANSFER - OUT REPORT:    Verbal report given to [facility] LATANYA MCCAIN RN(name) on Oxana Benjamin  being transferred to 423(unit) for routine progression of care       Report consisted of patients Situation, Background, Assessment and   Recommendations(SBAR). Information from the following report(s) SBAR, ED Summary, MAR, Recent Results, and Alarm Parameters  was reviewed with the receiving nurse. Lines:   Peripheral IV 03/05/23 Right Antecubital (Active)       Peripheral IV 03/05/23 Posterior;Right Hand (Active)        Opportunity for questions and clarification was provided.       Patient transported with:   Monitor  O2 @ 25 liters  Registered Nurse

## 2023-03-07 NOTE — PROGRESS NOTES
Transition of Care: likely back to SNF; patient was at [facility]-health care unit -prior to this readmission; patient normally lives in the assisted living section at [address] [facility] of [address]: likely BLS/stretcher- not set up yet    RUR: 16%    Main contact is Toro Toth- [phone]    1st IM medicare letter received on 3/5/23    Discharge pending:  -patient continues on high flow O2  -patient continues on IV antibiotics  -pending PT/OT consults and recommendations    1500: per chart notes; patient was at [address] [place] [address] from 2/23-2/28/23 and discharged to [facility]; patient was readmitted to [address] [place] [address] on 3/5/23; per chart, patient on chronic O2 NC 2L; has a walker and needs help with all ADLs; patient normally lives in the assisted living section of [address] [place] [address]; [facility]    Reason for Readmission:   acute on chronic respiratory failure            RUR Score/Risk Level:     16%    PCP: First and Last name:     Name of Practice:    Are you a current patient: Yes/No:    Approximate date of last visit:    Can you participate in a virtual visit with your PCP:     Is a Care Conference indicated:   no    Did you attend your follow up appointment (s): If not, why not:  Patient discharged to SNF at last discharge       Resources/supports as identified by patient/family:        Has medicare and insurance; lives in [place] at [address] [facility] facing patient (as identified by patient/family and CM): Finances/Medication cost?     no  Transportation      no- uses family or resources at [facility] [place] or lack thereof? Has supportive children   Living arrangements? In assisted living at [address]        Self-care/ADLs/Cognition?      Alert and oriented 2-3; currently confused       Current Advanced Directive/Advance Care Plan:           DNR- has acp docs  Plan for utilizing home health:  likely no- will need to go to health care unit at [address]             Transition of Care Plan:    Based on readmission, the patient's previous Plan of Care   has been evaluated and/or modified.  The current Transition of Care Plan is:  laura back to our [address] unit        Readmission Assessment  Previous disposition: SNF ([facility])  Did you visit your Primary Care Physician after you left the hospital, before you returned this time?: No (went to SNF for rehab)  Why weren't you able to visit your PCP?: Other (Comment) (went to SNF for rehab)  Who advised the patient to return to the hospital?: Skilled Unit ([address] [facility])     Care Management Interventions  Mode of Transport at Discharge: BLS  Physical Therapy Consult: Yes  Occupational Therapy Consult: Yes  Support Systems: Child(tor), [place] ([facility] [place]  Discharge Location  Patient Expects to be Discharged to[de-identified] Other: (likely to SNF to finish rehab)     CM following  Jayde Berger RN

## 2023-03-07 NOTE — PROGRESS NOTES
1300: TRANSFER - IN REPORT:    Verbal report received from [employer] (name) on Suri Adames  being received from ED (unit) for routine progression of care      Report consisted of patients Situation, Background, Assessment and   Recommendations(SBAR). Information from the following report(s) SBAR, Kardex, and ED Summary was reviewed with the receiving nurse. Opportunity for questions and clarification was provided. Assessment completed upon patients arrival to unit and care assumed.

## 2023-03-07 NOTE — PROGRESS NOTES
[facility]  Hospitalist Group                                                                                          Hospitalist Progress Note  Sudhir Del Castillo MD  Answering service: [phone] from in house phone        Date of Service:  3/7/2023  NAME:  Primo Day  :  1928  MRN:  [id number]       Admission Summary:   Primo Day is a 80 y.o. female with COPD on O2 who was recently discharged from here to SNF after being hospitalized for RSV as well as bacterial pneumonia and MARILYN. She returns with hypoxic respiratory failure and is found to have bacterial pneumonia. Interval history / Subjective:   No acute events overnight, on high flow O2, states she's \"fine,\" denies pain. Assessment & Plan:         Acute on chronic hypoxic respiratory failure: due to HCAP, possible pulm edema/effusion, COPD exacerbation  -continue high flow-O2, wean as able  -inhaled bronchodilators  -systemic steroids  -low-dose PO furosemide  -repeat CXR today 3/7  -recent TTE w/ normal LVEF and CLVH     HCAP with sepsis: (leukocytosis, RR, tachycardia)  -IV cefepime; treated w/ ceftriaxone/azithro then Augmentin last admit  -follow-up blood cultures     HTN:  -hold home amlodipine for now     Troponin elevation: mild elevation in the setting of resp failure, lower than last admit. No chest pain. Hypothyroidism:  -continue synthroid     Depression/anxiety:   -continue Zoloft     Recent RSV infection     Remote hx of MI, breast CA, L lung mass excision    Code status: DNR  Prophylaxis: sc Lovenox  Care Plan discussed with: pt's son over the phone   Anticipated Disposition: TBD  Inpatient  Cardiac monitoring: Telemetry    CRITICAL CARE ATTESTATION:  I had a face to face encounter with the patient, reviewed and interpreted patient data including clinical events, labs, images, vital signs, I/O's, and examined patient.   I have discussed the case and the plan and management of the patient's care with the consulting services, the bedside nurses and necessary ancillary providers. NOTE OF PERSONAL INVOLVEMENT IN CARE   This patient has a high probability of imminent, clinically significant deterioration, which requires the highest level of preparedness to intervene urgently. I participated in the decision-making and personally managed or directed the management of the following life and organ supporting interventions that required my frequent assessment to treat or prevent imminent deterioration. I personally spent 35 minutes of critical care time. This is time spent at this critically ill patient's bedside actively involved in patient care as well as the coordination of care and discussions with the patient's family. This does not include any procedural time which has been billed separately. Hospital Problems  Date Reviewed: 2/28/2023            Codes Class Noted POA    Acute on chronic respiratory failure with hypoxia [facility]) ICD-10-CM: J96.21  ICD-9-CM: 518.84, 799.02  3/5/2023 Unknown         Social Determinants of Health     Tobacco Use: Medium Risk    Smoking Tobacco Use: Former    Smokeless Tobacco Use: Never    Passive Exposure: Not on file   Alcohol Use: Not on file   Financial Resource Strain: Not on file   Food Insecurity: Not on file   Transportation Needs: Not on file   Physical Activity: Not on file   Stress: Not on file   Social Connections: Not on file   Intimate Partner Violence: Not on file   Depression: Not on file   Housing Stability: Not on file       Review of Systems:   Review of systems not obtained due to patient factors. Limited historian, as per HPI       Vital Signs:    Last 24hrs VS reviewed since prior progress note.  Most recent are:  Visit Vitals  /67   Pulse 81   Temp 98.4 °F (36.9 °C)   Resp 24   Ht 5' 2\" (1.575 m)   Wt 50.5 kg (111 lb 5.3 oz)   SpO2 94%   BMI 20.36 kg/m²       No intake or output data in the 24 hours ending 03/07/23 0778     Physical Examination:     I had a face to face encounter with this patient and independently examined them on 3/7/2023 as outlined below:    General: no respiratory distress on high flow O2  HEENT: anicteric sclerae normal conjunctiva  Neck: Supple, no JVD, no meningeal signs  Chest: severely diminished breath sounds globally, minimal expiratory wheeze, diminished breath sounds in L base, no accessory muscle use on high flow  CVS: RRR, S1 S2 heard, no murmurs/rubs/gallops  Abd: Soft, non-tender, non-distended, +bowel sounds   Ext: No clubbing, no cyanosis, no edema  Neuro/Psych: grossly non-focal, not anxious nor agitated  Cap refill: Brisk, less than 3 seconds  Pulses: 2+, symmetric in all extremities  Skin: Warm, dry, without rashes or lesions    Data Review:    Review and/or order of clinical lab test  Review and/or order of tests in the radiology section of CPT      I have personally and independently reviewed all pertinent labs, diagnostic studies, imaging, and have provided independent interpretation of the same. Labs:     Recent Labs     03/07/23  0435 03/06/23  1159   WBC 23.2* 27.5*   HGB 14.7 15.7   HCT 46.8 49.2*    406*       Recent Labs     03/07/23  0313 03/06/23  1159 03/05/23  1520    139 140   K 3.8 4.1 4.2    105 106   CO2 28 28 30   BUN 31* 33* 27*   CREA 0.94 1.16* 1.08*   GLU 78 96 134*   CA 8.9 9.4 9.6   MG 2.2 2.3 2.3       Recent Labs     03/05/23  1520   ALT 34   AP 72   TBILI 0.5   TP 7.2   ALB 3.0*   GLOB 4.2*       No results for input(s): INR, PTP, APTT, INREXT, INREXT in the last 72 hours. No results for input(s): FE, TIBC, PSAT, FERR in the last 72 hours. No results found for: FOL, RBCF   No results for input(s): PH, PCO2, PO2 in the last 72 hours. No results for input(s): CPK, CKNDX, TROIQ in the last 72 hours.     No lab exists for component: CPKMB  No results found for: CHOL, CHOLX, CHLST, CHOLV, HDL, HDLP, LDL, LDLC, DLDLP, TGLX, Ancelmo Turner, [facility], [facility]  Lab Results   Component Value Date/Time    Glucose (POC) 89 11/13/2014 07:42 AM     Lab Results   Component Value Date/Time    Color YELLOW/STRAW 02/23/2023 04:05 PM    Appearance CLEAR 02/23/2023 04:05 PM    Specific gravity 1.018 02/23/2023 04:05 PM    pH (UA) 5.0 02/23/2023 04:05 PM    Protein 30 (A) 02/23/2023 04:05 PM    Glucose Negative 02/23/2023 04:05 PM    Ketone Negative 02/23/2023 04:05 PM    Bilirubin Negative 02/23/2023 04:05 PM    Urobilinogen 1.0 02/23/2023 04:05 PM    Nitrites Negative 02/23/2023 04:05 PM    Leukocyte Esterase Negative 02/23/2023 04:05 PM    Epithelial cells FEW 02/23/2023 04:05 PM    Bacteria Negative 02/23/2023 04:05 PM    WBC 0-4 02/23/2023 04:05 PM    RBC 0-5 02/23/2023 04:05 PM       Notes reviewed from all clinical/nonclinical/nursing services involved in patient's clinical care. Care coordination discussions were held with appropriate clinical/nonclinical/ nursing providers based on care coordination needs. Patients current active Medications were reviewed, considered, added and adjusted based on the clinical condition today. Home Medications were reconciled to the best of my ability given all available resources at the time of admission. Route is PO if not otherwise noted.       Admission Status:[id number]:::1}      Medications Reviewed:     Current Facility-Administered Medications   Medication Dose Route Frequency    predniSONE (DELTASONE) tablet 30 mg  30 mg Oral DAILY WITH BREAKFAST    furosemide (LASIX) tablet 20 mg  20 mg Oral DAILY    sodium chloride (NS) flush 5-40 mL  5-40 mL IntraVENous Q8H    sodium chloride (NS) flush 5-40 mL  5-40 mL IntraVENous PRN    acetaminophen (TYLENOL) tablet 650 mg  650 mg Oral Q6H PRN    Or    acetaminophen (TYLENOL) suppository 650 mg  650 mg Rectal Q6H PRN    polyethylene glycol (MIRALAX) packet 17 g  17 g Oral DAILY PRN    ondansetron (ZOFRAN ODT) tablet 4 mg  4 mg Oral Q8H PRN    Or    [facility]ansetron [facility]) injection 4 mg  4 mg IntraVENous Q6H PRN    albuterol (PROVENTIL VENTOLIN) nebulizer solution 2.5 mg  2.5 mg Nebulization Q4H PRN    aspirin chewable tablet 81 mg  81 mg Oral DAILY    levothyroxine (SYNTHROID) tablet 75 mcg  75 mcg Oral ACB    sertraline (ZOLOFT) tablet 50 mg  50 mg Oral QHS    albuterol-ipratropium (DUO-NEB) 2.5 MG-0.5 MG/3 ML  3 mL Nebulization QID RT    heparin (porcine) injection 5,000 Units  5,000 Units SubCUTAneous Q12H    cefepime (MAXIPIME) 1 g in 0.9% sodium chloride (MBP/ADV) 50 mL MBP  1 g IntraVENous Q12H     Current Outpatient Medications   Medication Sig    albuterol-ipratropium (DUO-NEB) 2.5 mg-0.5 mg/3 ml nebu 3 mL by Nebulization route every six (6) hours as needed for Wheezing for up to 30 days. predniSONE (DELTASONE) 10 mg tablet Take 40 mg by mouth daily (with breakfast) for 2 days, THEN 30 mg daily (with breakfast) for 2 days, THEN 20 mg daily (with breakfast) for 2 days, THEN 10 mg daily (with breakfast) for 2 days. (Patient taking differently: Take 40 mg by mouth daily (with breakfast) for 2 days, THEN 30 mg daily (with breakfast) for 2 days, THEN 20 mg daily (with breakfast) for 2 days, THEN 10 mg daily (with breakfast) for 2 days. (3/1/23-3/9/23))    docusate sodium (COLACE) 100 mg capsule Take 100 mg by mouth two (2) times a day. 0900 and 1700    guaiFENesin ER (MUCINEX) 600 mg ER tablet Take 600 mg by mouth two (2) times a day. Indications: COPD and chronic congestion    therapeutic multivitamin (Thera) tablet Take 1 Tablet by mouth daily. fluticasone-umeclidinium-vilanterol (Trelegy Ellipta) 100-62.5-25 mcg inhaler Take 1 Puff by inhalation daily. Rinse mouth with water after use    acetaminophen (TYLENOL) 325 mg tablet Take 650 mg by mouth every four (4) hours as needed (Mild Pain). senna-docusate (Senexon-S) 8.6-50 mg per tablet Take 2 Tablets by mouth two (2) times daily as needed for Constipation.     aspirin 81 mg chewable tablet Take 81 mg by mouth daily.    sertraline (ZOLOFT) 50 mg tablet Take 50 mg by mouth nightly. levothyroxine (SYNTHROID) 75 mcg tablet Take 75 mcg by mouth Daily (before breakfast). amLODIPine (NORVASC) 10 mg tablet Take 10 mg by mouth daily.     latanoprost (XALATAN) 0.005 % ophthalmic solution Administer 1 Drop to both eyes nightly.     ______________________________________________________________________  EXPECTED LENGTH OF STAY: - - -  ACTUAL LENGTH OF STAY:          2                 Wendell Hammans, MD

## 2023-03-07 NOTE — PROGRESS NOTES
Occupational Therapy   [phone]    Orders acknowledged and chart reviewed in prep for OT evaluation. Patient currently on HFNC at 25L/min, FIO2 74%. Will defer and follow up as able and medically appropriate. Thank you. Ralph Salazar, MS, OTR/L

## 2023-03-08 NOTE — PROGRESS NOTES
Referral received and acknowledged. The patient is in the bed being fed by the nursing tech. She is awake and alert. She requested we hold on therapy for today. Will follow up and eval as able.

## 2023-03-08 NOTE — PROGRESS NOTES
Transition of Care: likely back to SNF; patient was at [facility]-health care unit -prior to this readmission; patient normally lives in the assisted living section at [facility]/ [address]: likely BLS/stretcher- not set up yet; patient is normally on 2L NC O2     RUR: 16%     Main contact is Brianna Chen- [phone]     1st IM medicare letter received on 3/5/23     Discharge pending:  -patient continues on high flow O2  -patient continues on IV antibiotics  -pending PT/OT consults and recommendations    [id number]: this CM spoke to [facility]; she confirmed that they can accept patient back at discharge time     NOTES: per chart notes; patient was at [facility] from 2/23-2/28/23 and discharged to [facility]; patient was readmitted to [facility] on 3/5/23; per chart, patient on chronic O2 NC 2L; has a walker/rollator and needs help with all ADLs; patient normally lives in the assisted living section of [address]; she is normally alert and oriented 2-3                            CM following  Michael Hoffman RN          Revision History

## 2023-03-08 NOTE — CONSULTS
Pulmonary, Critical Care, and Sleep Medicine~Consult Note    Name: Edna Hendricks MRN: [id number]   : 1928 Hospital: [facility]    Date: 3/8/2023 11:53 AM Admission: 3/5/2023     Impression Plan   Acute on chronic hypoxic resp failure   AE of COPD  RSV infection, but was + on last admission  LLL  HAP. Unclear if new or same from recent admission  HTN O2 titration above 90% , would transition to midflow as soon as possible  Continue on PO steroids  Noted abx coverage   Bronchodilators   On lasix  Agree with monitoring a short while; if no clinical improvement noted consideration of transition to comfort       Daily Progression:    Consult Note requested by hospitalist.     Tammi Davis presented for admission secondary to worsening dyspnea at her SNF. She was found at admission to have what was suspected to be a HAP and RSV infection. She has known COPD and is in exacerbation as well. Currently on 25lpm and 70% FiO2. Unlabored in breathing. She was just recently hospitalized and discharged at the end of Feb for similar issues. I have reviewed the labs and previous days notes. Review of systems not obtained due to patient factors.   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[address]     LUNG    Chronic obstructive pulmonary disease (HCC)     Elevated cholesterol     Glaucoma     H/O: pneumonia     History of pelvic fracture 2018    Hypertension     Hypothyroid     Pacemaker 2018    Sick sinus syndrome ([address]     Syncope       Past Surgical History:   Procedure Laterality Date    HX CATARACT REMOVAL Bilateral     HX HEART CATHETERIZATION      HX OTHER SURGICAL  2013    EXC LESIONS FACE, LIP    HX PACEMAKER  2018    HX PACEMAKER PLACEMENT Left 01/2018    SC UNLISTED PROCEDURE BREAST  1998    right mastectomy    SC UNLISTED PROCEDURE LUNGS & PLEURA Left 1998    EXC LUNG MASS      Prior to Admission medications    Medication Sig Start Date End Date Taking? Authorizing Provider   albuterol-ipratropium (DUO-NEB) 2.5 mg-0.5 mg/3 ml nebu 3 mL by Nebulization route every six (6) hours as needed for Wheezing for up to 30 days. 2/28/23 3/30/23  Armond Parry MD   predniSONE (DELTASONE) 10 mg tablet Take 40 mg by mouth daily (with breakfast) for 2 days, THEN 30 mg daily (with breakfast) for 2 days, THEN 20 mg daily (with breakfast) for 2 days, THEN 10 mg daily (with breakfast) for 2 days. Patient taking differently: Take 40 mg by mouth daily (with breakfast) for 2 days, THEN 30 mg daily (with breakfast) for 2 days, THEN 20 mg daily (with breakfast) for 2 days, THEN 10 mg daily (with breakfast) for 2 days. (3/1/23-3/9/23) 3/1/23 3/9/23  Armond Parry MD   docusate sodium (COLACE) 100 mg capsule Take 100 mg by mouth two (2) times a day. 0900 and 1700    Provider, Historical   guaiFENesin ER (MUCINEX) 600 mg ER tablet Take 600 mg by mouth two (2) times a day. Indications: COPD and chronic congestion    Provider, Historical   therapeutic multivitamin (Thera) tablet Take 1 Tablet by mouth daily. Provider, Historical   fluticasone-umeclidinium-vilanterol (Trelegy Ellipta) 100-62.5-25 mcg inhaler Take 1 Puff by inhalation daily. Rinse mouth with water after use    Provider, Historical   acetaminophen (TYLENOL) 325 mg tablet Take 650 mg by mouth every four (4) hours as needed (Mild Pain). Provider, Historical   senna-docusate (Senexon-S) 8.6-50 mg per tablet Take 2 Tablets by mouth two (2) times daily as needed for Constipation. Provider, Historical   aspirin 81 mg chewable tablet Take 81 mg by mouth daily. Other, MD Janak   sertraline (ZOLOFT) 50 mg tablet Take 50 mg by mouth nightly.  7/25/21   Provider, Historical   levothyroxine (SYNTHROID) 75 mcg tablet Take 75 mcg by mouth Daily (before breakfast). 21   Provider, Historical   amLODIPine (NORVASC) 10 mg tablet Take 10 mg by mouth daily. 3/15/18   Provider, Historical   latanoprost (XALATAN) 0.005 % ophthalmic solution Administer 1 Drop to both eyes nightly. 17   Provider, Historical     No Known Allergies   Social History     Tobacco Use    Smoking status: Former     Packs/day: 0.25     Years: 10.00     Pack years: 2.50     Types: Cigarettes     Quit date: 1989     Years since quittin.2    Smokeless tobacco: Never   Substance Use Topics    Alcohol use: No      Family History   Family history unknown: Yes     OBJECTIVE:     Vital Signs:     Visit Vitals  BP (!) 174/71 (BP 1 Location: Left upper arm, BP Patient Position: At rest)   Pulse 80   Temp 97 °F (36.1 °C)   Resp 24   Ht 5' 2\" (1.575 m)   Wt 50.5 kg (111 lb 5.3 oz)   SpO2 95%   BMI 20.36 kg/m²      Temp (24hrs), Av.7 °F (36.5 °C), Min:97 °F (36.1 °C), Max:98.6 °F (37 °C)     Intake/Output:     Last shift:  0701 -  1900  In: -   Out: 100     Last 3 shifts: No intake/output data recorded.         Intake/Output Summary (Last 24 hours) at 3/8/2023 1153  Last data filed at 3/8/2023 0758  Gross per 24 hour   Intake --   Output 100 ml   Net -100 ml       Physical Exam:                                        Exam Findings Other   General: No resp distress noted, appears stated age    [de-identified]:  No ulcers, JVD not elevated, no cervical LAD    Chest: No pectus deformity, normal chest rise b/l    HEART:  RRR, no murmurs/rubs/gallops    Lungs:  CTA b/l, no rhonchi/crackles/wheeze, diminished BS at bases    ABD: Soft/NT, non rigid mildly distended    EXT: No cyanosis/clubbing/edema, normal peripheral pulses    Skin: No rashes or ulcers, no mottling    Neuro: Resting         Medications:  Current Facility-Administered Medications   Medication Dose Route Frequency    guaiFENesin ER (MUCINEX) tablet 600 mg  600 mg Oral BID    amLODIPine (NORVASC) tablet 10 mg  10 mg Oral DAILY    predniSONE (DELTASONE) tablet 30 mg  30 mg Oral DAILY WITH BREAKFAST    azithromycin (ZITHROMAX) tablet 500 mg  500 mg Oral DAILY    furosemide (LASIX) tablet 20 mg  20 mg Oral DAILY    sodium chloride (NS) flush 5-40 mL  5-40 mL IntraVENous Q8H    sodium chloride (NS) flush 5-40 mL  5-40 mL IntraVENous PRN    acetaminophen (TYLENOL) tablet 650 mg  650 mg Oral Q6H PRN    Or    acetaminophen (TYLENOL) suppository 650 mg  650 mg Rectal Q6H PRN    polyethylene glycol (MIRALAX) packet 17 g  17 g Oral DAILY PRN    ondansetron (ZOFRAN ODT) tablet 4 mg  4 mg Oral Q8H PRN    Or    ondansetron (ZOFRAN) injection 4 mg  4 mg IntraVENous Q6H PRN    albuterol (PROVENTIL VENTOLIN) nebulizer solution 2.5 mg  2.5 mg Nebulization Q4H PRN    aspirin chewable tablet 81 mg  81 mg Oral DAILY    levothyroxine (SYNTHROID) tablet 75 mcg  75 mcg Oral ACB    sertraline (ZOLOFT) tablet 50 mg  50 mg Oral QHS    albuterol-ipratropium (DUO-NEB) 2.5 MG-0.5 MG/3 ML  3 mL Nebulization QID RT    heparin (porcine) injection 5,000 Units  5,000 Units SubCUTAneous Q12H    cefepime (MAXIPIME) 1 g in 0.9% sodium chloride (MBP/ADV) 50 mL MBP  1 g IntraVENous Q12H       Labs:  ABG Recent Labs     03/05/23  1528   PHI 7.49*   PCO2I 38.5   PO2I 54*   HCO3I 29.0*   SO2I 90.2*        CBC Recent Labs     03/07/23  0435 03/06/23  1159 03/05/23  1520   WBC 23.2* 27.5* 19.2*   HGB 14.7 15.7 16.4*   HCT 46.8 49.2* 52.5*    406* 458*   MCV 93.8 92.1 94.1   MCH 29.5 29.4 63.6        Metabolic  Panel Recent Labs     03/07/23  0313 03/06/23  1159 03/05/23  1520    139 140   K 3.8 4.1 4.2    105 106   CO2 28 28 30   GLU 78 96 134*   BUN 31* 33* 27*   CREA 0.94 1.16* 1.08*   CA 8.9 9.4 9.6   MG 2.2 2.3 2.3   ALB  --   --  3.0*   ALT  --   --  34        Pertinent Labs                Giuseppe Sotomayor PA-C  3/8/2023

## 2023-03-08 NOTE — PROGRESS NOTES
Bedside and Verbal shift change report given to Noa Waldrop (oncoming nurse) by Gustabo Holland (offgoing nurse). Report included the following information SBAR and Kardex.

## 2023-03-08 NOTE — PROGRESS NOTES
[address] [facility] Adult  Hospitalist Group                                                                                          Hospitalist Progress Note  Shelia Rai MD  Answering service: [phone] OR 36 from in house phone        Date of Service:  3/8/2023  NAME:  Roopa Means  :  1928  MRN:  [id number]       Admission Summary:   Roopa Means is a 80 y.o. female with COPD on O2 who was recently discharged from here to SNF after being hospitalized for RSV as well as bacterial pneumonia and MARILYN. She returns with hypoxic respiratory failure and is found to have bacterial pneumonia. Interval history / Subjective:   No acute events overnight, on high flow O2, states she's \"fine,\" denies pain. Remains on 25L 69% FiO2. Assessment & Plan:         Acute on chronic hypoxic respiratory failure: due to HCAP, possible pulm edema/effusion, COPD exacerbation  -continue high flow-O2, wean as able  -inhaled bronchodilators  -systemic steroids  -low-dose PO furosemide - appears dry so will hold this tomorrow  -repeat CXR 3/7 - no significant change  -recent TTE w/ normal LVEF and CLVH  -consult pulmonary     HCAP with sepsis: (leukocytosis, RR, tachycardia)  -IV cefepime; treated w/ ceftriaxone/azithro then Augmentin last admit  -follow-up blood cultures     HTN:  -resume home amlodipine     Troponin elevation: mild elevation in the setting of resp failure, lower than last admit. No chest pain. Hypothyroidism:  -continue synthroid     Depression/anxiety:   -continue Zoloft     Recent RSV infection     Remote hx of MI, breast CA, L lung mass excision    Pt's son/mPOA updated 3/8 AM, discussed her progress/ she is stable but needing quite a bit of support. She is very frail, poor baseline pulmonary status, poor functional status and advanced age. If she recovers from this hospitalization, her functional status will remain poor and her risk of readmission is very high. Discussed comfort measures/hospice with him as he noted she would not want to spend so much time in a hospital bed like she is now. He will discuss with the rest of his family and make a decision depending on her clinical course over the next few days. Code status: DNR  Prophylaxis: sc Lovenox  Care Plan discussed with: pt's son over the phone 3/8  Anticipated Disposition: TBD  Inpatient  Cardiac monitoring: Telemetry    CRITICAL CARE ATTESTATION:  I had a face to face encounter with the patient, reviewed and interpreted patient data including clinical events, labs, images, vital signs, I/O's, and examined patient. I have discussed the case and the plan and management of the patient's care with the consulting services, the bedside nurses and necessary ancillary providers. NOTE OF PERSONAL INVOLVEMENT IN CARE   This patient has a high probability of imminent, clinically significant deterioration, which requires the highest level of preparedness to intervene urgently. I participated in the decision-making and personally managed or directed the management of the following life and organ supporting interventions that required my frequent assessment to treat or prevent imminent deterioration. I personally spent 35 minutes of critical care time. This is time spent at this critically ill patient's bedside actively involved in patient care as well as the coordination of care and discussions with the patient's family. This does not include any procedural time which has been billed separately.        Hospital Problems  Date Reviewed: 2/28/2023            Codes Class Noted POA    Acute on chronic respiratory failure with hypoxia [facility]) ICD-10-CM: C15.95  ICD-9-CM: 518.84, 799.02  3/5/2023 Unknown         Social Determinants of Health     Tobacco Use: Medium Risk    Smoking Tobacco Use: Former    Smokeless Tobacco Use: Never    Passive Exposure: Not on file   Alcohol Use: Not on file   Financial Resource Strain: Not on file   Food Insecurity: Not on file   Transportation Needs: Not on file   Physical Activity: Not on file   Stress: Not on file   Social Connections: Not on file   Intimate Partner Violence: Not on file   Depression: Not on file   Housing Stability: Not on file       Review of Systems:   Review of systems not obtained due to patient factors. Limited historian, as per HPI       Vital Signs:    Last 24hrs VS reviewed since prior progress note. Most recent are:  Visit Vitals  BP (!) 147/65   Pulse 75   Temp 97.7 °F (36.5 °C)   Resp 17   Ht 5' 2\" (1.575 m)   Wt 50.5 kg (111 lb 5.3 oz)   SpO2 95%   BMI 20.36 kg/m²         Intake/Output Summary (Last 24 hours) at 3/8/2023 4172  Last data filed at 3/8/2023 2192  Gross per 24 hour   Intake --   Output 100 ml   Net -100 ml          Physical Examination:     I had a face to face encounter with this patient and independently examined them on 3/8/2023 as outlined below:    General: no respiratory distress on high flow O2  HEENT: anicteric sclerae normal conjunctiva  Neck: Supple, no JVD, no meningeal signs  Chest: severely diminished breath sounds globally, no wheeze today, diminished breath sounds in L base, no accessory muscle use on high flow  CVS: RRR, S1 S2 heard, no murmurs/rubs/gallops  Abd: Soft, non-tender, non-distended, +bowel sounds   Ext: No clubbing, no cyanosis, no edema  Neuro/Psych: grossly non-focal, not anxious nor agitated  Cap refill: Brisk, less than 3 seconds  Pulses: 2+, symmetric in all extremities  Skin: Warm, dry, without rashes or lesions    Data Review:    Review and/or order of clinical lab test  Review and/or order of tests in the radiology section of CPT      I have personally and independently reviewed all pertinent labs, diagnostic studies, imaging, and have provided independent interpretation of the same.      Labs:     Recent Labs     03/07/23  0435 03/06/23  1159   WBC 23.2* 27.5*   HGB 14.7 15.7   HCT 46.8 49.2*    406* Recent Labs     03/07/23  0313 03/06/23  1159 03/05/23  1520    139 140   K 3.8 4.1 4.2    105 106   CO2 28 28 30   BUN 31* 33* 27*   CREA 0.94 1.16* 1.08*   GLU 78 96 134*   CA 8.9 9.4 9.6   MG 2.2 2.3 2.3       Recent Labs     03/05/23  1520   ALT 34   AP 72   TBILI 0.5   TP 7.2   ALB 3.0*   GLOB 4.2*       No results for input(s): INR, PTP, APTT, INREXT, INREXT in the last 72 hours. No results for input(s): FE, TIBC, PSAT, FERR in the last 72 hours. No results found for: FOL, RBCF   No results for input(s): PH, PCO2, PO2 in the last 72 hours. No results for input(s): CPK, CKNDX, TROIQ in the last 72 hours. No lab exists for component: CPKMB  No results found for: CHOL, CHOLX, CHLST, CHOLV, HDL, HDLP, LDL, LDLC, DLDLP, TGLX, TRIGL, TRIGP, CHHD, CHHDX  Lab Results   Component Value Date/Time    Glucose (POC) 101 03/08/2023 08:45 AM    Glucose (POC) 114 03/07/2023 10:02 PM    Glucose (POC) 73 03/07/2023 05:06 PM    Glucose (POC) 89 11/13/2014 07:42 AM     Lab Results   Component Value Date/Time    Color YELLOW/STRAW 02/23/2023 04:05 PM    Appearance CLEAR 02/23/2023 04:05 PM    Specific gravity 1.018 02/23/2023 04:05 PM    pH (UA) 5.0 02/23/2023 04:05 PM    Protein 30 (A) 02/23/2023 04:05 PM    Glucose Negative 02/23/2023 04:05 PM    Ketone Negative 02/23/2023 04:05 PM    Bilirubin Negative 02/23/2023 04:05 PM    Urobilinogen 1.0 02/23/2023 04:05 PM    Nitrites Negative 02/23/2023 04:05 PM    Leukocyte Esterase Negative 02/23/2023 04:05 PM    Epithelial cells FEW 02/23/2023 04:05 PM    Bacteria Negative 02/23/2023 04:05 PM    WBC 0-4 02/23/2023 04:05 PM    RBC 0-5 02/23/2023 04:05 PM       Notes reviewed from all clinical/nonclinical/nursing services involved in patient's clinical care. Care coordination discussions were held with appropriate clinical/nonclinical/ nursing providers based on care coordination needs.          Patients current active Medications were reviewed, considered, added and adjusted based on the clinical condition today. Home Medications were reconciled to the best of my ability given all available resources at the time of admission. Route is PO if not otherwise noted.       Admission Status:[id number]:::1}      Medications Reviewed:     Current Facility-Administered Medications   Medication Dose Route Frequency    predniSONE (DELTASONE) tablet 30 mg  30 mg Oral DAILY WITH BREAKFAST    azithromycin (ZITHROMAX) tablet 500 mg  500 mg Oral DAILY    furosemide (LASIX) tablet 20 mg  20 mg Oral DAILY    sodium chloride (NS) flush 5-40 mL  5-40 mL IntraVENous Q8H    sodium chloride (NS) flush 5-40 mL  5-40 mL IntraVENous PRN    acetaminophen (TYLENOL) tablet 650 mg  650 mg Oral Q6H PRN    Or    acetaminophen (TYLENOL) suppository 650 mg  650 mg Rectal Q6H PRN    polyethylene glycol (MIRALAX) packet 17 g  17 g Oral DAILY PRN    ondansetron (ZOFRAN ODT) tablet 4 mg  4 mg Oral Q8H PRN    Or    ondansetron (ZOFRAN) injection 4 mg  4 mg IntraVENous Q6H PRN    albuterol (PROVENTIL VENTOLIN) nebulizer solution 2.5 mg  2.5 mg Nebulization Q4H PRN    aspirin chewable tablet 81 mg  81 mg Oral DAILY    levothyroxine (SYNTHROID) tablet 75 mcg  75 mcg Oral ACB    sertraline (ZOLOFT) tablet 50 mg  50 mg Oral QHS    albuterol-ipratropium (DUO-NEB) 2.5 MG-0.5 MG/3 ML  3 mL Nebulization QID RT    heparin (porcine) injection 5,000 Units  5,000 Units SubCUTAneous Q12H    cefepime (MAXIPIME) 1 g in 0.9% sodium chloride (MBP/ADV) 50 mL MBP  1 g IntraVENous Q12H     ______________________________________________________________________  EXPECTED LENGTH OF STAY: 5d 0h  ACTUAL LENGTH OF STAY:          3                 Chalice Bernheim, MD

## 2023-03-08 NOTE — PROGRESS NOTES
Problem: Self Care Deficits Care Plan (Adult)  Goal: *Acute Goals and Plan of Care (Insert Text)  Description: FUNCTIONAL STATUS PRIOR TO ADMISSION: Patient was modified independent using a single point cane for functional mobility. HOME SUPPORT: Pt was received from [facility]    Occupational Therapy Goals  Initiated 3/8/2023  1. Patient will perform self-feeding with supervision/set-up within 7 day(s). 2.  Patient will perform grooming with minimal assistance/contact guard assist within 7 day(s). 3.  Patient will perform bathing with minimal assistance/contact guard assist within 7 day(s). 4.  Patient will perform toilet transfers with moderate assistance  within 7 day(s). 5.  Patient will perform all aspects of toileting with moderate assistance  within 7 day(s). 6.  Patient will participate in upper extremity therapeutic exercise/activities with minimal assistance/contact guard assist for 5 minutes within 7 day(s). 7.  Patient will utilize energy conservation techniques during functional activities with verbal cues within 7 day(s). Outcome: Not Met   OCCUPATIONAL THERAPY EVALUATION  Patient: Brock Altamirano (80 y.o. female)  Date: 3/8/2023  Primary Diagnosis: Acute on chronic respiratory failure with hypoxia (HCC) [J96.21]       Precautions: fall       ASSESSMENT  Based on the objective data described below, the patient presents with decreased conditioning, decreased orientation, decreased coorination, and is currently on 25L of o2 and high flow nc. Pt was received semi reclined in bed and agreeable to therapy. Pt was oriented to self but not oriented to place, time or situation when given option she was able to choose the correct option. Pt stated she is normally very independent at baseline, pt is a poor historian hard to determine how much assistance she receives at baseline.  Pt was able to go from supine to sit with min-mod A, no reports of dizziness, o2 stayed around 90-92 percent with movement. Pt was able to wash face with set up, CGA/min A for trunk stability most likely due to her feet not being able to touch the ground? Pt was min A for sit to supine and to brush teeth with suction , with set up. Pt demonstrated WFL ROM and good strength in BUE 3+/5 bilaterally. Pt was mod A for rolling side to side for bed level hygiene able to use handrails to assist with rolling. Pt was left with hob elevated and all needs met. It is recommended that pt receive skilled acute care ot while admitted in the hospital in addition it is recommended pt return to snf at ID. Current Level of Function Impacting Discharge (ADLs/self-care): Pt is currently mod-max for adls     Functional Outcome Measure: The patient scored Total: 25/100 on the Barthel Index outcome measure which is indicative of being Very dependent in basic self-care. Other factors to consider for discharge: medical course of dc     Patient will benefit from skilled therapy intervention to address the above noted impairments. PLAN :  Recommendations and Planned Interventions: self care training, functional mobility training, therapeutic exercise, balance training, therapeutic activities, endurance activities, patient education, home safety training, and family training/education    Frequency/Duration: Patient will be followed by occupational therapy 3 times a week to address goals. Recommendation for discharge: (in order for the patient to meet his/her long term goals)  Therapy up to 5 days/week in SNF setting    This discharge recommendation:  Has not yet been discussed the attending provider and/or case management    IF patient discharges home will need the following DME: tbd       SUBJECTIVE:   Patient stated Galina Saul you come  back and see me. 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    Chronic obstructive pulmonary disease (HCC)     Elevated cholesterol     Glaucoma     H/O: pneumonia 2013    History of pelvic fracture 2018    Hypertension     Hypothyroid     Pacemaker 01/09/2018    Sick sinus syndrome ([facility] [address]     Syncope      Past Surgical History:   Procedure Laterality Date    HX CATARACT REMOVAL Bilateral 2001    HX HEART CATHETERIZATION      HX OTHER SURGICAL  2013    [address] LIP    HX PACEMAKER  2018    HX PACEMAKER PLACEMENT Left 01/2018    AR UNLISTED PROCEDURE BREAST  1998    right mastectomy    AR UNLISTED PROCEDURE LUNGS & PLEURA Left 1998    EXC LUNG MASS       Expanded or extensive additional review of patient history:     Home Situation  Support Systems: Child(tor), [facility])  Patient Expects to be Discharged to[de-identified] Skilled nursing facility    Hand dominance: Right    EXAMINATION OF PERFORMANCE DEFICITS:  Cognitive/Behavioral Status:  Neurologic State: Alert;Eyes open to voice  Orientation Level: Oriented to person;Disoriented to place; Disoriented to situation;Disoriented to time  Cognition: Follows commands             Skin:   Intact in visible areas      Edema:   None noted    Hearing:   intact    Vision/Perceptual:                                     Range of Motion:    AROM: Generally decreased, functional  PROM: Generally decreased, functional                      Strength:    Strength: Generally decreased, functional                Coordination:  Coordination: Within functional limits  Fine Motor Skills-Upper: Left Intact; Right Intact    Gross Motor Skills-Upper: Left Intact; Right Intact    Tone & Sensation:    Tone: Normal  Sensation: Intact                      Balance:  Sitting: Impaired; Without support  Sitting - Static: Fair (occasional)  Sitting - Dynamic: Fair (occasional); Poor (constant support)  Standing:  (did not attempt)    Functional Mobility and Transfers for ADLs:  Bed Mobility:  Rolling: Minimum assistance; Moderate assistance  Supine to Sit: Minimum assistance  Sit to Supine: Minimum assistance  Scooting: Minimum assistance; Moderate assistance (helped scooting forward to EOB, able to bend knees and push with feet to assist with scooting to head of bed)    Transfers:       ADL Assessment:  Feeding: Setup; Independent    Oral Facial Hygiene/Grooming: Setup; Independent              Upper Body Dressing: Minimum assistance (inferred)                          ADL Intervention and task modifications:  Feeding  Container Management: Contact guard assistance  Drink to Mouth: Stand-by assistance    Grooming  Grooming Assistance: Stand-by assistance  Position Performed: Long sitting on bed  Washing Face: Set-up; Independent  Washing Hands: Set-up; Independent  Brushing Teeth: Set-up; Independent                        Lower Body Dressing Assistance  Dressing Assistance: Maximum assistance  Protective Undergarmet: Maximum assistance  Socks: Maximum assistance  Position Performed: Supine    Toileting  Toileting Assistance: Maximum assistance  Bladder Hygiene: Maximum assistance (purwick)  Bowel Hygiene: Maximum assistance           Functional Measure:    Barthel Index:  Bathin  Bladder: 5  Bowels: 5  Groomin  Dressin  Feedin  Mobility: 0  Stairs: 0  Toilet Use: 0  Transfer (Bed to Chair and Back): 0  Total: 25/100      The Barthel ADL Index: Guidelines  1. The index should be used as a record of what a patient does, not as a record of what a patient could do. 2. The main aim is to establish degree of independence from any help, physical or verbal, however minor and for whatever reason. 3. The need for supervision renders the patient not independent. 4. A patient's performance should be established using the best available evidence.  Asking the patient, friends/relatives and nurses are the usual sources, but direct observation and common sense are also important. However direct testing is not needed. 5. Usually the patient's performance over the preceding 24-48 hours is important, but occasionally longer periods will be relevant. 6. Middle categories imply that the patient supplies over 50 per cent of the effort. 7. Use of aids to be independent is allowed. Score Interpretation (from [address])    Independent   60-79 Minimally independent   40-59 Partially dependent   20-39 Very dependent   <20 Totally dependent     -Godfrey Altamirano., Barthel, D.W. (1965). Functional evaluation: the Barthel Index. [address] ([address] (1997). The Barthel activities of daily living index: self-reporting versus actual performance in the old (> or = 75 years). Journal of [address] [place] [address] 45(7), [facility], JCHANDLER, Luis Alfredo Tatum., Guanaco Miller (1999). Measuring the change in disability after inpatient rehabilitation; comparison of the responsiveness of the Barthel Index and Functional Hand Measure. Journal of Neurology, Neurosurgery, and Psychiatry, 66(4), 528-666. Corinna Patel, N.J.A, LILIAN Chandra, & Drake Jerez M.A. (2004) Assessment of post-stroke quality of life in cost-effectiveness studies: The usefulness of the Barthel Index and the EuroQoL-5D.  Quality of Life Research, 15, 997-30     Occupational Therapy Evaluation Charge Determination   History Examination Decision-Making   LOW Complexity : Brief history review  LOW Complexity : 1-3 performance deficits relating to physical, cognitive , or psychosocial skils that result in activity limitations and / or participation restrictions  LOW Complexity : No comorbidities that affect functional and no verbal or physical assistance needed to complete eval tasks       Based on the above components, the patient evaluation is determined to be of the following complexity level: LOW Pain Rating:  none    Activity Tolerance:   Fair, Poor, desaturates with exertion and requires oxygen, and requires rest breaks    After treatment patient left in no apparent distress:    Supine in bed, Call bell within reach, and Side rails x 3    COMMUNICATION/EDUCATION:   The patients plan of care was discussed with: Physical therapist and Registered nurse. Home safety education was provided and the patient/caregiver indicated understanding.         Thank you for this referral.  Bebo Ngo OT  Time Calculation: 32 mins

## 2023-03-09 NOTE — PROGRESS NOTES
Problem: Mobility Impaired (Adult and Pediatric)  Goal: *Acute Goals and Plan of Care (Insert Text)  Description: FUNCTIONAL STATUS PRIOR TO ADMISSION: Per chart review, pt lives in [place] at [facility] where she ambulates with RW, requires assist with ADLs. Pt recently residing in SNF at [facility] following recent hospitalization 2/23-2/28      Physical Therapy Goals  Initiated 3/9/2023  1. Patient will move from supine to sit and sit to supine  in bed with minimal assistance/contact guard assist within 7 day(s). 2.  Patient will transfer from bed to chair and chair to bed with moderate assistance  using the least restrictive device within 7 day(s). 3.  Patient will perform sit to stand with moderate assistance  within 7 day(s). 4.  Patient will ambulate with moderate assistance  for at least 10 feet with the least restrictive device within 7 day(s). Outcome: Not Met   PHYSICAL THERAPY EVALUATION  Patient: Irina Odonnell (35 y.o. female)  Date: 3/9/2023  Primary Diagnosis: Acute on chronic respiratory failure with hypoxia (HCC) [J96.21]       Precautions:  DNR, Fall    ASSESSMENT  Based on the objective data described below, the patient presents with decreased activity tolerance, general weakness, impaired balance, decline in functional mobility following admission from SNF with acute respiratory failure. Pt received semi-reclined with HFO2 @ 25L/ 69%. Alert, appeared fatigued, minimally conversant, SpO2 91%. Pt moved supine to sit with mod A for LEs and trunk; declined scooting closer to EOB in attempt to reach feet to floor due to fatigue. SpO2 88-90% while sitting EOB. Pt unable to tolerate additional activity and requested return to supine; required max A to move EOB to semi-reclined position; avoided full supine due to pt with wet cough. Pt remains below reported functional baseline. Will con't to follow for mobility progression as tolerated.     Current Level of Function Impacting Discharge (mobility/balance): bed mob mod/ max A    Other factors to consider for discharge: remains on HFO2     Patient will benefit from skilled therapy intervention to address the above noted impairments. PLAN :  Recommendations and Planned Interventions: bed mobility training, transfer training, gait training, therapeutic exercises, patient and family training/education, and therapeutic activities      Frequency/Duration: Patient will be followed by physical therapy:  3 times a week to address goals.     Recommendation for discharge: (in order for the patient to meet his/her long term goals)  Therapy up to 5 days/week in SNF setting    This discharge recommendation:  Has not yet been discussed the attending provider and/or case management    IF patient discharges home will need the following DME: to be determined (TBD)         SUBJECTIVE:   Patient minimally conversant    OBJECTIVE DATA SUMMARY:   HISTORY:    Past Medical History:   Diagnosis Date    Arm fracture, left 2013    Arm fracture, right 1999    Bell's palsy 2005, 2011    H/O BELL'S PALSY X2    CAD (coronary artery disease) 1996    MI , STENT    Cancer ([address] 2008    BREAST RIGHT - [place] RADIATION    Cancer ([address]     LUNG    Chronic obstructive pulmonary disease ([address]     Elevated cholesterol     Glaucoma     H/O: pneumonia 2013    History of pelvic fracture 2018    Hypertension     Hypothyroid     Pacemaker 01/09/2018    Sick sinus syndrome ([address]     Syncope      Past Surgical History:   Procedure Laterality Date    HX CATARACT REMOVAL Bilateral 2001    HX HEART CATHETERIZATION      HX OTHER SURGICAL  2013    [address] LIP    HX PACEMAKER  2018    HX PACEMAKER PLACEMENT Left 01/2018    [place] UNLISTED PROCEDURE BREAST  1998    right mastectomy    [place] UNLISTED PROCEDURE LUNGS & PLEURA Left 1998    EXC LUNG MASS       Personal factors and/or comorbidities impacting plan of care: recent hospitalization    Home Situation  Support Systems: Child(tor), Skilled Nursing Facility ([facility])  Patient Expects to be Discharged to[de-identified] Skilled nursing facility    EXAMINATION/PRESENTATION/DECISION MAKING:   Critical Behavior:  Neurologic State: Alert, Eyes open to voice  Orientation Level: Oriented to place, Oriented to person  Cognition: Follows commands       Range Of Motion:  AROM: Generally decreased, functional                       Strength:    Strength: Generally decreased, functional                    Tone & Sensation:   Tone: Normal              Sensation: Intact               Coordination:  Coordination: Within functional limits       Functional Mobility:  Bed Mobility:     Supine to Sit: Moderate assistance;Bed Modified; Additional time  Sit to Supine: Maximum assistance  Scooting: Total assistance;Assist x2 (for scooting in supine)            Balance:   Sitting: Impaired  Sitting - Static: Good (unsupported)  Sitting - Dynamic: Fair (occasional)                 Physical Therapy Evaluation Charge Determination   History Examination Presentation Decision-Making   HIGH Complexity :3+ comorbidities / personal factors will impact the outcome/ POC  MEDIUM Complexity : 3 Standardized tests and measures addressing body structure, function, activity limitation and / or participation in recreation  MEDIUM Complexity : Evolving with changing characteristics  LOW Complexity : FOTO score of       Based on the above components, the patient evaluation is determined to be of the following complexity level: LOW     Pain Rating:  No c/o pain    Activity Tolerance:   Fair, Poor, and desaturates with exertion and requires oxygen    After treatment patient left in no apparent distress:   Call bell within reach, Bed / chair alarm activated, Side rails x 3, and sitting up in bed    COMMUNICATION/EDUCATION:   The patients plan of care was discussed with: Registered nurse. Fall prevention education was provided and the patient/caregiver indicated understanding.     Thank you for this referral.  Koby Burger, PT   Time Calculation: 24 mins

## 2023-03-09 NOTE — PROGRESS NOTES
[facility]                                                                                          Hospitalist Progress Note  Jose Lopez MD  Answering service: [phone] from in house phone        Date of Service:  3/9/2023  NAME:  Sy Rosenthal  :  1928  MRN:  [id number]       Admission Summary:   Sy Rosenthal is a 80 y.o. female with COPD on O2 who was recently discharged from here to SNF after being hospitalized for RSV as well as bacterial pneumonia and MARILYN. She returns with hypoxic respiratory failure and is found to have bacterial pneumonia. Interval history / Subjective:     No acute overnight events  Remains on 25L HFNC  Appears weak  NAD or complaints     Assessment & Plan:     Acute on chronic hypoxic respiratory failure: due to HCAP, possible pulm edema/effusion, COPD exacerbation  -continue high flow-O2, wean as able  -on 2L at baseline  -inhaled bronchodilators  -systemic steroid course  -appears dry so lasix on hold  -repeat CXR 3/7 - no significant change  -recent TTE w/ normal LVEF and CLVH  -appreciate pulmonary: agree with monitoring a short while; if no clinical improvement consider transition to comfort     HCAP with sepsis: (leukocytosis, RR, tachycardia)  -Cont IV cefepime; treated w/ ceftriaxone/azithro then Augmentin last admit  -blood cultures NG     HTN:  -cont home amlodipine     Troponin elevation: mild elevation in the setting of resp failure, lower than last admit. No chest pain.       Hypothyroidism:  -continue synthroid  Check TSH     Depression/anxiety:   -continue Zoloft     Recent RSV infection     Remote hx of MI, breast CA, L lung mass excision      Per family discussion on 3/8: family to consider transition to comfort care over the next few days    Care affected by social determinants of health: family/community support    Plan remain in same level of care intermediate    Code status: DNR  Prophylaxis: Heparin ppx  Care Plan discussed with: Patient/Family, Nurse, and   Anticipated Disposition:  tbd, from [facility]  Anticipated Discharge: Greater than 48 hours   Admission Status: Inpatient     Hospital Problems  Date Reviewed: 2/28/2023            Codes Class Noted POA    Acute on chronic respiratory failure with hypoxia [facility]) ICD-10-CM: J96.21  ICD-9-CM: 518.84, 799.02  3/5/2023 Unknown           Social Determinants of Health     Tobacco Use: Medium Risk    Smoking Tobacco Use: Former    Smokeless Tobacco Use: Never    Passive Exposure: Not on file   Alcohol Use: Not on file   Financial Resource Strain: Not on file   Food Insecurity: Not on file   Transportation Needs: Not on file   Physical Activity: Not on file   Stress: Not on file   Social Connections: Not on file   Intimate Partner Violence: Not on file   Depression: Not on file   Housing Stability: Not on file       Review of Systems:   Pertinent items are noted in HPI. Vital Signs:    Last 24hrs VS reviewed since prior progress note. Most recent are:  Visit Vitals  BP (!) 147/64 (BP 1 Location: Left upper arm, BP Patient Position: At rest)   Pulse 73   Temp 98.5 °F (36.9 °C)   Resp 14   Ht 5' 2\" (1.575 m)   Wt 42.3 kg (93 lb 4.1 oz)   SpO2 91%   BMI 17.06 kg/m²         Intake/Output Summary (Last 24 hours) at 3/9/2023 1353  Last data filed at 3/9/2023 5081  Gross per 24 hour   Intake 120 ml   Output 140 ml   Net -20 ml      Physical Examination:   I had a face to face encounter with this patient and independently examined them on 3/9/2023 as outlined below:    General: Alert, no acute distress, chronically ill-appearing, weak  EENT:  EOMI. Anicteric sclerae  Resp:  HFNC, CTA bilaterally, No accessory muscle use  CV:  RRR, No audible murmur  GI:  Soft, Non distended, Non tender  Neurologic:  Alert and oriented x1-2, CLARKE though weak throughout  Extremities: No edema or cyanosis  Psych:  Not anxious or agitated  Skin:  No rashes.  No jaundice    Data Review:   I have personally and independently reviewed all pertinent labs, diagnostic studies, imaging, and have provided independent interpretation of the same. Labs:     Recent Labs     03/09/23  0504 03/08/23  1110   WBC 15.3* 20.4*   HGB 13.5 14.9   HCT 42.0 46.8   * 426*     Recent Labs     03/09/23  0504 03/08/23  1110 03/07/23  0313    140 141   K 5.1 3.9 3.8    109* 108   CO2 29 26 28   BUN 47* 37* 31*   CREA 1.27* 1.08* 0.94   * 116* 78   CA 8.7 9.3 8.9   MG 2.6*  --  2.2     No results for input(s): ALT, AP, TBIL, TBILI, TP, ALB, GLOB, GGT, AML, LPSE in the last 72 hours. No lab exists for component: SGOT, GPT, AMYP, HLPSE  No results for input(s): INR, PTP, APTT, INREXT in the last 72 hours. No results for input(s): FE, TIBC, PSAT, FERR in the last 72 hours. No results found for: FOL, RBCF   No results for input(s): PH, PCO2, PO2 in the last 72 hours. No results for input(s): CPK, CKNDX, TROIQ in the last 72 hours.     No lab exists for component: CPKMB  No results found for: CHOL, CHOLX, CHLST, CHOLV, HDL, HDLP, LDL, LDLC, DLDLP, TGLX, TRIGL, TRIGP, CHHD, CHHDX  Lab Results   Component Value Date/Time    Glucose (POC) 121 (H) 03/08/2023 05:32 PM    Glucose (POC) 103 03/08/2023 12:21 PM    Glucose (POC) 101 03/08/2023 08:45 AM    Glucose (POC) 114 03/07/2023 10:02 PM    Glucose (POC) 73 03/07/2023 05:06 PM     Lab Results   Component Value Date/Time    Color YELLOW/STRAW 02/23/2023 04:05 PM    Appearance CLEAR 02/23/2023 04:05 PM    Specific gravity 1.018 02/23/2023 04:05 PM    pH (UA) 5.0 02/23/2023 04:05 PM    Protein 30 (A) 02/23/2023 04:05 PM    Glucose Negative 02/23/2023 04:05 PM    Ketone Negative 02/23/2023 04:05 PM    Bilirubin Negative 02/23/2023 04:05 PM    Urobilinogen 1.0 02/23/2023 04:05 PM    Nitrites Negative 02/23/2023 04:05 PM    Leukocyte Esterase Negative 02/23/2023 04:05 PM    Epithelial cells FEW 02/23/2023 04:05 PM    Bacteria Negative 02/23/2023 04:05 PM    WBC 0-4 02/23/2023 04:05 PM    RBC 0-5 02/23/2023 04:05 PM       Notes reviewed from all clinical/nonclinical/nursing services involved in patient's clinical care. Care coordination discussions were held with appropriate clinical/nonclinical/ nursing providers based on care coordination needs. Patients current active Medications were reviewed, considered, added and adjusted based on the clinical condition today.       Medications Reviewed:     Current Facility-Administered Medications   Medication Dose Route Frequency    guaiFENesin ER (MUCINEX) tablet 600 mg  600 mg Oral BID    amLODIPine (NORVASC) tablet 10 mg  10 mg Oral DAILY    predniSONE (DELTASONE) tablet 30 mg  30 mg Oral DAILY WITH BREAKFAST    azithromycin (ZITHROMAX) tablet 500 mg  500 mg Oral DAILY    [Held by provider] furosemide (LASIX) tablet 20 mg  20 mg Oral DAILY    sodium chloride (NS) flush 5-40 mL  5-40 mL IntraVENous Q8H    sodium chloride (NS) flush 5-40 mL  5-40 mL IntraVENous PRN    acetaminophen (TYLENOL) tablet 650 mg  650 mg Oral Q6H PRN    Or    acetaminophen (TYLENOL) suppository 650 mg  650 mg Rectal Q6H PRN    polyethylene glycol (MIRALAX) packet 17 g  17 g Oral DAILY PRN    ondansetron (ZOFRAN ODT) tablet 4 mg  4 mg Oral Q8H PRN    Or    ondansetron (ZOFRAN) injection 4 mg  4 mg IntraVENous Q6H PRN    albuterol (PROVENTIL VENTOLIN) nebulizer solution 2.5 mg  2.5 mg Nebulization Q4H PRN    aspirin chewable tablet 81 mg  81 mg Oral DAILY    levothyroxine (SYNTHROID) tablet 75 mcg  75 mcg Oral ACB    sertraline (ZOLOFT) tablet 50 mg  50 mg Oral QHS    albuterol-ipratropium (DUO-NEB) 2.5 MG-0.5 MG/3 ML  3 mL Nebulization QID RT    heparin (porcine) injection 5,000 Units  5,000 Units SubCUTAneous Q12H    cefepime (MAXIPIME) 1 g in 0.9% sodium chloride (MBP/ADV) 50 mL MBP  1 g IntraVENous Q12H     ______________________________________________________________________  EXPECTED LENGTH OF STAY: 5d 0h  ACTUAL LENGTH OF STAY: Man Lea, MD

## 2023-03-09 NOTE — PROGRESS NOTES
Pulmonary, Critical Care, and Sleep Medicine~Progress Note    Name: Vinay Perera MRN: [id number]   : 1928 Hospital: [facility]   Date: 3/9/2023 11:53 AM Admission: 3/5/2023     Impression Plan   Acute on chronic hypoxic resp failure   AE of COPD  RSV infection, but was + on last admission  LLL  HAP. Unclear if new or same from recent admission  HTN O2 titration above 90% , remains on high flow  Continue on PO steroids  Noted abx coverage   Bronchodilators   Lasix on hold   Agree with monitoring a short while; if no clinical improvement noted consideration of transition to comfort       Daily Progression:    3/9  Looks miserable  On high flow   WBC trending down     3/8  Consult Note requested by hospitalist.     Sherre Crigler presented for admission secondary to worsening dyspnea at her SNF. She was found at admission to have what was suspected to be a HAP and RSV infection. She has known COPD and is in exacerbation as well. Currently on 25lpm and 70% FiO2. Unlabored in breathing. She was just recently hospitalized and discharged at the end of Feb for similar issues. I have reviewed the labs and previous days notes. Review of systems not obtained due to patient factors.   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     LUNG    Chronic obstructive pulmonary disease (HCC)     Elevated cholesterol     Glaucoma     H/O: pneumonia     History of pelvic fracture     Hypertension     Hypothyroid     Pacemaker 2018    Sick sinus syndrome ([facility] [address] [facility]     Syncope       Past Surgical History:   Procedure Laterality Date    HX CATARACT REMOVAL Bilateral     HX HEART CATHETERIZATION      HX OTHER SURGICAL      EXC LESIONS FACE, LIP    HX PACEMAKER  2018    HX PACEMAKER PLACEMENT Left 01/2018    [place] UNLISTED PROCEDURE BREAST  1998    right mastectomy    [place] UN[place]ED PROCEDURE LUNGS & PLEURA Left 1998    EXC LUNG MASS      Prior to Admission medications    Medication Sig Start Date End Date Taking? Authorizing Provider   albuterol-ipratropium (DUO-NEB) 2.5 mg-0.5 mg/3 ml nebu 3 mL by Nebulization route every six (6) hours as needed for Wheezing for up to 30 days. 2/28/23 3/30/23  Royal Temi MD   predniSONE (DELTASONE) 10 mg tablet Take 40 mg by mouth daily (with breakfast) for 2 days, THEN 30 mg daily (with breakfast) for 2 days, THEN 20 mg daily (with breakfast) for 2 days, THEN 10 mg daily (with breakfast) for 2 days. Patient taking differently: Take 40 mg by mouth daily (with breakfast) for 2 days, THEN 30 mg daily (with breakfast) for 2 days, THEN 20 mg daily (with breakfast) for 2 days, THEN 10 mg daily (with breakfast) for 2 days. (3/1/23-3/9/23) 3/1/23 3/9/23  Royal Temi MD   docusate sodium (COLACE) 100 mg capsule Take 100 mg by mouth two (2) times a day. 0900 and 1700    Provider, Historical   guaiFENesin ER (MUCINEX) 600 mg ER tablet Take 600 mg by mouth two (2) times a day. Indications: COPD and chronic congestion    Provider, Historical   therapeutic multivitamin (Thera) tablet Take 1 Tablet by mouth daily. Provider, Historical   fluticasone-umeclidinium-vilanterol (Trelegy Ellipta) 100-62.5-25 mcg inhaler Take 1 Puff by inhalation daily. Rinse mouth with water after use    Provider, Historical   acetaminophen (TYLENOL) 325 mg tablet Take 650 mg by mouth every four (4) hours as needed (Mild Pain). Provider, Historical   senna-docusate (Senexon-S) 8.6-50 mg per tablet Take 2 Tablets by mouth two (2) times daily as needed for Constipation. Provider, Historical   aspirin 81 mg chewable tablet Take 81 mg by mouth daily. Other, MD Janak   sertraline (ZOLOFT) 50 mg tablet Take 50 mg by mouth nightly. 21   Provider, Historical   levothyroxine (SYNTHROID) 75 mcg tablet Take 75 mcg by mouth Daily (before breakfast). 21   Provider, Historical   amLODIPine (NORVASC) 10 mg tablet Take 10 mg by mouth daily. 3/15/18   Provider, Historical   latanoprost (XALATAN) 0.005 % ophthalmic solution Administer 1 Drop to both eyes nightly.  17   Provider, Historical     No Known Allergies   Social History     Tobacco Use    Smoking status: Former     Packs/day: 0.25     Years: 10.00     Pack years: 2.50     Types: Cigarettes     Quit date: 1989     Years since quittin.2    Smokeless tobacco: Never   Substance Use Topics    Alcohol use: No      Family History   Family history unknown: Yes     OBJECTIVE:     Vital Signs:     Visit Vitals  BP (!) 147/64 (BP 1 Location: Left upper arm, BP Patient Position: At rest)   Pulse 77   Temp 98.5 °F (36.9 °C)   Resp 14   Ht 5' 2\" (1.575 m)   Wt 42.3 kg (93 lb 4.1 oz)   SpO2 91%   BMI 17.06 kg/m²      Temp (24hrs), Av.4 °F (36.3 °C), Min:96.4 °F (35.8 °C), Max:98.5 °F (36.9 °C)     Intake/Output:     Last shift: 701 - 1900  In: -   Out: 140 [Urine:140]    Last 3 shifts:  190 -  0700  In: 120 [P.O.:120]  Out: 100         Intake/Output Summary (Last 24 hours) at 3/9/2023 1146  Last data filed at 3/9/2023 0742  Gross per 24 hour   Intake 120 ml   Output 140 ml   Net -20 ml         Physical Exam:                                        Exam Findings Other   General: No resp distress noted, appears stated age    HEENT:  No ulcers, JVD not elevated, no cervical LAD    Chest: No pectus deformity, normal chest rise b/l    HEART:  RRR, no murmurs/rubs/gallops    Lungs:  CTA b/l, no rhonchi/crackles/wheeze, diminished BS at bases    ABD: Soft/NT, non rigid mildly distended    EXT: No cyanosis/clubbing/edema, normal peripheral pulses    Skin: No rashes or ulcers, no mottling    Neuro: Resting         Medications:  Current Facility-Administered Medications   Medication Dose Route Frequency    guaiFENesin ER (MUCINEX) tablet 600 mg  600 mg Oral BID    amLODIPine (NORVASC) tablet 10 mg  10 mg Oral DAILY    predniSONE (DELTASONE) tablet 30 mg  30 mg Oral DAILY WITH BREAKFAST    azithromycin (ZITHROMAX) tablet 500 mg  500 mg Oral DAILY    [Held by provider] furosemide (LASIX) tablet 20 mg  20 mg Oral DAILY    sodium chloride (NS) flush 5-40 mL  5-40 mL IntraVENous Q8H    sodium chloride (NS) flush 5-40 mL  5-40 mL IntraVENous PRN    acetaminophen (TYLENOL) tablet 650 mg  650 mg Oral Q6H PRN    Or    acetaminophen (TYLENOL) suppository 650 mg  650 mg Rectal Q6H PRN    polyethylene glycol (MIRALAX) packet 17 g  17 g Oral DAILY PRN    ondansetron (ZOFRAN ODT) tablet 4 mg  4 mg Oral Q8H PRN    Or    ondansetron (ZOFRAN) injection 4 mg  4 mg IntraVENous Q6H PRN    albuterol (PROVENTIL VENTOLIN) nebulizer solution 2.5 mg  2.5 mg Nebulization Q4H PRN    aspirin chewable tablet 81 mg  81 mg Oral DAILY    levothyroxine (SYNTHROID) tablet 75 mcg  75 mcg Oral ACB    sertraline (ZOLOFT) tablet 50 mg  50 mg Oral QHS    albuterol-ipratropium (DUO-NEB) 2.5 MG-0.5 MG/3 ML  3 mL Nebulization QID RT    heparin (porcine) injection 5,000 Units  5,000 Units SubCUTAneous Q12H    cefepime (MAXIPIME) 1 g in 0.9% sodium chloride (MBP/ADV) 50 mL MBP  1 g IntraVENous Q12H       Labs:  ABG No results for input(s): PHI, PCO2I, PO2I, HCO3I, SO2I, FIO2I in the last 72 hours.        CBC Recent Labs     03/09/23  0504 03/08/23  1110 03/07/23  0435   WBC 15.3* 20.4* 23.2*   HGB 13.5 14.9 14.7   HCT 42.0 46.8 46.8   * 426* 362   MCV 92.1 92.7 93.8   MCH 29.6 29.5 48.8          Metabolic  Panel Recent Labs     03/09/23  0504 03/08/23  1110 03/07/23  0313 03/06/23  1159    140 141 139   K 5.1 3.9 3.8 4.1    109* 108 105   CO2 29 26 28 28   * 116* 78 96   BUN 47* 37* 31* 33*   CREA 1.27* 1.08* 0.94 1.16*   CA 8.7 9.3 8.9 9.4   MG 2.6*  --  2.2 2.3          Pertinent Labs Gaby Moran PA-C  3/9/2023

## 2023-03-09 NOTE — PROGRESS NOTES
Comprehensive Nutrition Assessment    Type and Reason for Visit: Initial    Nutrition Recommendations/Plan:     Continue regular diet with high calorie high protein ons and snacks  Poor functional status- pt is able to feed self but her motivation to do so is low. She needs encouragement with all meals and supplements  Encourage intake of pleasure foods in context of age and functional status         Malnutrition Assessment:  Malnutrition Status:  Insufficient data (03/09/23 1217)    Context:  Chronic illness     Findings of the 6 clinical characteristics of malnutrition:   Energy Intake:  Unable to assess  Weight Loss:  Mild weight loss (specify amount and time period) (6% x 1 year)     Body Fat Loss:  Unable to assess,     Muscle Mass Loss:  Unable to assess,    Fluid Accumulation:  No significant fluid accumulation,     Strength:  Not performed        Nutrition Assessment:    PMHx: chronic hypoxic respiratory failure, HTN, hypothyroidism, depression, h/o MI, h/o breast cancer    80 y.o. female admitted with acute on chronic respiratory failure d/t HCAP. On high flow O2. On IV abx for HCAP with sepsis. Visited pt at bedside for low BMI of 17. She spoke very quietly and would answer with 1 word or nod/shake her head. Per wt history in EMR pt has been slowly and steadily losing weight for several years. None of it is significant from an acute/chronic malnutrition standpoint but at this point she is underweight. She has very little fat or muscle mass which is related to her age and ongoing wt loss. Pt denied abd pain, n/v/d. Feeling a little constipated tho last recorded BM was this morning. Denied issues with chewing her food, sometimes coughs a little when eating dry food but not every time she eats. She nodded yes when asked if she's able to feed herself. I did assist her with drinking a carton of apple juice, and got her some milk which she asked for.  Even if she is able to feed herself her functional status is poor and I don't feel she will eat or drink much unless she has some assistance. At this point the focus should be protein and calorie rich foods and supplements along with pleasure foods. Increased calorie needs r/t COPD. Mg 2.6     Nutritionally Significant Medications:  Zithromax, cefepime, synthroid, deltasone      Estimated Daily Nutrient Needs:  Energy Requirements Based On: Kcal/kg  Weight Used for Energy Requirements: Ideal  Energy (kcal/day): 1500 (30 kcal/kg)  Weight Used for Protein Requirements: Current  Protein (g/day): 63 (1.5 g/kg)     Fluid (ml/day): 1 ml/kcal    Nutrition Related Findings:   Edema: No data recorded    Last BM: 03/09/23, Formed    Wounds: None      Current Nutrition Therapies:  Diet: regular  Supplements: none  Meal intake: No data found. Supplement intake: No data found. Nutrition Support: none      Anthropometric Measures:  Height: 5' 2\" (157.5 cm)  Ideal Body Weight (IBW): 110 lbs (50 kg)     Current Body Wt:  42.3 kg (93 lb 4.1 oz), 84.8 % IBW.  Bed scale  Current BMI (kg/m2): 17.1  Usual Body Weight: 45 kg (99 lb 3.3 oz)  % Weight Change (Calculated): -6  Weight Adjustment: No adjustment                 BMI Category: Underweight (BMI less than 18.5)    Wt Readings from Last 10 Encounters:   03/09/23 42.3 kg (93 lb 4.1 oz)   01/02/22 45 kg (99 lb 3.3 oz)   12/02/21 45.4 kg (100 lb)   10/29/21 47.6 kg (105 lb)   07/28/21 47.6 kg (105 lb)   01/20/21 55.2 kg (121 lb 9.6 oz)   01/14/21 51 kg (112 lb 7 oz)   07/02/20 52.4 kg (115 lb 9.6 oz)   11/07/19 51 kg (112 lb 7 oz)   05/23/19 51 kg (112 lb 6.4 oz)           Nutrition Diagnosis:   Underweight related to inadequate protein-energy intake as evidenced by BMI  Inadequate oral intake related to increased demand for energy/nutrients as evidenced by weight loss (COPD)    Nutrition Interventions:   Food and/or Nutrient Delivery: Continue current diet, Start oral nutrition supplement, Snacks (specify)  Nutrition Education/Counseling: No recommendations at this time  Coordination of Nutrition Care: Continue to monitor while inpatient, Feeding assistance/environmental change       Goals:     Goals: Meet at least 75% of estimated needs, by next RD assessment       Nutrition Monitoring and Evaluation:   Behavioral-Environmental Outcomes: None identified  Food/Nutrient Intake Outcomes: Food and nutrient intake, Supplement intake  Physical Signs/Symptoms Outcomes: Biochemical data, Meal time behavior, Weight, Nutrition focused physical findings    Discharge Planning:    Continue oral nutrition supplement      Recent Labs     03/09/23  0504 03/08/23  1110 03/07/23  0313   * 116* 78   BUN 47* 37* 31*   CREA 1.27* 1.08* 0.94    140 141   K 5.1 3.9 3.8    109* 108   CO2 29 26 28   CA 8.7 9.3 8.9   MG 2.6*  --  2.2       No results for input(s): ALT, AST, AP, TBIL, TBILI, TP, ALB, GLOB, GGT, AML, LPSE in the last 72 hours. No lab exists for component: SGOT, GPT, AMYP, HLPSE    No results for input(s): LAC in the last 72 hours. Recent Labs     03/09/23  0504 03/08/23  1110   WBC 15.3* 20.4*   HGB 13.5 14.9   HCT 42.0 46.8   * 426*       No results for input(s): PREALB in the last 72 hours. No results found for: PREALB    No results for input(s): TRIGL in the last 72 hours. No results found for: TRIGL    Recent Labs     03/08/23  1732 03/08/23  1221 03/08/23  0845 03/07/23  2202 03/07/23  1706   GLUCPOC 121* 103 101 114 73       No results found for: HBA1C, DHQ8BKHZ, MKK7CWFA, RVD2ZBOF    Iron Profile    No results for input(s): IRON, FOL, B12LT, VB6LT in the last 72 hours. No results for input(s): NH4 in the last 72 hours.     No results found for: IRON, TIBC, PSAT    No results found for: FERR    B Vitamins  No results found for: FOL, B12LT, VB6LT, VIB1LT    Zinc  No results found for: ZINCLT    Copper  No results found for: COSLT    Selenium  No results found for: SELNLT    Vit C  No results found for: [id number]    Fat Soluble Vitamins    No results found for: VITALT, VITD3, VITE1T, VITEG, VIK1LT    No results found for: NH4        Jenna Young RD  Available via [employer]

## 2023-03-09 NOTE — PROGRESS NOTES
Bedside shift change report given to Sneha Vang, RN and Bree Thakur RN (oncoming nurse) by Corey Waterman, nursing student and Maya Graham RN (offgoing nurse). Report included the following information SBAR, Kardex, ED Summary, Intake/Output, Recent Results, and Cardiac Rhythm sinus arrhythmia .

## 2023-03-10 NOTE — PROGRESS NOTES
[facility] Adult  Hospitalist Group                                                                                          Hospitalist Progress Note  Luis Felipe Kapoor MD  Answering service: [phone] from in house phone        Date of Service:  3/10/2023  NAME:  Ana Blank  :  1928  MRN:  [id number]       Admission Summary:   Ana Blank is a 80 y.o. female with COPD on O2 who was recently discharged from here to SNF after being hospitalized for RSV as well as bacterial pneumonia and MARILYN. She returns with hypoxic respiratory failure and is found to have bacterial pneumonia. Interval history / Subjective:     Required up to 60L HFNC earlier this AM, now back to 25L HFNC as has been for the last several days  No clinical improvement  Appears weak  NAD or complaints, denies pain or discomfort     Assessment & Plan:     Acute on chronic hypoxic respiratory failure: due to HCAP, possible pulm edema/effusion, COPD exacerbation  -continue high flow-O2, unable to wean  -on 2L at baseline  -inhaled bronchodilators  -systemic steroid course  -repeat CXR 3/7 - no significant change  -recent TTE w/ normal LVEF  -appreciate pulmonary: may be time to consider comfort measures  -long d/w son on phone: agree with hospice/comfort care; ideally would want to return to [facility] with hospice but unable to transfer on HFNC and likely will be too unstable once weaned off HFNC to tolerate transfer     HCAP with sepsis: (leukocytosis, RR, tachycardia)  -IV cefepime; treated w/ ceftriaxone/azithro then Augmentin last admit  -blood cultures NG     HTN:  - home amlodipine     Troponin elevation: mild elevation in the setting of resp failure, lower than last admit. No chest pain.       Hypothyroidism:  -TSH above goal, increase synthroid to 88mcg     Depression/anxiety:   -Zoloft     Recent RSV infection    Hypophos, replete     Remote hx of MI, breast CA, L lung mass excision      Patient is critically ill with high risk of mortality    Per d/w son today: agree with hospice/comfort. Hospice consult placed. Ideally prefer to return to [facility] with hospice but unable to transfer on HFNC and likely will be too unstable once weaned off HFNC to tolerate transfer    Care affected by social determinants of health: family/community support    Plan remain in same level of care intermediate for now while remains on HFNC    Code status: DNR  Prophylaxis: Heparin ppx  Care Plan discussed with: Patient/Family, Nurse, and   Anticipated Disposition:  [facility]  Anticipated Discharge: 24 hours to 48 hours   Admission Status: Inpatient    CRITICAL CARE ATTESTATION:  I had a face to face encounter with the patient, reviewed and interpreted patient data including clinical events, labs, images, vital signs, I/O's, and examined patient. I have discussed the case and the plan and management of the patient's care with the consulting services, the bedside nurses and necessary ancillary providers. NOTE OF PERSONAL INVOLVEMENT IN CARE   This patient has a high probability of imminent, clinically significant deterioration, which requires the highest level of preparedness to intervene urgently. I participated in the decision-making and personally managed or directed the management of the following life and organ supporting interventions that required my frequent assessment to treat or prevent imminent deterioration. I personally spent 35 minutes of critical care time. This is time spent at this critically ill patient's bedside actively involved in patient care as well as the coordination of care and discussions with the patient's family. This does not include any procedural time which has been billed separately.      Hospital Problems  Date Reviewed: 2/28/2023            Codes Class Noted POA    Acute on chronic respiratory failure with hypoxia [facility]) ICD-10-CM: P43.48  ICD-9-CM: 518.84, 799.02  3/5/2023 Unknown Social Determinants of Health     Tobacco Use: Medium Risk    Smoking Tobacco Use: Former    Smokeless Tobacco Use: Never    Passive Exposure: Not on file   Alcohol Use: Not on file   Financial Resource Strain: Not on file   Food Insecurity: Not on file   Transportation Needs: Not on file   Physical Activity: Not on file   Stress: Not on file   Social Connections: Not on file   Intimate Partner Violence: Not on file   Depression: Not on file   Housing Stability: Not on file       Review of Systems:   Pertinent items are noted in HPI. Vital Signs:    Last 24hrs VS reviewed since prior progress note. Most recent are:  Visit Vitals  BP (!) 124/52 (BP 1 Location: Left upper arm, BP Patient Position: Supine; At rest)   Pulse 66   Temp 98.4 °F (36.9 °C)   Resp 18   Ht 5' 2\" (1.575 m)   Wt 42.3 kg (93 lb 4.1 oz)   SpO2 100%   BMI 17.06 kg/m²         Intake/Output Summary (Last 24 hours) at 3/10/2023 1240  Last data filed at 3/10/2023 0600  Gross per 24 hour   Intake 50 ml   Output 125 ml   Net -75 ml        Physical Examination:   I had a face to face encounter with this patient and independently examined them on 3/10/2023 as outlined below:    General: Alert, no acute distress, chronically ill-appearing, weak, thin  Resp:  HFNC, decreased AE, No accessory muscle use  CV:  RRR, No audible murmur  GI:  Soft, Non distended, Non tender  Neurologic:  Alert and oriented x2, weak throughout  Extremities: No edema or cyanosis  Psych:  Not anxious or agitated  Skin:  No rashes. No jaundice    Data Review:   I have personally and independently reviewed all pertinent labs, diagnostic studies, imaging, and have provided independent interpretation of the same.     Labs:     Recent Labs     03/10/23  0720 03/09/23  0504   WBC 14.0* 15.3*   HGB 13.3 13.5   HCT 43.2 42.0   * 416*       Recent Labs     03/10/23  0720 03/09/23  0504 03/08/23  1110    139 140   K 4.2 5.1 3.9   * 106 109*   CO2 25 29 26   BUN 42* 47* 37*   CREA 0.98 1.27* 1.08*   GLU 84 125* 116*   CA 8.9 8.7 9.3   MG 2.3 2.6*  --    PHOS 2.3*  --   --        No results for input(s): ALT, AP, TBIL, TBILI, TP, ALB, GLOB, GGT, AML, LPSE in the last 72 hours. No lab exists for component: SGOT, GPT, AMYP, HLPSE  No results for input(s): INR, PTP, APTT, INREXT, INREXT in the last 72 hours. No results for input(s): FE, TIBC, PSAT, FERR in the last 72 hours. No results found for: FOL, RBCF   No results for input(s): PH, PCO2, PO2 in the last 72 hours. No results for input(s): CPK, CKNDX, TROIQ in the last 72 hours. No lab exists for component: CPKMB  No results found for: CHOL, CHOLX, CHLST, CHOLV, HDL, HDLP, LDL, LDLC, DLDLP, TGLX, TRIGL, TRIGP, CHHD, CHHDX  Lab Results   Component Value Date/Time    Glucose (POC) 121 (H) 03/08/2023 05:32 PM    Glucose (POC) 103 03/08/2023 12:21 PM    Glucose (POC) 101 03/08/2023 08:45 AM    Glucose (POC) 114 03/07/2023 10:02 PM    Glucose (POC) 73 03/07/2023 05:06 PM     Lab Results   Component Value Date/Time    Color YELLOW/STRAW 02/23/2023 04:05 PM    Appearance CLEAR 02/23/2023 04:05 PM    Specific gravity 1.018 02/23/2023 04:05 PM    pH (UA) 5.0 02/23/2023 04:05 PM    Protein 30 (A) 02/23/2023 04:05 PM    Glucose Negative 02/23/2023 04:05 PM    Ketone Negative 02/23/2023 04:05 PM    Bilirubin Negative 02/23/2023 04:05 PM    Urobilinogen 1.0 02/23/2023 04:05 PM    Nitrites Negative 02/23/2023 04:05 PM    Leukocyte Esterase Negative 02/23/2023 04:05 PM    Epithelial cells FEW 02/23/2023 04:05 PM    Bacteria Negative 02/23/2023 04:05 PM    WBC 0-4 02/23/2023 04:05 PM    RBC 0-5 02/23/2023 04:05 PM       Notes reviewed from all clinical/nonclinical/nursing services involved in patient's clinical care. Care coordination discussions were held with appropriate clinical/nonclinical/ nursing providers based on care coordination needs.      Patients current active Medications were reviewed, considered, added and adjusted based on the clinical condition today.       Medications Reviewed:     Current Facility-Administered Medications   Medication Dose Route Frequency    cefepime (MAXIPIME) 1 g in 0.9% sodium chloride (MBP/ADV) 50 mL MBP  1 g IntraVENous Q12H    nystatin (MYCOSTATIN) 100,000 unit/mL oral suspension 500,000 Units  500,000 Units Oral QID    guaiFENesin ER (MUCINEX) tablet 600 mg  600 mg Oral BID    amLODIPine (NORVASC) tablet 10 mg  10 mg Oral DAILY    predniSONE (DELTASONE) tablet 30 mg  30 mg Oral DAILY WITH BREAKFAST    azithromycin (ZITHROMAX) tablet 500 mg  500 mg Oral DAILY    [Held by provider] furosemide (LASIX) tablet 20 mg  20 mg Oral DAILY    sodium chloride (NS) flush 5-40 mL  5-40 mL IntraVENous Q8H    sodium chloride (NS) flush 5-40 mL  5-40 mL IntraVENous PRN    acetaminophen (TYLENOL) tablet 650 mg  650 mg Oral Q6H PRN    Or    acetaminophen (TYLENOL) suppository 650 mg  650 mg Rectal Q6H PRN    polyethylene glycol (MIRALAX) packet 17 g  17 g Oral DAILY PRN    ondansetron (ZOFRAN ODT) tablet 4 mg  4 mg Oral Q8H PRN    Or    ondansetron (ZOFRAN) injection 4 mg  4 mg IntraVENous Q6H PRN    albuterol (PROVENTIL VENTOLIN) nebulizer solution 2.5 mg  2.5 mg Nebulization Q4H PRN    aspirin chewable tablet 81 mg  81 mg Oral DAILY    levothyroxine (SYNTHROID) tablet 75 mcg  75 mcg Oral ACB    sertraline (ZOLOFT) tablet 50 mg  50 mg Oral QHS    albuterol-ipratropium (DUO-NEB) 2.5 MG-0.5 MG/3 ML  3 mL Nebulization QID RT    heparin (porcine) injection 5,000 Units  5,000 Units SubCUTAneous Q12H     ______________________________________________________________________  EXPECTED LENGTH OF STAY: 5d 0h  ACTUAL LENGTH OF STAY:          5                 Elizabeth Matos MD

## 2023-03-10 NOTE — PROGRESS NOTES
1930: Bedside shift change report given to Renee Lopez  (oncoming nurse) by RN (offgoing nurse). Report included the following information SBAR, Kardex, Intake/Output, and MAR.         0400: RN attempted to get labs x2. RN unsuccessful. Will call phlebotomy for assistance. 0730: Bedside shift change report given to Hema Modi  (oncoming nurse) by Renee Lopez  (offgoing nurse). Report included the following information SBAR, Kardex, Intake/Output, and MAR.

## 2023-03-10 NOTE — PROGRESS NOTES
Transition of Care:TBD; pending [employer]; referral sent on 3/10     NOTE: prior to this readmission;[facility] -she is accepted back when discharged and family agrees; patient normally lives in the assisted living section at [facility]; no insurance Shanti Nephew is needed for patient to return to the health care unit     Transport Plan: will need BLS/stretcher- not set up yet; patient is normally on 2L NC O2     RUR: 17%     Main contact is Rei Tavarez- [phone]     1st IM medicare letter received on 3/5/23     Discharge pending:  -pending family discussion and meeting on next goals of care  -patient continues on high flow O2  -pending PT/OT progress and recommendations    1200: [facility] SNF has accepted patient back when she is able to discharge    1600: this CM noted hospice consult and CM consult; sent referral to [employer] via [employer]    CM following  Makenzie Ahmadi RN          NOTES: per chart notes; patient was at [facility] from 2/23-2/28/23 and discharged to [facility]; patient was readmitted to [facility] on 3/5/23; per chart, patient on chronic O2 NC 2L; has a walker/rollator and needs help with all ADLs; patient normally lives in the assisted living section of [address]; she is normally alert and oriented 2-3                             CM following  aMkenzie Ahmadi RN           Revision History

## 2023-03-10 NOTE — HOSPICE
[facility]  [phone]     Patient Name: Umberto Pringle  YOB: 1928  Age: 80 y.o. [facility] RN Note:  Hospice consult received, reviewing chart. Will follow up with Unit Nurse and Care Manager to discuss plan of care, patient status and discharge disposition within the hour. Call made to patient's son, Deleta Cockayne. Offered meeting time for this evening, or tomorrow, 3/11/2023. Caprice Meigs shared that he is 1 of 4 children, and would like to coordinate a time that is best for all his siblings. Minrice Meigs to call Hospice back within the next hour with a good time for family to meet Saturday. Thank you for the opportunity to be of service to this patient. 18:00: Spoke with son, Caprice Meigs. Caprice Meigs requests to meet with Hospice in the am around 09:30. Hospice information left bedside; Caprice Meigs and family plan to visit later this evening.     Andrew Crespo RN, [facility] Nurse Liaison  [phone] Mobile  [phone] Office  Available on [employer]

## 2023-03-10 NOTE — PROGRESS NOTES
Pulmonary, Critical Care, and Sleep Medicine~Progress Note    Name: Margaux Castellano MRN: [id number]   : 1928 Hospital: [facility]    Date: 3/10/2023 11:53 AM Admission: 3/5/2023     Impression Plan   Acute on chronic hypoxic resp failure   AE of COPD  RSV infection, but was + on last admission  LLL  HAP. Unclear if new or same from recent admission  HTN O2 titration above 90% , remains on high flow  Continue on PO steroids  Noted abx coverage   Bronchodilators   Lasix on hold   It may be time to consider comfort measures; consider palliative care involvement       Daily Progression:    3/10  She could not tolerated such high flow  Reduced back to where she was  Looks miserable  Family at bedside     3/9  Looks miserable  On high flow   WBC trending down     3/8  Consult Note requested by hospitalist.     Elizabeth Garcia presented for admission secondary to worsening dyspnea at her SNF. She was found at admission to have what was suspected to be a HAP and RSV infection. She has known COPD and is in exacerbation as well. Currently on 25lpm and 70% FiO2. Unlabored in breathing. She was just recently hospitalized and discharged at the end of Feb for similar issues. I have reviewed the labs and previous days notes. Review of systems not obtained due to patient factors.   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[facility]     LUNG    Chronic obstructive pulmonary disease (HCC)     Elevated cholesterol     Glaucoma     H/O: pneumonia     History of pelvic fracture     Hypertension     Hypothyroid     Pacemaker 2018    Sick sinus syndrome ([facility] [address] [facility]     Syncope       Past Surgical History:   Procedure Laterality Date    HX CATARACT REMOVAL Bilateral  HX HEART CATHETERIZATION      HX OTHER SURGICAL  2013    EXC LESIONS FACE, LIP    HX PACEMAKER  2018    HX PACEMAKER PLACEMENT Left 01/2018    [place] UNLISTED PROCEDURE BREAST  1998    right mastectomy    [place] UNLISTED PROCEDURE LUNGS & PLEURA Left 1998    EXC LUNG MASS      Prior to Admission medications    Medication Sig Start Date End Date Taking? Authorizing Provider   albuterol-ipratropium (DUO-NEB) 2.5 mg-0.5 mg/3 ml nebu 3 mL by Nebulization route every six (6) hours as needed for Wheezing for up to 30 days. 2/28/23 3/30/23  Saba Vargas MD   predniSONE (DELTASONE) 10 mg tablet Take 40 mg by mouth daily (with breakfast) for 2 days, THEN 30 mg daily (with breakfast) for 2 days, THEN 20 mg daily (with breakfast) for 2 days, THEN 10 mg daily (with breakfast) for 2 days. Patient taking differently: Take 40 mg by mouth daily (with breakfast) for 2 days, THEN 30 mg daily (with breakfast) for 2 days, THEN 20 mg daily (with breakfast) for 2 days, THEN 10 mg daily (with breakfast) for 2 days. (3/1/23-3/9/23) 3/1/23 3/9/23  Saba Vargas MD   docusate sodium (COLACE) 100 mg capsule Take 100 mg by mouth two (2) times a day. 0900 and 1700    Provider, Historical   guaiFENesin ER (MUCINEX) 600 mg ER tablet Take 600 mg by mouth two (2) times a day. Indications: COPD and chronic congestion    Provider, Historical   therapeutic multivitamin (Thera) tablet Take 1 Tablet by mouth daily. Provider, Historical   fluticasone-umeclidinium-vilanterol (Trelegy Ellipta) 100-62.5-25 mcg inhaler Take 1 Puff by inhalation daily. Rinse mouth with water after use    Provider, Historical   acetaminophen (TYLENOL) 325 mg tablet Take 650 mg by mouth every four (4) hours as needed (Mild Pain). Provider, Historical   senna-docusate (Senexon-S) 8.6-50 mg per tablet Take 2 Tablets by mouth two (2) times daily as needed for Constipation. Provider, Historical   aspirin 81 mg chewable tablet Take 81 mg by mouth daily. Other, MD Janak   sertraline (ZOLOFT) 50 mg tablet Take 50 mg by mouth nightly. 21   Provider, Historical   levothyroxine (SYNTHROID) 75 mcg tablet Take 75 mcg by mouth Daily (before breakfast). 21   Provider, Historical   amLODIPine (NORVASC) 10 mg tablet Take 10 mg by mouth daily. 3/15/18   Provider, Historical   latanoprost (XALATAN) 0.005 % ophthalmic solution Administer 1 Drop to both eyes nightly. 17   Provider, Historical     No Known Allergies   Social History     Tobacco Use    Smoking status: Former     Packs/day: 0.25     Years: 10.00     Pack years: 2.50     Types: Cigarettes     Quit date: 1989     Years since quittin.2    Smokeless tobacco: Never   Substance Use Topics    Alcohol use: No      Family History   Family history unknown: Yes     OBJECTIVE:     Vital Signs:     Visit Vitals  BP (!) 150/65 (BP 1 Location: Left upper arm, BP Patient Position: At rest)   Pulse 69   Temp 98.4 °F (36.9 °C)   Resp 23   Ht 5' 2\" (1.575 m)   Wt 42.3 kg (93 lb 4.1 oz)   SpO2 95%   BMI 17.06 kg/m²      Temp (24hrs), Av.1 °F (36.7 °C), Min:97.3 °F (36.3 °C), Max:98.7 °F (37.1 °C)     Intake/Output:     Last shift: No intake/output data recorded. Last 3 shifts:  1901 - 03/10 0700  In: 170 [P.O.:120;  I.V.:50]  Out: 265 [Urine:265]        Intake/Output Summary (Last 24 hours) at 3/10/2023 1118  Last data filed at 3/10/2023 0600  Gross per 24 hour   Intake 50 ml   Output 125 ml   Net -75 ml         Physical Exam:                                        Exam Findings Other   General: No resp distress noted, appears stated age    [de-identified]:  No ulcers, JVD not elevated, no cervical LAD    Chest: No pectus deformity, normal chest rise b/l    HEART:  RRR, no murmurs/rubs/gallops    Lungs:  CTA b/l, no rhonchi/crackles/wheeze, diminished BS at bases    ABD: Soft/NT, non rigid mildly distended    EXT: No cyanosis/clubbing/edema, normal peripheral pulses    Skin: No rashes or ulcers, no mottling Neuro: Resting         Medications:  Current Facility-Administered Medications   Medication Dose Route Frequency    cefepime (MAXIPIME) 1 g in 0.9% sodium chloride (MBP/ADV) 50 mL MBP  1 g IntraVENous Q12H    nystatin (MYCOSTATIN) 100,000 unit/mL oral suspension 500,000 Units  500,000 Units Oral QID    guaiFENesin ER (MUCINEX) tablet 600 mg  600 mg Oral BID    amLODIPine (NORVASC) tablet 10 mg  10 mg Oral DAILY    predniSONE (DELTASONE) tablet 30 mg  30 mg Oral DAILY WITH BREAKFAST    azithromycin (ZITHROMAX) tablet 500 mg  500 mg Oral DAILY    [Held by provider] furosemide (LASIX) tablet 20 mg  20 mg Oral DAILY    sodium chloride (NS) flush 5-40 mL  5-40 mL IntraVENous Q8H    sodium chloride (NS) flush 5-40 mL  5-40 mL IntraVENous PRN    acetaminophen (TYLENOL) tablet 650 mg  650 mg Oral Q6H PRN    Or    acetaminophen (TYLENOL) suppository 650 mg  650 mg Rectal Q6H PRN    polyethylene glycol (MIRALAX) packet 17 g  17 g Oral DAILY PRN    ondansetron (ZOFRAN ODT) tablet 4 mg  4 mg Oral Q8H PRN    Or    ondansetron (ZOFRAN) injection 4 mg  4 mg IntraVENous Q6H PRN    albuterol (PROVENTIL VENTOLIN) nebulizer solution 2.5 mg  2.5 mg Nebulization Q4H PRN    aspirin chewable tablet 81 mg  81 mg Oral DAILY    levothyroxine (SYNTHROID) tablet 75 mcg  75 mcg Oral ACB    sertraline (ZOLOFT) tablet 50 mg  50 mg Oral QHS    albuterol-ipratropium (DUO-NEB) 2.5 MG-0.5 MG/3 ML  3 mL Nebulization QID RT    heparin (porcine) injection 5,000 Units  5,000 Units SubCUTAneous Q12H       Labs:  ABG No results for input(s): PHI, PCO2I, PO2I, HCO3I, SO2I, FIO2I in the last 72 hours.        CBC Recent Labs     03/10/23  0720 03/09/23  0504 03/08/23  1110   WBC 14.0* 15.3* 20.4*   HGB 13.3 13.5 14.9   HCT 43.2 42.0 46.8   * 416* 426*   MCV 94.3 92.1 92.7   MCH 29.0 29.6 42.9          Metabolic  Panel Recent Labs     03/10/23  0720 03/09/23  0504 03/08/23  1110    139 140   K 4.2 5.1 3.9   * 106 109*   CO2 25 29 26   GLU 84 125* 116*   BUN 42* 47* 37*   CREA 0.98 1.27* 1.08*   CA 8.9 8.7 9.3   MG 2.3 2.6*  --    PHOS 2.3*  --   --           Pertinent Labs                Giuseppe Baker PA-C  3/10/2023

## 2023-03-11 NOTE — PROGRESS NOTES
TRANSFER - OUT REPORT:    Verbal report given to KELLY Bowser  (name) on Rosas Sanchez  being transferred to  [address] (unit) for routine progression of care       Report consisted of patients Situation, Background, Assessment and   Recommendations(SBAR). Information from the following report(s) SBAR, Kardex, MAR, Recent Results, and Cardiac Rhythm A paced  was reviewed with the receiving nurse. Lines:   Peripheral IV 03/09/23 Anterior; Left Forearm (Active)   Site Assessment Clean, dry, & intact 03/11/23 1200   Phlebitis Assessment 0 03/11/23 1200   Infiltration Assessment 0 03/11/23 1200   Dressing Status Clean, dry, & intact 03/11/23 1200   Dressing Type Tape;Transparent 03/11/23 1200   Hub Color/Line Status Blue; Infusing 03/11/23 1200   Action Taken Open ports on tubing capped 03/11/23 1200   Alcohol Cap Used Yes 03/11/23 1200        Opportunity for questions and clarification was provided.       Patient transported with:   O2 @ 6 liters

## 2023-03-11 NOTE — PROGRESS NOTES
Problem: Pressure Injury - Risk of  Goal: *Prevention of pressure injury  Description: Document Gerardo Scale and appropriate interventions in the flowsheet. Outcome: Progressing Towards Goal  Note: Pressure Injury Interventions:  Sensory Interventions: Float heels, Keep linens dry and wrinkle-free, Maintain/enhance activity level, Minimize linen layers    Moisture Interventions: Internal/External urinary devices, Limit adult briefs, Minimize layers, Maintain skin hydration (lotion/cream)    Activity Interventions: Pressure redistribution bed/mattress(bed type), Increase time out of bed    Mobility Interventions: HOB 30 degrees or less, Pressure redistribution bed/mattress (bed type), Float heels    Nutrition Interventions: Document food/fluid/supplement intake    Friction and Shear Interventions: Lift team/patient mobility team, Minimize layers, Lift sheet, HOB 30 degrees or less                Problem: Patient Education: Go to Patient Education Activity  Goal: Patient/Family Education  Outcome: Progressing Towards Goal     Problem: Falls - Risk of  Goal: *Absence of Falls  Description: Document Kranthi Fall Risk and appropriate interventions in the flowsheet.   Outcome: Progressing Towards Goal  Note: Fall Risk Interventions:                                Problem: Patient Education: Go to Patient Education Activity  Goal: Patient/Family Education  Outcome: Progressing Towards Goal     Problem: Patient Education: Go to Patient Education Activity  Goal: Patient/Family Education  Outcome: Progressing Towards Goal     Problem: Breathing Pattern - Ineffective  Goal: *Absence of hypoxia  Outcome: Progressing Towards Goal     Problem: Patient Education: Go to Patient Education Activity  Goal: Patient/Family Education  Outcome: Progressing Towards Goal

## 2023-03-11 NOTE — PROGRESS NOTES
Bedside and Verbal shift change report given to Mahendra Martínez (oncoming nurse) by Jose De Anda (offgoing nurse). Report included the following information SBAR, Kardex, MAR, and Recent Results.

## 2023-03-11 NOTE — PROGRESS NOTES
[facility]  Hospitalist Group                                                                                          Hospitalist Progress Note  Roma Avila MD  Answering service: [phone] from in house phone        Date of Service:  3/11/2023  NAME:  Edna Hendricks  :  1928  MRN:  [id number]       Admission Summary:   Edna Hendricks is a 80 y.o. female with COPD on O2 who was recently discharged from here to SNF after being hospitalized for RSV as well as bacterial pneumonia and MARILYN. She returns with hypoxic respiratory failure and is found to have bacterial pneumonia. Interval history / Subjective:     No acute overnight events  Down to 10L NC  Pt in NAD, denies pain or discomfort  Appears weak  D/w family at bedside    Plan for comfort care s/p hospice meeting     Assessment & Plan:     Acute on chronic hypoxic respiratory failure: due to HCAP, possible pulm edema/effusion, COPD exacerbation  -on 2L at baseline  -completed systemic steroid course  -repeat CXR 3/7 - no significant change  -recent TTE w/ normal LVEF  -appreciate pulmonary: may be time to consider comfort measures  -appreciate hospice assistance, s/p family meeting this AM: decision to transition to comfort care, not requiring GIP yet as minimal symptoms  Per hospice RN: family wants to dc duonebs d/t discomfort; want meds to help secretions; goal to wean O2 for comfort, agree with IV opioids to achieve; can continue PO meds for now as pt able to take     HCAP with sepsis: (leukocytosis, RR, tachycardia)  -IV cefepime last dose tomorrow; treated w/ ceftriaxone/azithro then Augmentin last admit  -blood cultures NG     HTN:  - home amlodipine     Troponin elevation: mild elevation in the setting of resp failure, lower than last admit. No chest pain.       Hypothyroidism:  -TSH above goal, increased synthroid to 88mcg     Depression/anxiety:   -Zoloft     Recent RSV infection    Dementia at baseline    Hypophos, repleted     Remote hx of MI, breast CA, L lung mass excision      Care affected by social determinants of health: family/community support    Plan de-escalation of level of care intermediate to tele d/t comfort measures    Code status: DNR  Prophylaxis: Heparin ppx  Care Plan discussed with: Patient/Family, Nurse, and Consultant hospice RN  Anticipated Disposition:  GIP vs hospice house  Anticipated Discharge: 24 hours to 48 hours   Admission Status: Inpatient     Hospital Problems  Date Reviewed: 2/28/2023            Codes Class Noted POA    Acute on chronic respiratory failure with hypoxia [facility]) ICD-10-CM: M57.51  ICD-9-CM: 518.84, 799.02  3/5/2023 Unknown         Social Determinants of Health     Tobacco Use: Medium Risk    Smoking Tobacco Use: Former    Smokeless Tobacco Use: Never    Passive Exposure: Not on file   Alcohol Use: Not on file   Financial Resource Strain: Not on file   Food Insecurity: Not on file   Transportation Needs: Not on file   Physical Activity: Not on file   Stress: Not on file   Social Connections: Not on file   Intimate Partner Violence: Not on file   Depression: Not on file   Housing Stability: Not on file       Review of Systems:   Pertinent items are noted in HPI. Vital Signs:    Last 24hrs VS reviewed since prior progress note.  Most recent are:  Visit Vitals  BP (!) 140/58 (BP 1 Location: Left upper arm, BP Patient Position: At rest)   Pulse 68   Temp 97.2 °F (36.2 °C)   Resp 20   Ht 5' 2\" (1.575 m)   Wt 42.3 kg (93 lb 4.1 oz)   SpO2 100%   BMI 17.06 kg/m²       No intake or output data in the 24 hours ending 03/11/23 1143     Physical Examination:   I had a face to face encounter with this patient and independently examined them on 3/11/2023 as outlined below:    General: Alert, no acute distress, chronically ill-appearing, weak, thin  Resp:  HFNC, decreased AE, No accessory muscle use  CV:  RRR, No audible murmur  GI:  Soft, Non distended, Non tender  Neurologic:  Alert and oriented x2, weak throughout  Extremities: No edema or cyanosis  Psych:  Not anxious or agitated  Skin:  No rashes. No jaundice    Data Review:   I have personally and independently reviewed all pertinent labs, diagnostic studies, imaging, and have provided independent interpretation of the same. Labs:     Recent Labs     03/10/23  0720 03/09/23  0504   WBC 14.0* 15.3*   HGB 13.3 13.5   HCT 43.2 42.0   * 416*       Recent Labs     03/10/23  0720 03/09/23  0504    139   K 4.2 5.1   * 106   CO2 25 29   BUN 42* 47*   CREA 0.98 1.27*   GLU 84 125*   CA 8.9 8.7   MG 2.3 2.6*   PHOS 2.3*  --        No results for input(s): ALT, AP, TBIL, TBILI, TP, ALB, GLOB, GGT, AML, LPSE in the last 72 hours. No lab exists for component: SGOT, GPT, AMYP, HLPSE  No results for input(s): INR, PTP, APTT, INREXT, INREXT in the last 72 hours. No results for input(s): FE, TIBC, PSAT, FERR in the last 72 hours. No results found for: FOL, RBCF   No results for input(s): PH, PCO2, PO2 in the last 72 hours. No results for input(s): CPK, CKNDX, TROIQ in the last 72 hours.     No lab exists for component: CPKMB  No results found for: CHOL, CHOLX, CHLST, CHOLV, HDL, HDLP, LDL, LDLC, DLDLP, TGLX, TRIGL, TRIGP, CHHD, CHHDX  Lab Results   Component Value Date/Time    Glucose (POC) 121 (H) 03/08/2023 05:32 PM    Glucose (POC) 103 03/08/2023 12:21 PM    Glucose (POC) 101 03/08/2023 08:45 AM    Glucose (POC) 114 03/07/2023 10:02 PM    Glucose (POC) 73 03/07/2023 05:06 PM     Lab Results   Component Value Date/Time    Color YELLOW/STRAW 02/23/2023 04:05 PM    Appearance CLEAR 02/23/2023 04:05 PM    Specific gravity 1.018 02/23/2023 04:05 PM    pH (UA) 5.0 02/23/2023 04:05 PM    Protein 30 (A) 02/23/2023 04:05 PM    Glucose Negative 02/23/2023 04:05 PM    Ketone Negative 02/23/2023 04:05 PM    Bilirubin Negative 02/23/2023 04:05 PM    Urobilinogen 1.0 02/23/2023 04:05 PM    Nitrites Negative 02/23/2023 04:05 PM    Leukocyte Esterase Negative 02/23/2023 04:05 PM    Epithelial cells FEW 02/23/2023 04:05 PM    Bacteria Negative 02/23/2023 04:05 PM    WBC 0-4 02/23/2023 04:05 PM    RBC 0-5 02/23/2023 04:05 PM       Notes reviewed from all clinical/nonclinical/nursing services involved in patient's clinical care. Care coordination discussions were held with appropriate clinical/nonclinical/ nursing providers based on care coordination needs. Patients current active Medications were reviewed, considered, added and adjusted based on the clinical condition today.       Medications Reviewed:     Current Facility-Administered Medications   Medication Dose Route Frequency    levothyroxine (SYNTHROID) tablet 88 mcg  88 mcg Oral ACB    cefepime (MAXIPIME) 1 g in 0.9% sodium chloride (MBP/ADV) 50 mL MBP  1 g IntraVENous Q12H    nystatin (MYCOSTATIN) 100,000 unit/mL oral suspension 500,000 Units  500,000 Units Oral QID    guaiFENesin ER (MUCINEX) tablet 600 mg  600 mg Oral BID    amLODIPine (NORVASC) tablet 10 mg  10 mg Oral DAILY    [Held by provider] furosemide (LASIX) tablet 20 mg  20 mg Oral DAILY    sodium chloride (NS) flush 5-40 mL  5-40 mL IntraVENous Q8H    sodium chloride (NS) flush 5-40 mL  5-40 mL IntraVENous PRN    acetaminophen (TYLENOL) tablet 650 mg  650 mg Oral Q6H PRN    Or    acetaminophen (TYLENOL) suppository 650 mg  650 mg Rectal Q6H PRN    polyethylene glycol (MIRALAX) packet 17 g  17 g Oral DAILY PRN    ondansetron (ZOFRAN ODT) tablet 4 mg  4 mg Oral Q8H PRN    Or    ondansetron (ZOFRAN) injection 4 mg  4 mg IntraVENous Q6H PRN    albuterol (PROVENTIL VENTOLIN) nebulizer solution 2.5 mg  2.5 mg Nebulization Q4H PRN    aspirin chewable tablet 81 mg  81 mg Oral DAILY    sertraline (ZOLOFT) tablet 50 mg  50 mg Oral QHS    heparin (porcine) injection 5,000 Units  5,000 Units SubCUTAneous Q12H     ______________________________________________________________________  EXPECTED LENGTH OF STAY: 5d 0h  ACTUAL LENGTH OF STAY:          Bradford Schultz MD

## 2023-03-11 NOTE — PROGRESS NOTES
Problem: Pressure Injury - Risk of  Goal: *Prevention of pressure injury  Description: Document Gerardo Scale and appropriate interventions in the flowsheet. Outcome: Progressing Towards Goal  Note: Pressure Injury Interventions:  Sensory Interventions: Float heels, Minimize linen layers    Moisture Interventions: Check for incontinence Q2 hours and as needed    Activity Interventions: Pressure redistribution bed/mattress(bed type)    Mobility Interventions: Pressure redistribution bed/mattress (bed type)    Nutrition Interventions: Document food/fluid/supplement intake, Offer support with meals,snacks and hydration    Friction and Shear Interventions: Apply protective barrier, creams and emollients                Problem: Falls - Risk of  Goal: *Absence of Falls  Description: Document Kranthi Fall Risk and appropriate interventions in the flowsheet.   Outcome: Progressing Towards Goal  Note: Fall Risk Interventions:

## 2023-03-11 NOTE — PROGRESS NOTES
Bedside and Verbal shift change report given to Caleb Bowden (oncoming nurse) by Arian Lam (offgoing nurse). Report included the following information SBAR, Kardex, ED Summary, Recent Results, Med Rec Status, and Cardiac Rhythm A-paced .

## 2023-03-11 NOTE — HOSPICE
[facility]  [phone]    Nursing Note   Patient Name: Marisel Carranza  YOB: 1928  Age: 80 y.o. [address] [facility] RN Note:  Chart reviewed. Plan of care discussed with patients nurse. In to meet with pt's 4 children and their spouses in private area away from patient per their request. Pt is alert x2 per nurse. Discussed Hospice philosophy, general plan of care, levels of care, services. Family is in agreement with comfort measures only and symptom management medications to address dyspnea and secretions. Pt is currently not receiving symptoms medications. They understand patient may be transferred off telemetry unit. Goal will be to wean O2 for comfort and they are in agreement with using IV opioids to achieve. Family information provided reviewed with them. They are interested in [facility] as would be centrally located. DDNR to be signed. Thank you for the opportunity to be of service to this patient.        Cassi March, [address] [facility]  [phone]

## 2023-03-12 NOTE — HOSPICE
[facility]  [phone]    Hospice Liaison Nursing Note   Patient Name: Irina Odonnell  YOB: 1928  Age: 80 y.o. Chart reviewed for updates in plan of care. Per bedside nurse, patient has taken oral medications without distress. She verbalizes no distress and has not required any PRN medications. Pt remains stable on oxygen 4 liters via NC. Plan: continue to support and offer hospice information. Will continue to evaluate patient for GIP inpatient hospice    Please call Hospice team to offer support for patient, family or staff. Thank you for your coordination with the hospice plan of care. 19:00: Call received from patient's son, Erin Vargas with concerns over why patient is continuing with current medications under hospice care. Education provided that patient is not under hospice services, and explained medicare requirements for various levels and locations for hospice services. Felikelsie Sam asked to meet with hospice team on Monday.       Santhosh Segura RN, [facility]  [phone] [place]  [phone] Office

## 2023-03-12 NOTE — PROGRESS NOTES
Problem: Pressure Injury - Risk of  Goal: *Prevention of pressure injury  Description: Document Gerardo Scale and appropriate interventions in the flowsheet. Outcome: Progressing Towards Goal  Note: Pressure Injury Interventions:  Sensory Interventions: Assess changes in LOC, Float heels, Check visual cues for pain    Moisture Interventions: Absorbent underpads, Apply protective barrier, creams and emollients, Check for incontinence Q2 hours and as needed    Activity Interventions: Assess need for specialty bed, Pressure redistribution bed/mattress(bed type)    Mobility Interventions: Assess need for specialty bed, Float heels, HOB 30 degrees or less, Pressure redistribution bed/mattress (bed type)    Nutrition Interventions: Document food/fluid/supplement intake, Offer support with meals,snacks and hydration    Friction and Shear Interventions: Apply protective barrier, creams and emollients, HOB 30 degrees or less       Problem: Patient Education: Go to Patient Education Activity  Goal: Patient/Family Education  Outcome: Progressing Towards Goal     Problem: Falls - Risk of  Goal: *Absence of Falls  Description: Document Kranthi Fall Risk and appropriate interventions in the flowsheet.   Outcome: Progressing Towards Goal  Note: Fall Risk Interventions:        Problem: Patient Education: Go to Patient Education Activity  Goal: Patient/Family Education  Outcome: Progressing Towards Goal     Problem: Patient Education: Go to Patient Education Activity  Goal: Patient/Family Education  Outcome: Progressing Towards Goal     Problem: Breathing Pattern - Ineffective  Goal: *Absence of hypoxia  Outcome: Progressing Towards Goal     Problem: Patient Education: Go to Patient Education Activity  Goal: Patient/Family Education  Outcome: Progressing Towards Goal

## 2023-03-12 NOTE — PROGRESS NOTES
Problem: Pressure Injury - Risk of  Goal: *Prevention of pressure injury  Description: Document Gerardo Scale and appropriate interventions in the flowsheet. Outcome: Progressing Towards Goal  Note: Pressure Injury Interventions:  Sensory Interventions: Assess changes in LOC, Float heels, Check visual cues for pain    Moisture Interventions: Absorbent underpads, Apply protective barrier, creams and emollients, Check for incontinence Q2 hours and as needed    Activity Interventions: Pressure redistribution bed/mattress(bed type)    Mobility Interventions: Assess need for specialty bed, Float heels, HOB 30 degrees or less, Pressure redistribution bed/mattress (bed type)    Nutrition Interventions: Document food/fluid/supplement intake, Offer support with meals,snacks and hydration    Friction and Shear Interventions: Apply protective barrier, creams and emollients, HOB 30 degrees or less                Problem: Falls - Risk of  Goal: *Absence of Falls  Description: Document Kranthi Fall Risk and appropriate interventions in the flowsheet.   Outcome: Progressing Towards Goal  Note: Fall Risk Interventions:

## 2023-03-12 NOTE — PROGRESS NOTES
[facility]                                                                                          Hospitalist Progress Note  Belia Salas MD  Answering service: [phone] from in house phone        Date of Service:  3/12/2023  NAME:  Quoc Gustafson  :  1928  MRN:  [id number]       Admission Summary:   Quoc Gustafson is a 80 y.o. female with COPD on O2 who was recently discharged from here to SNF after being hospitalized for RSV as well as bacterial pneumonia and MARILYN. She returns with hypoxic respiratory failure and is found to have bacterial pneumonia. Interval history / Subjective:     No acute overnight events  On comfort care since yest afternoon  Down to 4L NC  Pt in NAD, denies pain or discomfort  Appears weak     Assessment & Plan:     Acute on chronic hypoxic respiratory failure: due to HCAP, possible pulm edema/effusion, COPD exacerbation  -on 2L at baseline  -completed systemic steroid course  -repeat CXR 3/7 - no significant change  -recent TTE w/ normal LVEF  -per pulmonary: may be time to consider comfort measures  -appreciate hospice assistance, s/p family meeting 3/11 with decision to transition to comfort care, not requiring GIP yet as minimal symptoms  Per family meeting: no duonebs d/t discomfort; meds to help secretions; goal to wean O2 for comfort, agree with IV opioids to achieve; can continue PO meds for now as pt able to take- monitor closely with low threshold to dc if unable or pt refuses     HCAP with sepsis:  -IV cefepime completes today; treated w/ ceftriaxone/azithro then Augmentin last admit  -blood cultures NG     HTN:  - amlodipine     Troponin elevation: mild elevation in the setting of resp failure, lower than last admit. No chest pain.       Hypothyroidism:  -TSH above goal, increased synthroid to 88mcg     Depression/anxiety:   -Zoloft     Recent RSV infection    Dementia at baseline    Hypophos, repleted Remote hx of MI, breast CA, L lung mass excision      Care affected by social determinants of health: family/community support    Plan remain in same level of care medical    Code status: DNR  Prophylaxis: Heparin ppx  Care Plan discussed with: Patient/Family  Anticipated Disposition:  GIP vs hospice house  Anticipated Discharge: 24 hours to 48 hours   Admission Status: Inpatient     Hospital Problems  Date Reviewed: 2/28/2023            Codes Class Noted POA    Acute on chronic respiratory failure with hypoxia [facility]) ICD-10-CM: S65.64  ICD-9-CM: 518.84, 799.02  3/5/2023 Unknown         Social Determinants of Health     Tobacco Use: Medium Risk    Smoking Tobacco Use: Former    Smokeless Tobacco Use: Never    Passive Exposure: Not on file   Alcohol Use: Not on file   Financial Resource Strain: Not on file   Food Insecurity: Not on file   Transportation Needs: Not on file   Physical Activity: Not on file   Stress: Not on file   Social Connections: Not on file   Intimate Partner Violence: Not on file   Depression: Not on file   Housing Stability: Not on file       Review of Systems:   Pertinent items are noted in HPI. Vital Signs:    Last 24hrs VS reviewed since prior progress note.  Most recent are:  Visit Vitals  BP (!) 162/76 (BP 1 Location: Left arm, BP Patient Position: At rest)   Pulse 69   Temp 97.5 °F (36.4 °C)   Resp 16   Ht 5' 2\" (1.575 m)   Wt 42.3 kg (93 lb 4.1 oz)   SpO2 95%   BMI 17.06 kg/m²       No intake or output data in the 24 hours ending 03/12/23 1436     Physical Examination:   I had a face to face encounter with this patient and independently examined them on 3/12/2023 as outlined below:    General: Alert, no acute distress, chronically ill-appearing, weak, thin  Resp:  Decreased AE, No accessory muscle use  CV:  RRR, No audible murmur  GI:  Soft, Non distended, Non tender  Neurologic:  Alert and oriented x1, weak throughout  Extremities: No edema or cyanosis  Psych:  Not anxious or agitated  Skin:  No rashes. No jaundice    Data Review:   I have personally and independently reviewed all pertinent labs, diagnostic studies, imaging, and have provided independent interpretation of the same. Labs:     Recent Labs     03/10/23  0720   WBC 14.0*   HGB 13.3   HCT 43.2   *       Recent Labs     03/10/23  0720      K 4.2   *   CO2 25   BUN 42*   CREA 0.98   GLU 84   CA 8.9   MG 2.3   PHOS 2.3*       No results for input(s): ALT, AP, TBIL, TBILI, TP, ALB, GLOB, GGT, AML, LPSE in the last 72 hours. No lab exists for component: SGOT, GPT, AMYP, HLPSE  No results for input(s): INR, PTP, APTT, INREXT, INREXT in the last 72 hours. No results for input(s): FE, TIBC, PSAT, FERR in the last 72 hours. No results found for: FOL, RBCF   No results for input(s): PH, PCO2, PO2 in the last 72 hours. No results for input(s): CPK, CKNDX, TROIQ in the last 72 hours.     No lab exists for component: CPKMB  No results found for: CHOL, CHOLX, CHLST, CHOLV, HDL, HDLP, LDL, LDLC, DLDLP, TGLX, TRIGL, TRIGP, CHHD, CHHDX  Lab Results   Component Value Date/Time    Glucose (POC) 121 (H) 03/08/2023 05:32 PM    Glucose (POC) 103 03/08/2023 12:21 PM    Glucose (POC) 101 03/08/2023 08:45 AM    Glucose (POC) 114 03/07/2023 10:02 PM    Glucose (POC) 73 03/07/2023 05:06 PM     Lab Results   Component Value Date/Time    Color YELLOW/STRAW 02/23/2023 04:05 PM    Appearance CLEAR 02/23/2023 04:05 PM    Specific gravity 1.018 02/23/2023 04:05 PM    pH (UA) 5.0 02/23/2023 04:05 PM    Protein 30 (A) 02/23/2023 04:05 PM    Glucose Negative 02/23/2023 04:05 PM    Ketone Negative 02/23/2023 04:05 PM    Bilirubin Negative 02/23/2023 04:05 PM    Urobilinogen 1.0 02/23/2023 04:05 PM    Nitrites Negative 02/23/2023 04:05 PM    Leukocyte Esterase Negative 02/23/2023 04:05 PM    Epithelial cells FEW 02/23/2023 04:05 PM    Bacteria Negative 02/23/2023 04:05 PM    WBC 0-4 02/23/2023 04:05 PM    RBC 0-5 02/23/2023 04:05 PM Notes reviewed from all clinical/nonclinical/nursing services involved in patient's clinical care. Care coordination discussions were held with appropriate clinical/nonclinical/ nursing providers based on care coordination needs. Patients current active Medications were reviewed, considered, added and adjusted based on the clinical condition today.       Medications Reviewed:     Current Facility-Administered Medications   Medication Dose Route Frequency    glycopyrrolate (ROBINUL) injection 0.2 mg  0.2 mg IntraVENous Q6H    HYDROmorphone (DILAUDID) injection 0.5 mg  0.5 mg IntraVENous Q4H PRN    levothyroxine (SYNTHROID) tablet 88 mcg  88 mcg Oral ACB    cefepime (MAXIPIME) 1 g in 0.9% sodium chloride (MBP/ADV) 50 mL MBP  1 g IntraVENous Q12H    nystatin (MYCOSTATIN) 100,000 unit/mL oral suspension 500,000 Units  500,000 Units Oral QID    guaiFENesin ER (MUCINEX) tablet 600 mg  600 mg Oral BID    amLODIPine (NORVASC) tablet 10 mg  10 mg Oral DAILY    [Held by provider] furosemide (LASIX) tablet 20 mg  20 mg Oral DAILY    sodium chloride (NS) flush 5-40 mL  5-40 mL IntraVENous Q8H    sodium chloride (NS) flush 5-40 mL  5-40 mL IntraVENous PRN    acetaminophen (TYLENOL) tablet 650 mg  650 mg Oral Q6H PRN    Or    acetaminophen (TYLENOL) suppository 650 mg  650 mg Rectal Q6H PRN    polyethylene glycol (MIRALAX) packet 17 g  17 g Oral DAILY PRN    ondansetron (ZOFRAN ODT) tablet 4 mg  4 mg Oral Q8H PRN    Or    ondansetron (ZOFRAN) injection 4 mg  4 mg IntraVENous Q6H PRN    albuterol (PROVENTIL VENTOLIN) nebulizer solution 2.5 mg  2.5 mg Nebulization Q4H PRN    aspirin chewable tablet 81 mg  81 mg Oral DAILY    sertraline (ZOLOFT) tablet 50 mg  50 mg Oral QHS    heparin (porcine) injection 5,000 Units  5,000 Units SubCUTAneous Q12H     ______________________________________________________________________  EXPECTED LENGTH OF STAY: 5d 0h  ACTUAL LENGTH OF STAY:          7                 Ania Abernathy MD

## 2023-03-13 NOTE — HOSPICE
[facility]  [phone]    Hospice Liaison Nursing Note   Patient Name: Rosas Sanchez  YOB: 1928  Age: 80 y.o. Chart reviewed for updates in plan of care. Plan: Continue with bedside support, and contact family this morning. Please call Hospice team to offer support for patient, family or staff. Thank you for your coordination with the hospice plan of care. 10:45: Bedside visit. Pt alert, appears oriented and fatigued. She states that she feels \"ok\" and denies pain or shortness of breath. Pt is not meeting GIP for inpatient hospice criteria. Updated medical team in rounds. 11:07: Call made to pt's son Neeru Yo. Neeru Yo currently at an appointment and will call hospice nurse within the next half hour to review hospice plan of care. 12:00: Long phone conversation with patient's son, Neeru Yo. Reviewed criteria for hospice vs home routine vs inpatient under GIP. Offered options of assisting patient set up hospice service at former facility vs home with a family member. Neeru Yo is going to speak with other family members about possibly bringing pt home, and plans to call [address] to verify patient can return under higher level of care. Updated CM. 12:36: New orders received from Dr. Zarina Barrios to hold IV Route Dilaudid dose for trial SL route dosing. Plan to continue to assess for GIP inpatient hospice.     Ilya Mancilla RN, [place] [id number] Nurse Liaison  [phone] Mobile  [phone] Office

## 2023-03-13 NOTE — PROGRESS NOTES
Alert and oriented x4, though does have occasional forgetfulness or confusion. Pain helped with norco and scheduled medications. Up with lift. Refusing to reposition except on left side. Dressing changed on coccyx. Takes medications 3 at a time. VSS on high flow oxygen. Report called to station 88 will be transferring shortly.    Problem: Pressure Injury - Risk of  Goal: *Prevention of pressure injury  Description: Document Gerardo Scale and appropriate interventions in the flowsheet. Outcome: Not Progressing Towards Goal  Note: Pressure Injury Interventions:  Sensory Interventions: Assess changes in LOC, Avoid rigorous massage over bony prominences, Check visual cues for pain, Float heels, Keep linens dry and wrinkle-free, Maintain/enhance activity level, Minimize linen layers, Monitor skin under medical devices, Pressure redistribution bed/mattress (bed type), Turn and reposition approx. every two hours (pillows and wedges if needed)    Moisture Interventions: Absorbent underpads, Apply protective barrier, creams and emollients, Assess need for specialty bed, Check for incontinence Q2 hours and as needed, Limit adult briefs, Maintain skin hydration (lotion/cream), Minimize layers, Moisture barrier    Activity Interventions: Assess need for specialty bed, Pressure redistribution bed/mattress(bed type)    Mobility Interventions: Assess need for specialty bed, Float heels, HOB 30 degrees or less, Turn and reposition approx. every two hours(pillow and wedges), Pressure redistribution bed/mattress (bed type)    Nutrition Interventions: Document food/fluid/supplement intake, Offer support with meals,snacks and hydration, Discuss nutritional consult with provider    Friction and Shear Interventions: Apply protective barrier, creams and emollients, Foam dressings/transparent film/skin sealants, Lift sheet, HOB 30 degrees or less, Minimize layers                Problem: Patient Education: Go to Patient Education Activity  Goal: Patient/Family Education  Outcome: Not Progressing Towards Goal     Problem: Falls - Risk of  Goal: *Absence of Falls  Description: Document Kranthi Fall Risk and appropriate interventions in the flowsheet.   Outcome: Not Progressing Towards Goal  Note: Fall Risk Interventions:                                Problem: Patient Education: Go to Patient Education Activity  Goal: Patient/Family Education  Outcome: Not Progressing Towards Goal     Problem: Patient Education: Go to Patient Education Activity  Goal: Patient/Family Education  Outcome: Not Progressing Towards Goal     Problem: Breathing Pattern - Ineffective  Goal: *Absence of hypoxia  Outcome: Not Progressing Towards Goal     Problem: Patient Education: Go to Patient Education Activity  Goal: Patient/Family Education  Outcome: Not Progressing Towards Goal

## 2023-03-13 NOTE — PROGRESS NOTES
[facility]  [facility]ospitalist Group                                                                                          Hospitalist Progress Note  Jodi Way DO Sheri  Answering service: [phone] from in house phone        Date of Service:  3/13/2023  NAME:  Vinay Perera  :  1928  MRN:  [id number]       Admission Summary:   Vinay Perera is a 80 y.o. female with COPD on O2 who was recently discharged from here to SNF after being hospitalized for RSV as well as bacterial pneumonia and MARILYN. She returns with hypoxic respiratory failure and is found to have bacterial pneumonia. Interval history / Subjective: Follow up respiratory failure  No acute overnight events  On comfort care  Per chart, family asking about home medications- if should continue or discontinued under hospice/comfort care   Pt in NAD, denies pain or discomfort  Appears weak     Assessment & Plan:     Acute on chronic hypoxic respiratory failure: due to HCAP, possible pulm edema/effusion, COPD exacerbation  -on 2L at baseline  -completed systemic steroid course  -repeat CXR 3/7 - no significant change  -recent TTE w/ normal LVEF  -per pulmonary: may be time to consider comfort measures  -appreciate hospice assistance, s/p family meeting 3/11 with decision to transition to comfort care, not requiring GIP yet as minimal symptoms     HCAP with sepsis:  -s/p antibiotics   -blood cultures NG     HTN     Troponin elevation: mild elevation in the setting of resp failure, lower than last admit. No chest pain.       Hypothyroidism     Depression/anxiety     Recent RSV infection    Dementia at baseline    Hypophos, s/p repleted     Remote hx of MI, breast CA, L lung mass excision      Care affected by social determinants of health: family/community support    Plan remain in same level of care medical    Code status: DNR  Prophylaxis: holding, on comfort care   Care Plan discussed with: nurse  Anticipated Disposition:  GIP vs hospice house  Anticipated Discharge: 24 hours to 48 hours   Admission Status: Inpatient     Hospital Problems  Date Reviewed: 2/28/2023            Codes Class Noted POA    Acute on chronic respiratory failure with hypoxia [facility]) ICD-10-CM: J96.21  ICD-9-CM: 518.84, 799.02  3/5/2023 Unknown         Social Determinants of Health     Tobacco Use: Medium Risk    Smoking Tobacco Use: Former    Smokeless Tobacco Use: Never    Passive Exposure: Not on file   Alcohol Use: Not on file   Financial Resource Strain: Not on file   Food Insecurity: Not on file   Transportation Needs: Not on file   Physical Activity: Not on file   Stress: Not on file   Social Connections: Not on file   Intimate Partner Violence: Not on file   Depression: Not on file   Housing Stability: Not on file       Review of Systems:   Pertinent items are noted in HPI. Vital Signs:    Last 24hrs VS reviewed since prior progress note. Most recent are:  Visit Vitals  BP (!) 156/77 (BP 1 Location: Left upper arm, BP Patient Position: At rest;Supine)   Pulse 60   Temp 97.4 °F (36.3 °C)   Resp 16   Ht 5' 2\" (1.575 m)   Wt 42.8 kg (94 lb 5.7 oz)   SpO2 (!) 89%   BMI 17.26 kg/m²       No intake or output data in the 24 hours ending 03/13/23 1135     Physical Examination:   I had a face to face encounter with this patient and independently examined them on 3/13/2023 as outlined below:    General: no acute distress, chronically ill-appearing, weak, thin  Resp:  Decreased AE, No accessory muscle use  CV:  RRR, No audible murmur  GI:  Soft, Non distended, Non tender  Neurologic:  Not assessed   Extremities: No edema or cyanosis  Skin:  No rashes. No jaundice    Data Review:   I have personally and independently reviewed all pertinent labs, diagnostic studies, imaging, and have provided independent interpretation of the same.     Labs:     No results for input(s): WBC, HGB, HCT, PLT, HGBEXT, HCTEXT, PLTEXT, HGBEXT, HCTEXT, PLTEXT in the last 72 hours. No results for input(s): NA, K, CL, CO2, BUN, CREA, GLU, CA, MG, PHOS, URICA in the last 72 hours. No results for input(s): ALT, AP, TBIL, TBILI, TP, ALB, GLOB, GGT, AML, LPSE in the last 72 hours. No lab exists for component: SGOT, GPT, AMYP, HLPSE  No results for input(s): INR, PTP, APTT, INREXT, INREXT in the last 72 hours. No results for input(s): FE, TIBC, PSAT, FERR in the last 72 hours. No results found for: FOL, RBCF   No results for input(s): PH, PCO2, PO2 in the last 72 hours. No results for input(s): CPK, CKNDX, TROIQ in the last 72 hours. No lab exists for component: CPKMB  No results found for: CHOL, CHOLX, CHLST, CHOLV, HDL, HDLP, LDL, LDLC, DLDLP, TGLX, TRIGL, TRIGP, CHHD, CHHDX  Lab Results   Component Value Date/Time    Glucose (POC) 121 (H) 03/08/2023 05:32 PM    Glucose (POC) 103 03/08/2023 12:21 PM    Glucose (POC) 101 03/08/2023 08:45 AM    Glucose (POC) 114 03/07/2023 10:02 PM    Glucose (POC) 73 03/07/2023 05:06 PM     Lab Results   Component Value Date/Time    Color YELLOW/STRAW 02/23/2023 04:05 PM    Appearance CLEAR 02/23/2023 04:05 PM    Specific gravity 1.018 02/23/2023 04:05 PM    pH (UA) 5.0 02/23/2023 04:05 PM    Protein 30 (A) 02/23/2023 04:05 PM    Glucose Negative 02/23/2023 04:05 PM    Ketone Negative 02/23/2023 04:05 PM    Bilirubin Negative 02/23/2023 04:05 PM    Urobilinogen 1.0 02/23/2023 04:05 PM    Nitrites Negative 02/23/2023 04:05 PM    Leukocyte Esterase Negative 02/23/2023 04:05 PM    Epithelial cells FEW 02/23/2023 04:05 PM    Bacteria Negative 02/23/2023 04:05 PM    WBC 0-4 02/23/2023 04:05 PM    RBC 0-5 02/23/2023 04:05 PM       Notes reviewed from all clinical/nonclinical/nursing services involved in patient's clinical care. Care coordination discussions were held with appropriate clinical/nonclinical/ nursing providers based on care coordination needs.      Patients current active Medications were reviewed, considered, added and adjusted based on the clinical condition today.       Medications Reviewed:     Current Facility-Administered Medications   Medication Dose Route Frequency    glycopyrrolate (ROBINUL) injection 0.2 mg  0.2 mg IntraVENous Q6H    HYDROmorphone (DILAUDID) injection 0.5 mg  0.5 mg IntraVENous Q4H PRN    levothyroxine (SYNTHROID) tablet 88 mcg  88 mcg Oral ACB    guaiFENesin ER (MUCINEX) tablet 600 mg  600 mg Oral BID    amLODIPine (NORVASC) tablet 10 mg  10 mg Oral DAILY    sodium chloride (NS) flush 5-40 mL  5-40 mL IntraVENous Q8H    sodium chloride (NS) flush 5-40 mL  5-40 mL IntraVENous PRN    acetaminophen (TYLENOL) tablet 650 mg  650 mg Oral Q6H PRN    Or    acetaminophen (TYLENOL) suppository 650 mg  650 mg Rectal Q6H PRN    polyethylene glycol (MIRALAX) packet 17 g  17 g Oral DAILY PRN    ondansetron (ZOFRAN ODT) tablet 4 mg  4 mg Oral Q8H PRN    Or    ondansetron (ZOFRAN) injection 4 mg  4 mg IntraVENous Q6H PRN    albuterol (PROVENTIL VENTOLIN) nebulizer solution 2.5 mg  2.5 mg Nebulization Q4H PRN    aspirin chewable tablet 81 mg  81 mg Oral DAILY    sertraline (ZOLOFT) tablet 50 mg  50 mg Oral QHS    heparin (porcine) injection 5,000 Units  5,000 Units SubCUTAneous Q12H     ______________________________________________________________________  EXPECTED LENGTH OF STAY: 5d 0h  ACTUAL LENGTH OF STAY:          [address] Silva DO

## 2023-03-13 NOTE — PROGRESS NOTES
ALYSE:  1. RUR-15%  2. Admitted from [facility] SNF, patient is on comfort care. CM spoke with patient son to inform him that his mother could go back to [address] with [facility] hospice, he is going to talk with his family about this and contact cm later. [facility] hospice continue to follow here at the hospital.    CM to follow.     Tyler De Luna [facility]

## 2023-03-13 NOTE — PROGRESS NOTES
Problem: Pressure Injury - Risk of  Goal: *Prevention of pressure injury  Description: Document Gerardo Scale and appropriate interventions in the flowsheet.   Outcome: Progressing Towards Goal  Note: Pressure Injury Interventions:  Sensory Interventions: Assess changes in LOC, Check visual cues for pain, Float heels    Moisture Interventions: Absorbent underpads, Apply protective barrier, creams and emollients, Internal/External urinary devices    Activity Interventions: Assess need for specialty bed, Pressure redistribution bed/mattress(bed type)    Mobility Interventions: Assess need for specialty bed, Float heels, HOB 30 degrees or less, Pressure redistribution bed/mattress (bed type)    Nutrition Interventions: Document food/fluid/supplement intake, Offer support with meals,snacks and hydration    Friction and Shear Interventions: Apply protective barrier, creams and emollients, Feet elevated on foot rest, Foam dressings/transparent film/skin sealants, HOB 30 degrees or less

## 2023-03-13 NOTE — PROGRESS NOTES
Pulmonary, Critical Care, and Sleep Medicine~Progress Note    Name: Sachin Valentine MRN: [id number]   : 1928 Hospital: [place] [address]   Date: 3/13/2023 11:53 AM Admission: 3/5/2023     Impression Plan   Acute on chronic hypoxic resp failure   AE of COPD  RSV infection, but was + on last admission  LLL  HAP. Unclear if new or same from recent admission  HTN O2 for comfort  Secretion control as needed  Noted transition into hospice   We will be available again to see if needed      Daily Progression:    3/13  Resting on NC  Looks comfortable     3/10  She could not tolerated such high flow  Reduced back to where she was  Looks miserable  Family at bedside     3/9  Looks miserable  On high flow   WBC trending down     3/8  Consult Note requested by hospitalist.     Emile Hawk presented for admission secondary to worsening dyspnea at her SNF. She was found at admission to have what was suspected to be a HAP and RSV infection. She has known COPD and is in exacerbation as well. Currently on 25lpm and 70% FiO2. Unlabored in breathing. She was just recently hospitalized and discharged at the end of Feb for similar issues. I have reviewed the labs and previous days notes. Review of systems not obtained due to patient factors.   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[facility]     LUNG    Chronic obstructive pulmonary disease (HCC)     Elevated cholesterol     Glaucoma     H/O: pneumonia     History of pelvic fracture     Hypertension     Hypothyroid     Pacemaker 2018    Sick sinus syndrome ([facility]     Syncope       Past Surgical History:   Procedure Laterality Date    HX CATARACT REMOVAL Bilateral     HX HEART CATHETERIZATION      HX OTHER SURGICAL  2013    EXC LESIONS FACE, LIP    HX PACEMAKER  2018    HX PACEMAKER PLACEMENT Left 01/2018    LA UNLISTED PROCEDURE BREAST  1998    right mastectomy    LA UNLISTED PROCEDURE LUNGS & PLEURA Left 1998    EXC LUNG MASS      Prior to Admission medications    Medication Sig Start Date End Date Taking? Authorizing Provider   albuterol-ipratropium (DUO-NEB) 2.5 mg-0.5 mg/3 ml nebu 3 mL by Nebulization route every six (6) hours as needed for Wheezing for up to 30 days. 2/28/23 3/30/23  Lacy OJEDA MD   docusate sodium (COLACE) 100 mg capsule Take 100 mg by mouth two (2) times a day. 0900 and 1700    Provider, Historical   guaiFENesin ER (MUCINEX) 600 mg ER tablet Take 600 mg by mouth two (2) times a day. Indications: COPD and chronic congestion    Provider, Historical   therapeutic multivitamin (Thera) tablet Take 1 Tablet by mouth daily. Provider, Historical   fluticasone-umeclidinium-vilanterol (Trelegy Ellipta) 100-62.5-25 mcg inhaler Take 1 Puff by inhalation daily. Rinse mouth with water after use    Provider, Historical   acetaminophen (TYLENOL) 325 mg tablet Take 650 mg by mouth every four (4) hours as needed (Mild Pain). Provider, Historical   senna-docusate (Senexon-S) 8.6-50 mg per tablet Take 2 Tablets by mouth two (2) times daily as needed for Constipation. Provider, Historical   aspirin 81 mg chewable tablet Take 81 mg by mouth daily. Other, MD Janak   sertraline (ZOLOFT) 50 mg tablet Take 50 mg by mouth nightly. 7/25/21   Provider, Historical   levothyroxine (SYNTHROID) 75 mcg tablet Take 75 mcg by mouth Daily (before breakfast). 7/12/21   Provider, Historical   amLODIPine (NORVASC) 10 mg tablet Take 10 mg by mouth daily. 3/15/18   Provider, Historical   latanoprost (XALATAN) 0.005 % ophthalmic solution Administer 1 Drop to both eyes nightly.  11/24/17   Provider, Historical     No Known Allergies   Social History     Tobacco Use    Smoking status: Former     Packs/day: 0.25 Years: 10.00     Pack years: 2.50     Types: Cigarettes     Quit date: 1989     Years since quittin.2    Smokeless tobacco: Never   Substance Use Topics    Alcohol use: No      Family History   Family history unknown: Yes     OBJECTIVE:     Vital Signs:     Visit Vitals  BP (!) 156/77 (BP 1 Location: Left upper arm, BP Patient Position: At rest;Supine)   Pulse 60   Temp 97.4 °F (36.3 °C)   Resp 16   Ht 5' 2\" (1.575 m)   Wt 42.8 kg (94 lb 5.7 oz)   SpO2 (!) 89%   BMI 17.26 kg/m²      Temp (24hrs), Av.7 °F (36.5 °C), Min:97.4 °F (36.3 °C), Max:98.5 °F (36.9 °C)     Intake/Output:     Last shift: No intake/output data recorded. Last 3 shifts: No intake/output data recorded.       No intake or output data in the 24 hours ending 23 1011      Physical Exam:                                        Exam Findings Other   General: No resp distress noted, appears stated age    [de-identified]:  No ulcers, JVD not elevated, no cervical LAD    Chest: No pectus deformity, normal chest rise b/l    HEART:  RRR, no murmurs/rubs/gallops    Lungs:  CTA b/l, no rhonchi/crackles/wheeze, diminished BS at bases    ABD: Soft/NT, non rigid mildly distended    EXT: No cyanosis/clubbing/edema, normal peripheral pulses    Skin: No rashes or ulcers, no mottling    Neuro: Resting         Medications:  Current Facility-Administered Medications   Medication Dose Route Frequency    glycopyrrolate (ROBINUL) injection 0.2 mg  0.2 mg IntraVENous Q6H    HYDROmorphone (DILAUDID) injection 0.5 mg  0.5 mg IntraVENous Q4H PRN    levothyroxine (SYNTHROID) tablet 88 mcg  88 mcg Oral ACB    guaiFENesin ER (MUCINEX) tablet 600 mg  600 mg Oral BID    amLODIPine (NORVASC) tablet 10 mg  10 mg Oral DAILY    sodium chloride (NS) flush 5-40 mL  5-40 mL IntraVENous Q8H    sodium chloride (NS) flush 5-40 mL  5-40 mL IntraVENous PRN    acetaminophen (TYLENOL) tablet 650 mg  650 mg Oral Q6H PRN    Or    acetaminophen (TYLENOL) suppository 650 mg  650 mg Rectal Q6H PRN    polyethylene glycol (MIRALAX) packet 17 g  17 g Oral DAILY PRN    ondansetron (ZOFRAN ODT) tablet 4 mg  4 mg Oral Q8H PRN    Or    ondansetron (ZOFRAN) injection 4 mg  4 mg IntraVENous Q6H PRN    albuterol (PROVENTIL VENTOLIN) nebulizer solution 2.5 mg  2.5 mg Nebulization Q4H PRN    aspirin chewable tablet 81 mg  81 mg Oral DAILY    sertraline (ZOLOFT) tablet 50 mg  50 mg Oral QHS    heparin (porcine) injection 5,000 Units  5,000 Units SubCUTAneous Q12H       Labs:  ABG No results for input(s): PHI, PCO2I, PO2I, HCO3I, SO2I, FIO2I in the last 72 hours. CBC No results for input(s): WBC, HGB, HCT, PLT, MCV, MCH, HGBEXT, HCTEXT, PLTEXT, HGBEXT, HCTEXT, PLTEXT in the last 72 hours. Metabolic  Panel No results for input(s): NA, K, CL, CO2, GLU, BUN, CREA, CA, MG, PHOS, ALB, TBIL, ALT, INR, INREXT, INREXT in the last 72 hours.     No lab exists for component: SGOT       Pertinent Labs                Lee Samuels  3/13/2023

## 2023-03-14 NOTE — DISCHARGE SUMMARY
Discharge Summary       PATIENT ID: Brock Altamirano  MRN: [id number]   YOB: 1928    DATE OF ADMISSION: 3/5/2023  2:58 PM    DATE OF DISCHARGE: 3/14/2023   PRIMARY CARE PROVIDER: Timothy, MD Janak     ATTENDING PHYSICIAN: [address], DO   DISCHARGING PROVIDER: [address] [place], [place] [address]DO      CONSULTATIONS: IP CONSULT TO PULMONOLOGY    PROCEDURES/SURGERIES: * No surgery found *    [address] [facility],[address] COURSE:   Admission History:  \"80 y.o. female with COPD on O2 who was recently discharged from here to SNF after being hospitalized for RSV as well as bacterial pneumonia and MARILYN. She returns with hypoxic respiratory failure and is found to have bacterial pneumonia. \"    Patient admitted for further evaluation. She had poor functional status and hospice was recommended. Family elected for comfort care/hospice and she was discharged to [facility] with hospice services     DISCHARGE DIAGNOSES / PLAN:      Acute on chronic hypoxic respiratory failure: due to HCAP, possible pulm edema/effusion, COPD exacerbation  -on 2L at baseline  -completed systemic steroid course  -repeat CXR 3/7 - no significant change  -recent TTE w/ normal LVEF  -per pulmonary: may be time to consider comfort measures  -appreciate hospice assistance, s/p family meeting 3/11 with decision to transition to comfort care, not requiring GIP yet as minimal symptoms     HCAP with sepsis:  -s/p antibiotics   -blood cultures NG     HTN     Troponin elevation: mild elevation in the setting of resp failure, lower than last admit. No chest pain. Hypothyroidism     Depression/anxiety     Recent RSV infection     Dementia at baseline     Hypophos, s/p repleted     Remote hx of MI, breast CA, L lung mass excision       BMI: Body mass index is 17.26 kg/m². . This patient: Meets criteria for underweight given BMI </= 18.5. The patient should be followed closely for risk of malnutrition.      PENDING TEST RESULTS:   At the time of discharge the following test results are still pending: none         FOLLOW UP APPOINTMENTS:    Follow-up Information       Follow up With Specialties Details Why Contact Info    Janak Aguirre MD Neurology                DISCHARGE MEDICATIONS:  Current Discharge Medication List        CONTINUE these medications which have NOT CHANGED    Details   acetaminophen (TYLENOL) 325 mg tablet Take 650 mg by mouth every four (4) hours as needed (Mild Pain).            STOP taking these medications       albuterol-ipratropium (DUO-NEB) 2.5 mg-0.5 mg/3 ml nebu Comments:   Reason for Stopping:         predniSONE (DELTASONE) 10 mg tablet Comments:   Reason for Stopping:         docusate sodium (COLACE) 100 mg capsule Comments:   Reason for Stopping:         guaiFENesin ER (MUCINEX) 600 mg ER tablet Comments:   Reason for Stopping:         therapeutic multivitamin (Thera) tablet Comments:   Reason for Stopping:         fluticasone-umeclidinium-vilanterol (Trelegy Ellipta) 100-62.5-25 mcg inhaler Comments:   Reason for Stopping:         senna-docusate (Senexon-S) 8.6-50 mg per tablet Comments:   Reason for Stopping:         aspirin 81 mg chewable tablet Comments:   Reason for Stopping:         sertraline (ZOLOFT) 50 mg tablet Comments:   Reason for Stopping:         levothyroxine (SYNTHROID) 75 mcg tablet Comments:   Reason for Stopping:         amLODIPine (NORVASC) 10 mg tablet Comments:   Reason for Stopping:         latanoprost (XALATAN) 0.005 % ophthalmic solution Comments:   Reason for Stopping:               DISPOSITION:    Home With:   OT  PT  HH  RN       Long term SNF/Inpatient Rehab    Independent/assisted living   x Hospice- at [facility]    Other:     PATIENT CONDITION AT DISCHARGE:     Functional status   x Poor     Deconditioned     Independent      Cognition     Lucid     Forgetful    x Dementia      Catheters/lines (plus indication)    Nicholson     PICC     PEG    x None      Code status     Full code    x DNR      PHYSICAL EXAMINATION AT DISCHARGE:    General:          can answer simple questions, no acute distress, chronically ill-appearing, weak, thin  Resp:               Decreased AE, No accessory muscle use  CV:                  RRR, No audible murmur  GI:                   Soft, Non distended, Non tender  Neurologic:       Not assessed   Extremities:     No edema or cyanosis  Skin:                No rashes.  No jaundice      CHRONIC MEDICAL DIAGNOSES:  Problem List as of 3/14/2023 Date Reviewed: 2/28/2023            Codes Class Noted - Resolved    Acute on chronic respiratory failure with hypoxia [facility]) ICD-10-CM: J96.21  ICD-9-CM: 518.84, 799.02  3/5/2023 - Present        COPD exacerbation ([facility] ICD-10-CM: J44.1  ICD-9-CM: 491.21  2/28/2023 - Present        RSV (acute bronchiolitis due to respiratory syncytial virus) ICD-10-CM: J21.0  ICD-9-CM: 466.11  2/28/2023 - Present        MARILYN (acute kidney injury) ([facility] ICD-10-CM: N17.9  ICD-9-CM: 584.9  2/28/2023 - Present        Hypernatremia ICD-10-CM: E87.0  ICD-9-CM: 276.0  2/28/2023 - Present        Pneumonia ICD-10-CM: J18.9  ICD-9-CM: 486  2/23/2023 - Present        Hip fracture requiring operative repair [facility]) ICD-10-CM: S72.009A  ICD-9-CM: 820.8  10/29/2021 - Present        SSS (sick sinus syndrome) ([facility] ICD-10-CM: I49.5  ICD-9-CM: 427.81  1/9/2018 - Present        Heart palpitations ICD-10-CM: R00.2  ICD-9-CM: 785.1  12/18/2017 - Present        Shortness of breath ICD-10-CM: R06.02  ICD-9-CM: 786.05  9/23/2016 - Present        RESOLVED: Rectal prolapse ICD-10-CM: K62.3  ICD-9-CM: 569.1  1/18/2021 - 1/20/2021        RESOLVED: UTI (urinary tract infection) ICD-10-CM: N39.0  ICD-9-CM: 599.0  2/9/2018 - 1/21/2021        RESOLVED: Pubic bone fracture ([address] ICD-10-CM: Y82.968N  ICD-9-CM: 808.2  2/6/2018 - 1/21/2021        RESOLVED: Pneumonia ICD-10-CM: J18.9  ICD-9-CM: 486  11/13/2014 - 1/21/2021    Overview Signed 11/13/2014 10:13 AM by Rickie Mancia MD     Presumed aspiration related to epistaxis (nose bleed)             RESOLVED: Left humeral fracture ICD-10-CM: Z63.148K  ICD-9-CM: 812.20  11/13/2014 - 1/21/2021        RESOLVED: Acute respiratory failure with hypoxia ([facility] ICD-10-CM: J96.01  ICD-9-CM: 518.81  11/13/2014 - 1/21/2021    Overview Signed 11/13/2014 10:16 AM by Bill Tabor MD     From Pneumonia, resolved             RESOLVED: Sepsis ([facility] ICD-10-CM: A41.9  ICD-9-CM: 038.9, 995.91  11/10/2014 - 1/21/2021    Overview Signed 11/13/2014 10:13 AM by Bill Tabor MD     resolved             RESOLVED: CAD (coronary artery disease) ICD-10-CM: I25.10  ICD-9-CM: 414.00  1/1/1996 - 1/21/2021    Overview Signed 9/23/2016 11:46 AM by Lexy Balderas NP     MI                 Greater than 30 minutes were spent with the patient on counseling and coordination of care    Signed:   Nida Unger DO  3/14/2023  10:37 AM

## 2023-03-14 NOTE — PROGRESS NOTES
Nutrition Brief    Pt is comfort care. Aggressive nutrition intervention no longer warranted. Will continue with supplements and snacks as ordered- ensure enlive, yogurt, ice cream. Encourage intake of pleasure foods when awake and alert. Nutrition signing off.     Tram Candelaria RD  Available via [address] [place] [address]

## 2023-03-14 NOTE — PROGRESS NOTES
ALYSE:  1. RUR-15%  2. Return to [facility] with [facility] hospice. 3. BLS transport. CM spoke with patient son, Shahida Somers he was unaware that her discharge was happening today, but agreeable after speaking with Dr. Zandra Aly. [facility] with [facility] is in contact with him to inform him they can provide the care she needs along with hospice. Jose Snider with [facility] hospice can order a hospital bed, and it can take 4 hours for delivery. CM will schedule transport for later this evening with [facility]. 6601- Patient will discharge Wednesday morning back to her room at [facility] with [address]. Jose Snider is ordering  the hospital bed to be delivered this evening with admitting nurse tomorrow at 10:00 a.m MAHESH also requested for admission on Wednesday to allow time for DME to be brought in. Patient son aware of this. CM will arrange transport for 9:30 a.m. Wednesday. CM will e-mail IMM letter to patient son at [email or web address]        Medicare pt has received, reviewed, and signed 2nd IM letter informing them of their right to appeal the discharge. Signed copy has been placed on pt bedside chart.       [place], [facility]

## 2023-03-14 NOTE — HOSPICE
[facility]  [phone]     Patient Name: Roopa Means  YOB: 1928  Age: 80 y.o. [facility] RN Note:  Patient is alert and verbal, denies pain. Not meeting criteria for GIP hospice. Plan will be to return to [facility] with hospice. [facility] can accept at [facility] once d/c date is determined. I spoke with patient's son, Kendell Sadler, and he agrees with this plan. Thank you for the opportunity to be of service to this patient.      Laurita Callaway RN  Clinical Nurse Liaison   [facility]  Y)[phone] (A) [phone]

## 2023-03-14 NOTE — HOSPICE
[facility]  [phone]    Hospice Nursing PRE-Admission   Discharge Summary  Patient Name: Quoc Last  YOB: 1928  Age: 80 y.o. Date of PLANNED Hospice Admission: 3/15/2023  Hospice Attending: Dr Daron Ca  Primary Care Physician: Timothy, MD Janak     Home Hospice Address:  [address] [address] [phone]    Primary Contact and Phone:  Venita Smith [phone], son      ADVANCE CARE PLANNING    Code Status: DNR   Durable DNR: [x]  Yes  []  No  Advance Care Planning 3/12/2023   Patient's Healthcare Decision Maker is: -   Primary Decision Maker Name -   Primary Decision Maker Phone Number -   Primary Decision Maker Relationship to Patient -   Confirm Advance Directive Yes, on file   Patient Would Like to Complete Advance Directive -   Does the patient have other document types -       Muslim: OTHER   Home: TBD    HOSPICE SUMMARY     Verbal CTI of terminal diagnosis with life expectancy of 6 months or less received from: Dr. Danielle Grimes    For the Hospice Diagnosis of: PCM    NCD: Requested      CLINICAL INFORMATION   Allergies: No Known Allergies      Currently this patient has:  [x] Supplemental O2     [] PICC    [] PORT   [] Nicholson Catheter [] Ostomy  [] NG Tube  [] PEG Tube    [] Wounds       Does patient have an AICD device:  [] Yes     [x] No    Has ICD been deactivated? [] Yes     [] No         COVID Screening: 3/5: Negative      ASSESSMENT & PLAN     1. Symptom Issues Identified: weakness, confusion, shortness of breath, poor appetite    2. Spiritual Issues  Identified: open to  visits    3. Psych/ Social/ Emotional Issues Identified: son, Ross Camarillo, is in agreement with hospice plan of care. CARE COORDINATION           Hospice Consents: signed via [employer]    2.  DME Ordered/Company/Delivery Plan: [facility] #[id number]: hospital bed, gel overlay, 5L oxygen conc for delivery 3/14 by 6pm      3. Unique home needs for safety: Bariatric patient  NO    4. Symptom Kit and other Medications Needs: Facility is requesting the we get hospice comfort kit filled with pre-filled syringes for all liquids. We will order and have delivered to [facility] in AM    5. Home Admission Reservation: 3/15 10 am    6. Transportation by: [facility]   Scheduled for 3/15 9AM         7.    Verbal Report/Handoff given to: per this email      8. Phone number to GERMANIA ADHIKARI [phone]    4.  Supplies/Wound Care: chux, diapers, barrier cream    Geraldine Medina RN  Clinical Nurse Liaison   [address] [place] [address]  S)[phone] (W) [phone]

## 2023-03-15 NOTE — HOSPICE
[facility]  [phone]     Patient Name: Patti Ramos  YOB: 1928  Age: 80 y.o. [address] [facility] RN Note:  Hospice bedside assessment. She is alert and verbal, denies pain. Stable for transport to [facility]. Supplies gathered. I fed her and she ate 20% of her breakfast.   is setting up transport which may delay admission. I will update admission nurse once transport arranged. Thank you for the opportunity to be of service to this patient.      Adolph Lundborg RN  Clinical Nurse Liaison   [address] [facility]  U)[phone] (T) [phone]

## 2023-03-15 NOTE — DISCHARGE SUMMARY
Discharge Summary       PATIENT ID: Sachin Valentine  MRN: [id number]   YOB: 1928    DATE OF ADMISSION: 3/5/2023  2:58 PM    DATE OF DISCHARGE: 3/14/2023   PRIMARY CARE PROVIDER: Janak Aguirre MD     ATTENDING PHYSICIAN: Betsy aMtos DO   DISCHARGING PROVIDER: Betsy Matos DO      CONSULTATIONS: IP CONSULT TO PULMONOLOGY    PROCEDURES/SURGERIES: * No surgery found *    [address] [facility],[address] COURSE:   Admission History:  \"80 y.o. female with COPD on O2 who was recently discharged from here to SNF after being hospitalized for RSV as well as bacterial pneumonia and MARILYN. She returns with hypoxic respiratory failure and is found to have bacterial pneumonia. \"    Patient admitted for further evaluation. She had poor functional status and hospice was recommended. Family elected for comfort care/hospice and she was discharged to [facility] with hospice services. DISCHARGE DIAGNOSES / PLAN:      Acute on chronic hypoxic respiratory failure: due to HCAP, possible pulm edema/effusion, COPD exacerbation  -on 2L at baseline. Requiring 2-4L on discharge  -completed systemic steroid course  -repeat CXR 3/7 - no significant change  -recent TTE w/ normal LVEF  -per pulmonary: may be time to consider comfort measures  -appreciate hospice assistance, s/p family meeting 3/11 with decision to transition to comfort care, not requiring GIP yet as minimal symptoms. Discharge to [facility] with hospice services      HCAP with sepsis:  -s/p antibiotics   -blood cultures NG     HTN     Troponin elevation: mild elevation in the setting of resp failure, lower than last admit. No chest pain. Hypothyroidism     Depression/anxiety     Recent RSV infection     Dementia at baseline     Hypophos, s/p repleted     Remote hx of MI, breast CA, L lung mass excision    Discontinued home chronic medications. Goal for symptomatic management with hospice        BMI: Body mass index is 17.26 kg/m². . This patient: Meets criteria for underweight given BMI </= 18.5. The patient should be followed closely for risk of malnutrition. PENDING TEST RESULTS:   At the time of discharge the following test results are still pending: none         FOLLOW UP APPOINTMENTS:    Follow-up Information       Follow up With Specialties Details Why Contact Info    Janak Aguirre MD Neurology                DISCHARGE MEDICATIONS:  Current Discharge Medication List        CONTINUE these medications which have NOT CHANGED    Details   acetaminophen (TYLENOL) 325 mg tablet Take 650 mg by mouth every four (4) hours as needed (Mild Pain).            STOP taking these medications       albuterol-ipratropium (DUO-NEB) 2.5 mg-0.5 mg/3 ml nebu Comments:   Reason for Stopping:         predniSONE (DELTASONE) 10 mg tablet Comments:   Reason for Stopping:         docusate sodium (COLACE) 100 mg capsule Comments:   Reason for Stopping:         guaiFENesin ER (MUCINEX) 600 mg ER tablet Comments:   Reason for Stopping:         therapeutic multivitamin (Thera) tablet Comments:   Reason for Stopping:         fluticasone-umeclidinium-vilanterol (Trelegy Ellipta) 100-62.5-25 mcg inhaler Comments:   Reason for Stopping:         senna-docusate (Senexon-S) 8.6-50 mg per tablet Comments:   Reason for Stopping:         aspirin 81 mg chewable tablet Comments:   Reason for Stopping:         sertraline (ZOLOFT) 50 mg tablet Comments:   Reason for Stopping:         levothyroxine (SYNTHROID) 75 mcg tablet Comments:   Reason for Stopping:         amLODIPine (NORVASC) 10 mg tablet Comments:   Reason for Stopping:         latanoprost (XALATAN) 0.005 % ophthalmic solution Comments:   Reason for Stopping:               DISPOSITION:    Home With:   OT  PT  HH  RN       Long term SNF/Inpatient Rehab    Independent/assisted living   x Hospice- at [facility]    Other:     PATIENT CONDITION AT DISCHARGE:     Functional status   x Poor     Deconditioned     Independent      Cognition     Lucid Forgetful    x Dementia      Catheters/lines (plus indication)    Nicholson     PICC     PEG    x None      Code status     Full code    x DNR      PHYSICAL EXAMINATION AT DISCHARGE:    General:          can answer simple questions, no acute distress, chronically ill-appearing, weak, thin  Resp:               Decreased AE, No accessory muscle use  CV:                  RRR, No audible murmur  GI:                   Soft, Non distended, Non tender  Neurologic:       Not assessed   Extremities:     No edema or cyanosis  Skin:                No rashes.  No jaundice      CHRONIC MEDICAL DIAGNOSES:  Problem List as of 3/15/2023 Date Reviewed: 2/28/2023            Codes Class Noted - Resolved    Acute on chronic respiratory failure with hypoxia [facility]) ICD-10-CM: J96.21  ICD-9-CM: 518.84, 799.02  3/5/2023 - Present        COPD exacerbation ([facility] ICD-10-CM: J44.1  ICD-9-CM: 491.21  2/28/2023 - Present        RSV (acute bronchiolitis due to respiratory syncytial virus) ICD-10-CM: J21.0  ICD-9-CM: 466.11  2/28/2023 - Present        MARILYN (acute kidney injury) ([facility] ICD-10-CM: N17.9  ICD-9-CM: 584.9  2/28/2023 - Present        Hypernatremia ICD-10-CM: E87.0  ICD-9-CM: 276.0  2/28/2023 - Present        Pneumonia ICD-10-CM: J18.9  ICD-9-CM: 486  2/23/2023 - Present        Hip fracture requiring operative repair [facility]) ICD-10-CM: S72.009A  ICD-9-CM: 820.8  10/29/2021 - Present        SSS (sick sinus syndrome) ([facility] ICD-10-CM: I49.5  ICD-9-CM: 427.81  1/9/2018 - Present        Heart palpitations ICD-10-CM: R00.2  ICD-9-CM: 785.1  12/18/2017 - Present        Shortness of breath ICD-10-CM: R06.02  ICD-9-CM: 786.05  9/23/2016 - Present        RESOLVED: Rectal prolapse ICD-10-CM: K62.3  ICD-9-CM: 569.1  1/18/2021 - 1/20/2021        RESOLVED: UTI (urinary tract infection) ICD-10-CM: N39.0  ICD-9-CM: 599.0  2/9/2018 - 1/21/2021        RESOLVED: Pubic bone fracture ([facility] ICD-10-CM: H79.965C  ICD-9-CM: 808.2  2/6/2018 - 1/21/2021        RESOLVED: Pneumonia ICD-10-CM: J18.9  ICD-9-CM: 013  11/13/2014 - 1/21/2021    Overview Signed 11/13/2014 10:13 AM by Sophia Sandoval MD     Presumed aspiration related to epistaxis (nose bleed)             RESOLVED: Left humeral fracture ICD-10-CM: P63.925Y  ICD-9-CM: 812.20  11/13/2014 - 1/21/2021        RESOLVED: Acute respiratory failure with hypoxia ([facility] ICD-10-CM: J96.01  ICD-9-CM: 518.81  11/13/2014 - 1/21/2021    Overview Signed 11/13/2014 10:16 AM by Sophia Sandoval MD     From Pneumonia, resolved             RESOLVED: Sepsis ([facility] ICD-10-CM: A41.9  ICD-9-CM: 038.9, 995.91  11/10/2014 - 1/21/2021    Overview Signed 11/13/2014 10:13 AM by Sophia Sandoval MD     resolved             RESOLVED: CAD (coronary artery disease) ICD-10-CM: I25.10  ICD-9-CM: 414.00  1/1/1996 - 1/21/2021    Overview Signed 9/23/2016 11:46 AM by Michelle Tong NP     MI               Greater than 30 minutes were spent with the patient on counseling and coordination of care    Signed:   Rodríguez Osei DO  3/15/2023  10:37 AM

## 2023-03-15 NOTE — HOSPICE
Tyler Spring  1928  81 yo female      Principle Hospice Diagnosis: End Stage COPD  Diagnoses RELATED to the terminal prognosis: Protein calorie malnutrition, Dementia, respiratory failure, recurrent pneumonia, depression, anxiety  Unrelated Diagnosis: Hypothyroidism      Date of Hospice Admission: 3/15/2023  Hospice Attending Elected by Patient: Dr. Dash Nevarez  Primary Care Physician:      Admitting RN: Ya Jenkins RN  Nurse CM: Elizabeth  : Lio Filter: Deandra Ellis DNR: yes, on file       Home: TBD     Direct Observation:    Received patient lying in bed in [facility]. Patient alert and oriented to self. Patient with labored breathing on 5L supplemental O2. RR 28. SPO2 87-89%. Laureen Castellnaos LPN at facility entered room and advised [facility] arrived. Hospice RN administered 2mg dilaudid per order. Patient denies pain. VSS. Lungs diminished throughout. BS active x4. Per facility staff patient was ambulatory and eating prior to hospitalization. She appears weak and debilitated, with sunken facial features. Her skin is fragile. She has foam dressing intact on sacrum. Per facility nurse reports erythema on her sacrum. Dressing left clean and intact. Per hospital staff she is now requiring assistance with feeding and her appetite is poor. Upon reassessment after dilaudid, patient resting in bed sleeping. She is easily arousable and endorses feeling more comfortable. SPO2 89-90%. Respiratory effort decreased. Admission/medications orders written and given to Laureen Castellanos LPN. Call to sonIdalmis to update on visit and provided opportunity for questions. Encouraged son and staff to call  with questions, concerns, or changes in patient condition. SRK in facility delivered from [facility]. Prefilled syringes and bubble packed tablets required by facility. Personal care supplies ordered through [employer]. DME in place through [facility].      Palliative Performance Scale: 30%        ER Visits/ Hospitalizations in past year: 2   Onset Date of Hospice Diagnosis: years  Summary of Disease Progression Leading to Hospice Diagnosis: Excerpted from discharge summary of Dr. Alva Walton Admission History: \"80 y.o. female with COPD on O2 who was recently discharged from here to SNF after being hospitalized for RSV as well as bacterial pneumonia and MARILYN. She returns with hypoxic respiratory failure and is found to have bacterial pneumonia. \"   Patient admitted for further evaluation. She had poor functional status and hospice was recommended. Family elected for comfort care/hospice and she was discharged to [address] [place] [address] with hospice services   Acute on chronic hypoxic respiratory failure: due to HCAP, possible pulm edema/effusion, COPD exacerbation -on 2L at baseline -completed systemic steroid course -repeat CXR 3/7 - no significant change -recent TTE w/ normal LVEF -per pulmonary: may be time to consider comfort measures -appreciate hospice assistance, s/p family meeting 3/11 with decision to transition to comfort care, not requiring GIP yet as minimal symptoms   HCAP with sepsis: -s/p antibiotics  -blood cultures NG   HTN  Troponin elevation: mild elevation in the setting of resp failure, lower than last admit. No chest pain. Hypothyroidism   Depression/anxiety   Recent RSV infection   Dementia at baseline   Hypophos, s/p repleted   Remote hx of MI, breast CA, L lung mass excision       Use LCD Guidelines and list features:     Patients will be considered to be in the terminal stage of pulmonary disease (life expectancy of six months or less) if they meet the following criteria. The criteria refer to patients with various forms of advanced pulmonary disease who eventually follow a final common pathway for end stage pulmonary disease. (1 and 2 should be present. Documentation of 3, 4, and 5, will lend supporting documentation.):    ____x____  1.  Severe chronic lung disease as documented by both a and b: ___x_____  a. Disabling dyspnea at rest, poorly or unresponsive to bronchodilators,                                                   resulting in decreased functional capacity, e.g., bed to chair existence,                                                   fatigue, and cough: (Documentation of Forced Expiratory Volume in One                                                   Second (FEV1), after bronchodilator, less than 30% of predicted is objective                                                                  evidence for disabling dyspnea, but is not necessary to obtain.)                          ___x_____  b. Progression of end stage pulmonary disease, as evidenced by increasing visits                                                   to the emergency department or hospitalizations for pulmonary infections                                                   and/or respiratory failure or increasing physician home visits prior to initial                                                   certification. (Documentation of serial decrease of FEV1>40 ml/year is                                                                     objective evidence for disease progression, but is not necessary to obtain.)  ___x_____  2. Hypoxemia at rest on room air, as evidenced by pO2 ? 55 mmHg; or oxygen saturation                           ? 88%, determined either by arterial blood gases or oxygen saturation monitors; (These                            values may be obtained from recent hospital records. ) OR Hypercapnia, as evidenced by                            pCO2 ?50 mmHg. (This value may be obtained from recent [within 3 months] hospital                            records.)  ________  3. Right heart failure (RHF) secondary to pulmonary disease (Cor pulmonale) (e.g., not                           secondary to left heart disease or valvulopathy). ____x____  4.  Unintentional progressive weight loss of greater than 10% of body weight over the                            preceding six months.  ____x____  5. Resting tachycardia >100/min. SPIRITUAL/Social/Emotional/psych: Patient has 2 sons and 2 daughters. Son, Neeru Yo is POA. Dr. Tashia March contacted, agrees to serve as attending provider for hospice and provided verbal certification of terminal illness with prognosis of 6 months or less life expectancy. Orders for hospice admission, medications and plan of treatment received. Medication reconciliation completed.         Currently this patient has:  Supplemental O2: 2-4L continuous           MEDS:  I have reviewed the patient's medication list with MD and identified the following:  Nonformulary medications: n/a  Unrelated medications: n/a     IDT communication to include MD, SN, SW, [address] [place] [address] and support team.

## 2023-03-15 NOTE — PROGRESS NOTES
Report called to [facility] IN THE [facility] at [address] care home

## 2023-03-15 NOTE — PROGRESS NOTES
ALYSE:  1. RUR-15%      Patient is returning to [facility] with [facility] hospice services. CM arranged transport with Hospital to home for 10:00 a.m. [facility] hospice providing payment of $258.50. Report can be called to [phone] Room 114.        [facility]

## 2023-03-16 NOTE — HOSPICE
SNV made to follow up on post admission. Arrived at patient's room, patient laying in hosital bed, eyes slightly opened, not interactive, nonverbal, facial grimacing noted, respirations even and increased effort. Lungs diminished, no edema to lower extremities. Medications reconciled with Radha Gold, facility nurse. Radha Gold stated that patient has had decline since yesterday, unable to verbalize and interact today with staff. Patient received 3 doses of dilaudid since admission. Orders received for scheduled dilaudid due to respiratory distress and pain. Mary Ellen verbalized understanding. Tierra Ram, arrived during visit. Joint phone call made to son, Edi Klein to update on patient status. Long conversation held about patient decline, patient's symptoms, medication changes. Discussed with Edi Klein about importance of coming to facility today due to patient's decline. Edi Klein stated that  home  is [place]. Patient added to 0-7.  triage RN to order dilaudid to facility.

## 2023-03-16 NOTE — HOSPICE
LMSW made visit for inital assessment. Upon arrival, KELLY Lockett was bedside determining patient at 0-7 status. LMSW provided comforting presence. LMSW confirmed presence of DDNR in facility. Staff reported patient's condition was a rapid change overnight. LMSW and KELLY Lockett called and spoke with son Graham Gentile via phone informing him of patient's change in condition. LMSW and RN encouraged family to come to the facility. KELLY Lockett provided education about EOL signs and symptoms. LMSW inquired about final arrangements. Patient has pre-arranged with [place]  home. LMSW explained that hospice will manage logistics at [facility]. Graham Gentile processed feelings of shock at patient's decline and recent hospital stay. LMSW provided validation. Graham Gentile stated that he had just set up visit with  at 1 pm today prior to [address] [facility] and RN's call. LMSW followed up with Jasmine Mchugh to inform of change in condition/0-7 status. LMSW will continue to offer support and will remain available to address needs that arise.

## 2023-03-16 NOTE — HOSPICE
This  called and spoke with son, introducing self and  role and assessing spiritual care needs of patient and family. Patient has [place] background and son is welcoming of  visits for spiritual support of patient as she nears end of life. Shortly after the phone call  received text from staff that patient is actively dying.  drove to her care facility and visited with son and daughter-in-law at bedside of patient who was non-responsive and appearing comfortable.  engaged with family in life review and offered prayer, end of life education, and grief support.

## 2023-03-17 ENCOUNTER — HOME CARE VISIT (OUTPATIENT)
Dept: HOSPICE | Facility: HOSPICE | Age: 88
End: 2023-03-17
Payer: MEDICARE

## 2025-05-30 NOTE — PROGRESS NOTES
Bedside shift change report given to Marilee [id number] (oncoming nurse) by Camila Newman (offgoing nurse). Report included the following information SBAR, Kardex, Intake/Output, MAR, and Cardiac Rhythm NSR . Problem: Pressure Injury - Risk of  Goal: *Prevention of pressure injury  Description: Document Gerardo Scale and appropriate interventions in the flowsheet. Outcome: Progressing Towards Goal  Note: Pressure Injury Interventions:  Sensory Interventions: Discuss PT/OT consult with provider, Float heels, Keep linens dry and wrinkle-free, Maintain/enhance activity level, Minimize linen layers, Assess changes in LOC    Moisture Interventions: Absorbent underpads, Apply protective barrier, creams and emollients, Minimize layers    Activity Interventions: Increase time out of bed, Pressure redistribution bed/mattress(bed type), PT/OT evaluation    Mobility Interventions: Float heels, HOB 30 degrees or less, Pressure redistribution bed/mattress (bed type), PT/OT evaluation    Nutrition Interventions: Document food/fluid/supplement intake, Offer support with meals,snacks and hydration    Friction and Shear Interventions: Apply protective barrier, creams and emollients, Minimize layers                Problem: Patient Education: Go to Patient Education Activity  Goal: Patient/Family Education  Outcome: Progressing Towards Goal     Problem: Falls - Risk of  Goal: *Absence of Falls  Description: Document Kranthi Fall Risk and appropriate interventions in the flowsheet.   Outcome: Progressing Towards Goal  Note: Fall Risk Interventions:  Mobility Interventions: Bed/chair exit alarm, Patient to call before getting OOB, PT Consult for mobility concerns, PT Consult for assist device competence    Mentation Interventions: Bed/chair exit alarm, Adequate sleep, hydration, pain control, More frequent rounding, Reorient patient, Room close to nurse's station    Medication Interventions: Bed/chair exit alarm, Patient to call before getting Patient : John Palacios Age: 58 year old Sex: male   MRN: [id number] Encounter Date: 5/30/2025      History      Chief Complaint   Patient presents with    Alcohol Problem    Weakness            HPI    John Palacios is a 58 year old male with hx of alcoholism and jaime presenting to the emergency department with CC of of defibrillator issues. Pt states \"things are going down real quick\" and he \"can't do anything anymore.\" Pt notes he is an alcoholic and he started drinking again, with his last drink 1 hr PTA. Pt states he wants to quit drinking, and feels terrible. Pt endorses feeling his defibrillator adjusting his rhythm, which causes a burning sensation. Pt states he has previously fallen a few times in the shower, and is anxious about getting in his bathtub. Per family, pt takes all his medications. Pt denies SI/HI.     I have reviewed John Palacios's previous discharge summary from 4/11.  Note Review Summary:   HOSPITAL COURSE:   Hospital Summary:   John Palacios is a 57 year old male with a PMHx of tobacco use, alcohol use disorder, depression, anxiety, substance use disorder, and recent cardiac arrest resulting in reduced EF now s/p ICD placement who presented after an unintentional overdose of Ambien.  His fiancée stated that she was assembling his meds and his pill planner for the week and had 7 or 8 of his Ambien and a small cup sitting near him and he took the meds in the Thinking they were his evening meds.  He also had a few alcoholic beverages prior to this event.  She does not believe he had any intentions to harm himself, which he confirmed.  Mildly hypoxic on initial presentation and somnolent, therefore admitted for observation.  Toxicology contacted in the ED, recommended repeat APAP and salicylates in 4 hours and admitting for monitoring until medications are metabolized. These levels stable on serial checks. Patient with clinical improvement while under observation and weaned off of  OOB, Teach patient to arise slowly    Elimination Interventions: Bed/chair exit alarm, Toilet paper/wipes in reach, Toileting schedule/hourly rounds, Stay With Me (per policy)              Problem: Patient Education: Go to Patient Education Activity  Goal: Patient/Family Education  Outcome: Progressing Towards Goal     Problem: Risk for Spread of Infection  Goal: Prevent transmission of infectious organism to others  Description: Prevent the transmission of infectious organisms to other patients, staff members, and visitors.   Outcome: Progressing Towards Goal     Problem: Patient Education:  Go to Education Activity  Goal: Patient/Family Education  Outcome: Progressing Towards Goal oxygen, with stable vitals signs, low CIWA scores, ambulating without difficulty and tolerating a diet. He will be discharged to home in stable condition. Recommend avoiding Ambien due to concurrent alcohol use as well as other sedating medications. He will be discharged in stable condition to home, accompanied by his wife. Follow up with PCP in 1 week for transitional care appointment     The discharge planning of the patient took 30 minutes.     Past/Family/Social History     No Known Allergies    No current facility-administered medications for this encounter.     Current Outpatient Medications   Medication Sig    ondansetron (ZOFRAN ODT) 4 MG disintegrating tablet Place 1 tablet onto the tongue every 8 hours as needed for up to 7 days.    prednisoLONE acetate (PRED FORTE) 1 % ophthalmic suspension Place 1 drop into both eyes in the morning and 1 drop at noon and 1 drop in the evening.    azithromycin (ZITHROMAX) 500 MG tablet Take 2 tablets by mouth once a week for 3 weeks    zolpidem (AMBIEN) 10 MG tablet Take 1 tablet by mouth nightly.    mupirocin (BACTROBAN) 2 % ointment Apply 1 Application to the skin 3 (three) times a day for 7 days.    spironolactone (ALDACTONE) 25 MG tablet Take 1 tablet (25 mg total) by mouth daily.    gabapentin (NEURONTIN) 400 MG capsule Take 1 capsule by mouth in the morning and 1 capsule at noon and 1 capsule in the evening.    doxycycline hyclate (VIBRA-TABS) 100 MG tablet Take 1 tablet by mouth once a day    ondansetron (ZOFRAN ODT) 4 MG disintegrating tablet Place 1 tablet onto the tongue every 8 hours as needed for nausea    potassium CHLORIDE (KLOR-CON M) 20 MEQ sarabjit ER tablet Take 1 tablet by mouth daily.    busPIRone (BUSPAR) 10 MG tablet Take 1 tablet by mouth in the morning and 1 tablet in the evening.    thiamine (VITAMIN B1) 100 MG tablet Take 1 tablet by mouth daily.    magnesium oxide (MAG-OX) 400 (240 Mg) MG tablet Take 1 tablet by mouth daily. (Patient taking  differently: Take 400 mg by mouth nightly.)    pantoprazole (PROTONIX) 40 MG tablet Take 1 tablet by mouth daily.    losartan (COZAAR) 50 MG tablet Take 1 tablet by mouth daily.    folic acid (FOLATE) 1 MG tablet Take 1 tablet by mouth daily.    carvedilol (COREG) 6.25 MG tablet Take 1 tablet by mouth 2 times daily with meals.    atorvastatin (LIPITOR) 40 MG tablet Take 1 tablet by mouth daily.    aspirin (ECOTRIN) 81 MG EC tablet Take 1 tablet (81 mg total) by mouth daily.    naLOXone (NARCAN) 4 MG/0.1ML nasal liquid Spray the content of 1 device into 1 nostril. Call 911. May repeat with 2nd device in alternate nostril if no response in 2-3 minutes.    Acetaminophen 500 MG Cap Take 1 capsule by mouth 2 times daily as needed.    Multiple Vitamin (Multi Vitamin Daily) Tab Take 1 tablet by mouth daily.    cholecalciferol 25 mcg(1,000 units) tablet Take 25 mcg by mouth daily as needed.    Sennosides (SENOKOT PO) Take 2 tablets by mouth daily as needed.    triamcinolone (ARISTOCORT) 0.1 % ointment Apply topically 2 times daily. Apply to scalp lesions    cetirizine (ZyrTEC) 10 MG tablet Take 10 mg by mouth daily as needed for Allergies.       Past Medical History:   Diagnosis Date    Alcohol abuse     Cardiac arrest (CMD)     Essential (primary) hypertension     High cholesterol     Moderate aortic stenosis     Non-ischemic cardiomyopathy  (CMD)     Polysubstance abuse (CMD)     Tobacco dependence        Past Surgical History:   Procedure Laterality Date    Back surgery      Colonoscopy remove lesions by snare  01/07/2022    Dr. Roger Andrew.    Esophagogastroduodenoscopy transoral flex w/bx single or mult  01/07/2022    Dr. Roger Andrew.    Fracture surgery         Family History   Problem Relation Age of Onset    Cancer Mother         lung    Cancer Father         He does not know what kind of cancer follow-up passed away at the age of 63       Social History     Tobacco Use    Smoking status: Every Day     Current  packs/day: 0.50     Types: Cigarettes    Smokeless tobacco: Never   Vaping Use    Vaping status: never used   Substance Use Topics    Alcohol use: Yes     Comment: 3-5 drinks per day    Drug use: Yes     Types: Prescription Drugs, Cocaine     Comment: last used a few weeks          Review of Systems   Review of Symptoms     Review of Systems       Physical Exam   Physical Exam     ED Triage Vitals   ED Triage Vitals Group      Temp 05/30/25 1837 98.4 °F (36.9 °C)      Heart Rate 05/30/25 1837 94      Resp 05/30/25 1837 20      BP 05/30/25 1839 (!) 158/79      SpO2 05/30/25 1837 98 %      EtCO2 mmHg --       Height 05/30/25 1837 6' (1.829 m)      Weight 05/30/25 1837 200 lb 13.4 oz (91.1 kg)      Weight Scale Used 05/30/25 1837 Scale in bed      BMI (Calculated) 05/30/25 1837 27.24      IBW/kg (Calculated) 05/30/25 1837 77.6       Physical Exam  Constitutional:       Comments: Appears intoxicated   HENT:      Head: Normocephalic and atraumatic.      Right Ear: External ear normal.      Left Ear: External ear normal.      Nose: Nose normal.      Mouth/Throat:      Mouth: Mucous membranes are moist.   Eyes:      Extraocular Movements: Extraocular movements intact.      Pupils: Pupils are equal, round, and reactive to light.   Cardiovascular:      Rate and Rhythm: Normal rate and regular rhythm.      Heart sounds: Normal heart sounds. No murmur heard.     No friction rub. No gallop.      Comments: No peripheral edema.   Pulmonary:      Effort: Pulmonary effort is normal. No respiratory distress.      Breath sounds: Normal breath sounds. No stridor. No wheezing, rhonchi or rales.   Chest:      Comments: Defibrillator placed in L upper chest wall.   Abdominal:      General: Abdomen is flat. Bowel sounds are normal.      Palpations: There is no mass.      Tenderness: There is no abdominal tenderness. There is no guarding.   Musculoskeletal:         General: No swelling or signs of injury. Normal range of motion.      Right  lower leg: No edema.      Left lower leg: No edema.   Skin:     General: Skin is warm and dry.      Findings: No rash.   Neurological:      General: No focal deficit present.      Mental Status: He is alert and oriented to person, place, and time.      Cranial Nerves: No cranial nerve deficit.   Psychiatric:         Mood and Affect: Mood normal.         Behavior: Behavior normal.         Thought Content: Thought content normal.              Procedures   ED Procedures     Procedures     Lab Results   ED Lab     Results for orders placed or performed during the hospital encounter of 05/30/25   Comprehensive Metabolic Panel    Specimen: Blood, Venous   Result Value Ref Range    Fasting Status      Sodium 141 135 - 145 mmol/L    Potassium 3.4 3.4 - 5.1 mmol/L    Chloride 104 97 - 110 mmol/L    Carbon Dioxide 26 21 - 32 mmol/L    Anion Gap 14 7 - 19 mmol/L    Glucose 96 70 - 99 mg/dL    BUN 11 6 - 20 mg/dL    Creatinine 1.31 (H) 0.67 - 1.17 mg/dL    Glomerular Filtration Rate 63 >=60    BUN/Cr 8 7 - 25    Calcium 8.5 8.4 - 10.2 mg/dL    Bilirubin, Total 1.0 0.2 - 1.0 mg/dL    GOT/ (H) <=37 Units/L    GPT/ALT 76 (H) <64 Units/L    Alkaline Phosphatase 181 (H) 45 - 117 Units/L    Albumin 3.5 3.4 - 5.0 g/dL    Protein, Total 7.3 6.4 - 8.2 g/dL    Globulin 3.8 2.0 - 4.0 g/dL    A/G Ratio 0.9 (L) 1.0 - 2.4   Alcohol    Specimen: Blood, Venous   Result Value Ref Range    Alcohol 192 (H) <3 mg/dL   TROPONIN I, HIGH SENSITIVITY    Specimen: Blood, Venous   Result Value Ref Range    Troponin I, High Sensitivity 5 <77 ng/L   NT proBNP    Specimen: Blood, Venous   Result Value Ref Range    NT-proBNP <35 <=125 pg/mL   Drug Abuse Screen, Urine    Specimen: Urine clean catch   Result Value Ref Range    Amphetamines, Urine Negative Negative    Barbiturates, Urine Negative Negative    Benzodiazepines, Urine Negative Negative    Cocaine/ Metabolite, Urine Negative Negative    Opiates, Urine Negative Negative    Phencyclidine,  Urine Negative Negative    Cannabinoids, Urine Positive (A) Negative    Fentanyl, Urine Screen Negative Negative   Magnesium    Specimen: Blood, Venous   Result Value Ref Range    Magnesium 1.7 1.7 - 2.4 mg/dL   Urinalysis & Reflex Microscopy With Culture If Indicated    Specimen: Urine, Voided   Result Value Ref Range    COLOR, URINALYSIS Colorless     APPEARANCE, URINALYSIS Clear     GLUCOSE, URINALYSIS Negative Negative mg/dL    BILIRUBIN, URINALYSIS Negative Negative    KETONES, URINALYSIS Negative Negative mg/dL    SPECIFIC GRAVITY, URINALYSIS <1.005 (L) 1.005 - 1.030    OCCULT BLOOD, URINALYSIS Negative Negative    PH, URINALYSIS 6.0 5.0 - 7.0    PROTEIN, URINALYSIS Negative Negative mg/dL    UROBILINOGEN, URINALYSIS 0.2 0.2, 1.0 mg/dL    NITRITE, URINALYSIS Negative Negative    LEUKOCYTE ESTERASE, URINALYSIS Negative Negative   Salicylate Level    Specimen: Blood, Venous   Result Value Ref Range    Salicylate 3.9 <=30.0 mg/dL   Acetaminophen Level    Specimen: Blood, Venous   Result Value Ref Range    Acetaminophen 3 (L) 10 - 30 mcg/mL   CBC with Automated Differential (performable only)    Specimen: Blood, Venous   Result Value Ref Range    WBC 12.5 (H) 4.2 - 11.0 K/mcL    RBC 3.63 (L) 4.50 - 5.90 mil/mcL    HGB 12.0 (L) 13.0 - 17.0 g/dL    HCT 33.4 (L) 39.0 - 51.0 %    MCV 92.0 78.0 - 100.0 fl    MCH 33.1 26.0 - 34.0 pg    MCHC 35.9 32.0 - 36.5 g/dL    RDW-CV 13.5 11.0 - 15.0 %    RDW-SD 45.7 39.0 - 50.0 fL     140 - 450 K/mcL    NRBC 0 <=0 /100 WBC    Neutrophil, Percent 45 %    Lymphocytes, Percent 44 %    Mono, Percent 8 %    Eosinophils, Percent 2 %    Basophils, Percent 1 %    Immature Granulocytes 0 %    Absolute Neutrophils 5.6 1.8 - 7.7 K/mcL    Absolute Lymphocytes 5.5 (H) 1.0 - 4.0 K/mcL    Absolute Monocytes 1.0 (H) 0.3 - 0.9 K/mcL    Absolute Eosinophils  0.2 0.0 - 0.5 K/mcL    Absolute Basophils 0.2 0.0 - 0.3 K/mcL    Absolute Immature Granulocytes 0.0 0.0 - 0.2 K/mcL          EKG      [facility] EKG report   Results for orders placed or performed during the hospital encounter of 05/30/25   Electrocardiogram 12-Lead   Result Value Ref Range    Systolic Blood Pressure 158     Diastolic Blood Pressure 79     Ventricular Rate EKG/Min (BPM) 95     Atrial Rate (BPM) 95     MO-Interval (MSEC) 140     QRS-Interval (MSEC) 82     QT-Interval (MSEC) 346     QTc 435     P Axis (Degrees) 62     R Axis (Degrees) 70     T Axis (Degrees) 42     REPORT TEXT       Normal sinus rhythm  Normal ECG  When compared with ECG of  11-APR-2025 01:21,  Nonspecific T wave abnormality, improved in  Inferior leads  QT has shortened  Confirmed by MD RICCARDO, SHRUTI ([id number]),  Amanda Wilburn ([id number]) on 5/30/2025 7:51:42 PM         Radiology Results   ED Radiology Results     Imaging Results              XR CHEST AP OR PA (Final result)  Result time 05/30/25 20:36:07      Final result                   Impression:    IMPRESSION:    1.  No evidence of an acute cardiopulmonary process.      Electronically Signed by: Sammy Andrade MD  Signed on: 5/30/2025 8:36 PM  Created on Workstation ID: [id number]  Signed on Workstation ID: [id number]               Narrative:    XR CHEST AP OR PA  5/30/2025 8:14 PM    HISTORY:   SOB .    COMPARISON: April 10, 2025.    TECHNIQUE:    Single AP portable view.    FINDINGS:  Postoperative changes of a TAVR. Pacer/AICD appears within normal limits.  The lungs are clear. Stable bulla within the left lower lung.  The remaining cardiomediastinal silhouette and pulmonary vasculature appear  within normal limits.   The bony thorax appears normal.                                             ED Medications   ED Medications     Medications - No data to display       ED Course     Vitals:    05/30/25 1839 05/30/25 1900 05/30/25 2000 05/30/25 2040   BP: (!) 158/79 121/80 122/65    Pulse:  95 96 97   Resp:  20 20 19   Temp:       TempSrc:       SpO2:  97% 97% 98%   Weight:       Height:           ED Course  as of 05/30/25 2143   Fri May 30, 2025   2101  I have discussed the case with Dr Alejo at [facility].   We discussed the pertinent history, physical, diagnostic studies and the ED management of the patient.  The plan is to admit the patient, and Dr Alejo accepts.  [NB]      ED Course User Index  [NB] Amanda Wilburn       Cardiac Monitor Review: 9:43 PM  I have independently reviewed the patients cardiac monitor. The patient's rhythm shows normal sinus rhythm  with rate of 93 bpm.          Consults                    Medical Decision Making                                      Patient is a 58 year old with complaint of alcohol withdrawal, requesting detox.  No concerning medical abnormalities found on his workup.  Troponin and BNP are both negative and his EKG does not show any concerning ischemic changes.  He had a cardiac arrest earlier this year but that appears to have been due to severe electrolyte derangement which does not appear present today.  CIWA score improved in the emergency room without need for significant intervention.  Case was discussed with Dr. Marrero of [facility] and patient will transfer there for further care.  The patient will transfer to [facility]        Does the patient have sepsis: NO     Critical Care       No Critical Care        Disposition       Clinical Impression and Diagnosis  9:46 PM     Diagnosis:   1. Alcoholism  (CMD)           Pt to be transferred to [facility]. Dr. Marrero accepting.      Condition on Admission: Stable            Transfer to Another Facility 5/30/2025  9:41 PM  John Palacios should be transferred out to [facility].                I have reviewed the information recorded by the scribe for accuracy and agree with its contents.    ____________________________________________________________________    Amanda Wilburn acting as a scribe for Dr. Guido Delong  Dictation # [id number]     Scribe: Guido Sierra MD  05/30/25 4168

## (undated) DEVICE — DRAPE SURG W41XL74IN CLR FULL SZ C ARM 3 ADH POLY STRP E

## (undated) DEVICE — DBD-PACK,LAPAROTOMY,2 REINFORCED GOWNS: Brand: MEDLINE

## (undated) DEVICE — 4-PORT MANIFOLD: Brand: NEPTUNE 2

## (undated) DEVICE — SOLUTION IRRIG 1000ML 0.9% SOD CHL USP POUR PLAS BTL

## (undated) DEVICE — K-WIRE

## (undated) DEVICE — PADDING CAST SPEC 6INX4YD COT --

## (undated) DEVICE — YANKAUER,POOLE TIP,STERILE,50/CS: Brand: MEDLINE

## (undated) DEVICE — SURGIFOAM SPNG SZ 100

## (undated) DEVICE — GOWN,SIRUS,FABRNF,XL,20/CS: Brand: MEDLINE

## (undated) DEVICE — STERILE POLYISOPRENE POWDER-FREE SURGICAL GLOVES WITH EMOLLIENT COATING: Brand: PROTEXIS

## (undated) DEVICE — DRAPE,U/ SHT,SPLIT,PLAS,STERIL: Brand: MEDLINE

## (undated) DEVICE — SYR 10ML LUER LOK 1/5ML GRAD --

## (undated) DEVICE — TOTAL TRAY, 16FR 10ML SIL FOLEY, URN: Brand: MEDLINE

## (undated) DEVICE — PADDING CST 4INX4YD --

## (undated) DEVICE — JELLY,LUBE,STERILE,FLIP TOP,TUBE,4-OZ: Brand: MEDLINE

## (undated) DEVICE — SURGICAL PROCEDURE PACK BASIN MAJ SET CUST NO CAUT

## (undated) DEVICE — SOLUTION SURG PREP 26 CC PURPREP

## (undated) DEVICE — DRILL, AO SMALL: Brand: GAMMA

## (undated) DEVICE — STERILE-Z MAYO STAND COVERS CLEAR POLYETHYLENE STERILE UNIVERSAL FIT 20 PER CASE: Brand: STERILE-Z

## (undated) DEVICE — POSITIONER HD REST FOAM CMFRT TCH

## (undated) DEVICE — TOWEL SURG W17XL27IN STD BLU COT NONFENESTRATED PREWASHED

## (undated) DEVICE — Z DISCONTINUED USE 2744636  DRESSING AQUACEL 14 IN ALG W3.5XL14IN POLYUR FLM CVR W/ HYDRCOLL

## (undated) DEVICE — ROD RMR L950MM DIA2.5MM W/ EXTN BALL TIP

## (undated) DEVICE — INFECTION CONTROL KIT SYS

## (undated) DEVICE — SMOKE EVACUATION PENCIL: Brand: VALLEYLAB

## (undated) DEVICE — SOLUTION IV 1000ML 0.9% SOD CHL

## (undated) DEVICE — SUTURE VCRL SZ 2-0 L27IN ABSRB UD L26MM SH 1/2 CIR J417H

## (undated) DEVICE — GLOVE SURG SZ 8 L12IN FNGR THK94MIL STD WHT LTX FREE

## (undated) DEVICE — SUTURE VCRL 2-0 L27IN ABSRB CT BRAID COAT UD J275H

## (undated) DEVICE — REM POLYHESIVE ADULT PATIENT RETURN ELECTRODE: Brand: VALLEYLAB

## (undated) DEVICE — 6619 2 PTNT ISO SYS INCISE AREA&LT;(&GT;&&LT;)&GT;P: Brand: STERI-DRAPE™ IOBAN™ 2

## (undated) DEVICE — SOLUTION IRRIG 1000ML H2O STRL BLT

## (undated) DEVICE — HOOK RETRCT L5MM E SHRP SELF RET SYS LONE STAR

## (undated) DEVICE — STRAP,POSITIONING,KNEE/BODY,FOAM,4X60": Brand: MEDLINE

## (undated) DEVICE — RING RETRCTR SQUARE 14.1X14.1C --

## (undated) DEVICE — SUTURE MCRYL SZ 3-0 L27IN ABSRB UD L19MM PS-2 3/8 CIR PRIM Y427H

## (undated) DEVICE — Device

## (undated) DEVICE — SUTURE VCRL SZ 0 L27IN ABSRB UD L36MM CP-1 1/2 CIR REV CUT J267H

## (undated) DEVICE — SOLUTION IRRIG 1000ML STRL H2O USP PLAS POUR BTL

## (undated) DEVICE — PACK,BASIC,SIRUS,V: Brand: MEDLINE

## (undated) DEVICE — PENCIL SMK EVAC 10 FT BLADE ELECTRD ROCKER FOR TELSCP

## (undated) DEVICE — COLON CLOSING PACK: Brand: MEDLINE INDUSTRIES, INC.

## (undated) DEVICE — NEEDLE HYPO 25GA L1.5IN BVL ORIENTED ECLIPSE

## (undated) DEVICE — CURVED, SMALL JAW, OPEN SEALER/DIVIDER: Brand: LIGASURE

## (undated) DEVICE — SUTURE VCRL SZ 8-0 L12IN ABSRB VLT TG160-6 L5.5MM 1/2 CIR J975G

## (undated) DEVICE — SUTURE MCRYL SZ 3-0 L36IN ABSRB UD L36MM CT-1 1/2 CIR Y944H

## (undated) DEVICE — TRAY PREP DRY W/ PREM GLV 2 APPL 6 SPNG 2 UNDPD 1 OVERWRAP

## (undated) DEVICE — GLOVE ORTHO 8   MSG9480

## (undated) DEVICE — DERMABOND SKIN ADH 0.7ML -- DERMABOND ADVANCED 12/BX

## (undated) DEVICE — BASIN ST MAJOR-NO CAUTERY: Brand: MEDLINE INDUSTRIES, INC.